# Patient Record
Sex: FEMALE | Race: BLACK OR AFRICAN AMERICAN | NOT HISPANIC OR LATINO | Employment: FULL TIME
[De-identification: names, ages, dates, MRNs, and addresses within clinical notes are randomized per-mention and may not be internally consistent; named-entity substitution may affect disease eponyms.]

---

## 2017-06-17 ENCOUNTER — HEALTH MAINTENANCE LETTER (OUTPATIENT)
Age: 52
End: 2017-06-17

## 2017-10-10 ENCOUNTER — HOSPITAL ENCOUNTER (OUTPATIENT)
Dept: CT IMAGING | Facility: CLINIC | Age: 52
Discharge: HOME | End: 2017-10-10
Attending: RADIOLOGY | Admitting: RADIOLOGY
Payer: COMMERCIAL

## 2017-10-10 DIAGNOSIS — Q25.72 PULMONARY ARTERIOVENOUS MALFORMATION: ICD-10-CM

## 2017-10-10 PROCEDURE — 25000128 H RX IP 250 OP 636: Performed by: STUDENT IN AN ORGANIZED HEALTH CARE EDUCATION/TRAINING PROGRAM

## 2017-10-10 PROCEDURE — 71260 CT THORAX DX C+: CPT

## 2017-10-10 RX ORDER — IOPAMIDOL 755 MG/ML
63 INJECTION, SOLUTION INTRAVASCULAR ONCE
Status: COMPLETED | OUTPATIENT
Start: 2017-10-10 | End: 2017-10-10

## 2017-10-10 RX ADMIN — IOPAMIDOL 63 ML: 755 INJECTION, SOLUTION INTRAVENOUS at 09:50

## 2017-10-11 ENCOUNTER — OFFICE VISIT (OUTPATIENT)
Dept: RADIOLOGY | Facility: CLINIC | Age: 52
End: 2017-10-11
Attending: RADIOLOGY
Payer: COMMERCIAL

## 2017-10-11 VITALS
OXYGEN SATURATION: 97 % | BODY MASS INDEX: 30.22 KG/M2 | SYSTOLIC BLOOD PRESSURE: 141 MMHG | HEIGHT: 64 IN | DIASTOLIC BLOOD PRESSURE: 79 MMHG | TEMPERATURE: 98.4 F | WEIGHT: 177 LBS | RESPIRATION RATE: 18 BRPM | HEART RATE: 51 BPM

## 2017-10-11 DIAGNOSIS — Q25.72 PULMONARY ARTERIOVENOUS MALFORMATION: Primary | ICD-10-CM

## 2017-10-11 PROCEDURE — 99213 OFFICE O/P EST LOW 20 MIN: CPT | Mod: ZF

## 2017-10-11 ASSESSMENT — PAIN SCALES - GENERAL: PAINLEVEL: NO PAIN (0)

## 2017-10-11 NOTE — NURSING NOTE
"Oncology Rooming Note    October 11, 2017 11:09 AM   Leigh Gray is a 52 year old female who presents for:    Chief Complaint   Patient presents with     Oncology Clinic Visit     Return visit related to one year follow up     Initial Vitals: /79  Pulse 51  Temp 98.4  F (36.9  C) (Tympanic)  Resp 18  Ht 1.613 m (5' 3.5\")  Wt 80.3 kg (177 lb)  SpO2 97%  BMI 30.86 kg/m2 Estimated body mass index is 30.86 kg/(m^2) as calculated from the following:    Height as of this encounter: 1.613 m (5' 3.5\").    Weight as of this encounter: 80.3 kg (177 lb). Body surface area is 1.9 meters squared.  No Pain (0) Comment: Data Unavailable   No LMP recorded. Patient is not currently having periods (Reason: IUD).  Allergies reviewed: Yes  Medications reviewed: Yes    Medications: Medication refills not needed today.  Pharmacy name entered into EPIC:    [facility] [place] - [id number] [place], [place] - [address] [place] [address]    Clinical concerns: No new concerns. Provider was notified.    10 minutes for nursing intake (face to face time)     Katarzyna Ellis LPN            "

## 2017-10-11 NOTE — PROGRESS NOTES
"    INTERVENTIONAL RADIOLOGY CONSULTATION    Name: Leigh Gray  Age: 52 year old   Referring Physician: Dr. Fernandez   REASON FOR REFERRAL: followup pulmonary AVM     HPI: Ms. 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 She has had no recent epistaxis, chest pain, shortness of breath or neurological symptoms. No shortness of breath or chest pain. Overall, she feels \"great\".    No epistaxis or GI bleeding. She is established at the [facility] HHT clinic.     She got  since our last visit and is very happy.       PAST MEDICAL HISTORY:   Past Medical History:   Diagnosis Date     AVM (arteriovenous malformation)     Right Pulmonary     GERD (gastroesophageal reflux disease)      Hiatal hernia     nissen done in 2007     HTN (hypertension)        PAST SURGICAL HISTORY:   Past Surgical History:   Procedure Laterality Date     COLONOSCOPY  6/21/2012    Procedure: COLONOSCOPY;;  Surgeon: Radha Hector MD;  Location:  GI     LAPAROSCOPIC NISSEN FUNDOPLICATION  2007     TUBAL LIGATION  1990       FAMILY HISTORY:   Family History   Problem Relation Age of Onset     C.A.D. 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 Provider to review         MEDICATIONS:   Prescription Medications as of 10/11/2017             amoxicillin (AMOXIL) 500 MG tablet Take 4 pills (2000 mg) 30-60 minutes before dental procedures, including teeth cleaning.    SERTRALINE HCL PO Take 50 mg by mouth daily    LISINOPRIL PO Take 20 mg by mouth daily    TRAZodone (DESYREL) 25 MG TABS Take 150 mg by mouth At Bedtime.    atenolol (TENORMIN) 25 MG tablet Take 25 mg by mouth. Takes 1 tab a day.           ALLERGIES:   Sulfa drugs    ROS:  Skin: negative  Eyes: negative  Ears/Nose/Throat: negative  Respiratory: No shortness of breath   Cardiovascular: negative  Gastrointestinal: negative  Neurologic: negative  Psychiatric: negative      Physical Examination:   VITALS:   /79  Pulse 51  Temp 98.4  F (36.9  C) (Tympanic)  Resp 18  Ht 1.613 m (5' 3.5\")  Wt 80.3 kg (177 lb)  SpO2 97%  BMI 30.86 kg/m2  Constitutional: healthy, alert and no distress  Head: Normocephalic.   ENT: oral mucosal and nasal telangiectasias with no bleeding stigmata  Cardiovascular: RRR. No murmur  Respiratory: Lungs clear  Extr: No pedal edema  Psychiatric: affect normal/bright and mentation appears normal.      Labs:    BMP RESULTS:  Lab Results   Component Value Date     10/31/2012    POTASSIUM 3.6 03/04/2015    CHLORIDE 106 10/31/2012    CO2 26 09/05/2012    ANIONGAP 9 09/05/2012     (A) 10/31/2012    BUN 11 10/31/2012    CR 0.64 03/04/2015    GFRESTIMATED >90  Non  GFR Calc   03/04/2015    GFRESTBLACK >90   GFR Calc   03/04/2015    ILIANA 8.9 10/31/2012        CBC RESULTS:  Lab Results   Component Value Date    WBC 7.8 10/31/2012    RBC 5.00 10/31/2012    HGB 14.5 03/04/2015    HCT 43.1 03/04/2015    MCV 88 10/31/2012    MCH 30 10/31/2012    MCHC 34 10/31/2012    RDW 14 10/31/2012     03/04/2015       INR/PTT:  Lab Results   Component Value Date    INR 1.11 03/04/2015       Diagnostic studies: CTA " reviewed by me. Stable plug RML peripheral pulmonary artery. There is filling of the vein, but no significant change and some filling may be related to return from adjacent pulmonary venous drainage    Assessment: 53 yo F with HHT, s/p embolization of RML AVM. She is asymptomatic and there are no new AVMs. There is residual fillin of her venous varix that may be related to venous drainage from adjacent normal lung. She is asymptomatic.     Plan:  RTC with pulmonary CTA in one year. If the draining vein enlarges at all or a distinct supplying artery is found, I will consider pulmonary angiography at that time.    It was a pleasure to see Leigh in clinic today. Thank you for involving the interventional Radiology service in her care.     A total of 20 minutes was spent with this patient, over 50% time was for counseling.      Minerva Israel MD  Interventional Radiology Attending                 [facility]        CC  Patient Care Team:  Darleen Ventura MD as PCP - General (Family Practice)  Katie Chavez MD as MD (Pulmonary)  Minerva Israel MD as MD (Radiology)  SELF, REFERRED

## 2017-10-11 NOTE — LETTER
"10/11/2017       RE: Leigh Gray  [address] [place] [address]     Dear Colleague,    Thank you for referring your patient, Leigh Gray, to the [facility]. Please see a copy of my visit note below.        INTERVENTIONAL RADIOLOGY CONSULTATION    Name: Leigh Gray  Age: 52 year old   Referring Physician: Dr. Fernandez   REASON FOR REFERRAL: followup pulmonary AVM     HPI: Ms. 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 She has had no recent epistaxis, chest pain, shortness of breath or neurological symptoms. No shortness of breath or chest pain. Overall, she feels \"great\".    No epistaxis or GI bleeding. She is established at the [facility] HHT clinic.     She got  since our last visit and is very happy.       PAST MEDICAL HISTORY:   Past Medical History:   Diagnosis Date     AVM (arteriovenous malformation)     Right Pulmonary     GERD (gastroesophageal reflux disease)      Hiatal hernia     nissen done in 2007     HTN (hypertension)        PAST SURGICAL HISTORY:   Past Surgical History:   Procedure Laterality Date     COLONOSCOPY  6/21/2012    Procedure: COLONOSCOPY;;  Surgeon: Radha Hector MD;  Location:  GI     LAPAROSCOPIC NISSEN FUNDOPLICATION  2007     TUBAL LIGATION  1990       FAMILY HISTORY:   Family History   Problem Relation Age of Onset     C.A.D. 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 Provider to review         MEDICATIONS:   Prescription Medications as of 10/11/2017             amoxicillin (AMOXIL) 500 MG tablet Take 4 pills (2000 mg) 30-60 minutes before dental procedures, including teeth cleaning.    SERTRALINE HCL PO Take 50 mg by mouth daily    LISINOPRIL PO Take 20 mg by mouth daily    TRAZodone (DESYREL) 25 MG TABS Take 150 mg by mouth At Bedtime.    atenolol (TENORMIN) 25 MG tablet Take 25 mg by mouth. Takes 1 tab a day.           ALLERGIES:   Sulfa drugs    ROS:  Skin: negative  Eyes: negative  Ears/Nose/Throat: negative  Respiratory: No shortness of breath   Cardiovascular: negative  Gastrointestinal: negative  Neurologic: negative  Psychiatric: negative      Physical Examination:   VITALS:   /79  Pulse 51  Temp 98.4  F (36.9  C) (Tympanic)  Resp 18  Ht 1.613 m (5' 3.5\")  Wt 80.3 kg (177 lb)  SpO2 97%  BMI 30.86 kg/m2  Constitutional: healthy, alert and no distress  Head: Normocephalic.   ENT: oral mucosal and nasal telangiectasias with no bleeding stigmata  Cardiovascular: RRR. No murmur  Respiratory: Lungs clear  Extr: No pedal edema  Psychiatric: affect normal/bright and mentation appears normal.      Labs:    BMP RESULTS:  Lab Results   Component Value Date     10/31/2012    POTASSIUM 3.6 03/04/2015    CHLORIDE 106 10/31/2012    CO2 26 09/05/2012    ANIONGAP 9 09/05/2012     (A) 10/31/2012    BUN 11 10/31/2012    CR 0.64 03/04/2015    GFRESTIMATED >90  Non  GFR Calc   03/04/2015    GFRESTBLACK >90   GFR Calc   03/04/2015    ILIANA 8.9 10/31/2012        CBC RESULTS:  Lab Results   Component Value Date    WBC 7.8 10/31/2012    RBC 5.00 10/31/2012    HGB 14.5 " 03/04/2015    HCT 43.1 03/04/2015    MCV 88 10/31/2012    MCH 30 10/31/2012    MCHC 34 10/31/2012    RDW 14 10/31/2012     03/04/2015       INR/PTT:  Lab Results   Component Value Date    INR 1.11 03/04/2015       Diagnostic studies: CTA reviewed by me. Stable plug RML peripheral pulmonary artery. There is filling of the vein, but no significant change and some filling may be related to return from adjacent pulmonary venous drainage    Assessment: 53 yo F with HHT, s/p embolization of RML AVM. She is asymptomatic and there are no new AVMs. There is residual fillin of her venous varix that may be related to venous drainage from adjacent normal lung. She is asymptomatic.     Plan:  RTC with pulmonary CTA in one year. If the draining vein enlarges at all or a distinct supplying artery is found, I will consider pulmonary angiography at that time.    It was a pleasure to see Leigh in clinic today. Thank you for involving the interventional Radiology service in her care.     A total of 20 minutes was spent with this patient, over 50% time was for counseling.      Minerva Israel MD  Interventional Radiology Attending                 [facility]        CC  Patient Care Team:  Darleen Ventura MD as PCP - General (Family Practice)  Katie Chavez MD as MD (Pulmonary)

## 2017-10-11 NOTE — MR AVS SNAPSHOT
After Visit Summary   10/11/2017    Leigh Gray    MRN: [id number]           Patient Information     Date Of Birth          1965        Visit Information        Provider Department      10/11/2017 10:40 AM Minerva Israel MD [facility]        Today's Diagnoses     Pulmonary arteriovenous malformation    -  1       Follow-ups after your visit        Your next 10 appointments already scheduled     Oct 18, 2018  7:30 AM CDT   [facility]  [facility]   [facility] Lab Draw ([facility])    [address] [place] [id number] [phone]            Oct 18, 2018  8:00 AM CDT   (Arrive by 7:45 AM)   Return Visit with Kacie Real MD   [facility] Cancer [facility] ([facility])    [address] [place] [id number] [phone]              Who to contact     If you have questions or need follow up information about today's clinic visit or your schedule please contact [facility] CANCER [facility] directly at [phone].  Normal or non-critical lab and imaging results will be communicated to you by [employer], letter or phone within 4 business days after the clinic has received the results. If you do not hear from us within 7 days, please contact the clinic through [employer] or phone. If you have a critical or abnormal lab result, we will notify you by phone as soon as possible.  Submit refill requests through [employer] or call your pharmacy and they will forward the refill request to us. Please allow 3 business days for your refill to be completed.          Additional Information About Your Visit        MyChart Information     [employer] gives you secure access to your electronic health record. If you see a primary care provider, you can also send messages to your care team and make appointments. If you have questions, please call your primary care  "clinic.  If you do not have a primary care provider, please call [phone] and they will assist you.        Care EveryWhere ID     This is your Care EveryWhere ID. This could be used by other organizations to access your [facility] medical records  [id number]        Your Vitals Were     Pulse Temperature Respirations Height Pulse Oximetry BMI (Body Mass Index)    51 98.4  F (36.9  C) (Tympanic) 18 1.613 m (5' 3.5\") 97% 30.86 kg/m2       Blood Pressure from Last 3 Encounters:   10/18/17 (!) 157/100   10/11/17 141/79   10/26/16 (!) 156/93    Weight from Last 3 Encounters:   10/18/17 78.5 kg (173 lb)   10/11/17 80.3 kg (177 lb)   10/26/16 83.2 kg (183 lb 8 oz)              Today, you had the following     No orders found for display       Primary Care Provider Office Phone # Fax #    Darleen Ventura [phone]       [address] [address]        Equal Access to Services     [facility]: Hadii aad ku hadasho Soomaali, waaxda luqadaha, qaybta kaalmada adeegyada, luci mcgrath . So [facility] [phone].    ATENCIÓN: Si habla español, tiene a blanco disposición servicios gratuitos de asistencia lingüística. Llame al [phone].    We comply with applicable federal civil rights laws and [place] laws. We do not discriminate on the basis of race, color, national origin, age, disability, sex, sexual orientation, or gender identity.            Thank you!     Thank you for choosing [facility] CANCER [facility]  for your care. Our goal is always to provide you with excellent care. Hearing back from our patients is one way we can continue to improve our services. Please take a few minutes to complete the written survey that you may receive in the mail after your visit with us. Thank you!             Your Updated Medication List - Protect others around you: Learn how to safely use, store and throw away your medicines at " www.disposemymeds.org.          This list is accurate as of: 10/11/17 11:59 PM.  Always use your most recent med list.                   Brand Name Dispense Instructions for use Diagnosis    atenolol 25 MG tablet    TENORMIN     Take 25 mg by mouth. Takes 1 tab a day.        LISINOPRIL PO      Take 20 mg by mouth daily        SERTRALINE HCL PO      Take 50 mg by mouth daily        traZODone 25 mg Tabs half-tab    DESYREL     Take 150 mg by mouth At Bedtime.

## 2017-10-18 ENCOUNTER — ONCOLOGY VISIT (OUTPATIENT)
Dept: ONCOLOGY | Facility: CLINIC | Age: 52
End: 2017-10-18
Attending: INTERNAL MEDICINE
Payer: COMMERCIAL

## 2017-10-18 VITALS
TEMPERATURE: 97 F | WEIGHT: 173 LBS | OXYGEN SATURATION: 95 % | HEIGHT: 64 IN | RESPIRATION RATE: 18 BRPM | DIASTOLIC BLOOD PRESSURE: 100 MMHG | SYSTOLIC BLOOD PRESSURE: 157 MMHG | HEART RATE: 53 BPM | BODY MASS INDEX: 29.53 KG/M2

## 2017-10-18 DIAGNOSIS — Q25.72 PULMONARY ARTERIOVENOUS MALFORMATION: ICD-10-CM

## 2017-10-18 DIAGNOSIS — I78.0 HHT (HEREDITARY HEMORRHAGIC TELANGIECTASIA) (H): Primary | ICD-10-CM

## 2017-10-18 DIAGNOSIS — I78.0 HHT (HEREDITARY HEMORRHAGIC TELANGIECTASIA) (H): ICD-10-CM

## 2017-10-18 LAB
ALBUMIN SERPL-MCNC: 3.3 G/DL (ref 3.4–5)
ALP SERPL-CCNC: 88 U/L (ref 40–150)
ALT SERPL W P-5'-P-CCNC: 20 U/L (ref 0–50)
ANION GAP SERPL CALCULATED.3IONS-SCNC: 7 MMOL/L (ref 3–14)
AST SERPL W P-5'-P-CCNC: 11 U/L (ref 0–45)
BASOPHILS # BLD AUTO: 0 10E9/L (ref 0–0.2)
BASOPHILS NFR BLD AUTO: 0.4 %
BILIRUB SERPL-MCNC: 0.4 MG/DL (ref 0.2–1.3)
BUN SERPL-MCNC: 13 MG/DL (ref 7–30)
CALCIUM SERPL-MCNC: 8.8 MG/DL (ref 8.5–10.1)
CHLORIDE SERPL-SCNC: 105 MMOL/L (ref 94–109)
CO2 SERPL-SCNC: 26 MMOL/L (ref 20–32)
CREAT SERPL-MCNC: 0.84 MG/DL (ref 0.52–1.04)
DIFFERENTIAL METHOD BLD: ABNORMAL
EOSINOPHIL # BLD AUTO: 0 10E9/L (ref 0–0.7)
EOSINOPHIL NFR BLD AUTO: 0.4 %
ERYTHROCYTE [DISTWIDTH] IN BLOOD BY AUTOMATED COUNT: 14 % (ref 10–15)
FERRITIN SERPL-MCNC: 89 NG/ML (ref 8–252)
GFR SERPL CREATININE-BSD FRML MDRD: 72 ML/MIN/1.7M2
GLUCOSE SERPL-MCNC: 130 MG/DL (ref 70–99)
HCT VFR BLD AUTO: 47.2 % (ref 35–47)
HGB BLD-MCNC: 15.2 G/DL (ref 11.7–15.7)
IMM GRANULOCYTES # BLD: 0 10E9/L (ref 0–0.4)
IMM GRANULOCYTES NFR BLD: 0.3 %
LYMPHOCYTES # BLD AUTO: 2.6 10E9/L (ref 0.8–5.3)
LYMPHOCYTES NFR BLD AUTO: 36.5 %
MCH RBC QN AUTO: 29.3 PG (ref 26.5–33)
MCHC RBC AUTO-ENTMCNC: 32.2 G/DL (ref 31.5–36.5)
MCV RBC AUTO: 91 FL (ref 78–100)
MONOCYTES # BLD AUTO: 0.4 10E9/L (ref 0–1.3)
MONOCYTES NFR BLD AUTO: 5.7 %
NEUTROPHILS # BLD AUTO: 4 10E9/L (ref 1.6–8.3)
NEUTROPHILS NFR BLD AUTO: 56.7 %
NRBC # BLD AUTO: 0 10*3/UL
NRBC BLD AUTO-RTO: 0 /100
PLATELET # BLD AUTO: 145 10E9/L (ref 150–450)
POTASSIUM SERPL-SCNC: 3.8 MMOL/L (ref 3.4–5.3)
PROT SERPL-MCNC: 6.5 G/DL (ref 6.8–8.8)
RBC # BLD AUTO: 5.19 10E12/L (ref 3.8–5.2)
SODIUM SERPL-SCNC: 138 MMOL/L (ref 133–144)
WBC # BLD AUTO: 7 10E9/L (ref 4–11)

## 2017-10-18 PROCEDURE — 85025 COMPLETE CBC W/AUTO DIFF WBC: CPT | Performed by: INTERNAL MEDICINE

## 2017-10-18 PROCEDURE — 99213 OFFICE O/P EST LOW 20 MIN: CPT | Mod: ZP | Performed by: INTERNAL MEDICINE

## 2017-10-18 PROCEDURE — 36415 COLL VENOUS BLD VENIPUNCTURE: CPT | Performed by: INTERNAL MEDICINE

## 2017-10-18 PROCEDURE — 80053 COMPREHEN METABOLIC PANEL: CPT | Performed by: INTERNAL MEDICINE

## 2017-10-18 PROCEDURE — 82728 ASSAY OF FERRITIN: CPT | Performed by: INTERNAL MEDICINE

## 2017-10-18 PROCEDURE — 99213 OFFICE O/P EST LOW 20 MIN: CPT | Mod: ZF

## 2017-10-18 RX ORDER — AMOXICILLIN 500 MG/1
TABLET, FILM COATED ORAL
Qty: 4 TABLET | Refills: 3 | Status: SHIPPED | OUTPATIENT
Start: 2017-10-18 | End: 2021-07-20

## 2017-10-18 ASSESSMENT — PAIN SCALES - GENERAL: PAINLEVEL: NO PAIN (0)

## 2017-10-18 NOTE — NURSING NOTE
"Oncology Rooming Note    October 18, 2017 7:29 AM   Leigh Gray is a 52 year old female who presents for:    Chief Complaint   Patient presents with     Oncology Clinic Visit     Return visit related to HTT     Initial Vitals: BP (!) 157/100  Pulse 53  Temp 97  F (36.1  C) (Oral)  Resp 18  Ht 1.613 m (5' 3.5\")  Wt 78.5 kg (173 lb)  SpO2 95%  BMI 30.16 kg/m2 Estimated body mass index is 30.16 kg/(m^2) as calculated from the following:    Height as of this encounter: 1.613 m (5' 3.5\").    Weight as of this encounter: 78.5 kg (173 lb). Body surface area is 1.88 meters squared.  No Pain (0) Comment: Data Unavailable   No LMP recorded. Patient is not currently having periods (Reason: IUD).  Allergies reviewed: Yes  Medications reviewed: Yes    Medications: Medication refills not needed today.  Pharmacy name entered into EPIC:    [facility] - [place] - [id number] [facility] - [place], [place] - [address] [place] [address]    Clinical concerns: No new concerns. Provider was notified.    10 minutes for nursing intake (face to face time)     Katarzyna Ellis LPN            "

## 2017-10-18 NOTE — LETTER
"10/18/2017       RE: Leigh Gray  [address] [place] [address]     Dear Colleague,    Thank you for referring your patient, Leigh Gray, to the [facility] CLINIC. Please see a copy of my visit note below.    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   Adopted and does not know family hx.  Adult offsping do not have s/s of HHT.Sequencing 10/6/15 of ACVRL1, ENG and SMAD4 are negative for any pathogenic mutations. Has pulmonary and liver AVMs, lip telangiectasia. Negative AVM on brain MRA 11/30/15. H/o plug embolization of lung AVM 3/2015.    2) hypertension  3) depression  4) h/o Frederic fund;lication     Interim History:  Ms. Gray is here for annual f/u. She has been feeling well. Has had only one nose bleed. No gum bleeding. No heavy menses because she has a Mirena IUD. T.i.d. was placed because of heavy menstrual bleeding. She has had recent CT angiogram with some small bowel dilatation beyond the area that had plug embolization. She reports that Dr. Israel will just monitor this.    She is concerned that she's gained some weight due to inactivity but just recently joined a gym. She denies any dyspnea on exertion or at rest. No abdominal pain. No leg edema.      PHYSICAL EXAMINATION:  BP (!) 157/100  Pulse 53  Temp 97  F (36.1  C) (Oral)  Resp 18  Ht 1.613 m (5' 3.5\")  Wt 78.5 kg (173 lb)  SpO2 95%  BMI 30.16 kg/m2    General appearance:  Patient is 53 yo woman in no acute distress.     HEENT:  No pallor, icterus, or mucositis.  A few tiny telangiectasias on lower lip.    Lungs:  Clear to auscultation bilaterally.   Heart:  Regular rate and rhythm; no S3 S4 or murmer.     Abdomen:  Positive bowel sounds, soft and nontender, nondistended.  No hepatomegaly. No splenomegaly appreciated.    Extremities:  No joint swelling or tenderness.  No ankle edema.     Skin:  No rash, no petechiae or ecchymoses.    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 She has been doing quite well and has this small abnormality in her lung for which he is being followed by interventional radiology. No other acute issues. Regarding the liver hemangiomas there is nothing specific to be done at this time. However annual comprehensive metabolic panel and monitoring for signs of portal hypertension continue. Regarding screening for brain AVMs-she has had this done and it seems that most patients if they are negative his adults will not go on to develop brain AVMs so there is no need to repeat routine MRI/MRA scans of the head. I have refilled her amoxicillin which she should take when she has dental work done.    Return to clinic in 1 year with labs.  Kacie Real MD  Hematology      Again, thank you for allowing me to participate in the care of your patient.      Sincerely,    Kacie Real MD

## 2017-10-18 NOTE — MR AVS SNAPSHOT
After Visit Summary   10/18/2017    Leigh Gray    MRN: [id number]           Patient Information     Date Of Birth          1965        Visit Information        Provider Department      10/18/2017 7:30 AM Kacie Real MD [facility]        Today's Diagnoses     HHT (hereditary hemorrhagic telangiectasia) (H)    -  1    Pulmonary arteriovenous malformation           Follow-ups after your visit        Follow-up notes from your care team     Return in about 1 year (around 10/18/2018) for f/u Farhan.      Your next 10 appointments already scheduled     Oct 18, 2017  8:15 AM CDT   Lab with [facility]   [facility] ([facility])    [address]  1st Floor  [facility] [address]   [phone]            Oct 18, 2018  7:30 AM CDT   [facility] Draw with  [facility] DRAW   [facility] ([facility])    [address] [place] [address]  [address] [facility] [address]   [phone]            Oct 18, 2018  8:00 AM CDT   (Arrive by 7:45 AM)   Return Visit with Kacie Real MD   [facility] ([facility])    [address] [place] [address]  [address] [facility] [address]   [phone]              Future tests that were ordered for you today     Open Future Orders        Priority Expected Expires Ordered    CBC with platelets differential Routine 10/18/2018 10/18/2018 10/18/2017    Comprehensive metabolic panel Routine 10/18/2018 10/18/2018 10/18/2017    Ferritin Routine 10/18/2018 10/18/2018 10/18/2017    CBC with platelets differential Routine 10/18/2017 11/1/2017 10/18/2017    Comprehensive metabolic panel Routine 10/18/2017 11/1/2017 10/18/2017    Ferritin Routine 10/18/2017 11/1/2017 10/18/2017            Who to contact     If you have questions or need follow up information about today's clinic visit or your schedule please contact [facility]  "directly at [phone].  Normal or non-critical lab and imaging results will be communicated to you by MyChart, letter or phone within 4 business days after the clinic has received the results. If you do not hear from us within 7 days, please contact the clinic through [employer] or phone. If you have a critical or abnormal lab result, we will notify you by phone as soon as possible.  Submit refill requests through [employer] or call your pharmacy and they will forward the refill request to us. Please allow 3 business days for your refill to be completed.          Additional Information About Your Visit        Face++har[employer]-umus Information     [employer] gives you secure access to your electronic health record. If you see a primary care provider, you can also send messages to your care team and make appointments. If you have questions, please call your primary care clinic.  If you do not have a primary care provider, please call [phone] and they will assist you.        Care EveryWhere ID     This is your Care EveryWhere ID. This could be used by other organizations to access your [facility] medical records  [id number]        Your Vitals Were     Pulse Temperature Respirations Height Pulse Oximetry BMI (Body Mass Index)    53 97  F (36.1  C) (Oral) 18 1.613 m (5' 3.5\") 95% 30.16 kg/m2       Blood Pressure from Last 3 Encounters:   10/18/17 (!) 157/100   10/11/17 141/79   10/26/16 (!) 156/93    Weight from Last 3 Encounters:   10/18/17 78.5 kg (173 lb)   10/11/17 80.3 kg (177 lb)   10/26/16 83.2 kg (183 lb 8 oz)                 Where to get your medicines      These medications were sent to [place], [place] - [address] [place] [address]     Phone:  [phone]     amoxicillin 500 MG tablet          Primary Care Provider Office Phone # Fax #    Darleen Ventura [phone]       [address]   [facility] [address]      "   Equal Access to Services     [facility] Center[facility]ISSY : Hadii odalis basilio titus Hallman, wabhaktida luqadaha, qaybta kagraysonluci sen. So [facility] [phone].    ATENCIÓN: Si habla español, tiene a blanco disposición servicios gratuitos de asistencia lingüística. Melitoname al [phone].    We comply with applicable federal civil rights laws and [place] laws. We do not discriminate on the basis of race, color, national origin, age, disability, sex, sexual orientation, or gender identity.            Thank you!     Thank you for choosing [facility]  for your care. Our goal is always to provide you with excellent care. Hearing back from our patients is one way we can continue to improve our services. Please take a few minutes to complete the written survey that you may receive in the mail after your visit with us. Thank you!             Your Updated Medication List - Protect others around you: Learn how to safely use, store and throw away your medicines at www.disposemymeds.org.          This list is accurate as of: 10/18/17  7:53 AM.  Always use your most recent med list.                   Brand Name Dispense Instructions for use Diagnosis    amoxicillin 500 MG tablet    AMOXIL    4 tablet    Take 4 pills (2000 mg) 30-60 minutes before dental procedures, including teeth cleaning.    Pulmonary arteriovenous malformation, HHT (hereditary hemorrhagic telangiectasia) (H)       atenolol 25 MG tablet    TENORMIN     Take 25 mg by mouth. Takes 1 tab a day.        LISINOPRIL PO      Take 20 mg by mouth daily        SERTRALINE HCL PO      Take 50 mg by mouth daily        traZODone 25 mg Tabs half-tab    DESYREL     Take 150 mg by mouth At Bedtime.

## 2017-10-18 NOTE — PROGRESS NOTES
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  Adopted and does not know family hx.  Adult offsping do not have s/s of HHT.Sequencing 10/6/15 of ACVRL1, ENG and SMAD4 are negative for any pathogenic mutations. Has pulmonary and liver AVMs, lip telangiectasia. Negative AVM on brain MRA 11/30/15. H/o plug embolization of lung AVM 3/2015.    2) hypertension  3) depression  4) h/o Frederic fund;lication     Interim History:  Ms. Gray is here for annual f/u. She has been feeling well. Has had only one nose bleed. No gum bleeding. No heavy menses because she has a Mirena IUD. T.i.d. was placed because of heavy menstrual bleeding. She has had recent CT angiogram with some small bowel dilatation beyond the area that had plug embolization. She reports that Dr. Israel will just monitor this.    She is concerned that she's gained some weight due to inactivity but just recently joined a gym. She denies any dyspnea on exertion or at rest. No abdominal pain. No leg edema.      PHYSICAL EXAMINATION:  BP (!) 157/100  Pulse 53  Temp 97  F (36.1  C) (Oral)  Resp 18  Ht 1.613 m (5' 3.5\")  Wt 78.5 kg (173 lb)  SpO2 95%  BMI 30.16 kg/m2    General appearance:  Patient is 51 yo woman in no acute distress.     HEENT:  No pallor, icterus, or mucositis.  A few tiny telangiectasias on lower lip.    Lungs:  Clear to auscultation bilaterally.   Heart:  Regular rate and rhythm; no S3 S4 or murmer.     Abdomen:  Positive bowel sounds, soft and nontender, nondistended.  No hepatomegaly. No splenomegaly appreciated.    Extremities:  No joint swelling or tenderness.  No ankle edema.     Skin:  No rash, no petechiae or ecchymoses.    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 She has been doing quite well and has this small abnormality in her lung for which he is being followed by interventional radiology. No other acute issues. Regarding the liver hemangiomas there is " nothing specific to be done at this time. However annual comprehensive metabolic panel and monitoring for signs of portal hypertension continue. Regarding screening for brain AVMs-she has had this done and it seems that most patients if they are negative his adults will not go on to develop brain AVMs so there is no need to repeat routine MRI/MRA scans of the head. I have refilled her amoxicillin which she should take when she has dental work done.    Return to clinic in 1 year with labs.  Kacie Real MD  Hematology

## 2018-05-07 ENCOUNTER — TRANSFERRED RECORDS (OUTPATIENT)
Dept: HEALTH INFORMATION MANAGEMENT | Facility: CLINIC | Age: 53
End: 2018-05-07

## 2018-05-07 ENCOUNTER — MEDICAL CORRESPONDENCE (OUTPATIENT)
Dept: HEALTH INFORMATION MANAGEMENT | Facility: CLINIC | Age: 53
End: 2018-05-07

## 2018-08-06 ENCOUNTER — TELEPHONE (OUTPATIENT)
Dept: RADIOLOGY | Facility: CLINIC | Age: 53
End: 2018-08-06

## 2018-08-06 DIAGNOSIS — Q25.72 PULMONARY ARTERIOVENOUS MALFORMATION: Primary | ICD-10-CM

## 2018-10-10 ENCOUNTER — RADIANT APPOINTMENT (OUTPATIENT)
Dept: CT IMAGING | Facility: CLINIC | Age: 53
End: 2018-10-10
Attending: RADIOLOGY
Payer: COMMERCIAL

## 2018-10-10 ENCOUNTER — OFFICE VISIT (OUTPATIENT)
Dept: RADIOLOGY | Facility: CLINIC | Age: 53
End: 2018-10-10
Attending: RADIOLOGY
Payer: COMMERCIAL

## 2018-10-10 VITALS
TEMPERATURE: 98.3 F | WEIGHT: 166.1 LBS | HEART RATE: 59 BPM | SYSTOLIC BLOOD PRESSURE: 153 MMHG | HEIGHT: 64 IN | DIASTOLIC BLOOD PRESSURE: 78 MMHG | BODY MASS INDEX: 28.36 KG/M2

## 2018-10-10 DIAGNOSIS — Q25.72 PULMONARY ARTERIOVENOUS MALFORMATION: ICD-10-CM

## 2018-10-10 DIAGNOSIS — Q25.72 PULMONARY ARTERIOVENOUS MALFORMATION: Primary | ICD-10-CM

## 2018-10-10 PROCEDURE — G0463 HOSPITAL OUTPT CLINIC VISIT: HCPCS

## 2018-10-10 RX ORDER — IOPAMIDOL 755 MG/ML
61 INJECTION, SOLUTION INTRAVASCULAR ONCE
Status: COMPLETED | OUTPATIENT
Start: 2018-10-10 | End: 2018-10-10

## 2018-10-10 RX ORDER — PANTOPRAZOLE SODIUM 20 MG/1
20 TABLET, DELAYED RELEASE ORAL DAILY PRN
Refills: 3 | COMMUNITY
Start: 2018-01-12

## 2018-10-10 RX ADMIN — IOPAMIDOL 61 ML: 755 INJECTION, SOLUTION INTRAVASCULAR at 09:34

## 2018-10-10 ASSESSMENT — PAIN SCALES - GENERAL: PAINLEVEL: NO PAIN (0)

## 2018-10-10 NOTE — LETTER
10/10/2018        RE: Leigh Espinal  [address] [place] [address]     Dear Colleague,    Thank you for referring your patient, Leigh Espinal, to the [facility]. Please see a copy of my visit note below.        INTERVENTIONAL RADIOLOGY CONSULTATION    Name: Leigh Espinal  Age: 53 year old   Referring Physician: Dr. Ab Cuellar, Dr. Real  REASON FOR REFERRAL: f/u pulmonary AVM     HPI: Productive (clear mucus) cough x several days, nasal congestion started at same time. No chest pain, dyspnea, fever. Cough medicine helps with cough (Robitussin CF). Past smoker (quit 5-6 years ago).    No dizziness, vision changes, headaches, focal neurologic symptoms. A few bloody noses (2 this month), lasts 2-3 minutes, able to stop with compression. Energy level good. No fatigue with house hold chores or walking. No blood in stool. No vomiting. No chest pain.    PAST MEDICAL HISTORY:   Past Medical History:   Diagnosis Date     AVM (arteriovenous malformation)     Right Pulmonary     GERD (gastroesophageal reflux disease)      Hiatal hernia     nissen done in 2007     HTN (hypertension)        PAST SURGICAL HISTORY:   Past Surgical History:   Procedure Laterality Date     COLONOSCOPY  6/21/2012    Procedure: COLONOSCOPY;;  Surgeon: Rahda Hector MD;  Location:  GI     LAPAROSCOPIC NISSEN FUNDOPLICATION  2007     TUBAL LIGATION  1990       FAMILY HISTORY:   Family History   Problem Relation Age of Onset     C.A.D. Maternal Grandfather      Hypertension Mother      Hypertension Father        SOCIAL HISTORY:   Social History   Substance Use Topics     Smoking status: Former Smoker     Packs/day: 1.00     Years: 20.00     Types: Cigarettes     Start date: 1/1/1980     Quit date: 9/13/2011     Smokeless tobacco: Never Used     Alcohol use 2.0 oz/week     4 drink(s) per week      Comment: occ       PROBLEM LIST:   Patient Active Problem List    Diagnosis Date Noted  "    Pulmonary arteriovenous malformation 11/25/2015     Priority: Medium     HHT (hereditary hemorrhagic telangiectasia) (H) 11/25/2015     Priority: Medium     Possible- no ACVRL1, ENG or SMAD4 mutations found on sequencing 10/6/15       Iron deficiency anemia 06/08/2012     Priority: Medium     Problem list name updated by automated process. Provider to review         MEDICATIONS:   Prescription Medications as of 10/10/2018             amoxicillin (AMOXIL) 500 MG tablet Take 4 pills (2000 mg) 30-60 minutes before dental procedures, including teeth cleaning.    atenolol (TENORMIN) 25 MG tablet Take 25 mg by mouth. Takes 1 tab a day.     LISINOPRIL PO Take 20 mg by mouth daily    pantoprazole (PROTONIX) 20 MG EC tablet TK 1 T PO D    SERTRALINE HCL PO Take 50 mg by mouth daily    TRAZodone (DESYREL) 25 MG TABS Take 150 mg by mouth At Bedtime.      Facility Administered Medications as of 10/10/2018             iopamidol (ISOVUE-370) solution 61 mL Inject 61 mLs into the vein once          ALLERGIES:   Sulfa drugs    ROS:  As above    Physical Examination:   VITALS:   /78 (BP Location: Left arm, Patient Position: Chair, Cuff Size: Adult Large)  Pulse 59  Temp 98.3  F (36.8  C) (Oral)  Ht 1.613 m (5' 3.5\")  Wt 75.3 kg (166 lb 1.6 oz)  BMI 28.96 kg/m2  Constitutional: healthy, alert and no distress  Head: Normocephalic.   Cardiovascular: Acyanotic  Respiratory: Normal effort  Psychiatric: affect normal/bright and mentation appears normal.    Labs:    BMP RESULTS:  Lab Results   Component Value Date     10/18/2017    POTASSIUM 3.8 10/18/2017    CHLORIDE 105 10/18/2017    CO2 26 10/18/2017    ANIONGAP 7 10/18/2017     (H) 10/18/2017    BUN 13 10/18/2017    CR 0.84 10/18/2017    GFRESTIMATED 72 10/18/2017    GFRESTBLACK 87 10/18/2017    ILIANA 8.8 10/18/2017        CBC RESULTS:  Lab Results   Component Value Date    WBC 7.0 10/18/2017    RBC 5.19 10/18/2017    HGB 15.2 10/18/2017    HCT 47.2 (H) " 10/18/2017    MCV 91 10/18/2017    MCH 29.3 10/18/2017    MCHC 32.2 10/18/2017    RDW 14.0 10/18/2017     (L) 10/18/2017       INR/PTT:  Lab Results   Component Value Date    INR 1.11 03/04/2015       Diagnostic studies:   Imaging reviewed by me: Pulm CTA today shows stable decompressed AVM, no new AVM. There is bronchial wall thickening with no pulmonary consolidation    Final read:  IMPRESSION:   1. Stable embolization changes of the right upper lobe AV  malformation.  2. Mild diffuse bronchial wall thickening. Correlate for bronchitis.     ROULA BAZZI MD      Assessment/Plan:  53-year-old female with history of gastroesophageal reflux disease, status post Nissen revision, hypertension, HHT and a right middle lobe pulmonary AVM status post embolization March 2015. AVM is stable on imaging, significantly reduced in size compared to preembolization images. Plan to continue to follow with yearly clinic visit with pulmonary CT angiogram. Regarding her upper respiratory symptoms, should they not resolve or worsen, she will consult her primary care physician.    It was a pleasure to see Mrs. Espinal in clinic today. Thank you for involving the Interventional Radiology service in her care.     I spent a total of 20 minutes with this patient, over 50% time was for counseling and coordination.     Minerva Israel MD  Interventional Radiology Attending                 [facility]    CC  Patient Care Team:  Darleen Ventura MD as PCP - General (Family Practice)

## 2018-10-10 NOTE — MR AVS SNAPSHOT
MRN:[id number]                      After Visit Summary   10/10/2018    Leigh Espinal    MRN: [id number]           Visit Information        Provider Department      10/10/2018 11:00 AM Minerva Israel MD [facility]        MyChart Information     [employer] gives you secure access to your electronic health record. If you see a primary care provider, you can also send messages to your care team and make appointments. If you have questions, please call your primary care clinic.  If you do not have a primary care provider, please call [phone] and they will assist you.        Care EveryWhere ID     This is your Care EveryWhere ID. This could be used by other organizations to access your [place] medical records  [id number]        Equal Access to Services     SIRI CORONEL : Willa Hallman, wabhaktida talon, qaybta kaalmada delfino, luci johansen. So [facility] [phone].    ATENCIÓN: Si habla español, tiene a blanco disposición servicios gratuitos de asistencia lingüística. Llame al [phone].    We comply with applicable federal civil rights laws and [place] laws. We do not discriminate on the basis of race, color, national origin, age, disability, sex, sexual orientation, or gender identity.

## 2018-10-10 NOTE — NURSING NOTE
"Oncology Rooming Note    October 10, 2018 11:13 AM   Leigh Espinal is a 53 year old female who presents for:    Chief Complaint   Patient presents with     Oncology Clinic Visit     [facility] RETURN- IRON DEFICIENCY ANEMIA     Initial Vitals: /78 (BP Location: Left arm, Patient Position: Chair, Cuff Size: Adult Large)  Pulse 59  Temp 98.3  F (36.8  C) (Oral)  Ht 1.613 m (5' 3.5\")  Wt 75.3 kg (166 lb 1.6 oz)  BMI 28.96 kg/m2 Estimated body mass index is 28.96 kg/(m^2) as calculated from the following:    Height as of this encounter: 1.613 m (5' 3.5\").    Weight as of this encounter: 75.3 kg (166 lb 1.6 oz). Body surface area is 1.84 meters squared.  No Pain (0) Comment: Data Unavailable   No LMP recorded. Patient is not currently having periods (Reason: IUD).  Allergies reviewed: Yes  Medications reviewed: Yes    Medications: Medication refills not needed today.  Pharmacy name entered into EPIC:    [facility] ([facility] - [place], [place] - [address] [facility] - [place], [place] - [address] [place] [address]    Clinical concerns: No new concerns. Jhonatan was notified.    10 minutes for nursing intake (face to face time)     Tree Miramontes LPN            "

## 2018-10-10 NOTE — DISCHARGE INSTRUCTIONS

## 2018-10-18 ENCOUNTER — ONCOLOGY VISIT (OUTPATIENT)
Dept: ONCOLOGY | Facility: CLINIC | Age: 53
End: 2018-10-18
Attending: INTERNAL MEDICINE
Payer: COMMERCIAL

## 2018-10-18 ENCOUNTER — APPOINTMENT (OUTPATIENT)
Dept: LAB | Facility: CLINIC | Age: 53
End: 2018-10-18
Attending: INTERNAL MEDICINE
Payer: COMMERCIAL

## 2018-10-18 VITALS
WEIGHT: 163 LBS | DIASTOLIC BLOOD PRESSURE: 122 MMHG | SYSTOLIC BLOOD PRESSURE: 201 MMHG | HEIGHT: 64 IN | BODY MASS INDEX: 27.83 KG/M2 | HEART RATE: 59 BPM | RESPIRATION RATE: 18 BRPM | OXYGEN SATURATION: 97 % | TEMPERATURE: 98.8 F

## 2018-10-18 DIAGNOSIS — I78.0 HHT (HEREDITARY HEMORRHAGIC TELANGIECTASIA) (H): ICD-10-CM

## 2018-10-18 DIAGNOSIS — D50.0 IRON DEFICIENCY ANEMIA DUE TO CHRONIC BLOOD LOSS: Primary | ICD-10-CM

## 2018-10-18 PROCEDURE — 99214 OFFICE O/P EST MOD 30 MIN: CPT | Mod: ZP | Performed by: INTERNAL MEDICINE

## 2018-10-18 PROCEDURE — G0463 HOSPITAL OUTPT CLINIC VISIT: HCPCS | Mod: ZF

## 2018-10-18 ASSESSMENT — PAIN SCALES - GENERAL: PAINLEVEL: NO PAIN (0)

## 2018-10-18 NOTE — NURSING NOTE
"Oncology Rooming Note    October 18, 2018 8:26 AM   Leigh Espinal is a 53 year old female who presents for:    Chief Complaint   Patient presents with     Oncology Clinic Visit     Return visit related to HTT     Initial Vitals: BP (!) 201/122 (BP Location: Right arm, Patient Position: Sitting, Cuff Size: Adult Regular)  Pulse 59  Temp 98.8  F (37.1  C) (Oral)  Resp 18  Ht 1.613 m (5' 3.5\")  Wt 73.9 kg (163 lb)  SpO2 97%  BMI 28.42 kg/m2 Estimated body mass index is 28.42 kg/(m^2) as calculated from the following:    Height as of this encounter: 1.613 m (5' 3.5\").    Weight as of this encounter: 73.9 kg (163 lb). Body surface area is 1.82 meters squared.  No Pain (0) Comment: Data Unavailable   No LMP recorded. Patient is not currently having periods (Reason: IUD).  Allergies reviewed: Yes  Medications reviewed: Yes    Medications: Medication refills not needed today.  Pharmacy name entered into EPIC:    [facility] ([facility]) [facility] - [place], [place] - [address] [facility] - [place], [place] - [address] [place] [address]    Clinical concerns: No new concerns. Provider was notified.    10 minutes for nursing intake (face to face time)     Katarzyna Ellis LPN            "

## 2018-10-18 NOTE — PROGRESS NOTES
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  Adopted and does not know family hx.  Adult offsping do not have s/s of HHT.Sequencing 10/6/15 of ACVRL1, ENG and SMAD4 are negative for any pathogenic mutations. Has pulmonary and liver AVMs, lip telangiectasia. Negative AVM on brain MRA 11/30/15. H/o plug embolization of lung AVM 3/2015.    2) hypertension  3) depression  4) s/p Frederic fundiplication      Interim History:  Ms. Espinal is here for follow-up of HHT.  I last saw her on 10/18/17.  She had a CT angiogram on 10/10/18 which showed no new AVMs.  She had a couple of nosebleeds a few weeks ago, which is the only nosebleeds she has had in the past year.  She has an IUD in place and so no longer has heavy menses.  No melena or hematochezia or hematuria.  He has had a colonoscopy twice in the past.  Overall she is feeling quite well.  She had labs done through her primary care physician and on 5/9/18 the ferritin was 90.    Review of systems she says she has occasional cough and allergies.  She keeps her blood pressure medications at work and does not taken them yet today, which is why her blood pressure so high.  The rest of the greater than 10 point review of systems negative.      PHYSICAL EXAMINATION:  BP (!) 201/122 (BP Location: Right arm, Patient Position: Sitting, Cuff Size: Adult Regular)  Pulse 59  Temp 98.8  F (37.1  C) (Oral)  Resp 18  Ht 1.613 m (5' 3.5\")  Wt 73.9 kg (163 lb)  SpO2 97%  BMI 28.42 kg/m2    General appearance:  Patient is 52 yo womanin no acute distress.     HEENT:  No pallor, icterus, or mucositis.  No thyromegaly.   Lymph nodes:  No cervical, supraclavicular, axillary, or inguinal lymphadenopathy.   Lungs:  Clear to auscultation bilaterally.   Heart:  Regular rate and rhythm; no S3 S4 or murmer.     Abdomen: Well-healed tiny surgical scars from the Niesen fundoplication positive bowel sounds, soft and nontender, nondistended.  No hepatomegaly. No " splenomegaly appreciated.    Extremities:  No joint swelling or tenderness.  No ankle edema.     Skin:  No rash, no petechiae or ecchymoses.    Assessment and recommendation: HHT-she is having minimal symptoms.  She does not have a lot of epistaxis.  The menorrhagia is gone ever since she started using an IUD seven years ago.  In May she was iron replete, and with no excessive bleeding I do not think it is necessary to check today.  She should let me know if she is having more problems with the epistaxis.  I discussed with her that there is no clear guidelines as far as how often to screen for pulmonary AVMs.  She has had CT angios only a year apart.  She probably needs the CT angios screening for pulmonary AVMs checked almost every 5 years.  She should continue to take amoxicillin before dental procedures.  She says she does not need a refill.    RTC 12 months.  Kacie Real MD  Hematology

## 2018-10-18 NOTE — MR AVS SNAPSHOT
After Visit Summary   10/18/2018    Leigh Espinal    MRN: [id number]           Patient Information     Date Of Birth          1965        Visit Information        Provider Department      10/18/2018 8:00 AM Kacie Real MD [facility]        Today's Diagnoses     Iron deficiency anemia due to chronic blood loss    -  1    HHT (hereditary hemorrhagic telangiectasia) (H)           Follow-ups after your visit        Follow-up notes from your care team     Return in about 1 year (around 10/18/2019) for f/u Farhan.      Future tests that were ordered for you today     Open Future Orders        Priority Expected Expires Ordered    CBC with platelets differential Routine 10/18/2019 10/18/2019 10/18/2018    Ferritin Routine 10/18/2019 10/18/2019 10/18/2018            Who to contact     If you have questions or need follow up information about today's clinic visit or your schedule please contact [facility] directly at [phone].  Normal or non-critical lab and imaging results will be communicated to you by [employer], letter or phone within 4 business days after the clinic has received the results. If you do not hear from us within 7 days, please contact the clinic through [facility] or phone. If you have a critical or abnormal lab result, we will notify you by phone as soon as possible.  Submit refill requests through [facility] or call your pharmacy and they will forward the refill request to us. Please allow 3 business days for your refill to be completed.          Additional Information About Your Visit        [employer] Information     [facility] gives you secure access to your electronic health record. If you see a primary care provider, you can also send messages to your care team and make appointments. If you have questions, please call your primary care clinic.  If you do not have a primary care provider, please call [phone] and they will assist you.    "     Care EveryWhere ID     This is your Care EveryWhere ID. This could be used by other organizations to access your [facility] medical records  [id number]        Your Vitals Were     Pulse Temperature Respirations Height Pulse Oximetry BMI (Body Mass Index)    59 98.8  F (37.1  C) (Oral) 18 1.613 m (5' 3.5\") 97% 28.42 kg/m2       Blood Pressure from Last 3 Encounters:   10/18/18 (!) 201/122   10/10/18 153/78   10/18/17 (!) 157/100    Weight from Last 3 Encounters:   10/18/18 73.9 kg (163 lb)   10/10/18 75.3 kg (166 lb 1.6 oz)   10/18/17 78.5 kg (173 lb)               Primary Care Provider Office Phone # Fax #    Darleen Ventura [phone]       [address] [address]        Equal Access to Services     LIANA [facility] AH: Hadii aad ku hadasho Soomaali, waaxda luqadaha, qaybta kaalmada adeegyada, waxay idiin hayana paula mcgrath . So [facility] [phone].    ATENCIÓN: Si habla español, tiene a blanco disposición servicios gratuitos de asistencia lingüística. [facility] [phone].    We comply with applicable federal civil rights laws and [place] laws. We do not discriminate on the basis of race, color, national origin, age, disability, sex, sexual orientation, or gender identity.            Thank you!     Thank you for choosing [facility]  for your care. Our goal is always to provide you with excellent care. Hearing back from our patients is one way we can continue to improve our services. Please take a few minutes to complete the written survey that you may receive in the mail after your visit with us. Thank you!             Your Updated Medication List - Protect others around you: Learn how to safely use, store and throw away your medicines at www.disposemymeds.org.          This list is accurate as of 10/18/18  8:51 AM.  Always use your most recent med list.                   Brand Name Dispense Instructions for use Diagnosis "    amoxicillin 500 MG tablet    AMOXIL    4 tablet    Take 4 pills (2000 mg) 30-60 minutes before dental procedures, including teeth cleaning.    Pulmonary arteriovenous malformation, HHT (hereditary hemorrhagic telangiectasia) (H)       atenolol 25 MG tablet    TENORMIN     Take 25 mg by mouth. Takes 1 tab a day.        LISINOPRIL PO      Take 20 mg by mouth daily        pantoprazole 20 MG EC tablet    PROTONIX     TK 1 T PO D        SERTRALINE HCL PO      Take 50 mg by mouth daily        traZODone 25 mg Tabs half-tab    DESYREL     Take 150 mg by mouth At Bedtime.

## 2018-10-18 NOTE — LETTER
"10/18/2018       RE: Leigh Espinal  [address] [place] [address]     Dear Colleague,    Thank you for referring your patient, Leigh Espinal, to the [facility]IC. Please see a copy of my visit note below.    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  Adopted and does not know family hx.  Adult offsping do not have s/s of HHT.Sequencing 10/6/15 of ACVRL1, ENG and SMAD4 are negative for any pathogenic mutations. Has pulmonary and liver AVMs, lip telangiectasia. Negative AVM on brain MRA 11/30/15. H/o plug embolization of lung AVM 3/2015.    2) hypertension  3) depression  4) s/p Frederic fundiplication      Interim History:  Ms. Espinal is here for follow-up of HHT.  I last saw her on 10/18/17.  She had a CT angiogram on 10/10/18 which showed no new AVMs.  She had a couple of nosebleeds a few weeks ago, which is the only nosebleeds she has had in the past year.  She has an IUD in place and so no longer has heavy menses.  No melena or hematochezia or hematuria.  He has had a colonoscopy twice in the past.  Overall she is feeling quite well.  She had labs done through her primary care physician and on 5/9/18 the ferritin was 90.    Review of systems she says she has occasional cough and allergies.  She keeps her blood pressure medications at work and does not taken them yet today, which is why her blood pressure so high.  The rest of the greater than 10 point review of systems negative.      PHYSICAL EXAMINATION:  BP (!) 201/122 (BP Location: Right arm, Patient Position: Sitting, Cuff Size: Adult Regular)  Pulse 59  Temp 98.8  F (37.1  C) (Oral)  Resp 18  Ht 1.613 m (5' 3.5\")  Wt 73.9 kg (163 lb)  SpO2 97%  BMI 28.42 kg/m2    General appearance:  Patient is 54 yo womanin no acute distress.     HEENT:  No pallor, icterus, or mucositis.  No thyromegaly.   Lymph nodes:  No cervical, supraclavicular, axillary, or inguinal lymphadenopathy. "   Lungs:  Clear to auscultation bilaterally.   Heart:  Regular rate and rhythm; no S3 S4 or murmer.     Abdomen: Well-healed tiny surgical scars from the Niesen fundoplication positive bowel sounds, soft and nontender, nondistended.  No hepatomegaly. No splenomegaly appreciated.    Extremities:  No joint swelling or tenderness.  No ankle edema.     Skin:  No rash, no petechiae or ecchymoses.    Assessment and recommendation: HHT-she is having minimal symptoms.  She does not have a lot of epistaxis.  The menorrhagia is gone ever since she started using an IUD seven years ago.  In May she was iron replete, and with no excessive bleeding I do not think it is necessary to check today.  She should let me know if she is having more problems with the epistaxis.  I discussed with her that there is no clear guidelines as far as how often to screen for pulmonary AVMs.  She has had CT angios only a year apart.  She probably needs the CT angios screening for pulmonary AVMs checked almost every 5 years.  She should continue to take amoxicillin before dental procedures.  She says she does not need a refill.    RTC 12 months.  Kacie Real MD  Hematology        Again, thank you for allowing me to participate in the care of your patient.      Sincerely,    Kacie Real MD

## 2019-01-10 ENCOUNTER — TRANSFERRED RECORDS (OUTPATIENT)
Dept: HEALTH INFORMATION MANAGEMENT | Facility: CLINIC | Age: 54
End: 2019-01-10

## 2019-08-12 DIAGNOSIS — Q25.72 PULMONARY ARTERIOVENOUS MALFORMATION: Primary | ICD-10-CM

## 2019-10-01 ENCOUNTER — HEALTH MAINTENANCE LETTER (OUTPATIENT)
Age: 54
End: 2019-10-01

## 2019-10-23 ENCOUNTER — ONCOLOGY VISIT (OUTPATIENT)
Dept: ONCOLOGY | Facility: CLINIC | Age: 54
End: 2019-10-23
Attending: INTERNAL MEDICINE
Payer: COMMERCIAL

## 2019-10-23 ENCOUNTER — APPOINTMENT (OUTPATIENT)
Dept: LAB | Facility: CLINIC | Age: 54
End: 2019-10-23
Attending: INTERNAL MEDICINE
Payer: COMMERCIAL

## 2019-10-23 ENCOUNTER — ANCILLARY PROCEDURE (OUTPATIENT)
Dept: MAMMOGRAPHY | Facility: CLINIC | Age: 54
End: 2019-10-23
Payer: COMMERCIAL

## 2019-10-23 ENCOUNTER — OFFICE VISIT (OUTPATIENT)
Dept: RADIOLOGY | Facility: CLINIC | Age: 54
End: 2019-10-23
Attending: INTERNAL MEDICINE
Payer: COMMERCIAL

## 2019-10-23 VITALS
TEMPERATURE: 98 F | RESPIRATION RATE: 18 BRPM | WEIGHT: 173.2 LBS | SYSTOLIC BLOOD PRESSURE: 156 MMHG | OXYGEN SATURATION: 97 % | DIASTOLIC BLOOD PRESSURE: 84 MMHG | BODY MASS INDEX: 30.2 KG/M2 | HEART RATE: 60 BPM

## 2019-10-23 VITALS
DIASTOLIC BLOOD PRESSURE: 84 MMHG | HEART RATE: 60 BPM | WEIGHT: 173.28 LBS | TEMPERATURE: 98 F | BODY MASS INDEX: 30.21 KG/M2 | SYSTOLIC BLOOD PRESSURE: 156 MMHG

## 2019-10-23 DIAGNOSIS — Q25.72 PULMONARY ARTERIOVENOUS MALFORMATION: Primary | ICD-10-CM

## 2019-10-23 DIAGNOSIS — I78.0 HHT (HEREDITARY HEMORRHAGIC TELANGIECTASIA) (H): ICD-10-CM

## 2019-10-23 DIAGNOSIS — Z12.31 VISIT FOR SCREENING MAMMOGRAM: ICD-10-CM

## 2019-10-23 DIAGNOSIS — D50.0 IRON DEFICIENCY ANEMIA DUE TO CHRONIC BLOOD LOSS: ICD-10-CM

## 2019-10-23 DIAGNOSIS — Z23 NEED FOR PROPHYLACTIC VACCINATION AND INOCULATION AGAINST INFLUENZA: Primary | ICD-10-CM

## 2019-10-23 LAB
BASOPHILS # BLD AUTO: 0 10E9/L (ref 0–0.2)
BASOPHILS NFR BLD AUTO: 0.5 %
DIFFERENTIAL METHOD BLD: NORMAL
EOSINOPHIL # BLD AUTO: 0.1 10E9/L (ref 0–0.7)
EOSINOPHIL NFR BLD AUTO: 0.8 %
ERYTHROCYTE [DISTWIDTH] IN BLOOD BY AUTOMATED COUNT: 14.1 % (ref 10–15)
FERRITIN SERPL-MCNC: 82 NG/ML (ref 8–252)
HCT VFR BLD AUTO: 44.9 % (ref 35–47)
HGB BLD-MCNC: 14.7 G/DL (ref 11.7–15.7)
IMM GRANULOCYTES # BLD: 0 10E9/L (ref 0–0.4)
IMM GRANULOCYTES NFR BLD: 0.2 %
LYMPHOCYTES # BLD AUTO: 2.5 10E9/L (ref 0.8–5.3)
LYMPHOCYTES NFR BLD AUTO: 38.8 %
MCH RBC QN AUTO: 29.6 PG (ref 26.5–33)
MCHC RBC AUTO-ENTMCNC: 32.7 G/DL (ref 31.5–36.5)
MCV RBC AUTO: 91 FL (ref 78–100)
MONOCYTES # BLD AUTO: 0.5 10E9/L (ref 0–1.3)
MONOCYTES NFR BLD AUTO: 7.2 %
NEUTROPHILS # BLD AUTO: 3.4 10E9/L (ref 1.6–8.3)
NEUTROPHILS NFR BLD AUTO: 52.5 %
NRBC # BLD AUTO: 0 10*3/UL
NRBC BLD AUTO-RTO: 0 /100
PLATELET # BLD AUTO: 192 10E9/L (ref 150–450)
RBC # BLD AUTO: 4.96 10E12/L (ref 3.8–5.2)
WBC # BLD AUTO: 6.4 10E9/L (ref 4–11)

## 2019-10-23 PROCEDURE — 85025 COMPLETE CBC W/AUTO DIFF WBC: CPT | Performed by: INTERNAL MEDICINE

## 2019-10-23 PROCEDURE — 99214 OFFICE O/P EST MOD 30 MIN: CPT | Mod: ZP | Performed by: INTERNAL MEDICINE

## 2019-10-23 PROCEDURE — G0008 ADMIN INFLUENZA VIRUS VAC: HCPCS

## 2019-10-23 PROCEDURE — 82728 ASSAY OF FERRITIN: CPT | Performed by: INTERNAL MEDICINE

## 2019-10-23 PROCEDURE — 25000128 H RX IP 250 OP 636: Mod: ZF | Performed by: INTERNAL MEDICINE

## 2019-10-23 PROCEDURE — G0463 HOSPITAL OUTPT CLINIC VISIT: HCPCS | Mod: ZF

## 2019-10-23 PROCEDURE — 90682 RIV4 VACC RECOMBINANT DNA IM: CPT | Mod: ZF | Performed by: INTERNAL MEDICINE

## 2019-10-23 PROCEDURE — G0463 HOSPITAL OUTPT CLINIC VISIT: HCPCS | Mod: ZF,25

## 2019-10-23 RX ADMIN — INFLUENZA A VIRUS A/BRISBANE/02/2018 (H1N1) RECOMBINANT HEMAGGLUTININ ANTIGEN, INFLUENZA A VIRUS A/KANSAS/14/2017 (H3N2) RECOMBINANT HEMAGGLUTININ ANTIGEN, INFLUENZA B VIRUS B/PHUKET/3073/2013 RECOMBINANT HEMAGGLUTININ ANTIGEN, AND INFLUENZA B VIRUS B/MARYLAND/15/2016 RECOMBINANT HEMAGGLUTININ ANTIGEN 0.5 ML: 45; 45; 45; 45 INJECTION INTRAMUSCULAR at 08:36

## 2019-10-23 ASSESSMENT — PAIN SCALES - GENERAL
PAINLEVEL: NO PAIN (0)
PAINLEVEL: NO PAIN (0)

## 2019-10-23 NOTE — NURSING NOTE
Chief Complaint   Patient presents with     Blood Draw     Right FA 20 ga angio place X 1 attempt for CT Scan today, labs drawn and sent, flushed with saline, vitals completed, checked into next appointment.   Izzy Deluna RN

## 2019-10-23 NOTE — LETTER
10/23/2019       RE: Leigh Espinal  [address] [place] [address]     Dear Colleague,    Thank you for referring your patient, Leigh Espinal, to the [facility]. Please see a copy of my visit note below.        INTERVENTIONAL RADIOLOGY CONSULTATION    Name: Leigh Espinal  Age: 54 year old   Referring Physician: Dr. Ventura   REASON FOR REFERRAL: followup pulmonary AVM     HPI: No dyspnea or cough. No dizziness, vision changes, headaches, focal neurologic symptoms. Rare epistaxis, lasts a few minutes, able to stop with compression. Energy level good. No fatigue with house hold chores or walking. No blood in stool. No vomiting. No chest pain.    PAST MEDICAL HISTORY:   Past Medical History:   Diagnosis Date     AVM (arteriovenous malformation)     Right Pulmonary     GERD (gastroesophageal reflux disease)      Hiatal hernia     nissen done in      HTN (hypertension)        PAST SURGICAL HISTORY:   Past Surgical History:   Procedure Laterality Date     COLONOSCOPY  2012    Procedure: COLONOSCOPY;;  Surgeon: Radha Hector MD;  Location: [facility]     LAPAROSCOPIC NISSEN FUNDOPLICATION       TUBAL LIGATION         FAMILY HISTORY:   Family History   Problem Relation Age of Onset     C.A.D. Maternal Grandfather      Hypertension Mother      Hypertension Father        SOCIAL HISTORY:   Social History     Tobacco Use     Smoking status: Former Smoker     Packs/day: 1.00     Years: 20.00     Pack years: 20.00     Types: Cigarettes     Start date: 1980     Last attempt to quit: 2011     Years since quittin.1     Smokeless tobacco: Never Used   Substance Use Topics     Alcohol use: Yes     Alcohol/week: 3.3 standard drinks     Types: 4 drink(s) per week     Comment: occ       PROBLEM LIST:   Patient Active Problem List    Diagnosis Date Noted     Pulmonary arteriovenous malformation 2015     Priority: Medium     HHT (hereditary  hemorrhagic telangiectasia) (H) 11/25/2015     Priority: Medium     Possible- no ACVRL1, ENG or SMAD4 mutations found on sequencing 10/6/15       Iron deficiency anemia 06/08/2012     Priority: Medium     Problem list name updated by automated process. Provider to review         MEDICATIONS:   Prescription Medications as of 10/23/2019       Rx Number Disp Refills Start End Last Dispensed Date Next Fill Date Owning Pharmacy    amoxicillin (AMOXIL) 500 MG tablet  4 tablet 3 10/18/2017    [place], [place] - [address] [place] [address] [address]    Sig: Take 4 pills (2000 mg) 30-60 minutes before dental procedures, including teeth cleaning.    Class: E-Prescribe    atenolol (TENORMIN) 25 MG tablet            Sig: Take 25 mg by mouth. Takes 1 tab a day.     Class: Historical    Route: Oral    LISINOPRIL PO            Sig: Take 20 mg by mouth daily    Class: Historical    Route: Oral    pantoprazole (PROTONIX) 20 MG EC tablet   3 1/12/2018        Sig: TK 1 T PO D    Class: Historical    SERTRALINE HCL PO            Sig: Take 50 mg by mouth daily    Class: Historical    Route: Oral    TRAZodone (DESYREL) 25 MG TABS            Sig: Take 150 mg by mouth At Bedtime.    Class: Historical    Route: Oral      Clinic-Administered Medications as of 10/23/2019       Dose Frequency Start End    influenza recomb quadrivalent PF (FLUBLOK) injection 0.5 mL (Completed) 0.5 mL ONCE 10/23/2019 10/23/2019    Admin Instructions: Administer when afebrile (less than 100.4 F) x 24 hours, or Prior to Discharge. If not administering when scheduled , change the due time by following the instructions in the reference link below. If patient refuses vaccine, chart as Vaccine Refused.    Route: Intramuscular    sodium chloride (PF) 0.9% PF flush 20 mL 20 mL EVERY 8 HOURS 10/23/2019     Class: E-Prescribe    Route: Intracatheter          ALLERGIES:   Sulfa drugs    ROS:  As above      Physical Examination:   VITALS:   BP (!)  156/84   Pulse 60   Temp 98  F (36.7  C) (Oral)   Wt 78.6 kg (173 lb 4.5 oz)   BMI 30.21 kg/m     Constitutional: healthy, alert and no distress  Head: Normocephalic.   ENT: Few mucocutaneous telangiectasias bilateral nares, tongue  CV: RRR  Respiratory: Lungs clear  Skin: No jaundice  Psychiatric: affect normal/bright and mentation appears normal.    Labs:    BMP RESULTS:  Lab Results   Component Value Date     10/18/2017    POTASSIUM 3.8 10/18/2017    CHLORIDE 105 10/18/2017    CO2 26 10/18/2017    ANIONGAP 7 10/18/2017     (H) 10/18/2017    BUN 13 10/18/2017    CR 0.84 10/18/2017    GFRESTIMATED 72 10/18/2017    GFRESTBLACK 87 10/18/2017    ILIANA 8.8 10/18/2017        CBC RESULTS:  Lab Results   Component Value Date    WBC 6.4 10/23/2019    RBC 4.96 10/23/2019    HGB 14.7 10/23/2019    HCT 44.9 10/23/2019    MCV 91 10/23/2019    MCH 29.6 10/23/2019    MCHC 32.7 10/23/2019    RDW 14.1 10/23/2019     10/23/2019       INR/PTT:  Lab Results   Component Value Date    INR 1.11 03/04/2015       Diagnostic studies:   CTA chest today stable post-embo changes. No new or enlarging pAVM    Final Read 10/2018:  IMPRESSION:   1. Stable embolization changes of the right upper lobe AV  malformation.  2. Mild diffuse bronchial wall thickening. Correlate for bronchitis.     ROULA BAZZI MD    Assessment:   54-year-old female with history of gastroesophageal reflux disease, status post Nissen revision, hypertension, HHT and a right middle lobe pulmonary AVM status post embolization March 2015. AVM has remained stable on imaging, significantly reduced in size compared to preembolization images.     Plan: Followup CTA five years from most recent scan of 2018     It was a pleasure to see Mrs. Espinal in clinic today. Thank you for involving the Interventional Radiology service in her care.     I spent a total of 20 minutes with this patient, over 50% time was for counseling and coordination.     Minerva MALDONADO  MD Jhonatan  Interventional Radiology Attending                 [facility]    CC  Patient Care Team:  Darleen Ventura MD as PCP - General (Family Practice)  Minerva Israel MD as MD (Radiology)

## 2019-10-23 NOTE — NURSING NOTE
"Oncology Rooming Note    October 23, 2019 8:48 AM   Leigh Espinal is a 54 year old female who presents for:    Chief Complaint   Patient presents with     Oncology Clinic Visit     Return visit; hereditary hemorrhagic telangiectasia; vitals taken     Initial Vitals: BP (!) 156/84   Pulse 60   Temp 98  F (36.7  C) (Oral)   Wt 78.6 kg (173 lb 4.5 oz)   BMI 30.21 kg/m   Estimated body mass index is 30.21 kg/m  as calculated from the following:    Height as of 10/18/18: 1.613 m (5' 3.5\").    Weight as of this encounter: 78.6 kg (173 lb 4.5 oz). Body surface area is 1.88 meters squared.  No Pain (0) Comment: Data Unavailable   No LMP recorded. (Menstrual status: IUD).  Allergies reviewed: Yes  Medications reviewed: Yes    Medications: Medication refills not needed today.  Pharmacy name entered into EPIC:    [facility] - [place], [place] - [address] [facility] [place], [place] - [address] [place] [address] [address]    Clinical concerns: No new concerns today. Dr. Israel was notified.      BRIGIDO REN CMA            "

## 2019-10-23 NOTE — PROGRESS NOTES
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  Adopted and does not know family hx.  Adult offsping do not have s/s of HHT.Sequencing 10/6/15 of ACVRL1, ENG and SMAD4 are negative for any pathogenic mutations. Has pulmonary and liver AVMs, lip telangiectasia. Negative AVM on brain MRA 11/30/15. H/o plug embolization of lung AVM 3/2015.  Most recent CT angiogram 10/10/2018 with no new AVMs.  Brain MRI 11/30/2015, no AVMs, completely normal.  2) hypertension  3) depression  4) s/p Frederic fundiplication      Interim History:  Ms. Hernadez is here for follow-up of HHT.  I last saw her on 10/18/2018.  Since that time she has been feeling well.  She is only had a couple of nosebleeds within the past year.  She does not have any menstrual periods due to the Mirena IUD.  Her energy is good.  She has an appointment today to have a CT angiogram and follow-up with Dr. Israel in .  She is also scheduled for a mammogram today, this is about 5 years since she had one.      PHYSICAL EXAMINATION:  BP (!) 156/84 (BP Location: Right arm, Patient Position: Sitting, Cuff Size: Adult Regular)   Pulse 60   Temp 98  F (36.7  C) (Oral)   Resp 18   Wt 78.6 kg (173 lb 3.2 oz)   SpO2 97%   BMI 30.20 kg/m      General appearance:  Patient is 55 yo woman in no acute distress.     HEENT:  No pallor, icterus, or mucositis.  Few tiny sludge ectasias on lips.  Lymph nodes:  No cervical, supraclavicular, axillary, or inguinal lymphadenopathy.   Lungs:  Clear to auscultation bilaterally.   Heart:  Regular rate and rhythm; no S3 S4 or murmer.     Abdomen:  Positive bowel sounds, soft and nontender, nondistended.  No hepatomegaly. No splenomegaly appreciated.    Extremities:  No joint swelling or tenderness.  No ankle edema.     Skin:  No rash, no petechiae or ecchymoses.    Labs:  Results for AZRA HERNADEZ (MRN [id number]) as of 10/23/2019 16:32   Ref. Range 10/23/2019 07:51   Ferritin Latest Ref Range: 8  - 252 ng/mL 82   WBC Latest Ref Range: 4.0 - 11.0 10e9/L 6.4   Hemoglobin Latest Ref Range: 11.7 - 15.7 g/dL 14.7   Hematocrit Latest Ref Range: 35.0 - 47.0 % 44.9   Platelet Count Latest Ref Range: 150 - 450 10e9/L 192   RBC Count Latest Ref Range: 3.8 - 5.2 10e12/L 4.96   MCV Latest Ref Range: 78 - 100 fl 91   MCH Latest Ref Range: 26.5 - 33.0 pg 29.6   MCHC Latest Ref Range: 31.5 - 36.5 g/dL 32.7   RDW Latest Ref Range: 10.0 - 15.0 % 14.1   Diff Method Unknown Automated Method   % Neutrophils Latest Units: % 52.5   % Lymphocytes Latest Units: % 38.8   % Monocytes Latest Units: % 7.2   % Eosinophils Latest Units: % 0.8   % Basophils Latest Units: % 0.5   % Immature Granulocytes Latest Units: % 0.2   Nucleated RBCs Latest Ref Range: 0 /100 0   Absolute Neutrophil Latest Ref Range: 1.6 - 8.3 10e9/L 3.4   Absolute Lymphocytes Latest Ref Range: 0.8 - 5.3 10e9/L 2.5   Absolute Monocytes Latest Ref Range: 0.0 - 1.3 10e9/L 0.5   Absolute Eosinophils Latest Ref Range: 0.0 - 0.7 10e9/L 0.1   Absolute Basophils Latest Ref Range: 0.0 - 0.2 10e9/L 0.0   Abs Immature Granulocytes Latest Ref Range: 0 - 0.4 10e9/L 0.0   Absolute Nucleated RBC Unknown 0.0         Assessment and recommendation:   HHT-she is having minimal symptoms.  She does not have a lot of epistaxis.  The menorrhagia is gone ever since she started using an IUD seven years ago.  Her hemoglobin is normal and the ferritin is good.  She does not need iron supplementation.    -Timing of CT angio-I discussed with Dr. Israel who agrees that she does not need another CT angio at this time.  Due for CT angio to screen for pulmonary AVMs October 2023    -She should continue to take amoxicillin before dental procedures.  She says she does not need a refill.     -Flu shot today    RTC 12 months.  Kacie Real MD  Hematology

## 2019-10-23 NOTE — LETTER
10/23/2019       RE: Leigh Espinal  [address] [place] [address]     Dear Colleague,    Thank you for referring your patient, Leigh Espinal, to the [facility]. Please see a copy of my visit note below.    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  Adopted and does not know family hx.  Adult offsping do not have s/s of HHT.Sequencing 10/6/15 of ACVRL1, ENG and SMAD4 are negative for any pathogenic mutations. Has pulmonary and liver AVMs, lip telangiectasia. Negative AVM on brain MRA 11/30/15. H/o plug embolization of lung AVM 3/2015.  Most recent CT angiogram 10/10/2018 with no new AVMs.  Brain MRI 11/30/2015, no AVMs, completely normal.  2) hypertension  3) depression  4) s/p Frederic fundiplication      Interim History:  Ms. Espinal is here for follow-up of HHT.  I last saw her on 10/18/2018.  Since that time she has been feeling well.  She is only had a couple of nosebleeds within the past year.  She does not have any menstrual periods due to the Mirena IUD.  Her energy is good.  She has an appointment today to have a CT angiogram and follow-up with Dr. Israel in .  She is also scheduled for a mammogram today, this is about 5 years since she had one.      PHYSICAL EXAMINATION:  BP (!) 156/84 (BP Location: Right arm, Patient Position: Sitting, Cuff Size: Adult Regular)   Pulse 60   Temp 98  F (36.7  C) (Oral)   Resp 18   Wt 78.6 kg (173 lb 3.2 oz)   SpO2 97%   BMI 30.20 kg/m       General appearance:  Patient is 55 yo woman in no acute distress.     HEENT:  No pallor, icterus, or mucositis.  Few tiny sludge ectasias on lips.  Lymph nodes:  No cervical, supraclavicular, axillary, or inguinal lymphadenopathy.   Lungs:  Clear to auscultation bilaterally.   Heart:  Regular rate and rhythm; no S3 S4 or murmer.     Abdomen:  Positive bowel sounds, soft and nontender, nondistended.  No hepatomegaly. No splenomegaly appreciated.     Extremities:  No joint swelling or tenderness.  No ankle edema.     Skin:  No rash, no petechiae or ecchymoses.    Labs:  Results for AZRA HERNADEZ (MRN [id number]) as of 10/23/2019 16:32   Ref. Range 10/23/2019 07:51   Ferritin Latest Ref Range: 8 - 252 ng/mL 82   WBC Latest Ref Range: 4.0 - 11.0 10e9/L 6.4   Hemoglobin Latest Ref Range: 11.7 - 15.7 g/dL 14.7   Hematocrit Latest Ref Range: 35.0 - 47.0 % 44.9   Platelet Count Latest Ref Range: 150 - 450 10e9/L 192   RBC Count Latest Ref Range: 3.8 - 5.2 10e12/L 4.96   MCV Latest Ref Range: 78 - 100 fl 91   MCH Latest Ref Range: 26.5 - 33.0 pg 29.6   MCHC Latest Ref Range: 31.5 - 36.5 g/dL 32.7   RDW Latest Ref Range: 10.0 - 15.0 % 14.1   Diff Method Unknown Automated Method   % Neutrophils Latest Units: % 52.5   % Lymphocytes Latest Units: % 38.8   % Monocytes Latest Units: % 7.2   % Eosinophils Latest Units: % 0.8   % Basophils Latest Units: % 0.5   % Immature Granulocytes Latest Units: % 0.2   Nucleated RBCs Latest Ref Range: 0 /100 0   Absolute Neutrophil Latest Ref Range: 1.6 - 8.3 10e9/L 3.4   Absolute Lymphocytes Latest Ref Range: 0.8 - 5.3 10e9/L 2.5   Absolute Monocytes Latest Ref Range: 0.0 - 1.3 10e9/L 0.5   Absolute Eosinophils Latest Ref Range: 0.0 - 0.7 10e9/L 0.1   Absolute Basophils Latest Ref Range: 0.0 - 0.2 10e9/L 0.0   Abs Immature Granulocytes Latest Ref Range: 0 - 0.4 10e9/L 0.0   Absolute Nucleated RBC Unknown 0.0         Assessment and recommendation:   HHT-she is having minimal symptoms.  She does not have a lot of epistaxis.  The menorrhagia is gone ever since she started using an IUD seven years ago.  Her hemoglobin is normal and the ferritin is good.  She does not need iron supplementation.    -Timing of CT angio-I discussed with Dr. Israel who agrees that she does not need another CT angio at this time.  Due for CT angio to screen for pulmonary AVMs October 2023    -She should continue to take amoxicillin before dental  procedures.  She says she does not need a refill.     -Flu shot today    RTC 12 months.    Kacie Real MD  Hematology

## 2019-10-23 NOTE — NURSING NOTE
"Oncology Rooming Note    October 23, 2019 8:09 AM   Leigh Espinal is a 54 year old female who presents for:    Chief Complaint   Patient presents with     Blood Draw     Right FA 20 ga angio place X 1 attempt for CT Scan today, labs drawn and sent, flushed with saline, vitals completed, checked into next appointment.     Oncology Clinic Visit     Return - HTT  1yr f/u     Initial Vitals: BP (!) 156/84 (BP Location: Right arm, Patient Position: Sitting, Cuff Size: Adult Regular)   Pulse 60   Temp 98  F (36.7  C) (Oral)   Resp 18   Wt 78.6 kg (173 lb 3.2 oz)   SpO2 97%   BMI 30.20 kg/m   Estimated body mass index is 30.2 kg/m  as calculated from the following:    Height as of 10/18/18: 1.613 m (5' 3.5\").    Weight as of this encounter: 78.6 kg (173 lb 3.2 oz). Body surface area is 1.88 meters squared.  No Pain (0) Comment: Data Unavailable   No LMP recorded. (Menstrual status: IUD).  Allergies reviewed: Yes  Medications reviewed: Yes    Medications: Medication refills not needed today.  Pharmacy name entered into EPIC:    [facility] ([facility] [place] [facility] [id number] [facility] - [place], [place] - [address] [place] [address] [id number]    Clinical concerns: Lab Results and flu shot      Augustin Lopez              "

## 2019-10-24 ENCOUNTER — MEDICAL CORRESPONDENCE (OUTPATIENT)
Dept: HEALTH INFORMATION MANAGEMENT | Facility: CLINIC | Age: 54
End: 2019-10-24

## 2019-11-11 ENCOUNTER — ANCILLARY PROCEDURE (OUTPATIENT)
Dept: MAMMOGRAPHY | Facility: CLINIC | Age: 54
End: 2019-11-11
Attending: INTERNAL MEDICINE
Payer: COMMERCIAL

## 2019-11-11 DIAGNOSIS — R92.8 ABNORMAL MAMMOGRAM OF BOTH BREASTS: ICD-10-CM

## 2019-11-21 ENCOUNTER — ANCILLARY PROCEDURE (OUTPATIENT)
Dept: MAMMOGRAPHY | Facility: CLINIC | Age: 54
End: 2019-11-21
Attending: INTERNAL MEDICINE
Payer: COMMERCIAL

## 2019-11-21 DIAGNOSIS — R92.8 ABNORMAL MAMMOGRAM OF BOTH BREASTS: ICD-10-CM

## 2019-11-21 DIAGNOSIS — N63.0 BREAST MASS: Primary | ICD-10-CM

## 2019-11-21 RX ORDER — IOPAMIDOL 612 MG/ML
100 INJECTION, SOLUTION INTRAVASCULAR ONCE
Status: COMPLETED | OUTPATIENT
Start: 2019-11-21 | End: 2019-11-21

## 2019-11-21 RX ORDER — LIDOCAINE HYDROCHLORIDE 10 MG/ML
10 INJECTION, SOLUTION EPIDURAL; INFILTRATION; INTRACAUDAL; PERINEURAL ONCE
Status: COMPLETED | OUTPATIENT
Start: 2019-11-21 | End: 2019-11-21

## 2019-11-21 RX ADMIN — LIDOCAINE HYDROCHLORIDE 10 ML: 10 INJECTION, SOLUTION EPIDURAL; INFILTRATION; INTRACAUDAL; PERINEURAL at 09:14

## 2019-11-21 RX ADMIN — IOPAMIDOL 100 ML: 612 INJECTION, SOLUTION INTRAVASCULAR at 08:47

## 2019-11-22 LAB — COPATH REPORT: NORMAL

## 2019-11-25 ENCOUNTER — TELEPHONE (OUTPATIENT)
Dept: MAMMOGRAPHY | Facility: CLINIC | Age: 54
End: 2019-11-25

## 2019-11-25 LAB — COPATH REPORT: NORMAL

## 2019-11-25 NOTE — TELEPHONE ENCOUNTER
ONCOLOGY INTAKE: Records Information      APPT INFORMATION:  Reason for visit/diagnosis:  Bilateral Invasive Lobular Carcinoma, martina grade 1-2, ER/AZ+, HER2 pending, both areas measuring about 2cm in size  Has patient been notified of appointment date and time?: Yes    RECORDS INFORMATION:  Were the records received with the referral (via [employer])? Internal Referral    Has patient been seen for any external appt for this diagnosis? No    If yes, where? NA    Has patient had any imaging or procedures outside of [facility] for this condition? No      If Yes, where? NA    ADDITIONAL INFORMATION:  None

## 2019-11-25 NOTE — TELEPHONE ENCOUNTER
Spoke to Leigh about her new diagnosis of bilateral Invasive Lobular Carcinoma found during her breast biopsy last week.  Discussed the Radiologist's recommendation of surgical consultation.  She is currently scheduled to meet with Dr. Paz Smith on Thursday, December 5th at 7:15am to discuss surgical treatment of the areas.  Leigh verbalized understanding and all questions and concerns were answered at this time.

## 2019-12-02 ENCOUNTER — MEDICAL CORRESPONDENCE (OUTPATIENT)
Dept: HEALTH INFORMATION MANAGEMENT | Facility: CLINIC | Age: 54
End: 2019-12-02

## 2019-12-02 ENCOUNTER — TRANSFERRED RECORDS (OUTPATIENT)
Dept: HEALTH INFORMATION MANAGEMENT | Facility: CLINIC | Age: 54
End: 2019-12-02

## 2019-12-05 ENCOUNTER — TELEPHONE (OUTPATIENT)
Dept: ONCOLOGY | Facility: CLINIC | Age: 54
End: 2019-12-05

## 2019-12-05 ENCOUNTER — PRE VISIT (OUTPATIENT)
Dept: ONCOLOGY | Facility: CLINIC | Age: 54
End: 2019-12-05

## 2019-12-05 ENCOUNTER — OFFICE VISIT (OUTPATIENT)
Dept: ONCOLOGY | Facility: CLINIC | Age: 54
End: 2019-12-05
Attending: SURGERY
Payer: COMMERCIAL

## 2019-12-05 ENCOUNTER — MYC MEDICAL ADVICE (OUTPATIENT)
Dept: PLASTIC SURGERY | Facility: CLINIC | Age: 54
End: 2019-12-05

## 2019-12-05 ENCOUNTER — HOSPITAL ENCOUNTER (OUTPATIENT)
Facility: AMBULATORY SURGERY CENTER | Age: 54
End: 2019-12-05
Attending: SURGERY
Payer: COMMERCIAL

## 2019-12-05 VITALS
HEIGHT: 64 IN | BODY MASS INDEX: 29.89 KG/M2 | TEMPERATURE: 97.7 F | OXYGEN SATURATION: 100 % | SYSTOLIC BLOOD PRESSURE: 194 MMHG | WEIGHT: 175.1 LBS | RESPIRATION RATE: 16 BRPM | HEART RATE: 62 BPM | DIASTOLIC BLOOD PRESSURE: 83 MMHG

## 2019-12-05 DIAGNOSIS — C50.412 MALIGNANT NEOPLASM OF UPPER-OUTER QUADRANT OF LEFT BREAST IN FEMALE, ESTROGEN RECEPTOR POSITIVE (H): ICD-10-CM

## 2019-12-05 DIAGNOSIS — C50.211 MALIGNANT NEOPLASM OF UPPER-INNER QUADRANT OF RIGHT BREAST IN FEMALE, ESTROGEN RECEPTOR POSITIVE (H): ICD-10-CM

## 2019-12-05 DIAGNOSIS — Z17.0 MALIGNANT NEOPLASM OF UPPER-INNER QUADRANT OF RIGHT BREAST IN FEMALE, ESTROGEN RECEPTOR POSITIVE (H): ICD-10-CM

## 2019-12-05 DIAGNOSIS — Z17.0 MALIGNANT NEOPLASM OF UPPER-OUTER QUADRANT OF LEFT BREAST IN FEMALE, ESTROGEN RECEPTOR POSITIVE (H): ICD-10-CM

## 2019-12-05 PROCEDURE — 40000803 ZZHCL STATISTIC DNA ISOL HIGH PURITY: Performed by: SURGERY

## 2019-12-05 PROCEDURE — G0463 HOSPITAL OUTPT CLINIC VISIT: HCPCS | Mod: ZF

## 2019-12-05 PROCEDURE — 36415 COLL VENOUS BLD VENIPUNCTURE: CPT | Performed by: SURGERY

## 2019-12-05 RX ORDER — ACETAMINOPHEN 325 MG/1
975 TABLET ORAL ONCE
Status: CANCELLED | OUTPATIENT
Start: 2019-12-05 | End: 2019-12-05

## 2019-12-05 RX ORDER — CEFAZOLIN SODIUM 2 G/50ML
2 SOLUTION INTRAVENOUS
Status: CANCELLED | OUTPATIENT
Start: 2019-12-05

## 2019-12-05 RX ORDER — CEFAZOLIN SODIUM 1 G/50ML
1 INJECTION, SOLUTION INTRAVENOUS SEE ADMIN INSTRUCTIONS
Status: CANCELLED | OUTPATIENT
Start: 2019-12-05

## 2019-12-05 ASSESSMENT — PAIN SCALES - GENERAL: PAINLEVEL: NO PAIN (0)

## 2019-12-05 ASSESSMENT — MIFFLIN-ST. JEOR: SCORE: 1381.38

## 2019-12-05 NOTE — PROGRESS NOTES
NEW SURGICAL CONSULTATION  Dec 5, 2019    Leigh Espinal is a 54 year old woman who presents with a bilateral  breast complaint.  She was referred by Dr Molina.    HPI:    She noted no masses in either breast, axilla, or neck. She denies any nipple discharge or nipple inversion.    Imaging showed a 2 x 1.4 cm mass at 1:00 5 cm FN in the RIGHT breast, and a 9 mm mass at 2:00 6 cm FN in the LEFT breast.  Contrast imaging confirmed the RIGHT breast mass, and identified a 1.4 cm enhancing mass on the LEFT, with an additional satellite mass (total diameter 2.1 cm).    A biopsy was performed of the RIGHT breast mass and a clip was placed.  It showed invasive ductal carcinoma, grade 1, ER+ MD+ HER2/meche negative.  A biopsy was performed of the LEFT breast mass and a clip was placed. It showed invasive lobular carcinoma, grade 2, ER+ MD+ HER2/meche negative.      BREAST-SPECIFIC HISTORY:  Prior breast biopsies: No  Prior breast surgeries: No  Prior radiation history: No  Hormone replacement therapy: No  Bra size: 40 DD  Dominant hand: Right    GYN HISTORY:    Age at 1st pregnancy: 25  Age at menarche: 11  Breastfeeding history: Yes  Menopausal? Has Mirena in place (for menorrhagia and iron-deficiency)    FAMILY HISTORY:  Breast ca: No  Ovarian ca: No  Pancreatic ca: No  Gastric ca: No  Melanoma: No  Colon ca: No  Other cancer: No    Past Medical History:   Diagnosis Date     AVM (arteriovenous malformation)     Right Pulmonary     GERD (gastroesophageal reflux disease)      HHT (hereditary hemorrhagic telangiectasia) (H) 2015    Possible- no ACVRL1, ENG or SMAD4 mutations found on sequencing 10/6/15      Hiatal hernia     nissen done in      HTN (hypertension)      Iron deficiency anemia 2012     Problem list name updated by automated process. Provider to review   No MI, DM, CVA, asthma    Past Surgical History:   Procedure Laterality Date     COLONOSCOPY  2012    Procedure: COLONOSCOPY;;   "Surgeon: Radha Hector MD;  Location:  GI     LAPAROSCOPIC NISSEN FUNDOPLICATION  2007     TUBAL LIGATION  1990   No GA issues    Current Outpatient Medications   Medication Sig Dispense Refill     atenolol (TENORMIN) 25 MG tablet Take 25 mg by mouth. Takes 1 tab a day.        LISINOPRIL PO Take 20 mg by mouth daily       pantoprazole (PROTONIX) 20 MG EC tablet TK 1 T PO D  3     SERTRALINE HCL PO Take 50 mg by mouth daily       TRAZodone (DESYREL) 25 MG TABS Take 150 mg by mouth At Bedtime.       amoxicillin (AMOXIL) 500 MG tablet Take 4 pills (2000 mg) 30-60 minutes before dental procedures, including teeth cleaning. (Patient not taking: Reported on 10/18/2018) 4 tablet 3         Allergies   Allergen Reactions     Sulfa Drugs         SOCIAL HISTORY:  Smokes: No - quit 2011  EtOH: Yes - 2 drinks per day, most days of the week  Illicit drugs: No    She work as an  at the [employer] at [employer]. No heavy lifting.    ROS:  Back pain: No  Headache: Yes - vision change related? (resolves spontaneously, not nocturnal or persistent)  Abdominal pain: No  Unexpected weight loss: No  Easy bruising/bleeding: Yes    BP (!) 194/83 (BP Location: Right arm, Patient Position: Chair, Cuff Size: Adult Large)   Pulse 62   Temp 97.7  F (36.5  C)   Resp 16   Ht 1.613 m (5' 3.5\")   Wt 79.4 kg (175 lb 1.6 oz)   SpO2 100%   BMI 30.53 kg/m     Physical Exam  Constitutional:       Appearance: She is well-developed.   Chest:      Breasts: Breasts are symmetrical.         Right: Mass present. No inverted nipple, nipple discharge, skin change or tenderness.         Left: No inverted nipple, mass, nipple discharge, skin change or tenderness.          Comments: Patient was examined in both supine and upright positions.   Lymphadenopathy:      Cervical: No cervical adenopathy.      Right cervical: No superficial, deep or posterior cervical adenopathy.     Left cervical: No superficial, deep or posterior cervical " adenopathy.      Upper Body:      Right upper body: No supraclavicular, axillary or pectoral adenopathy.      Left upper body: No supraclavicular, axillary or pectoral adenopathy.      Comments: No lymphedema in bilateral upper extremities.   Skin:     General: Skin is warm and dry.          INVESTIGATIONS:    Contrast-Enhanced Mammogram (11/21/2019) showed:  BREAST DENSITY: Heterogeneously dense  Findings: Enhancing mass in the right breast at the site of known suspicious mass measures 2.0 cm.  1.4 cm enhancing mass in the left breast at the site of the mammographic and ultrasound finding with a small satellite lesion anterior to the mass measuring a total diameter of 2.1 cm.   IMPRESSION: BI-RADS CATEGORY: 4 - Suspicious Abnormality-Biopsy Should Be Considered.    Diagnostic Mammogram & Ultrasound (11/11/2019) showed:  Breast Density: Scattered fibroglandular densities  Technique: Standard mammographic views were performed with tomosynthesis and 2D reconstruction.  Comparisons: 10/23/2019  Findings:     Mammogram:  In the right superior breast at middle depth, there is a round mass with spiculated margins. In the left lateral breast at middle depth, there is architectural distortion.  Ultrasound:  Targeted, real-time ultrasound evaluation was performed by the technologist and radiologist. In the right breast at 1:00, 5 cm from the nipple, there is an irregular mass with angular margins measuring 2 x 1.4 cm. In the left breast at 2:00, 6 cm from the nipple, there is an irregular mass measuring 0.9 x 0.8 x 0.8 cm. There are no enlarged lymph nodes in the right axilla or left axilla.  IMPRESSION: BI-RADS CATEGORY: 5 - Highly Suggestive of Malignancy.    Screening Mammogram (10/23/2019) showed:  BREAST DENSITY: Heterogeneously dense.  COMMENTS: Possible architectural distortion and developing focal asymmetry in the right middle depth medial at 12 to 1:00. Possible developing asymmetry in the left middle depth, at  about 3:00.  IMPRESSION: BI-RADS CATEGORY: 0 - Incomplete - Need Additional Imaging Evaluation and/or Prior Mammograms for Comparison.    Biopsy (11/21/2019) showed:  FINAL DIAGNOSIS:   A. Breast, LEFT, 2:00, 6 cm from nipple, ultrasound guided core biopsy:   -INVASIVE LOBULAR CARCINOMA, NURA GRADE 2   -Lobular carcinoma in situ (LCIS), classical type   -Invasive carcinoma is estrogen receptor positive and progesterone receptor positive by immunohistochemistry   -HER2 FISH result will be reported separately by cytogenetics   -See comment   ER positive (95%)  KS positive (75%)  HER2/meche negative    B. Breast, RIGHT, 1:00, 5 cm from nipple, ultrasound guided core biopsy:   -INVASIVE MAMMARY CARCINOMA OF NO SPECIAL TYPE (INVASIVE DUCTAL CARCINOMA), NURA GRADE 1   -Ductal carcinoma in situ (DCIS), nuclear grade 2, cribriform type   -Invasive carcinoma is estrogen receptor positive and progesterone receptor positive by immunohistochemistry   -HER2 FISH result will be reported separately by cytogenetics   ER positive (98%)  KS positive (99%)  HER2/meche negative    ASSESSMENT:  Leigh Espinal is a 54 year old woman with BILATERAL breast cancer.    Her stage is:  Cancer Staging  Malignant neoplasm of upper-inner quadrant of right breast in female, estrogen receptor positive (H)  Staging form: Breast, AJCC 8th Edition  - Clinical: Stage IA (cT1c, cN0, cM0, G1, ER+, KS+, HER2-) - Signed by Paz Smith MD on 12/5/2019    Malignant neoplasm of upper-outer quadrant of left breast in female, estrogen receptor positive (H)  Staging form: Breast, AJCC 8th Edition  - Clinical: Stage IB (cT2, cN0, cM0, G2, ER+, KS+, HER2-) - Signed by Paz Smith MD on 12/5/2019    I reviewed the imaging, diagnosis, staging, and management (including surgery, chemotherapy, radiation therapy, and endocrine therapy) of breast cancer with Leigh Espinal and her . A copy of the biopsy pathology report was  "also provided.    Her cancer are ER positive. We reviewed that hormonal forms of contraception, including the Mirena IUD, are contraindicated in breast cancer. I recommended that she have this removed.    The mainstay of treatment for resectable breast cancer is surgical resection, in the form of either breast conservation (segmental mastectomy plus radiation) or mastectomy.  We reviewed that the two strategies are equivalent in terms of overall survival.  The advantages and disadvantages of each were discussed.   Leigh Espinal IS a candidate for bilateral breast conservation therapy.  We discussed that this involves two necessary components: the lumpectomy (or \"segmental mastectomy\"), and several weeks of whole breast radiation therapy.  We discussed that the overall survival after breast conservation therapy is identical to mastectomy and that local recurrence rates are significantly higher if segmental mastectomy was performed without subsequent radiation.  We also discussed the significance of clear margins and that a subsequent procedure may be necessary to achieve this.    Because the lesion on the left is not palpable and the one on the right vaguely so, a wire-localized approach would be taken for breast conservation.  She would present on the day of surgery for an image-guided wire placement, followed by a surgical excision in the operating room.  The risks of a wire-localized segmental mastectomy were discussed with the patient and her , including the risks of bleeding, wound infection, wound dehiscence, and post-operative contour change to the breast.      Alternatively, we also discussed the various types of mastectomy, including total, skin-sparing, and nipple-sparing mastectomy.  We reviewed that the nipple-sparing technique is cosmetic; sensation and contractility will likely be lost.  Leigh Espinal is not a candidate for nipple-sparing mastectomy owing to relative size of her " breasts.  The risks of a mastectomy were discussed with the patient and her , including the risks of bleeding, wound infection, wound dehiscence, skin flap/nipple necrosis, poor cosmetic outcomes with skin folds, decreased shoulder range of motion, chest wall numbness, and seroma formation.    The option of having immediate versus delayed reconstruction was also discussed.   We reviewed that the advantages of immediate reconstruction includes superior cosmetics, as the skin is preserved.  However, the major disadvantage is increased postoperative risks, including skin flap ischemia and expander infection, which can potentially delay adjuvant oncologic treatments which may be needed post-surgically.  Leigh Espinal was not interested in this; a Plastic Surgery consultation was offered and will not be arranged. Depending on the margin and alex status post-mastectomy, radiation may be necessary.      In addition to the surgical management of the breast, a sentinel lymph node biopsy is recommended for alex staging of the axilla bilaterally.  This is performed with the combination of the radioactive colloid and lymphazurin. The risks of a sentinel lymph node biopsy were discussed with the patient and her , including the risks of lymphedema (5-10%), bleeding, wound infection, wound dehiscence, seroma formation, and paresthesias. There is also a small risk of anaphylaxis with lymphazurin injection as part of the procedure. There is an approximately 10% false negative rate associated with sentinel lymph node biopsy as published in the literature.  The findings of the sentinel lymph node biopsy may result in the need for further surgery (i.e. Axillary lymph node dissection) or radiation. There is a 5-10% chance of patients whose sentinel lymph nodes do not map despite dual tracer (radiocolloid and lymphazurin). Should this be the case, we discussed that I would proceed with an axillary lymph node  dissection at the index procedure.  The higher risks of an axillary lymph node dissection were also reviewed, including lymphedema (20-30%), bleeding, wound infection, wound dehiscence, seroma formation, nerve injury, limited arm range of motion and paresthesias. We discussed that a drain would be placed intra-operatively should an axillary lymph node dissection be performed.     We discussed that surgical pathology results will be reviewed in person in clinic, at the postoperative visit. Because pathology reports are often complicated, results are not reviewed by phone.  Reviewing in person would also allow for careful discussion of next steps and for answering questions.    Finally, we reviewed that as part of team-based approach to breast cancer, medical oncology and radiation oncology referrals will also be made after surgery to discuss adjuvant systemic/endocrine therapy and radiation therapy.  The decision regarding adjuvant chemotherapy will be made after surgery.     In addition, despite a lack of family history of breast cancer, she does have synchronous bilateral cancers.  I have recommended genetic counseling +/- testing.  The natural history of BRCA mutations and breast cancer were discussed with the patient. Should a deleterious mutation be identified, she would no longer be a good candidate for breast conservation.  We also reviewed the risk reduction benefits of a prophylactic mastectomy in this situation. Because genetic testing results will influence surgical management of this breast cancer, I have recommended genetic counseling +/- testing on an urgent basis.     All of the above was discussed with Leigh Espinal and all questions were answered.  She elected to proceed with genetic testing, followed by counseling.  She has elected for bilateral breast conservation; we will plan for surgery approximately 4 weeks from now to allow time for the test results to come back.    Total time spent  with the patient was 60 minutes, of which 75% was counseling.     PLAN:  1. Discontinue Mirena  2. Genetic testing + counseling  3. BILATERAL wire-localized segmental mastectomy  4. BILATERAL axillary sentinel lymph node biopsy, possible axillary lymph node dissection  5. Plan for surgery about 4 weeks from now, to allow time for genetic testing to come back.    Paz Smith MD MSc [facility] FACS    Division of Surgical Oncology  [facility]     ---  GENETIC TESTING FOR BREAST ACTIONABLE    Based on her personal history, Leigh meets the current National Comprehensive Cancer Network (NCCN) criteria for genetic testing of BRCA1/BRCA2 and/or requires genetic testing based on the recommendation of the American Society of Breast Surgeons Consensus Guideline on Genetic Testing for Hereditary Breast Cancer.     The Breast Actionable test analyzes 11 genes associated with hereditary breast cancer: JEANNE, BRCA1, BRCA2, CDH1, CHEK2, NBN, NF1, PALB2, PTEN, STK11, TP53. BRCA1 and BRCA2 only account for about half of hereditary breast cancer; therefore, this multi-gene test is the most efficient and cost-effective way to analyze these genes. As hereditary breast cancer is suspected, there is a reasonable probability of detecting a mutation in my patient. All of the genes on this test have published clinical practice guidelines.    Medical Management:   For Leigh, we reviewed that the information from genetic testing may determine:    surgery to treat Leigh's active cancer diagnosis    targeted chemotherapies for Leigh's active cancer    additional cancer screening and/or medical intervention for which Leigh may qualify    Additionally, family history was reviewed for any significant cancer history in Leigh's first and second degree relatives. Family history is significant for the following: see above    I, a qualified medical provider, counseled the patient on possible test results and its  implications in relation to Leigh's treatment/management. Leigh expressed understanding and consented to proceed with genetic testing. Referral to genetic counseling will be made upon return of results to discuss additional genetic testing options if indicated based on patient s personal and/or family history.

## 2019-12-05 NOTE — NURSING NOTE
"Oncology Rooming Note    December 5, 2019 7:48 AM   Leigh Espinal is a 54 year old female who presents for:    Chief Complaint   Patient presents with     Oncology Clinic Visit     [facility] NEW- BREAST CA     Initial Vitals: BP (!) 194/83 (BP Location: Right arm, Patient Position: Chair, Cuff Size: Adult Large)   Pulse 62   Temp 97.7  F (36.5  C)   Resp 16   Ht 1.613 m (5' 3.5\")   Wt 79.4 kg (175 lb 1.6 oz)   SpO2 100%   BMI 30.53 kg/m   Estimated body mass index is 30.53 kg/m  as calculated from the following:    Height as of this encounter: 1.613 m (5' 3.5\").    Weight as of this encounter: 79.4 kg (175 lb 1.6 oz). Body surface area is 1.89 meters squared.  No Pain (0) Comment: Data Unavailable   No LMP recorded. (Menstrual status: IUD).  Allergies reviewed: Yes  Medications reviewed: Yes    Medications: Medication refills not needed today.  Pharmacy name entered into EPIC:    [facility] - [place], [place] - [facility] [facility]- [place], [place] - [address] [place] [address] [id number]    Clinical concerns: blood pressure 194/83 patient states she hasn't taken her blood pressure medication this morning.  Sarah was notified.      Tree Miramontes LPN            "

## 2019-12-05 NOTE — Clinical Note
2019       RE: Leigh Espinal  [address] [place] [address]     Dear Colleague,    Thank you for referring your patient, Leigh Espinal, to the [facility] at [facility]. Please see a copy of my visit note below.    NEW SURGICAL CONSULTATION  Dec 5, 2019    Leigh Espinal is a 54 year old woman who presents with a bilateral  breast complaint.  She was referred by Dr Molina.    HPI:    She noted no masses in either breast, axilla, or neck. She denies any nipple discharge or nipple inversion.    Imaging showed a 2 x 1.4 cm mass at 1:00 5 cm FN in the RIGHT breast, and a 9 mm mass at 2:00 6 cm FN in the LEFT breast.  Contrast imaging confirmed the RIGHT breast mass, and identified a 1.4 cm enhancing mass on the LEFT, with an additional satellite mass (total diameter 2.1 cm).    A biopsy was performed of the RIGHT breast mass and a clip was placed.  It showed invasive ductal carcinoma, grade 1, ER+ MI+ HER2/meche negative.  A biopsy was performed of the LEFT breast mass and a clip was placed. It showed invasive lobular carcinoma, grade 2, ER+ MI+ HER2/meche negative.      BREAST-SPECIFIC HISTORY:  Prior breast biopsies: No  Prior breast surgeries: No  Prior radiation history: No  Hormone replacement therapy: No  Bra size: 40 DD  Dominant hand: Right    GYN HISTORY:    Age at 1st pregnancy: 25  Age at menarche: 11  Breastfeeding history: Yes  Menopausal? Has Mirena in place (for menorrhagia and iron-deficiency)    FAMILY HISTORY:  Breast ca: No  Ovarian ca: No  Pancreatic ca: No  Gastric ca: No  Melanoma: No  Colon ca: No  Other cancer: No    Past Medical History:   Diagnosis Date     AVM (arteriovenous malformation)     Right Pulmonary     GERD (gastroesophageal reflux disease)      HHT (hereditary hemorrhagic telangiectasia) (H) 2015    Possible- no ACVRL1, ENG or SMAD4 mutations found on sequencing 10/6/15      Naomi  "hernia     nissen done in 2007     HTN (hypertension)      Iron deficiency anemia 6/8/2012     Problem list name updated by automated process. Provider to review   No MI, DM, CVA, asthma    Past Surgical History:   Procedure Laterality Date     COLONOSCOPY  6/21/2012    Procedure: COLONOSCOPY;;  Surgeon: Radha Hector MD;  Location:  GI     LAPAROSCOPIC NISSEN FUNDOPLICATION  2007     TUBAL LIGATION  1990   No GA issues    Current Outpatient Medications   Medication Sig Dispense Refill     atenolol (TENORMIN) 25 MG tablet Take 25 mg by mouth. Takes 1 tab a day.        LISINOPRIL PO Take 20 mg by mouth daily       pantoprazole (PROTONIX) 20 MG EC tablet TK 1 T PO D  3     SERTRALINE HCL PO Take 50 mg by mouth daily       TRAZodone (DESYREL) 25 MG TABS Take 150 mg by mouth At Bedtime.       amoxicillin (AMOXIL) 500 MG tablet Take 4 pills (2000 mg) 30-60 minutes before dental procedures, including teeth cleaning. (Patient not taking: Reported on 10/18/2018) 4 tablet 3         Allergies   Allergen Reactions     Sulfa Drugs         SOCIAL HISTORY:  Smokes: No - quit 2011  EtOH: Yes - 2 drinks per day, most days of the week  Illicit drugs: No    She work as an  at the [employer] at [employer]. No heavy lifting.    ROS:  Back pain: No  Headache: Yes - vision change related? (resolves spontaneously, not nocturnal or persistent)  Abdominal pain: No  Unexpected weight loss: No  Easy bruising/bleeding: Yes    BP (!) 194/83 (BP Location: Right arm, Patient Position: Chair, Cuff Size: Adult Large)   Pulse 62   Temp 97.7  F (36.5  C)   Resp 16   Ht 1.613 m (5' 3.5\")   Wt 79.4 kg (175 lb 1.6 oz)   SpO2 100%   BMI 30.53 kg/m      Physical Exam  Constitutional:       Appearance: She is well-developed.   Chest:      Breasts: Breasts are symmetrical.         Right: Mass present. No inverted nipple, nipple discharge, skin change or tenderness.         Left: No inverted nipple, mass, nipple discharge, skin " change or tenderness.          Comments: Patient was examined in both supine and upright positions.   Lymphadenopathy:      Cervical: No cervical adenopathy.      Right cervical: No superficial, deep or posterior cervical adenopathy.     Left cervical: No superficial, deep or posterior cervical adenopathy.      Upper Body:      Right upper body: No supraclavicular, axillary or pectoral adenopathy.      Left upper body: No supraclavicular, axillary or pectoral adenopathy.      Comments: No lymphedema in bilateral upper extremities.   Skin:     General: Skin is warm and dry.          INVESTIGATIONS:    Contrast-Enhanced Mammogram (11/21/2019) showed:  BREAST DENSITY: Heterogeneously dense  Findings: Enhancing mass in the right breast at the site of known suspicious mass measures 2.0 cm.  1.4 cm enhancing mass in the left breast at the site of the mammographic and ultrasound finding with a small satellite lesion anterior to the mass measuring a total diameter of 2.1 cm.   IMPRESSION: BI-RADS CATEGORY: 4 - Suspicious Abnormality-Biopsy Should Be Considered.    Diagnostic Mammogram & Ultrasound (11/11/2019) showed:  Breast Density: Scattered fibroglandular densities  Technique: Standard mammographic views were performed with tomosynthesis and 2D reconstruction.  Comparisons: 10/23/2019  Findings:     Mammogram:  In the right superior breast at middle depth, there is a round mass with spiculated margins. In the left lateral breast at middle depth, there is architectural distortion.  Ultrasound:  Targeted, real-time ultrasound evaluation was performed by the technologist and radiologist. In the right breast at 1:00, 5 cm from the nipple, there is an irregular mass with angular margins measuring 2 x 1.4 cm. In the left breast at 2:00, 6 cm from the nipple, there is an irregular mass measuring 0.9 x 0.8 x 0.8 cm. There are no enlarged lymph nodes in the right axilla or left axilla.  IMPRESSION: BI-RADS CATEGORY: 5 - Highly  Suggestive of Malignancy.    Screening Mammogram (10/23/2019) showed:  BREAST DENSITY: Heterogeneously dense.  COMMENTS: Possible architectural distortion and developing focal asymmetry in the right middle depth medial at 12 to 1:00. Possible developing asymmetry in the left middle depth, at about 3:00.  IMPRESSION: BI-RADS CATEGORY: 0 - Incomplete - Need Additional Imaging Evaluation and/or Prior Mammograms for Comparison.    Biopsy (11/21/2019) showed:  FINAL DIAGNOSIS:   A. Breast, LEFT, 2:00, 6 cm from nipple, ultrasound guided core biopsy:   -INVASIVE LOBULAR CARCINOMA, NURA GRADE 2   -Lobular carcinoma in situ (LCIS), classical type   -Invasive carcinoma is estrogen receptor positive and progesterone receptor positive by immunohistochemistry   -HER2 FISH result will be reported separately by cytogenetics   -See comment   ER positive (95%)  WA positive (75%)  HER2/meche negative    B. Breast, RIGHT, 1:00, 5 cm from nipple, ultrasound guided core biopsy:   -INVASIVE MAMMARY CARCINOMA OF NO SPECIAL TYPE (INVASIVE DUCTAL CARCINOMA), NURA GRADE 1   -Ductal carcinoma in situ (DCIS), nuclear grade 2, cribriform type   -Invasive carcinoma is estrogen receptor positive and progesterone receptor positive by immunohistochemistry   -HER2 FISH result will be reported separately by cytogenetics   ER positive (98%)  WA positive (99%)  HER2/meche negative    ASSESSMENT:  Leigh Espinal is a 54 year old woman with BILATERAL breast cancer.    Her stage is:  Cancer Staging  Malignant neoplasm of upper-inner quadrant of right breast in female, estrogen receptor positive (H)  Staging form: Breast, AJCC 8th Edition  - Clinical: Stage IA (cT1c, cN0, cM0, G1, ER+, WA+, HER2-) - Signed by Paz Smith MD on 12/5/2019    Malignant neoplasm of upper-outer quadrant of left breast in female, estrogen receptor positive (H)  Staging form: Breast, AJCC 8th Edition  - Clinical: Stage IB (cT2, cN0, cM0, G2, ER+, WA+,  "HER2-) - Signed by Paz Smith MD on 12/5/2019    I reviewed the imaging, diagnosis, staging, and management (including surgery, chemotherapy, radiation therapy, and endocrine therapy) of breast cancer with Leigh Espinal and her . A copy of the biopsy pathology report was also provided.    Her cancer are ER positive. We reviewed that hormonal forms of contraception, including the Mirena IUD, are contraindicated in breast cancer. I recommended that she have this removed.    The mainstay of treatment for resectable breast cancer is surgical resection, in the form of either breast conservation (segmental mastectomy plus radiation) or mastectomy.  We reviewed that the two strategies are equivalent in terms of overall survival.  The advantages and disadvantages of each were discussed.   Leigh Espinal IS a candidate for bilateral breast conservation therapy.  We discussed that this involves two necessary components: the lumpectomy (or \"segmental mastectomy\"), and several weeks of whole breast radiation therapy.  We discussed that the overall survival after breast conservation therapy is identical to mastectomy and that local recurrence rates are significantly higher if segmental mastectomy was performed without subsequent radiation.  We also discussed the significance of clear margins and that a subsequent procedure may be necessary to achieve this.    Because the lesion on the left is not palpable and the one on the right vaguely so, a wire-localized approach would be taken for breast conservation.  She would present on the day of surgery for an image-guided wire placement, followed by a surgical excision in the operating room.  The risks of a wire-localized segmental mastectomy were discussed with the patient and her , including the risks of bleeding, wound infection, wound dehiscence, and post-operative contour change to the breast.      Alternatively, we also discussed the various " types of mastectomy, including total, skin-sparing, and nipple-sparing mastectomy.  We reviewed that the nipple-sparing technique is cosmetic; sensation and contractility will likely be lost.  Leigh Espinal is not a candidate for nipple-sparing mastectomy owing to relative size of her breasts.  The risks of a mastectomy were discussed with the patient and her , including the risks of bleeding, wound infection, wound dehiscence, skin flap/nipple necrosis, poor cosmetic outcomes with skin folds, decreased shoulder range of motion, chest wall numbness, and seroma formation.    The option of having immediate versus delayed reconstruction was also discussed.   We reviewed that the advantages of immediate reconstruction includes superior cosmetics, as the skin is preserved.  However, the major disadvantage is increased postoperative risks, including skin flap ischemia and expander infection, which can potentially delay adjuvant oncologic treatments which may be needed post-surgically.  Leigh Espinal was not interested in this; a Plastic Surgery consultation was offered and will not be arranged. Depending on the margin and alex status post-mastectomy, radiation may be necessary.      In addition to the surgical management of the breast, a sentinel lymph node biopsy is recommended for alex staging of the axilla bilaterally.  This is performed with the combination of the radioactive colloid and lymphazurin. The risks of a sentinel lymph node biopsy were discussed with the patient and her , including the risks of lymphedema (5-10%), bleeding, wound infection, wound dehiscence, seroma formation, and paresthesias. There is also a small risk of anaphylaxis with lymphazurin injection as part of the procedure. There is an approximately 10% false negative rate associated with sentinel lymph node biopsy as published in the literature.  The findings of the sentinel lymph node biopsy may result in the  need for further surgery (i.e. Axillary lymph node dissection) or radiation. There is a 5-10% chance of patients whose sentinel lymph nodes do not map despite dual tracer (radiocolloid and lymphazurin). Should this be the case, we discussed that I would proceed with an axillary lymph node dissection at the index procedure.  The higher risks of an axillary lymph node dissection were also reviewed, including lymphedema (20-30%), bleeding, wound infection, wound dehiscence, seroma formation, nerve injury, limited arm range of motion and paresthesias. We discussed that a drain would be placed intra-operatively should an axillary lymph node dissection be performed.     We discussed that surgical pathology results will be reviewed in person in clinic, at the postoperative visit. Because pathology reports are often complicated, results are not reviewed by phone.  Reviewing in person would also allow for careful discussion of next steps and for answering questions.    Finally, we reviewed that as part of team-based approach to breast cancer, medical oncology and radiation oncology referrals will also be made after surgery to discuss adjuvant systemic/endocrine therapy and radiation therapy.  The decision regarding adjuvant chemotherapy will be made after surgery.     In addition, despite a lack of family history of breast cancer, she does have synchronous bilateral cancers.  I have recommended genetic counseling +/- testing.  The natural history of BRCA mutations and breast cancer were discussed with the patient. Should a deleterious mutation be identified, she would no longer be a good candidate for breast conservation.  We also reviewed the risk reduction benefits of a prophylactic mastectomy in this situation. Because genetic testing results will influence surgical management of this breast cancer, I have recommended genetic counseling +/- testing on an urgent basis.     All of the above was discussed with Leigh CARLSON  Kylie and all questions were answered.  She elected to proceed with genetic testing, followed by counseling.  She has elected for bilateral breast conservation; we will plan for surgery approximately 4 weeks from now to allow time for the test results to come back.    Total time spent with the patient was 60 minutes, of which 75% was counseling.     PLAN:  1. Discontinue Mirena  2. Genetic testing + counseling  3. BILATERAL wire-localized segmental mastectomy  4. BILATERAL axillary sentinel lymph node biopsy, possible axillary lymph node dissection  5. Plan for surgery about 4 weeks from now, to allow time for genetic testing to come back.    Paz Smith MD MSc [facility] FACS    Division of Surgical Oncology  [facility]     ---  GENETIC TESTING FOR BREAST ACTIONABLE    Based on her personal history, Leigh meets the current National Comprehensive Cancer Network (NCCN) criteria for genetic testing of BRCA1/BRCA2 and/or requires genetic testing based on the recommendation of the American Society of Breast Surgeons Consensus Guideline on Genetic Testing for Hereditary Breast Cancer.     The Breast Actionable test analyzes 11 genes associated with hereditary breast cancer: JEANNE, BRCA1, BRCA2, CDH1, CHEK2, NBN, NF1, PALB2, PTEN, STK11, TP53. BRCA1 and BRCA2 only account for about half of hereditary breast cancer; therefore, this multi-gene test is the most efficient and cost-effective way to analyze these genes. As hereditary breast cancer is suspected, there is a reasonable probability of detecting a mutation in my patient. All of the genes on this test have published clinical practice guidelines.    Medical Management:   For Leigh, we reviewed that the information from genetic testing may determine:    surgery to treat Leigh's active cancer diagnosis    targeted chemotherapies for Leigh's active cancer    additional cancer screening and/or medical intervention for which Leigh  may qualify    Additionally, family history was reviewed for any significant cancer history in Leigh's first and second degree relatives. Family history is significant for the following: see above    I, a qualified medical provider, counseled the patient on possible test results and its implications in relation to Leigh's treatment/management. Leigh expressed understanding and consented to proceed with genetic testing. Referral to genetic counseling will be made upon return of results to discuss additional genetic testing options if indicated based on patient s personal and/or family history.      Again, thank you for allowing me to participate in the care of your patient.      Sincerely,    Paz Smith MD

## 2019-12-05 NOTE — TELEPHONE ENCOUNTER
ORDER RECEIVED VIA: CASE MESSAGE    Left message for patient to schedule surgery with Dr Paz Smith     [facility]    Patient will have pre-surgery visit with PCP -     Nuclear Medicine: yes, confirmed delivery to [facility] at 11am    Implants/Special Equipment: no    Wire Loc: yes email sent for 1130 wire loc and nuc med injection    -Patient advised H&P is needed or surgery cancellation may occur    Post-Op care appointment scheduled?  YES on  1/23    Patient is aware a / is needed day of surgery.     Surgery packet was delivered to patient via [employer], patient has my direct contact information for any further questions.        Zoey Gee  Surgical Peg-Op Coordinator  [phone]

## 2019-12-05 NOTE — LETTER
RE: Leigh Espinal  [address] [place] [address]     Dec 5, 2019    Darleen Ventura MD   [address] [address]    RE: Leigh Espinal  (: 1965)    Dear Dr. Darleen Ventura    Your patient was seen for evaluation in my office.  Please find a copy of my notes for your record and review.  If you have any further questions, please feel free to contact my office.   Thank you for your kind referral.    Sincerely,   Paz Smith MD MSc [facility]    ---   NEW SURGICAL CONSULTATION  Dec 5, 2019    Leigh Espinal is a 54 year old woman who presents with a bilateral  breast complaint.  She was referred by Dr Molina.    HPI:    She noted no masses in either breast, axilla, or neck. She denies any nipple discharge or nipple inversion.    Imaging showed a 2 x 1.4 cm mass at 1:00 5 cm FN in the RIGHT breast, and a 9 mm mass at 2:00 6 cm FN in the LEFT breast.  Contrast imaging confirmed the RIGHT breast mass, and identified a 1.4 cm enhancing mass on the LEFT, with an additional satellite mass (total diameter 2.1 cm).    A biopsy was performed of the RIGHT breast mass and a clip was placed.  It showed invasive ductal carcinoma, grade 1, ER+ FL+ HER2/meche negative.  A biopsy was performed of the LEFT breast mass and a clip was placed. It showed invasive lobular carcinoma, grade 2, ER+ FL+ HER2/meche negative.      BREAST-SPECIFIC HISTORY:  Prior breast biopsies: No  Prior breast surgeries: No  Prior radiation history: No  Hormone replacement therapy: No  Bra size: 40 DD  Dominant hand: Right    GYN HISTORY:    Age at 1st pregnancy: 25  Age at menarche: 11  Breastfeeding history: Yes  Menopausal? 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 Provider to review   No MI, DM, CVA, asthma    Past Surgical History:   Procedure Laterality Date     COLONOSCOPY  6/21/2012    Procedure: COLONOSCOPY;;  Surgeon: Radha Hector MD;  Location:  GI     LAPAROSCOPIC NISSEN FUNDOPLICATION  2007     TUBAL LIGATION  1990   No GA issues    Current Outpatient Medications   Medication Sig Dispense Refill     atenolol (TENORMIN) 25 MG tablet Take 25 mg by mouth. Takes 1 tab a day.        LISINOPRIL PO Take 20 mg by mouth daily       pantoprazole (PROTONIX) 20 MG EC tablet TK 1 T PO D  3     SERTRALINE HCL PO Take 50 mg by mouth daily       TRAZodone (DESYREL) 25 MG TABS Take 150 mg by mouth At Bedtime.       amoxicillin (AMOXIL) 500 MG tablet Take 4 pills (2000 mg) 30-60 minutes before dental procedures, including teeth cleaning. (Patient not taking: Reported on 10/18/2018) 4 tablet 3         Allergies   Allergen Reactions     Sulfa Drugs         SOCIAL HISTORY:  Smokes: No - quit 2011  EtOH: Yes - 2 drinks per day, most days of the week  Illicit drugs: No    She work as an  at the [employer] at [facility] [facility] [employer]. No heavy lifting.    ROS:  Back pain: No  Headache: Yes - vision change related? (resolves spontaneously, not nocturnal or persistent)  Abdominal pain: No  Unexpected weight loss: No  Easy bruising/bleeding: Yes    BP (!) 194/83 (BP Location: Right arm, Patient Position: Chair, Cuff Size: Adult Large)   Pulse 62   Temp 97.7  F (36.5  C)   Resp 16   Ht 1.613 m (5' 3.5\")   Wt 79.4 kg (175 lb 1.6 oz)   SpO2 100%   BMI 30.53 kg/m      Physical Exam  Constitutional:       " Appearance: She is well-developed.   Chest:      Breasts: Breasts are symmetrical.         Right: Mass present. No inverted nipple, nipple discharge, skin change or tenderness.         Left: No inverted nipple, mass, nipple discharge, skin change or tenderness.          Comments: Patient was examined in both supine and upright positions.   Lymphadenopathy:      Cervical: No cervical adenopathy.      Right cervical: No superficial, deep or posterior cervical adenopathy.     Left cervical: No superficial, deep or posterior cervical adenopathy.      Upper Body:      Right upper body: No supraclavicular, axillary or pectoral adenopathy.      Left upper body: No supraclavicular, axillary or pectoral adenopathy.      Comments: No lymphedema in bilateral upper extremities.   Skin:     General: Skin is warm and dry.          INVESTIGATIONS:    Contrast-Enhanced Mammogram (11/21/2019) showed:  BREAST DENSITY: Heterogeneously dense  Findings: Enhancing mass in the right breast at the site of known suspicious mass measures 2.0 cm.  1.4 cm enhancing mass in the left breast at the site of the mammographic and ultrasound finding with a small satellite lesion anterior to the mass measuring a total diameter of 2.1 cm.   IMPRESSION: BI-RADS CATEGORY: 4 - Suspicious Abnormality-Biopsy Should Be Considered.    Diagnostic Mammogram & Ultrasound (11/11/2019) showed:  Breast Density: Scattered fibroglandular densities  Technique: Standard mammographic views were performed with tomosynthesis and 2D reconstruction.  Comparisons: 10/23/2019  Findings:     Mammogram:  In the right superior breast at middle depth, there is a round mass with spiculated margins. In the left lateral breast at middle depth, there is architectural distortion.  Ultrasound:  Targeted, real-time ultrasound evaluation was performed by the technologist and radiologist. In the right breast at 1:00, 5 cm from the nipple, there is an irregular mass with angular margins  measuring 2 x 1.4 cm. In the left breast at 2:00, 6 cm from the nipple, there is an irregular mass measuring 0.9 x 0.8 x 0.8 cm. There are no enlarged lymph nodes in the right axilla or left axilla.  IMPRESSION: BI-RADS CATEGORY: 5 - Highly Suggestive of Malignancy.    Screening Mammogram (10/23/2019) showed:  BREAST DENSITY: Heterogeneously dense.  COMMENTS: Possible architectural distortion and developing focal asymmetry in the right middle depth medial at 12 to 1:00. Possible developing asymmetry in the left middle depth, at about 3:00.  IMPRESSION: BI-RADS CATEGORY: 0 - Incomplete - Need Additional Imaging Evaluation and/or Prior Mammograms for Comparison.    Biopsy (11/21/2019) showed:  FINAL DIAGNOSIS:   A. Breast, LEFT, 2:00, 6 cm from nipple, ultrasound guided core biopsy:   -INVASIVE LOBULAR CARCINOMA, NURA GRADE 2   -Lobular carcinoma in situ (LCIS), classical type   -Invasive carcinoma is estrogen receptor positive and progesterone receptor positive by immunohistochemistry   -HER2 FISH result will be reported separately by cytogenetics   -See comment   ER positive (95%)  WY positive (75%)  HER2/meche negative    B. Breast, RIGHT, 1:00, 5 cm from nipple, ultrasound guided core biopsy:   -INVASIVE MAMMARY CARCINOMA OF NO SPECIAL TYPE (INVASIVE DUCTAL CARCINOMA), NURA GRADE 1   -Ductal carcinoma in situ (DCIS), nuclear grade 2, cribriform type   -Invasive carcinoma is estrogen receptor positive and progesterone receptor positive by immunohistochemistry   -HER2 FISH result will be reported separately by cytogenetics   ER positive (98%)  WY positive (99%)  HER2/meche negative    ASSESSMENT:  Leigh Espinal is a 54 year old woman with BILATERAL breast cancer.    Her stage is:  Cancer Staging  Malignant neoplasm of upper-inner quadrant of right breast in female, estrogen receptor positive (H)  Staging form: Breast, AJCC 8th Edition  - Clinical: Stage IA (cT1c, cN0, cM0, G1, ER+, WY+, HER2-) -  "Signed by Paz Smith MD on 12/5/2019    Malignant neoplasm of upper-outer quadrant of left breast in female, estrogen receptor positive (H)  Staging form: Breast, AJCC 8th Edition  - Clinical: Stage IB (cT2, cN0, cM0, G2, ER+, SC+, HER2-) - Signed by Paz Smith MD on 12/5/2019    I reviewed the imaging, diagnosis, staging, and management (including surgery, chemotherapy, radiation therapy, and endocrine therapy) of breast cancer with Leigh Espinal and her . A copy of the biopsy pathology report was also provided.    Her cancer are ER positive. We reviewed that hormonal forms of contraception, including the Mirena IUD, are contraindicated in breast cancer. I recommended that she have this removed.    The mainstay of treatment for resectable breast cancer is surgical resection, in the form of either breast conservation (segmental mastectomy plus radiation) or mastectomy.  We reviewed that the two strategies are equivalent in terms of overall survival.  The advantages and disadvantages of each were discussed.   Leigh Espinal IS a candidate for bilateral breast conservation therapy.  We discussed that this involves two necessary components: the lumpectomy (or \"segmental mastectomy\"), and several weeks of whole breast radiation therapy.  We discussed that the overall survival after breast conservation therapy is identical to mastectomy and that local recurrence rates are significantly higher if segmental mastectomy was performed without subsequent radiation.  We also discussed the significance of clear margins and that a subsequent procedure may be necessary to achieve this.    Because the lesion on the left is not palpable and the one on the right vaguely so, a wire-localized approach would be taken for breast conservation.  She would present on the day of surgery for an image-guided wire placement, followed by a surgical excision in the operating room.  The risks of a " wire-localized segmental mastectomy were discussed with the patient and her , including the risks of bleeding, wound infection, wound dehiscence, and post-operative contour change to the breast.      Alternatively, we also discussed the various types of mastectomy, including total, skin-sparing, and nipple-sparing mastectomy.  We reviewed that the nipple-sparing technique is cosmetic; sensation and contractility will likely be lost.  Leigh Espinal is not a candidate for nipple-sparing mastectomy owing to relative size of her breasts.  The risks of a mastectomy were discussed with the patient and her , including the risks of bleeding, wound infection, wound dehiscence, skin flap/nipple necrosis, poor cosmetic outcomes with skin folds, decreased shoulder range of motion, chest wall numbness, and seroma formation.    The option of having immediate versus delayed reconstruction was also discussed.   We reviewed that the advantages of immediate reconstruction includes superior cosmetics, as the skin is preserved.  However, the major disadvantage is increased postoperative risks, including skin flap ischemia and expander infection, which can potentially delay adjuvant oncologic treatments which may be needed post-surgically.  Leigh Espinal was not interested in this; a Plastic Surgery consultation was offered and will not be arranged. Depending on the margin and alex status post-mastectomy, radiation may be necessary.      In addition to the surgical management of the breast, a sentinel lymph node biopsy is recommended for alex staging of the axilla bilaterally.  This is performed with the combination of the radioactive colloid and lymphazurin. The risks of a sentinel lymph node biopsy were discussed with the patient and her , including the risks of lymphedema (5-10%), bleeding, wound infection, wound dehiscence, seroma formation, and paresthesias. There is also a small risk of  anaphylaxis with lymphazurin injection as part of the procedure. There is an approximately 10% false negative rate associated with sentinel lymph node biopsy as published in the literature.  The findings of the sentinel lymph node biopsy may result in the need for further surgery (i.e. Axillary lymph node dissection) or radiation. There is a 5-10% chance of patients whose sentinel lymph nodes do not map despite dual tracer (radiocolloid and lymphazurin). Should this be the case, we discussed that I would proceed with an axillary lymph node dissection at the index procedure.  The higher risks of an axillary lymph node dissection were also reviewed, including lymphedema (20-30%), bleeding, wound infection, wound dehiscence, seroma formation, nerve injury, limited arm range of motion and paresthesias. We discussed that a drain would be placed intra-operatively should an axillary lymph node dissection be performed.     We discussed that surgical pathology results will be reviewed in person in clinic, at the postoperative visit. Because pathology reports are often complicated, results are not reviewed by phone.  Reviewing in person would also allow for careful discussion of next steps and for answering questions.    Finally, we reviewed that as part of team-based approach to breast cancer, medical oncology and radiation oncology referrals will also be made after surgery to discuss adjuvant systemic/endocrine therapy and radiation therapy.  The decision regarding adjuvant chemotherapy will be made after surgery.     In addition, despite a lack of family history of breast cancer, she does have synchronous bilateral cancers.  I have recommended genetic counseling +/- testing.  The natural history of BRCA mutations and breast cancer were discussed with the patient. Should a deleterious mutation be identified, she would no longer be a good candidate for breast conservation.  We also reviewed the risk reduction benefits of a  prophylactic mastectomy in this situation. Because genetic testing results will influence surgical management of this breast cancer, I have recommended genetic counseling +/- testing on an urgent basis.     All of the above was discussed with Leigh Espinal and all questions were answered.  She elected to proceed with genetic testing, followed by counseling.  She has elected for bilateral breast conservation; we will plan for surgery approximately 4 weeks from now to allow time for the test results to come back.    Total time spent with the patient was 60 minutes, of which 75% was counseling.     PLAN:  1. Discontinue Mirena  2. Genetic testing + counseling  3. BILATERAL wire-localized segmental mastectomy  4. BILATERAL axillary sentinel lymph node biopsy, possible axillary lymph node dissection  5. Plan for surgery about 4 weeks from now, to allow time for genetic testing to come back.    Paz Smith MD MSc [facility]    Division of Surgical Oncology  [facility]     ---  GENETIC TESTING FOR BREAST ACTIONABLE    Based on her personal history, Leigh meets the current National Comprehensive Cancer Network (NCCN) criteria for genetic testing of BRCA1/BRCA2 and/or requires genetic testing based on the recommendation of the American Society of Breast Surgeons Consensus Guideline on Genetic Testing for Hereditary Breast Cancer.     The Breast Actionable test analyzes 11 genes associated with hereditary breast cancer: JEANNE, BRCA1, BRCA2, CDH1, CHEK2, NBN, NF1, PALB2, PTEN, STK11, TP53. BRCA1 and BRCA2 only account for about half of hereditary breast cancer; therefore, this multi-gene test is the most efficient and cost-effective way to analyze these genes. As hereditary breast cancer is suspected, there is a reasonable probability of detecting a mutation in my patient. All of the genes on this test have published clinical practice guidelines.    Medical Management:   For Leigh, we  reviewed that the information from genetic testing may determine:    surgery to treat Leigh's active cancer diagnosis    targeted chemotherapies for Leigh's active cancer    additional cancer screening and/or medical intervention for which Leigh may qualify    Additionally, family history was reviewed for any significant cancer history in Leigh's first and second degree relatives. Family history is significant for the following: see above    I, a qualified medical provider, counseled the patient on possible test results and its implications in relation to Leigh's treatment/management. Leigh expressed understanding and consented to proceed with genetic testing. Referral to genetic counseling will be made upon return of results to discuss additional genetic testing options if indicated based on patient s personal and/or family history.

## 2019-12-06 LAB — COPATH REPORT: NORMAL

## 2019-12-19 DIAGNOSIS — C50.211 MALIGNANT NEOPLASM OF UPPER-INNER QUADRANT OF RIGHT BREAST IN FEMALE, ESTROGEN RECEPTOR POSITIVE (H): ICD-10-CM

## 2019-12-19 DIAGNOSIS — Z17.0 MALIGNANT NEOPLASM OF UPPER-INNER QUADRANT OF RIGHT BREAST IN FEMALE, ESTROGEN RECEPTOR POSITIVE (H): ICD-10-CM

## 2019-12-19 DIAGNOSIS — C50.412 MALIGNANT NEOPLASM OF UPPER-OUTER QUADRANT OF LEFT BREAST IN FEMALE, ESTROGEN RECEPTOR POSITIVE (H): Primary | ICD-10-CM

## 2019-12-19 DIAGNOSIS — Z17.0 MALIGNANT NEOPLASM OF UPPER-OUTER QUADRANT OF LEFT BREAST IN FEMALE, ESTROGEN RECEPTOR POSITIVE (H): Primary | ICD-10-CM

## 2019-12-19 PROCEDURE — 81405 MOPATH PROCEDURE LEVEL 6: CPT | Performed by: SURGERY

## 2019-12-19 PROCEDURE — 81323 PTEN GENE DUP/DELET VARIANT: CPT | Performed by: SURGERY

## 2019-12-19 PROCEDURE — 81479 UNLISTED MOLECULAR PATHOLOGY: CPT | Performed by: SURGERY

## 2019-12-19 PROCEDURE — 81408 MOPATH PROCEDURE LEVEL 9: CPT | Performed by: SURGERY

## 2019-12-19 PROCEDURE — 81406 MOPATH PROCEDURE LEVEL 7: CPT | Performed by: SURGERY

## 2019-12-19 PROCEDURE — 81321 PTEN GENE FULL SEQUENCE: CPT | Performed by: SURGERY

## 2019-12-19 PROCEDURE — 81162 BRCA1&2 GEN FULL SEQ DUP/DEL: CPT | Performed by: SURGERY

## 2019-12-23 LAB — COPATH REPORT: NORMAL

## 2020-01-02 ENCOUNTER — TELEPHONE (OUTPATIENT)
Dept: ONCOLOGY | Facility: CLINIC | Age: 55
End: 2020-01-02

## 2020-01-02 DIAGNOSIS — C50.211 MALIGNANT NEOPLASM OF UPPER-INNER QUADRANT OF RIGHT BREAST IN FEMALE, ESTROGEN RECEPTOR POSITIVE (H): ICD-10-CM

## 2020-01-02 DIAGNOSIS — C50.412 MALIGNANT NEOPLASM OF UPPER-OUTER QUADRANT OF LEFT BREAST IN FEMALE, ESTROGEN RECEPTOR POSITIVE (H): Primary | ICD-10-CM

## 2020-01-02 DIAGNOSIS — Z17.0 MALIGNANT NEOPLASM OF UPPER-INNER QUADRANT OF RIGHT BREAST IN FEMALE, ESTROGEN RECEPTOR POSITIVE (H): ICD-10-CM

## 2020-01-02 DIAGNOSIS — Z17.0 MALIGNANT NEOPLASM OF UPPER-OUTER QUADRANT OF LEFT BREAST IN FEMALE, ESTROGEN RECEPTOR POSITIVE (H): Primary | ICD-10-CM

## 2020-01-02 NOTE — TELEPHONE ENCOUNTER
Discussed with Leigh Espinal on the phone regarding deleterious CHEK2 mutation.    Discussed it is associated with 2-3 fold increase in breast cancer.  Likely around 30-45% lifetime risk. There is insufficient data to absolutely suggest mastectomy or that breast conservation is contraindicated, but we did discuss the pros and cons of each in the context of this mutation, as well as the perioperative expectations. If breast conservation, she would need high risk breast screening moving forward.  If mastectomy, no further breast imaging for screening.  She would like reconstruction if she decides on mastectomy.  Will arrange plastic surgery consultation. Will delay her lumpectomy surgery by another week for now to allow time for plastic surgery consultation.  She will speak to her family and let us know her decision regarding breast conservation or mastectomy in the near future.    All questions answered. She was appreciative of the call.    Paz Smith MD MSc [facility] FACS    Division of Surgical Oncology  [facility]

## 2020-01-06 DIAGNOSIS — C50.412 MALIGNANT NEOPLASM OF UPPER-OUTER QUADRANT OF LEFT BREAST IN FEMALE, ESTROGEN RECEPTOR POSITIVE (H): Primary | ICD-10-CM

## 2020-01-06 DIAGNOSIS — Z17.0 MALIGNANT NEOPLASM OF UPPER-INNER QUADRANT OF RIGHT BREAST IN FEMALE, ESTROGEN RECEPTOR POSITIVE (H): ICD-10-CM

## 2020-01-06 DIAGNOSIS — Z17.0 MALIGNANT NEOPLASM OF UPPER-OUTER QUADRANT OF LEFT BREAST IN FEMALE, ESTROGEN RECEPTOR POSITIVE (H): Primary | ICD-10-CM

## 2020-01-06 DIAGNOSIS — C50.211 MALIGNANT NEOPLASM OF UPPER-INNER QUADRANT OF RIGHT BREAST IN FEMALE, ESTROGEN RECEPTOR POSITIVE (H): ICD-10-CM

## 2020-01-06 RX ORDER — CEFAZOLIN SODIUM 2 G/50ML
2 SOLUTION INTRAVENOUS
Status: CANCELLED | OUTPATIENT
Start: 2020-01-06

## 2020-01-06 RX ORDER — CEFAZOLIN SODIUM 1 G/50ML
1 INJECTION, SOLUTION INTRAVENOUS SEE ADMIN INSTRUCTIONS
Status: CANCELLED | OUTPATIENT
Start: 2020-01-06

## 2020-01-06 RX ORDER — ACETAMINOPHEN 325 MG/1
975 TABLET ORAL ONCE
Status: CANCELLED | OUTPATIENT
Start: 2020-01-06 | End: 2020-01-06

## 2020-01-10 ENCOUNTER — NURSE TRIAGE (OUTPATIENT)
Dept: NURSING | Facility: CLINIC | Age: 55
End: 2020-01-10

## 2020-01-10 ENCOUNTER — PATIENT OUTREACH (OUTPATIENT)
Dept: PLASTIC SURGERY | Facility: CLINIC | Age: 55
End: 2020-01-10

## 2020-01-10 NOTE — PATIENT INSTRUCTIONS
Left message for pt regarding rescheduling consult. Offered earlier appt on 1/14/20 and provided direct contact information to discuss scheduling. Gwendolyn GALLEGO RNCC

## 2020-01-10 NOTE — TELEPHONE ENCOUNTER
The fax number for her pre op paperwork goes to  [phone].     Omayra Leger RN/ [facility]    Forward to Dr Ventura      Reason for Disposition    General information question, no triage required and triager able to answer question    Protocols used: INFORMATION ONLY CALL-A-AH

## 2020-01-16 ENCOUNTER — TELEPHONE (OUTPATIENT)
Dept: ONCOLOGY | Facility: CLINIC | Age: 55
End: 2020-01-16

## 2020-01-16 ENCOUNTER — PREP FOR PROCEDURE (OUTPATIENT)
Dept: SURGERY | Facility: CLINIC | Age: 55
End: 2020-01-16

## 2020-01-16 ENCOUNTER — TRANSFERRED RECORDS (OUTPATIENT)
Dept: SURGERY | Facility: AMBULATORY SURGERY CENTER | Age: 55
End: 2020-01-16

## 2020-01-16 DIAGNOSIS — Z17.0 MALIGNANT NEOPLASM OF UPPER-OUTER QUADRANT OF LEFT BREAST IN FEMALE, ESTROGEN RECEPTOR POSITIVE (H): Primary | ICD-10-CM

## 2020-01-16 DIAGNOSIS — C50.412 MALIGNANT NEOPLASM OF UPPER-OUTER QUADRANT OF LEFT BREAST IN FEMALE, ESTROGEN RECEPTOR POSITIVE (H): Primary | ICD-10-CM

## 2020-01-16 PROBLEM — C50.211 MALIGNANT NEOPLASM OF UPPER-INNER QUADRANT OF RIGHT BREAST IN FEMALE, ESTROGEN RECEPTOR POSITIVE (H): Status: ACTIVE | Noted: 2019-12-05

## 2020-01-16 RX ORDER — CEFAZOLIN SODIUM 2 G/50ML
2 SOLUTION INTRAVENOUS
Status: CANCELLED | OUTPATIENT
Start: 2020-01-16

## 2020-01-16 RX ORDER — CEFAZOLIN SODIUM 1 G/50ML
1 INJECTION, SOLUTION INTRAVENOUS SEE ADMIN INSTRUCTIONS
Status: CANCELLED | OUTPATIENT
Start: 2020-01-16

## 2020-01-16 NOTE — TELEPHONE ENCOUNTER
I scheduled surgery for this patient with Dr. Smith & Sen on 1/29 at [facility]. Scheduled per MD orders.    I called the patient and left a voicemail to confirm the scheduled dates.     The RN has completed the education regarding the surgery. I also mailed a surgery packet.    They are aware that they will receive a call  ~2 days prior to the scheduled procedure and will be given an exact arrival/start time.      PAC/H&P: PCP to complete    Post-Op:     Sen on 2/11    Sarah on 2/13

## 2020-01-21 ENCOUNTER — OFFICE VISIT (OUTPATIENT)
Dept: PLASTIC SURGERY | Facility: CLINIC | Age: 55
End: 2020-01-21
Attending: PLASTIC SURGERY
Payer: COMMERCIAL

## 2020-01-21 VITALS
HEART RATE: 72 BPM | WEIGHT: 176.2 LBS | RESPIRATION RATE: 16 BRPM | DIASTOLIC BLOOD PRESSURE: 109 MMHG | TEMPERATURE: 98.4 F | BODY MASS INDEX: 30.08 KG/M2 | SYSTOLIC BLOOD PRESSURE: 191 MMHG | HEIGHT: 64 IN | OXYGEN SATURATION: 98 %

## 2020-01-21 DIAGNOSIS — Z17.0 MALIGNANT NEOPLASM OF UPPER-INNER QUADRANT OF RIGHT BREAST IN FEMALE, ESTROGEN RECEPTOR POSITIVE (H): ICD-10-CM

## 2020-01-21 DIAGNOSIS — C50.211 MALIGNANT NEOPLASM OF UPPER-INNER QUADRANT OF RIGHT BREAST IN FEMALE, ESTROGEN RECEPTOR POSITIVE (H): ICD-10-CM

## 2020-01-21 DIAGNOSIS — C50.412 MALIGNANT NEOPLASM OF UPPER-OUTER QUADRANT OF LEFT BREAST IN FEMALE, ESTROGEN RECEPTOR POSITIVE (H): Primary | ICD-10-CM

## 2020-01-21 DIAGNOSIS — Z17.0 MALIGNANT NEOPLASM OF UPPER-OUTER QUADRANT OF LEFT BREAST IN FEMALE, ESTROGEN RECEPTOR POSITIVE (H): Primary | ICD-10-CM

## 2020-01-21 PROCEDURE — G0463 HOSPITAL OUTPT CLINIC VISIT: HCPCS | Mod: ZF

## 2020-01-21 ASSESSMENT — PAIN SCALES - GENERAL: PAINLEVEL: NO PAIN (0)

## 2020-01-21 ASSESSMENT — MIFFLIN-ST. JEOR: SCORE: 1386.3

## 2020-01-21 NOTE — NURSING NOTE
"Oncology Rooming Note    January 21, 2020 9:11 AM   Leigh Espinal is a 54 year old female who presents for:    Chief Complaint   Patient presents with     New Patient     NEW PT; RECONSTRUCTION CONSULT; BREAST CANCER; VITALS TAKEN      Initial Vitals: BP (!) 191/109   Pulse 72   Temp 98.4  F (36.9  C) (Oral)   Resp 16   Ht 1.613 m (5' 3.5\")   Wt 79.9 kg (176 lb 3.2 oz)   SpO2 98%   Breastfeeding No   BMI 30.72 kg/m   Estimated body mass index is 30.72 kg/m  as calculated from the following:    Height as of this encounter: 1.613 m (5' 3.5\").    Weight as of this encounter: 79.9 kg (176 lb 3.2 oz). Body surface area is 1.89 meters squared.  No Pain (0) Comment: Data Unavailable   No LMP recorded. (Menstrual status: IUD).  Allergies reviewed: Yes  Medications reviewed: Yes    Medications: Medication refills not needed today.  Pharmacy name entered into EPIC:    [facility] - [place], [place] - [phone] [facility]- [place], [place] - [address] [place] [address] [id number]    Clinical concerns: Patient's blood pressure readings given to provider. Patient states that she didn't take her blood pressure medication yesterday or today    Didn't do any screenings on patient as her isn't alone for appointment today.      Dr Chatterjee was notified.      Debbie Ureña              "

## 2020-01-21 NOTE — PROGRESS NOTES
CONSULT NOTE      REFERRING PHYSICIAN:  Paz Smith MD      PRESENTING COMPLAINT:  Consultation for breast reconstruction.      HISTORY OF PRESENTING COMPLAINT:  Ms. Espinal is 54 years old.  She has been diagnosed with bilateral breast cancer along with genetic workup is positive.  She has been advised bilateral nipple non-sparing mastectomies and is interested in breast reconstruction.  Here to discuss that.  Chance of radiation therapy is low but negative and will be based on postoperative pathology findings.  No plans for chemotherapy.  She wears a DD bra.  She would like to be around a C cup.      PAST MEDICAL HISTORY:  Hypertension, pulmonary AV malformation, hereditary hemorrhagic telangiectasias.      PAST SURGICAL HISTORY:  Nissen fundoplication x2.      MEDICATIONS:  Atenolol, lisinopril, sertraline, trazodone.      ALLERGIES:  Sulfa.      SOCIAL HISTORY:  Used to smoke a pack a day for 20 years, quit in 2011.  Drinks socially.  Lives in [place], is an .      REVIEW OF SYSTEMS:  Denies chest pain, shortness of breath, MI, CVA, DVT and PE.      PHYSICAL EXAMINATION:  Vital signs are stable.  She is afebrile, in no obvious distress.  She is 5 feet 3-1/2 inches, 176 pounds, BMI 31 kg/m2.  On examination of her breasts, she has grade 2 ptosis.  Left breast is slightly longer and larger than the right.  She has a moderate lower abdominal panniculus that has minimal overhang.  No obvious hernia.  Enough tissue to give her about a C cup bilaterally.      ASSESSMENT AND PLAN:  Based on above findings, a diagnosis of bilateral breast cancer undergoing bilateral nipple non-sparing mastectomy, interested in immediate reconstruction was made.  I had a mackenzie, detailed discussion with the patient and her  about breast reconstruction.  Overview of breast reconstruction was given to her.  She was made clear that this is an elective procedure.  The time line, the staged nature of reconstruction, the  overall options of implant-based versus autologous reconstructions were explained to her in detail.  She is inclined towards an autologous reconstruction, which I agree with and I think is reasonable.  Based on the exam, I think the first stage of nipple non-sparing mastectomy is feasible followed by an expander-based reconstruction.  I explained to her the risks, benefits and alternatives including pain, infection, bleeding, scarring, asymmetry, seromas, hematomas, wound breakdown, wound dehiscence, expander exposure, infection, removal, skin necrosis, numbness of the breast/chest, DVT, PE, MI, CVA, pneumonia, renal failure and death were all explained.  I look forward to helping her out in the near future.  All questions were answered.  All exam and discussion done in the presence of a female chaperone.  Consent obtained.  Photographs obtained.      Total time spent with patient 30 minutes, more than half was counseling.      cc:   Paz Smith MD   [facility]   [address] [facility] [place] [facility] [address]   [place]  [address]

## 2020-01-21 NOTE — LETTER
1/21/2020       RE: Leigh Espinal  [address] [place] [address]     Dear Colleague,    Thank you for referring your patient, Leigh Espinal, to the [facility] at [facility]. Please see a copy of my visit note below.    CONSULT NOTE      REFERRING PHYSICIAN:  Paz Smith MD      PRESENTING COMPLAINT:  Consultation for breast reconstruction.      HISTORY OF PRESENTING COMPLAINT:  Ms. Espinal is 54 years old.  She has been diagnosed with bilateral breast cancer along with genetic workup is positive.  She has been advised bilateral nipple non-sparing mastectomies and is interested in breast reconstruction.  Here to discuss that.  Chance of radiation therapy is low but negative and will be based on postoperative pathology findings.  No plans for chemotherapy.  She wears a DD bra.  She would like to be around a C cup.      PAST MEDICAL HISTORY:  Hypertension, pulmonary AV malformation, hereditary hemorrhagic telangiectasias.      PAST SURGICAL HISTORY:  Nissen fundoplication x2.      MEDICATIONS:  Atenolol, lisinopril, sertraline, trazodone.      ALLERGIES:  Sulfa.      SOCIAL HISTORY:  Used to smoke a pack a day for 20 years, quit in 2011.  Drinks socially.  Lives in [place], is an .      REVIEW OF SYSTEMS:  Denies chest pain, shortness of breath, MI, CVA, DVT and PE.      PHYSICAL EXAMINATION:  Vital signs are stable.  She is afebrile, in no obvious distress.  She is 5 feet 3-1/2 inches, 176 pounds, BMI 31 kg/m2.  On examination of her breasts, she has grade 2 ptosis.  Left breast is slightly longer and larger than the right.  She has a moderate lower abdominal panniculus that has minimal overhang.  No obvious hernia.  Enough tissue to give her about a C cup bilaterally.      ASSESSMENT AND PLAN:  Based on above findings, a diagnosis of bilateral breast cancer undergoing bilateral nipple non-sparing mastectomy, interested in immediate  reconstruction was made.  I had a mackenzie, detailed discussion with the patient and her  about breast reconstruction.  Overview of breast reconstruction was given to her.  She was made clear that this is an elective procedure.  The time line, the staged nature of reconstruction, the overall options of implant-based versus autologous reconstructions were explained to her in detail.  She is inclined towards an autologous reconstruction, which I agree with and I think is reasonable.  Based on the exam, I think the first stage of nipple non-sparing mastectomy is feasible followed by an expander-based reconstruction.  I explained to her the risks, benefits and alternatives including pain, infection, bleeding, scarring, asymmetry, seromas, hematomas, wound breakdown, wound dehiscence, expander exposure, infection, removal, skin necrosis, numbness of the breast/chest, DVT, PE, MI, CVA, pneumonia, renal failure and death were all explained.  I look forward to helping her out in the near future.  All questions were answered.  All exam and discussion done in the presence of a female chaperone.  Consent obtained.  Photographs obtained.      Total time spent with patient 30 minutes, more than half was counseling.      Again, thank you for allowing me to participate in the care of your patient.      Sincerely,    CEE Chatterjee MD    cc:   Paz Smith MD   [facility]   [address] [facility], [address]  [address]

## 2020-01-23 ENCOUNTER — ANCILLARY PROCEDURE (OUTPATIENT)
Dept: ULTRASOUND IMAGING | Facility: CLINIC | Age: 55
End: 2020-01-23
Attending: NURSE PRACTITIONER
Payer: COMMERCIAL

## 2020-01-23 ENCOUNTER — TRANSFERRED RECORDS (OUTPATIENT)
Dept: HEALTH INFORMATION MANAGEMENT | Facility: CLINIC | Age: 55
End: 2020-01-23

## 2020-01-23 DIAGNOSIS — N92.1 MENORRHAGIA WITH IRREGULAR CYCLE: ICD-10-CM

## 2020-01-27 ENCOUNTER — ANESTHESIA EVENT (OUTPATIENT)
Dept: SURGERY | Facility: CLINIC | Age: 55
End: 2020-01-27
Payer: COMMERCIAL

## 2020-01-29 ENCOUNTER — HOSPITAL ENCOUNTER (OUTPATIENT)
Facility: CLINIC | Age: 55
Discharge: HOME | End: 2020-01-29
Attending: SURGERY | Admitting: SURGERY
Payer: COMMERCIAL

## 2020-01-29 ENCOUNTER — RESULTS ONLY (OUTPATIENT)
Dept: LAB | Facility: CLINIC | Age: 55
End: 2020-01-29

## 2020-01-29 ENCOUNTER — HOSPITAL ENCOUNTER (OUTPATIENT)
Dept: NUCLEAR MEDICINE | Facility: CLINIC | Age: 55
Setting detail: NUCLEAR MEDICINE
End: 2020-01-29
Attending: SURGERY
Payer: COMMERCIAL

## 2020-01-29 ENCOUNTER — TRANSFERRED RECORDS (OUTPATIENT)
Dept: HEALTH INFORMATION MANAGEMENT | Facility: CLINIC | Age: 55
End: 2020-01-29

## 2020-01-29 ENCOUNTER — ANESTHESIA (OUTPATIENT)
Dept: SURGERY | Facility: CLINIC | Age: 55
End: 2020-01-29
Payer: COMMERCIAL

## 2020-01-29 VITALS
TEMPERATURE: 97.8 F | BODY MASS INDEX: 30.11 KG/M2 | OXYGEN SATURATION: 97 % | HEIGHT: 64 IN | RESPIRATION RATE: 16 BRPM | WEIGHT: 176.37 LBS | DIASTOLIC BLOOD PRESSURE: 74 MMHG | HEART RATE: 52 BPM | SYSTOLIC BLOOD PRESSURE: 143 MMHG

## 2020-01-29 DIAGNOSIS — C50.412 MALIGNANT NEOPLASM OF UPPER-OUTER QUADRANT OF LEFT BREAST IN FEMALE, ESTROGEN RECEPTOR POSITIVE (H): ICD-10-CM

## 2020-01-29 DIAGNOSIS — Z17.0 MALIGNANT NEOPLASM OF UPPER-INNER QUADRANT OF RIGHT BREAST IN FEMALE, ESTROGEN RECEPTOR POSITIVE (H): ICD-10-CM

## 2020-01-29 DIAGNOSIS — Z17.0 MALIGNANT NEOPLASM OF UPPER-OUTER QUADRANT OF LEFT BREAST IN FEMALE, ESTROGEN RECEPTOR POSITIVE (H): ICD-10-CM

## 2020-01-29 DIAGNOSIS — C50.211 MALIGNANT NEOPLASM OF UPPER-INNER QUADRANT OF RIGHT BREAST IN FEMALE, ESTROGEN RECEPTOR POSITIVE (H): ICD-10-CM

## 2020-01-29 DIAGNOSIS — Z98.890 S/P BREAST RECONSTRUCTION, BILATERAL: Primary | ICD-10-CM

## 2020-01-29 LAB
GLUCOSE BLDC GLUCOMTR-MCNC: 108 MG/DL (ref 70–99)
HCG UR QL: NEGATIVE

## 2020-01-29 PROCEDURE — 25000128 H RX IP 250 OP 636

## 2020-01-29 PROCEDURE — 25000128 H RX IP 250 OP 636: Performed by: ANESTHESIOLOGY

## 2020-01-29 PROCEDURE — 81025 URINE PREGNANCY TEST: CPT | Performed by: ANESTHESIOLOGY

## 2020-01-29 PROCEDURE — 25000132 ZZH RX MED GY IP 250 OP 250 PS 637: Performed by: SURGERY

## 2020-01-29 PROCEDURE — 25000125 ZZHC RX 250: Performed by: NURSE ANESTHETIST, CERTIFIED REGISTERED

## 2020-01-29 PROCEDURE — 36000057 ZZH SURGERY LEVEL 3 1ST 30 MIN - UMMC: Performed by: SURGERY

## 2020-01-29 PROCEDURE — A9520 TC99 TILMANOCEPT DIAG 0.5MCI: HCPCS | Performed by: SURGERY

## 2020-01-29 PROCEDURE — 38792 RA TRACER ID OF SENTINL NODE: CPT

## 2020-01-29 PROCEDURE — 88342 IMHCHEM/IMCYTCHM 1ST ANTB: CPT | Performed by: SURGERY

## 2020-01-29 PROCEDURE — 88360 TUMOR IMMUNOHISTOCHEM/MANUAL: CPT | Performed by: SURGERY

## 2020-01-29 PROCEDURE — 25000125 ZZHC RX 250

## 2020-01-29 PROCEDURE — L8600 IMPLANT BREAST SILICONE/EQ: HCPCS | Performed by: SURGERY

## 2020-01-29 PROCEDURE — 71000015 ZZH RECOVERY PHASE 1 LEVEL 2 EA ADDTL HR: Performed by: SURGERY

## 2020-01-29 PROCEDURE — 36000059 ZZH SURGERY LEVEL 3 EA 15 ADDTL MIN UMMC: Performed by: SURGERY

## 2020-01-29 PROCEDURE — 37000008 ZZH ANESTHESIA TECHNICAL FEE, 1ST 30 MIN: Performed by: SURGERY

## 2020-01-29 PROCEDURE — 25000128 H RX IP 250 OP 636: Performed by: NURSE ANESTHETIST, CERTIFIED REGISTERED

## 2020-01-29 PROCEDURE — 25000132 ZZH RX MED GY IP 250 OP 250 PS 637: Performed by: ANESTHESIOLOGY

## 2020-01-29 PROCEDURE — 25000128 H RX IP 250 OP 636: Performed by: PLASTIC SURGERY

## 2020-01-29 PROCEDURE — 25800030 ZZH RX IP 258 OP 636: Performed by: ANESTHESIOLOGY

## 2020-01-29 PROCEDURE — 25000125 ZZHC RX 250: Performed by: SURGERY

## 2020-01-29 PROCEDURE — 88377 M/PHMTRC ALYS ISHQUANT/SEMIQ: CPT | Performed by: PATHOLOGY

## 2020-01-29 PROCEDURE — 37000009 ZZH ANESTHESIA TECHNICAL FEE, EACH ADDTL 15 MIN: Performed by: SURGERY

## 2020-01-29 PROCEDURE — 71000014 ZZH RECOVERY PHASE 1 LEVEL 2 FIRST HR: Performed by: SURGERY

## 2020-01-29 PROCEDURE — 88341 IMHCHEM/IMCYTCHM EA ADD ANTB: CPT | Performed by: SURGERY

## 2020-01-29 PROCEDURE — 25800030 ZZH RX IP 258 OP 636

## 2020-01-29 PROCEDURE — 25000565 ZZH ISOFLURANE, EA 15 MIN: Performed by: SURGERY

## 2020-01-29 PROCEDURE — 71000027 ZZH RECOVERY PHASE 2 EACH 15 MINS: Performed by: SURGERY

## 2020-01-29 PROCEDURE — 27210794 ZZH OR GENERAL SUPPLY STERILE: Performed by: SURGERY

## 2020-01-29 PROCEDURE — 34300033 ZZH RX 343: Performed by: SURGERY

## 2020-01-29 PROCEDURE — 25000128 H RX IP 250 OP 636: Performed by: SURGERY

## 2020-01-29 PROCEDURE — 82962 GLUCOSE BLOOD TEST: CPT

## 2020-01-29 PROCEDURE — 25000125 ZZHC RX 250: Performed by: PLASTIC SURGERY

## 2020-01-29 PROCEDURE — 40000171 ZZH STATISTIC PRE-PROCEDURE ASSESSMENT III: Performed by: SURGERY

## 2020-01-29 PROCEDURE — 88307 TISSUE EXAM BY PATHOLOGIST: CPT | Performed by: SURGERY

## 2020-01-29 PROCEDURE — 00000159 ZZHCL STATISTIC H-SEND OUTS PREP: Performed by: SURGERY

## 2020-01-29 PROCEDURE — 00000158 ZZHCL STATISTIC H-FISH PROCESS B/S: Performed by: SURGERY

## 2020-01-29 RX ORDER — LIDOCAINE 40 MG/G
CREAM TOPICAL
Status: DISCONTINUED | OUTPATIENT
Start: 2020-01-29 | End: 2020-01-29 | Stop reason: HOSPADM

## 2020-01-29 RX ORDER — FENTANYL CITRATE 50 UG/ML
25-50 INJECTION, SOLUTION INTRAMUSCULAR; INTRAVENOUS
Status: DISCONTINUED | OUTPATIENT
Start: 2020-01-29 | End: 2020-01-29 | Stop reason: HOSPADM

## 2020-01-29 RX ORDER — ACETAMINOPHEN 325 MG/1
650 TABLET ORAL EVERY 4 HOURS PRN
Qty: 50 TABLET | Refills: 0 | Status: SHIPPED | OUTPATIENT
Start: 2020-01-29 | End: 2020-01-29

## 2020-01-29 RX ORDER — ONDANSETRON 2 MG/ML
4 INJECTION INTRAMUSCULAR; INTRAVENOUS EVERY 30 MIN PRN
Status: DISCONTINUED | OUTPATIENT
Start: 2020-01-29 | End: 2020-01-29 | Stop reason: HOSPADM

## 2020-01-29 RX ORDER — PROPOFOL 10 MG/ML
INJECTION, EMULSION INTRAVENOUS PRN
Status: DISCONTINUED | OUTPATIENT
Start: 2020-01-29 | End: 2020-01-29

## 2020-01-29 RX ORDER — FLUMAZENIL 0.1 MG/ML
0.2 INJECTION, SOLUTION INTRAVENOUS
Status: DISCONTINUED | OUTPATIENT
Start: 2020-01-29 | End: 2020-01-29 | Stop reason: HOSPADM

## 2020-01-29 RX ORDER — HYDROMORPHONE HYDROCHLORIDE 1 MG/ML
.3-.5 INJECTION, SOLUTION INTRAMUSCULAR; INTRAVENOUS; SUBCUTANEOUS EVERY 5 MIN PRN
Status: DISCONTINUED | OUTPATIENT
Start: 2020-01-29 | End: 2020-01-29 | Stop reason: HOSPADM

## 2020-01-29 RX ORDER — OXYCODONE HYDROCHLORIDE 5 MG/1
5 TABLET ORAL EVERY 4 HOURS PRN
Status: DISCONTINUED | OUTPATIENT
Start: 2020-01-29 | End: 2020-01-29 | Stop reason: HOSPADM

## 2020-01-29 RX ORDER — LIDOCAINE HYDROCHLORIDE 20 MG/ML
INJECTION, SOLUTION INFILTRATION; PERINEURAL PRN
Status: DISCONTINUED | OUTPATIENT
Start: 2020-01-29 | End: 2020-01-29

## 2020-01-29 RX ORDER — CLINDAMYCIN HCL 300 MG
300 CAPSULE ORAL EVERY 8 HOURS SCHEDULED
Status: DISCONTINUED | OUTPATIENT
Start: 2020-01-29 | End: 2020-01-29 | Stop reason: HOSPADM

## 2020-01-29 RX ORDER — GLYCOPYRROLATE 0.2 MG/ML
INJECTION, SOLUTION INTRAMUSCULAR; INTRAVENOUS PRN
Status: DISCONTINUED | OUTPATIENT
Start: 2020-01-29 | End: 2020-01-29

## 2020-01-29 RX ORDER — INDOCYANINE GREEN AND WATER 25 MG
KIT INJECTION PRN
Status: DISCONTINUED | OUTPATIENT
Start: 2020-01-29 | End: 2020-01-29

## 2020-01-29 RX ORDER — FENTANYL CITRATE 50 UG/ML
INJECTION, SOLUTION INTRAMUSCULAR; INTRAVENOUS PRN
Status: DISCONTINUED | OUTPATIENT
Start: 2020-01-29 | End: 2020-01-29

## 2020-01-29 RX ORDER — ONDANSETRON 4 MG/1
4 TABLET, FILM COATED ORAL EVERY 8 HOURS PRN
Qty: 10 TABLET | Refills: 0 | Status: SHIPPED | OUTPATIENT
Start: 2020-01-29 | End: 2020-03-09

## 2020-01-29 RX ORDER — CEFAZOLIN SODIUM 2 G/100ML
2 INJECTION, SOLUTION INTRAVENOUS
Status: COMPLETED | OUTPATIENT
Start: 2020-01-29 | End: 2020-01-29

## 2020-01-29 RX ORDER — ISOSULFAN BLUE 50 MG/5ML
INJECTION, SOLUTION SUBCUTANEOUS PRN
Status: DISCONTINUED | OUTPATIENT
Start: 2020-01-29 | End: 2020-01-29 | Stop reason: HOSPADM

## 2020-01-29 RX ORDER — CEFAZOLIN SODIUM 1 G/3ML
1 INJECTION, POWDER, FOR SOLUTION INTRAMUSCULAR; INTRAVENOUS SEE ADMIN INSTRUCTIONS
Status: DISCONTINUED | OUTPATIENT
Start: 2020-01-29 | End: 2020-01-29

## 2020-01-29 RX ORDER — OXYCODONE HYDROCHLORIDE 5 MG/1
5-10 TABLET ORAL EVERY 4 HOURS PRN
Qty: 30 TABLET | Refills: 0 | Status: SHIPPED | OUTPATIENT
Start: 2020-01-29 | End: 2020-01-29

## 2020-01-29 RX ORDER — ONDANSETRON 2 MG/ML
INJECTION INTRAMUSCULAR; INTRAVENOUS PRN
Status: DISCONTINUED | OUTPATIENT
Start: 2020-01-29 | End: 2020-01-29

## 2020-01-29 RX ORDER — PROPOFOL 10 MG/ML
INJECTION, EMULSION INTRAVENOUS CONTINUOUS PRN
Status: DISCONTINUED | OUTPATIENT
Start: 2020-01-29 | End: 2020-01-29

## 2020-01-29 RX ORDER — ONDANSETRON 4 MG/1
4 TABLET, ORALLY DISINTEGRATING ORAL EVERY 30 MIN PRN
Status: DISCONTINUED | OUTPATIENT
Start: 2020-01-29 | End: 2020-01-29 | Stop reason: HOSPADM

## 2020-01-29 RX ORDER — CLINDAMYCIN HCL 300 MG
300 CAPSULE ORAL 3 TIMES DAILY
Qty: 42 CAPSULE | Refills: 0 | Status: SHIPPED | OUTPATIENT
Start: 2020-01-29 | End: 2020-02-11

## 2020-01-29 RX ORDER — CEFAZOLIN SODIUM 2 G/100ML
2 INJECTION, SOLUTION INTRAVENOUS
Status: DISCONTINUED | OUTPATIENT
Start: 2020-01-29 | End: 2020-01-29

## 2020-01-29 RX ORDER — NALOXONE HYDROCHLORIDE 0.4 MG/ML
.1-.4 INJECTION, SOLUTION INTRAMUSCULAR; INTRAVENOUS; SUBCUTANEOUS
Status: DISCONTINUED | OUTPATIENT
Start: 2020-01-29 | End: 2020-01-29 | Stop reason: HOSPADM

## 2020-01-29 RX ORDER — SODIUM CHLORIDE, SODIUM LACTATE, POTASSIUM CHLORIDE, CALCIUM CHLORIDE 600; 310; 30; 20 MG/100ML; MG/100ML; MG/100ML; MG/100ML
INJECTION, SOLUTION INTRAVENOUS CONTINUOUS
Status: DISCONTINUED | OUTPATIENT
Start: 2020-01-29 | End: 2020-01-29 | Stop reason: HOSPADM

## 2020-01-29 RX ORDER — CEFAZOLIN SODIUM 1 G/3ML
1 INJECTION, POWDER, FOR SOLUTION INTRAMUSCULAR; INTRAVENOUS SEE ADMIN INSTRUCTIONS
Status: DISCONTINUED | OUTPATIENT
Start: 2020-01-29 | End: 2020-01-29 | Stop reason: HOSPADM

## 2020-01-29 RX ORDER — HYDROCODONE BITARTRATE AND ACETAMINOPHEN 5; 325 MG/1; MG/1
1 TABLET ORAL EVERY 6 HOURS PRN
Qty: 30 TABLET | Refills: 0 | Status: SHIPPED | OUTPATIENT
Start: 2020-01-29 | End: 2020-01-29

## 2020-01-29 RX ORDER — ACETAMINOPHEN 325 MG/1
975 TABLET ORAL ONCE
Status: COMPLETED | OUTPATIENT
Start: 2020-01-29 | End: 2020-01-29

## 2020-01-29 RX ORDER — EPHEDRINE SULFATE 50 MG/ML
INJECTION, SOLUTION INTRAMUSCULAR; INTRAVENOUS; SUBCUTANEOUS PRN
Status: DISCONTINUED | OUTPATIENT
Start: 2020-01-29 | End: 2020-01-29

## 2020-01-29 RX ORDER — BUPIVACAINE HYDROCHLORIDE 2.5 MG/ML
INJECTION, SOLUTION EPIDURAL; INFILTRATION; INTRACAUDAL PRN
Status: DISCONTINUED | OUTPATIENT
Start: 2020-01-29 | End: 2020-01-29

## 2020-01-29 RX ORDER — HYDROCODONE BITARTRATE AND ACETAMINOPHEN 5; 325 MG/1; MG/1
1 TABLET ORAL EVERY 6 HOURS PRN
Qty: 30 TABLET | Refills: 0 | Status: SHIPPED | OUTPATIENT
Start: 2020-01-29 | End: 2020-02-12

## 2020-01-29 RX ORDER — DEXAMETHASONE SODIUM PHOSPHATE 4 MG/ML
INJECTION, SOLUTION INTRA-ARTICULAR; INTRALESIONAL; INTRAMUSCULAR; INTRAVENOUS; SOFT TISSUE PRN
Status: DISCONTINUED | OUTPATIENT
Start: 2020-01-29 | End: 2020-01-29

## 2020-01-29 RX ADMIN — CEFAZOLIN SODIUM 2 G: 2 INJECTION, SOLUTION INTRAVENOUS at 09:38

## 2020-01-29 RX ADMIN — SODIUM CHLORIDE, POTASSIUM CHLORIDE, SODIUM LACTATE AND CALCIUM CHLORIDE: 600; 310; 30; 20 INJECTION, SOLUTION INTRAVENOUS at 09:30

## 2020-01-29 RX ADMIN — ROCURONIUM BROMIDE 20 MG: 10 INJECTION INTRAVENOUS at 12:11

## 2020-01-29 RX ADMIN — MIDAZOLAM 1 MG: 1 INJECTION INTRAMUSCULAR; INTRAVENOUS at 09:13

## 2020-01-29 RX ADMIN — HYDROMORPHONE HYDROCHLORIDE 0.3 MG: 1 INJECTION, SOLUTION INTRAMUSCULAR; INTRAVENOUS; SUBCUTANEOUS at 15:24

## 2020-01-29 RX ADMIN — ROCURONIUM BROMIDE 20 MG: 10 INJECTION INTRAVENOUS at 11:45

## 2020-01-29 RX ADMIN — CEFAZOLIN 1 G: 1 INJECTION, POWDER, FOR SOLUTION INTRAMUSCULAR; INTRAVENOUS at 11:40

## 2020-01-29 RX ADMIN — TILMANOCEPT 0.5 MILLICURIE: KIT at 08:05

## 2020-01-29 RX ADMIN — FENTANYL CITRATE 50 MCG: 50 INJECTION, SOLUTION INTRAMUSCULAR; INTRAVENOUS at 08:38

## 2020-01-29 RX ADMIN — PROPOFOL 20 MCG/KG/MIN: 10 INJECTION, EMULSION INTRAVENOUS at 09:40

## 2020-01-29 RX ADMIN — DEXAMETHASONE SODIUM PHOSPHATE 2 MG: 4 INJECTION, SOLUTION INTRA-ARTICULAR; INTRALESIONAL; INTRAMUSCULAR; INTRAVENOUS; SOFT TISSUE at 08:52

## 2020-01-29 RX ADMIN — ROCURONIUM BROMIDE 20 MG: 10 INJECTION INTRAVENOUS at 10:06

## 2020-01-29 RX ADMIN — ROCURONIUM BROMIDE 20 MG: 10 INJECTION INTRAVENOUS at 11:00

## 2020-01-29 RX ADMIN — ACETAMINOPHEN 975 MG: 325 TABLET, FILM COATED ORAL at 07:36

## 2020-01-29 RX ADMIN — Medication 10 MG: at 09:34

## 2020-01-29 RX ADMIN — GLYCOPYRROLATE 0.2 MG: 0.2 INJECTION, SOLUTION INTRAMUSCULAR; INTRAVENOUS at 09:23

## 2020-01-29 RX ADMIN — HYDROMORPHONE HYDROCHLORIDE 0.4 MG: 1 INJECTION, SOLUTION INTRAMUSCULAR; INTRAVENOUS; SUBCUTANEOUS at 15:50

## 2020-01-29 RX ADMIN — DEXMEDETOMIDINE HYDROCHLORIDE 40 MCG: 100 INJECTION, SOLUTION INTRAVENOUS at 08:52

## 2020-01-29 RX ADMIN — DEXAMETHASONE SODIUM PHOSPHATE 6 MG: 4 INJECTION, SOLUTION INTRA-ARTICULAR; INTRALESIONAL; INTRAMUSCULAR; INTRAVENOUS; SOFT TISSUE at 09:49

## 2020-01-29 RX ADMIN — ONDANSETRON 4 MG: 2 INJECTION INTRAMUSCULAR; INTRAVENOUS at 16:54

## 2020-01-29 RX ADMIN — SODIUM CHLORIDE, POTASSIUM CHLORIDE, SODIUM LACTATE AND CALCIUM CHLORIDE: 600; 310; 30; 20 INJECTION, SOLUTION INTRAVENOUS at 09:13

## 2020-01-29 RX ADMIN — ONDANSETRON 4 MG: 2 INJECTION INTRAMUSCULAR; INTRAVENOUS at 14:04

## 2020-01-29 RX ADMIN — FENTANYL CITRATE 50 MCG: 50 INJECTION, SOLUTION INTRAMUSCULAR; INTRAVENOUS at 12:07

## 2020-01-29 RX ADMIN — FENTANYL CITRATE 50 MCG: 50 INJECTION, SOLUTION INTRAMUSCULAR; INTRAVENOUS at 09:23

## 2020-01-29 RX ADMIN — FENTANYL CITRATE 50 MCG: 50 INJECTION, SOLUTION INTRAMUSCULAR; INTRAVENOUS at 11:00

## 2020-01-29 RX ADMIN — PROCHLORPERAZINE EDISYLATE 5 MG: 5 INJECTION INTRAMUSCULAR; INTRAVENOUS at 17:45

## 2020-01-29 RX ADMIN — OXYCODONE HYDROCHLORIDE 5 MG: 5 TABLET ORAL at 17:04

## 2020-01-29 RX ADMIN — PROPOFOL 150 MG: 10 INJECTION, EMULSION INTRAVENOUS at 09:23

## 2020-01-29 RX ADMIN — LIDOCAINE HYDROCHLORIDE 100 MG: 20 INJECTION, SOLUTION INFILTRATION; PERINEURAL at 09:23

## 2020-01-29 RX ADMIN — FENTANYL CITRATE 50 MCG: 50 INJECTION, SOLUTION INTRAMUSCULAR; INTRAVENOUS at 12:50

## 2020-01-29 RX ADMIN — HYDROMORPHONE HYDROCHLORIDE 0.5 MG: 1 INJECTION, SOLUTION INTRAMUSCULAR; INTRAVENOUS; SUBCUTANEOUS at 14:32

## 2020-01-29 RX ADMIN — MIDAZOLAM 1 MG: 1 INJECTION INTRAMUSCULAR; INTRAVENOUS at 08:38

## 2020-01-29 RX ADMIN — ROCURONIUM BROMIDE 50 MG: 10 INJECTION INTRAVENOUS at 09:23

## 2020-01-29 RX ADMIN — CEFAZOLIN 1 G: 1 INJECTION, POWDER, FOR SOLUTION INTRAMUSCULAR; INTRAVENOUS at 13:39

## 2020-01-29 RX ADMIN — FENTANYL CITRATE 50 MCG: 50 INJECTION, SOLUTION INTRAMUSCULAR; INTRAVENOUS at 10:26

## 2020-01-29 RX ADMIN — SUGAMMADEX 200 MG: 100 INJECTION, SOLUTION INTRAVENOUS at 14:30

## 2020-01-29 RX ADMIN — ROCURONIUM BROMIDE 10 MG: 10 INJECTION INTRAVENOUS at 13:52

## 2020-01-29 RX ADMIN — BUPIVACAINE HYDROCHLORIDE 40 ML: 2.5 INJECTION, SOLUTION EPIDURAL; INFILTRATION; INTRACAUDAL; PERINEURAL at 08:52

## 2020-01-29 RX ADMIN — ROCURONIUM BROMIDE 20 MG: 10 INJECTION INTRAVENOUS at 13:01

## 2020-01-29 RX ADMIN — Medication 5 MG: at 09:49

## 2020-01-29 RX ADMIN — INDOCYANINE GREEN 12.5 MG: KIT INTRAVENOUS at 13:40

## 2020-01-29 ASSESSMENT — MIFFLIN-ST. JEOR: SCORE: 1395

## 2020-01-29 NOTE — ANESTHESIA PROCEDURE NOTES
Peripheral Nerve Block Procedure Note    Staff:     Anesthesiologist:  Tolu aHyden DO    Resident/CRNA:  Meliton Uriostegui MD    Block performed by resident/CRNA in the presence of a teaching physician    Location: Pre-op  Procedure Start/Stop TImes:      1/29/2020 8:35 AM     1/29/2020 8:53 AM    patient identified, IV checked, site marked, risks and benefits discussed, informed consent, monitors and equipment checked, pre-op evaluation, at physician/surgeon's request and post-op pain management      Correct Patient: Yes      Correct Position: Yes      Correct Site: Yes      Correct Procedure: Yes      Correct Laterality:  Yes    Site Marked:  Yes  Procedure details:     Procedure:  Pectoralis    ASA:  2    Laterality:  Bilateral    Position:  Supine    Sterile Prep: chloraprep, mask and sterile gloves      Local skin infiltration:  None    Needle:  Touhy needle    Needle gauge:  17    Needle length (inches):  3.1    Ultrasound: Yes      Ultrasound used to identify targeted nerve, plexus, or vascular structure and placed a needle adjacent to it      Permanent Image entered into patiient's record      Abnormal pain on injection: No      Blood Aspirated: No      Paresthesias:  No    Bleeding at site: No      Bolus via:  Needle    Infusion Method:  Single Shot    Blood aspirated via catheter: No      Complications:  None

## 2020-01-29 NOTE — ANESTHESIA CARE TRANSFER NOTE
Patient: Leigh Espinal    Procedure(s):  BILATERAL Skin-Sparing Mastectomy  BILATERAL Axillary [place] Lymph Node Biopsy  Bilateral breast reconstruction with expanders and SPY    Diagnosis: Malignant neoplasm of upper-outer quadrant of left breast in female, estrogen receptor positive (H) [C50.412, Z17.0]  Malignant neoplasm of upper-inner quadrant of right breast in female, estrogen receptor positive (H) [C50.211, Z17.0]  Diagnosis Additional Information: No value filed.    Anesthesia Type:   General     Note:  Airway :Face Mask  Patient transferred to:PACU  Comments: Pt alert, breathing spontaneously on 6L O2 via FM. VSS. Report shared with RN.Handoff Report: Identifed the Patient, Identified the Reponsible Provider, Reviewed the pertinent medical history, Discussed the surgical course, Reviewed Intra-OP anesthesia mangement and issues during anesthesia, Set expectations for post-procedure period and Allowed opportunity for questions and acknowledgement of understanding      Vitals: (Last set prior to Anesthesia Care Transfer)    CRNA VITALS  1/29/2020 1418 - 1/29/2020 1454      1/29/2020             NIBP:  153/84    NIBP Mean:  114    Ht Rate:  66    SpO2:  100 %                Electronically Signed By: MATTHEW Osorio CRNA  January 29, 2020  2:54 PM

## 2020-01-29 NOTE — OP NOTE
DATE OF SURGERY: January 29, 2020     SURGEON: Paz Smith MD  ASSISTANT:   1. Alli Arellano MD PGY-5    PREOPERATIVE DIAGNOSIS:   1. Left breast cancer, upper outer quadrant  2. Right breast cancer, upper inner quadrant  3. Deleterious CHEK2 mutation    POSTOPERATIVE DIAGNOSIS: same    PROCEDURE:   1. Left skin-sparing mastectomy  2. Left axillary sentinel lymph node mapping and biopsy   3. Right skin-sparing mastectomy  4. Right axillary sentinel lymph node mapping and biopsy   5. Immediate reconstruction by Dr Chatterjee    ANESTHESIA: General + Block    CLINICAL NOTE:  Leigh Espinal is a 54 year old woman with bilateral breast cancer and a deleterious CHEK2 mutation.  The risks and benefits of bilateral skin-sparing mastectomy and bilateral axillary alex staging were discussed with the patient and she elected to proceed with informed consent.    OPERATIVE FINDINGS:  1. No palpable mass in either breast.  2. One sentinel lymph node removed from left axilla.  3. One sentinel lymph node removed from right axilla.    OPERATIVE PROCEDURE:  The patient first presented to the Nuclear Medicine department for subareolar injection of technetium-labelled colloid (tilmanocept).  The patient was brought to the operating room and placed in the supine position with appropriate padding for all of the pressure points. A general anesthetic was administered.   A surgical safety checklist was performed to confirm the patient's identity, the site and laterality of the procedure. Perioperative antibiotics (Ancef) was provided.  VTE prophylaxis was provided with serial compression devices. A chacko catheter was placed.  The bilateral breasts and axillae were then prepped and draped in the usual sterile fashion using Chloraprep.     We began on the left. 2.5 mL of lymphazurin was injected into the left  subareolar space and the left  breast was massaged for 5 minutes.  A circumareolar incision with a radial extension towards the  upper outer quadrant was made.  Superficial skin flaps were raised circumferentially, heading superiorly towards the clavicle, medially towards the lateral border of the sternum, inferiorly towards the insertion of the rectus sheath, and laterally to the lateral border of the pectoralis major muscle.  The breast was then dissected off of the pectoralis major muscle, taking its fascia en bloc with the specimen.   The specimen was then marked with a suture for the axillary tail and sent to pathology.  The wound was irrigated and hemostasis was achieved.   The Neoprobe was used to identify the sentinel lymph nodes in the left axilla.  [place] lymph node #1 was blue and had an ex vivo count of 6341.  The background count was 80. No other blue or palpable lymph nodes were found.  Thus no additional sentinel lymph nodes were sought.  All of the lymph nodes were sent to pathology individually.     Next, 2.5 mL of lymphazurin was injected into the right  subareolar space and the right  breast was massaged for 5 minutes.  A circumareolar incision with a radial extension towards the upper outer quadrant was made.  Superficial skin flaps were raised circumferentially, heading superiorly towards the clavicle, medially towards the lateral border of the sternum, inferiorly towards the insertion of the rectus sheath, and laterally to the lateral border of the pectoralis major muscle.  The breast was then dissected off of the pectoralis major muscle, taking its fascia en bloc with the specimen.   The specimen was then marked with a suture for the axillary tail and sent to pathology.  The wound was irrigated and hemostasis was achieved.   The Neoprobe was used to identify the sentinel lymph nodes.  [place] lymph node #1 was not blue and had an ex vivo count of 5725.  The background count was 103. No other blue or palpable lymph nodes were found.  Thus no additional sentinel lymph nodes were sought.  All of the lymph nodes were  sent to pathology individually.     The wound was irrigated and hemostasis was achieved. The patient tolerated the procedure well thus far. The sponge, needle, instrument counts were correct thus far.  At this point, Dr Chatterjee and his team took over for the reconstruction portion of the case, which will be dictated separately.       I was present and scrubbed for the entire above procedure.    EBL: 10 mL    SPECIMEN(S):  A. Left breast and nipple; suture = axillary tail  B. Left axillary sentinel lymph node # 1  C. Right breast and nipple; suture = axillary tail  D. Right axillary sentinel lymph node # 1    Paz Smith MD MSc [facility] FACS    Division of Surgical Oncology  [facility]

## 2020-01-29 NOTE — OR NURSING
Surgery paged (Dr. Arellano) : re: Leigh Espinal ([address]) patient does not want OXY, would prefer to have VICODIN. Additionally, patient may need Zofran to go home with she is experiencing fair amount of nausea. Av K[facility] [phone]  Spoke to Dr. Arellano, he asked that the general surgery resident be paged since he is no longer in house. Dr. French paged.

## 2020-01-29 NOTE — ANESTHESIA POSTPROCEDURE EVALUATION
Anesthesia POST Procedure Evaluation    Patient: Leigh Espinal   MRN:     [id number] Gender:   female   Age:    54 year old :      1965        Preoperative Diagnosis: Malignant neoplasm of upper-outer quadrant of left breast in female, estrogen receptor positive (H) [C50.412, Z17.0]  Malignant neoplasm of upper-inner quadrant of right breast in female, estrogen receptor positive (H) [C50.211, Z17.0]   Procedure(s):  BILATERAL Skin-Sparing Mastectomy  BILATERAL Axillary [place] Lymph Node Biopsy  Bilateral breast reconstruction with expanders and SPY   Postop Comments: No value filed.       Anesthesia Type:  Not documented  General    Reportable Event: NO     PAIN: Uncomplicated   Sign Out status: Comfortable, Well controlled pain     PONV: No PONV   Sign Out status:  No Nausea or Vomiting     Neuro/Psych: Uneventful perioperative course   Sign Out Status: Preoperative baseline; Age appropriate mentation     Airway/Resp.: Uneventful perioperative course   Sign Out Status: Non labored breathing, age appropriate RR; Resp. Status within EXPECTED Parameters     CV: Uneventful perioperative course   Sign Out status: Appropriate BP and perfusion indices; Appropriate HR/Rhythm     Disposition:   Sign Out in:  PACU  Disposition:  Floor  Recovery Course: Uneventful  Follow-Up: Not required           Last Anesthesia Record Vitals:  CRNA VITALS  2020 1418 - 2020 1501      2020             NIBP:  153/84    NIBP Mean:  114    Ht Rate:  66    SpO2:  100 %          Last PACU Vitals:  Vitals Value Taken Time   /84 2020  2:52 PM   Temp     Pulse 61 2020  2:52 PM   Resp     SpO2 99 % 2020  3:00 PM   Temp src     NIBP 153/84 2020  2:54 PM   Pulse     SpO2 100 % 2020  2:54 PM   Resp     Temp     Ht Rate 66 2020  2:54 PM   Temp 2     Vitals shown include unvalidated device data.      Electronically Signed By: Jasmin Dumont MD, 2020, 3:01 PM

## 2020-01-29 NOTE — ANESTHESIA PREPROCEDURE EVALUATION
Anesthesia Pre-Procedure Evaluation    Patient: Leigh Espinal   MRN:     [id number] Gender:   female   Age:    54 year old :      1965        Preoperative Diagnosis: Malignant neoplasm of upper-outer quadrant of left breast in female, estrogen receptor positive (H) [C50.412, Z17.0]  Malignant neoplasm of upper-inner quadrant of right breast in female, estrogen receptor positive (H) [C50.211, Z17.0]   Procedure(s):  BILATERAL Skin-Sparing Mastectomy  BILATERAL Axillary [place] Lymph Node Biopsy  BILATERAL Axillary Lymph Node Dissection possible  Bilateral breast reconstruction with expanders     Past Medical History:   Diagnosis Date     AVM (arteriovenous malformation)     Right Pulmonary     GERD (gastroesophageal reflux disease)      HHT (hereditary hemorrhagic telangiectasia) (H) 2015    Possible- no ACVRL1, ENG or SMAD4 mutations found on sequencing 10/6/15      Hiatal hernia     nissen done in      HTN (hypertension)      Iron deficiency anemia 2012     Problem list name updated by automated process. Provider to review      Past Surgical History:   Procedure Laterality Date     COLONOSCOPY  2012    Procedure: COLONOSCOPY;;  Surgeon: Radha Hector MD;  Location:  GI     LAPAROSCOPIC NISSEN FUNDOPLICATION       TUBAL LIGATION                 JZG FV AN PHYSICAL EXAM    LABS:  CBC:   Lab Results   Component Value Date    WBC 6.4 10/23/2019    WBC 7.0 10/18/2017    HGB 14.7 10/23/2019    HGB 15.2 10/18/2017    HCT 44.9 10/23/2019    HCT 47.2 (H) 10/18/2017     10/23/2019     (L) 10/18/2017     BMP:   Lab Results   Component Value Date     10/18/2017     10/31/2012    POTASSIUM 3.8 10/18/2017    POTASSIUM 3.6 2015    CHLORIDE 105 10/18/2017    CHLORIDE 106 10/31/2012    CO2 26 10/18/2017    CO2 26 2012    BUN 13 10/18/2017    BUN 11 10/31/2012    CR 0.84 10/18/2017    CR 0.64 2015     (H) 10/18/2017    GLC  "109 (A) 10/31/2012     COAGS:   Lab Results   Component Value Date    INR 1.11 03/04/2015     POC:   Lab Results   Component Value Date    HCGS Negative 03/04/2015     OTHER:   Lab Results   Component Value Date    ILIANA 8.8 10/18/2017    ALBUMIN 3.3 (L) 10/18/2017    PROTTOTAL 6.5 (L) 10/18/2017    ALT 20 10/18/2017    AST 11 10/18/2017    ALKPHOS 88 10/18/2017    BILITOTAL 0.4 10/18/2017    TSH 1.43 04/18/2012    CRP <5.0 04/18/2012        Preop Vitals    BP Readings from Last 3 Encounters:   01/29/20 (!) 160/81   01/21/20 (!) 191/109   12/05/19 (!) 194/83    Pulse Readings from Last 3 Encounters:   01/29/20 50   01/21/20 72   12/05/19 62      Resp Readings from Last 3 Encounters:   01/29/20 16   01/21/20 16   12/05/19 16    SpO2 Readings from Last 3 Encounters:   01/29/20 99%   01/21/20 98%   12/05/19 100%      Temp Readings from Last 1 Encounters:   01/29/20 36.6  C (97.9  F) (Oral)    Ht Readings from Last 1 Encounters:   01/29/20 1.626 m (5' 4\")      Wt Readings from Last 1 Encounters:   01/29/20 80 kg (176 lb 5.9 oz)    Estimated body mass index is 30.27 kg/m  as calculated from the following:    Height as of this encounter: 1.626 m (5' 4\").    Weight as of this encounter: 80 kg (176 lb 5.9 oz).     LDA:  Peripheral IV 10/23/19 Right Lower forearm (Active)   Number of days: 98        Assessment:   ASA SCORE: 2    H&P: History and physical reviewed and following examination; no interval change.    NPO Status: NPO Appropriate     Plan:   Anes. Type:  General   Pre-Medication: None   Induction:  IV (Standard)   Airway: ETT; Oral   Access/Monitoring: PIV   Maintenance: Balanced     Postop Plan:   Postop Pain: Opioids  Postop Sedation/Airway: Not planned  Disposition: Inpatient/Admit     PONV Management:   Adult Risk Factors: Female, Postop Opioids   Prevention: Ondansetron     CONSENT: Direct conversation   Plan and risks discussed with: Patient   Blood Products: Consented (ALL Blood Products)           Anesthesia " Attending    HPI: Leigh Espinal is a 54 year old female who  has a past medical history of AVM (arteriovenous malformation), GERD (gastroesophageal reflux disease), HHT (hereditary hemorrhagic telangiectasia) (H) (2015), Hiatal hernia, HTN (hypertension), and Iron deficiency anemia (2012).  has a past surgical history that includes Laparoscopic nissen fundoplication (); tubal ligation (); and Colonoscopy (2012). with a Malignant neoplasm of upper-outer quadrant of left breast in female, estrogen receptor positive (H) [C50.412, Z17.0];Malignant neoplasm of upper-inner quadrant of right breast in female, estrogen receptor positive (H) [C50.211, Z17.0] scheduled for Procedure(s):  BILATERAL Skin-Sparing Mastectomy  BILATERAL Axillary [place] Lymph Node Biopsy  BILATERAL Axillary Lymph Node Dissection possible  Bilateral breast reconstruction with expanders.      PMHx/PSHx/ROS:  Past Medical History:   Diagnosis Date     AVM (arteriovenous malformation)     Right Pulmonary     GERD (gastroesophageal reflux disease)      HHT (hereditary hemorrhagic telangiectasia) (H) 2015    Possible- no ACVRL1, ENG or SMAD4 mutations found on sequencing 10/6/15      Hiatal hernia     nissen done in      HTN (hypertension)      Iron deficiency anemia 2012     Problem list name updated by automated process. Provider to review       Past Surgical History:   Procedure Laterality Date     COLONOSCOPY  2012    Procedure: COLONOSCOPY;;  Surgeon: Radha Hector MD;  Location: [facility]     LAPAROSCOPIC NISSEN FUNDOPLICATION       TUBAL LIGATION         Soc Hx:   Social History     Tobacco Use     Smoking status: Former Smoker     Packs/day: 1.00     Years: 20.00     Pack years: 20.00     Types: Cigarettes     Start date: 1980     Last attempt to quit: 2011     Years since quittin.3     Smokeless tobacco: Never Used   Substance Use Topics     Alcohol use: Yes      Alcohol/week: 3.3 standard drinks     Types: 4 drink(s) per week     Comment: occ         Allergies:   Allergies   Allergen Reactions     Sulfa Drugs        Meds:   Medications Prior to Admission   Medication Sig Dispense Refill Last Dose     amoxicillin (AMOXIL) 500 MG tablet Take 4 pills (2000 mg) 30-60 minutes before dental procedures, including teeth cleaning. (Patient not taking: Reported on 10/18/2018) 4 tablet 3 Not Taking     atenolol (TENORMIN) 25 MG tablet Take 25 mg by mouth. Takes 1 tab a day.    Taking     LISINOPRIL PO Take 20 mg by mouth daily   Taking     pantoprazole (PROTONIX) 20 MG EC tablet daily as needed   3 Taking     SERTRALINE HCL PO Take 25 mg by mouth daily    Taking     TRAZodone (DESYREL) 25 MG TABS Take 50 mg by mouth At Bedtime    Taking       No current outpatient medications on file.         Labs:  BMP:  Recent Labs   Lab Test 10/18/17  0814      POTASSIUM 3.8   CHLORIDE 105   CO2 26   BUN 13   CR 0.84   *   ILIANA 8.8     CBC:   Recent Labs   Lab Test 10/23/19  0751   WBC 6.4   RBC 4.96   HGB 14.7   HCT 44.9   MCV 91   MCH 29.6   MCHC 32.7   RDW 14.1        Coags:  Recent Labs   Lab Test 03/04/15  1030   INR 1.11       Vital Signs:  T 97.9  P 50  /81  R 16  SpO2 99%    NPO status adequate.      54 year old female with PMHx as above, to OR for Procedure(s):  BILATERAL Skin-Sparing Mastectomy  BILATERAL Axillary [place] Lymph Node Biopsy  BILATERAL Axillary Lymph Node Dissection possible  Bilateral breast reconstruction with expanders    Plan for GA, standard monitors, large bore IVs, possible blood transfusion, PONV prophylaxis.  Antibiotics per primary service.    Plan discussed with the anesthesia and surgery teams.  Anesthetic risks discussed with the patient, all questions answered.  Consent in chart.            Jasmin Dumont MD

## 2020-01-29 NOTE — PROGRESS NOTES
"SPIRITUAL HEALTH SERVICES  [facility] ([place]) 3C   PRE-SURGERY VISIT    Had pre-surgery visit with pt./family. Provided spiritual support, prayer. The pt said, \"I want my  to pray with us.\" He held her hand and we prayed together.    Shabbir Reinoso M.Div.     Pager [phone]    "

## 2020-01-29 NOTE — DISCHARGE INSTRUCTIONS
TISSUE EXPANSION FOR BREAST RECONSTRUCTION  POST-OPERATIVE INSTRUCTIONS    Instructions     Shower with Bactoshield the night before and morning of each tissue expansion.     You need someone to drive you to your appointment for the first 3 weeks post-operatively or as long as you requiring narcotic pain medicine.    Activities     You cannot perform house hold chores or heavy lifting greater than 5 to 10 pounds for 6 weeks post-operatively. (Gallon of milk is 8 pounds)      No hot tubs, swimming in lakes until fully healed.    Be Aware:     You cannot have an MRI if you have tissue expanders in place.     The tissue expanders may be detected via the security scanners at the airport. You will need a note from your Doctor if you plan to travel indicating you have tissue expanders in place with metal.    Incision Care     You can shower 48 hours after the last drain tube has removed.     Avoid sun exposure to scars for at least 12 months after your last surgery.     If sun exposure is unavoidable then always use a sun block with 50 SPF or higher.     Shower regularly to keep the incisions clean and inspect for signs of Infection.     You can use moisturizer on the incisions and surrounding skin starting 3 Weeks.    What to Expect     You may experience minor discomfort for the following 12 to 24 hours after each tissue expansion. This discomfort usually subsides 2 to 3 days after each tissue expansion.     The tissue expander may shift after the 1st or 2 nd expansion.     You may experience more discomfort on one side than the other if you have bilateral tissue expanders placed.     Your posture may change causing you to have some upper and/or mid back pain as you re expanded.     Your shoulder range of motion may become stiff requiring continuing to perform the shoulder exercises during the tissue expansion process to prevent shoulder stiffness and a frozen shoulder.     You may find it difficult to sleep because you  feel tight across the chest and shoulders.     You may find it difficult to fit into certain types of tight fitting clothing. You may need to wear oversized shirts until the final exchange of the tissue expander (s).     You may feel chest persist tightness or heaviness secondary to being expanded. This usually subsides with time.     The tissue expanders will remain in place for a minimum of 8 weeks after completing the last tissue expansion. This will allow for the soft tissues (i.e. skin and muscle) to heal and recover from being stretched before the second surgery takes place for the exchange of the tissue expander for a permanent breast implant.     You ll see your surgeon when we think you have achieve your desired breast size to determine if you need additional expansions and for surgical planning.    How can I treat discomfort?     Ibuprofen (or other NSAIDs) 600 mg by mouth every 6 to 8 hours with food starting the morning of each tissue expansion and continue to take around the clock for 3 to 5 days as needed for discomfort. You can start this 2 weeks after surgery.     If you need additional pain control at night, you may take the prescribed Vicodin or Norco you were prescribed for pain immediately after surgery. DO NOT TAKE PRODUCTS CONTAINING ACETAMINOPHEN (TYLENOL).      You can take the ibuprofen with the Vicodin or Norco if additional pain relief is required.    Will I achieve my desired breast size?     This is determined on an individual basis.     There is no scientific way to determine the exact breast size. It will be determined by a number of different factors i.e.     How well you tolerate each tissue expansion.     How well your skin reacts to the expansions.     The size of the other breast (if a unilateral tissue expander).     Previous surgeries or amount of scarring     We recommend trying on a desired bra size as you approach your desired breast size to see how well the bra size  fits.    Appearance     While the tissue is being expanded, a bulge will be created. Depending upon the location of the tissue expander, the bulge may be considered desirable or unsightly.     Following the exchange of the tissue expander, a more normal and desirable look should be restored.     After the exchange for permanent implant, the breasts will feel softer with less fullness in your axilla.    When to Call:     If you have increasing swelling or bruising.     If swelling and redness persist after a few days.     If you have increased redness along the incision.     If you have severe or increased pain not relieved by medication.     If you have any side effects to mediations; such as, rash, nauseas, headache, vomiting, etc.     If you have an oral temperature of 100.5 degrees or higher.     If you have any yellow or greenish drainage from the incisions or notice a foul smell.     If you have bleeding from the incisions that is difficult to control with light pressure.     If you have loss of feeling or motion.    For Medical Questions, Please Call:     [phone], Monday - Friday, 8 a.m. - 4:30 p.m.     After hours and on weekends, call Hospital Paging at [phone] and ask for the Plastic Surgeon on call.    Please note my office will call you 1-2 business days after your procedure to check up on how you're doing and to schedule your post-operative appointment.       Take it easy when you get home.  Remember, same day surgery DOES NOT MEAN SAME DAY RECOVERY!  Healing is a gradual process.  You will need some time to recover - you may be more tired than you realize at first.  Rest and relax for at least the first 24 hours at home.  You'll feel better and heal faster if you take good care of yourself.

## 2020-01-29 NOTE — OP NOTE
PREOPERATIVE DIAGNOSIS: Status post bilateral breast cancer requiring bilateral nipple-non-sparing mastectomy and immediate reconstruction.     POSTOPERATIVE DIAGNOSIS: Status post bilateral breast cancer requiring bilateral nipple-non-sparing mastectomy and immediate reconstruction.     PROCEDURES:   1. Bilateral immediate breast reconstruction using pre-pectoral expander (CPX4 anatomic [place] breast expander with a base diameter 15.0 cm and a total fill volume of 750 mL filled to with air without tension for about a 1300 gram mastectomy specimen. Medical Indication for Use in the Pre-pectoral plane: Prevent animation defect, decrease matilda-operative pain, decrease anatomical destruction/distortion of the pectoral muscle).   2. Intraoperative chemical angiography with injection of ICG dye and interpretation of the angiogram using the SPY Elite System (indication of use was the fact the patient had undergone a nipple-non-sparing mastectomy and we needed to evaluate the blood flow to the skin flaps to decide appropriate reconstruction as well as debridement).    IMPLANTS:     Implant Name Type Inv. Item Serial No.  Lot No. LRB No. Used   MENTOR CPX4 WITH SUTURE TABS     [id number] Right 1     Implant Name Type Inv. Item Serial No.  Lot No. LRB No. Used   MENTOR CPX4 WITH SUTURE TABS      left 1         SURGEON: Leeann Chatterjee MD     PA: Ana Calvillo. (PA needed as there was no resident available for the case. Ana helped with closing skin and retraction).     ANESTHESIA: General anesthesia with endotracheal intubation.     COMPLICATIONS: Nil.     DRAINS: One 15-M[place]oth[place] channel YOLY drain on each side.    SPECIMENS: Nil.     BLOOD LOSS: 5 mL      DESCRIPTION OF PROCEDURE: After informed consent was taken from the patient, the proper site and procedure was ascertained with her and she was appropriately marked and taken to the operating room. She was placed in a supine position with her knees  comfortably flexed, pillows underneath them and pneumoboots placed and running prior to induction of anesthesia. Preoperative antibiotics were given in the OR and appropriately redosed during the case. General anesthesia was administered without any complications. A Abebe catheter was inserted. Her arms were abducted to about 50 degrees and appropriately padded. Surgical Oncology took over and began the procedure. The bilateral nipple-non sparing mastectomy was completed and I was called to the operating room. She was reprepped and draped and I began the procedure by first analyzing the skin flaps. Clinically everything looked good. I went ahead and carried out the preoperative chemical angiogram using the SPY Elite System and injection of ICG dye. A total of 5 mL was injected at this time and at 30 seconds we evaluated the blood flow to the surrounding skin flaps. Overall both skin flaps had some dark matilda-incisional areas (more on the right than the left) but overall the flow was good. These were marked out for later excision. Given the clinical findings, we decided to proceed with the immediate pre-pectoral reconstruction. Identical steps were performed bilaterally.The dead-space in the lateral areas were closed off and the LMF and IMF were recreated with 0-vicryl sutures. Key 0-Vicryl sutures were placed in the MMF and IMF to sew the tabs of the expanders to. No Alloderm/ADM was used.  We measured the base width at about 15.0 cm. The surgical pocket was then irrigated with triple antibiotic solution and betadine. We changed our gloves. The expander was then removed from the package and were placed in the pockets. The medial and inferior tabs were sutured down to the present sutures. Air was placed to fill the skin without tension. A 15-Ugandan channeled YOLY drain was then placed on each side and brought out through a separate stab incision and sewn into position. The edges of the wounds were refreshened per the  SPY and there was bleeding seen at the edges of the wounds and then the skin was closed using 2-0 Monocryl suture in a deep dermal layer. We went ahead and then placed a 3-0 Strattafix suture in a running intracuticular manner followed by Surgical Glue and Tape. Appropriate dressings were placed over the drain sites. The patient was wrapped not tightly. The patient tolerated the procedure well. All counts were correct at the end of the case. The patient was extubated and sent to recovery room in stable condition.

## 2020-01-30 ENCOUNTER — TELEPHONE (OUTPATIENT)
Dept: ONCOLOGY | Facility: CLINIC | Age: 55
End: 2020-01-30

## 2020-01-30 NOTE — TELEPHONE ENCOUNTER
POST-OP CALL  Jan 30, 2020    Leigh Espinal is a 54 year old female s/p   1. Left skin-sparing mastectomy  2. Left axillary sentinel lymph node mapping and biopsy   3. Right skin-sparing mastectomy  4. Right axillary sentinel lymph node mapping and biopsy   5. Immediate reconstruction by Dr Chatterjee    She reports doing just fine. Her pain is well controlled. She does have pain at the drain site at the skin.     Fevers/chills: Patient denies fever/chills.  Eating/drinking: Patient is able to eat and drink without any complaints.   Bowel habits: No bowel movement yet. Discussed plenty of fluids and miralax if needed.   Urine output: Voiding without difficulty.   Drains (YOLY): site covered with gauze, denies drainage from around site. Drainage: red, stable in color. Recording output.   Incisions: Patient denies any signs and symptoms of infection. No erythema, swelling or drainage  Pain: Patient reports pain is controlled with oxycodone.   Follow up appointment scheduled on 2/13 with Dr. Smith.  Patient will call with any questions or concerns.    Suellen Thomas PA-C

## 2020-01-30 NOTE — OR NURSING
Spoke to Dr. French, script for Zofran has been sent to pharmacy. States that she does not see script receipt for OXYCODONE. When reviewed it appears Dr. Arellano changed to GABRIELE, script has been printed and needs to have signature of prescribing physician. Dr. Arellano paged and made aware.

## 2020-01-30 NOTE — OR NURSING
Dr. Arellano paged: re: Leigh Espinal - thank you for changing the script to Norco but it will need to be signed, are you coming back to hosp or can you e-scribe?

## 2020-01-30 NOTE — OR NURSING
Patient assisted to ambulation to bathroom without difficulty. Patient able to void and appears steady on feet.

## 2020-01-30 NOTE — OR NURSING
Spoke to Dr. French regarding prescriptions, states she will cancel out Dr. Arellano script and place new order. MD aware pharmacy closes at 8pm. Awaiting new script.

## 2020-02-03 ENCOUNTER — TELEPHONE (OUTPATIENT)
Dept: ONCOLOGY | Facility: CLINIC | Age: 55
End: 2020-02-03

## 2020-02-03 NOTE — TELEPHONE ENCOUNTER
S/p mastectomy 1/29/2020. Pt reports that bandage is almost falling off and pressing on her skin especially at the seams of the wrap by her drain tubes. Has tried to put gauze and a washcloth in between but is not helping. Skin is red and very painful. She is also requesting a Norco refill. Is taking 3-4/day and is almost out. [facility] in [place].     Paged to Suellen Thomas PA-C

## 2020-02-03 NOTE — TELEPHONE ENCOUNTER
Leigh Espinal  Female, 54 year old, 1965  MRN:   [id number]  SB#:   No Service Bundle Assignment  Phone:   [phone] (M)  Weight:   80 kg (176 lb 5.9 oz)  HM Due?:   Due  Allergies  Sulfa Drugs  Pref Language:   English  PCP:   Darleen Ventura MD  Coverage:   MEDICA/MEDICA ESSENTIAL  Next Appt  With Oncology (Paz Smith MD)  02/13/2020 at 9:15 AM  concerns from pt   Received: Today   Message Contents   Gemma Rebolledo RN Choudry, M Umar Hasan, MD; Paz Smith MD; Gwendolyn Holbrook RN   Cc: Gemma Rebolledo RN             Call from who was having several concerns; she had called nurse triage and they sent message out to Suellen Thomas. They realized late in the day Suellen is on vacation this week. Pt reports she is miserable in the ace wrap, she said her skin is raw and the wrap hurts. I told her to take it off. She only has 2 pain meds left and is requesting more. She says she is nauseated from her antibiotic. I told her she would probably not hear back regarding her concerns today. She said she understood.

## 2020-02-03 NOTE — TELEPHONE ENCOUNTER
Pt called again, stating she wants to be seen today just to get another wrap since the velcro is irritating her skin. If she comes in to the clinic she can wait for refill to clinic pharmacy. Advised pt triage has paged Suellen Thomas as unsure if clinic would have supply available. Will also route to RNCC for assistance.

## 2020-02-04 ENCOUNTER — PATIENT OUTREACH (OUTPATIENT)
Dept: PLASTIC SURGERY | Facility: CLINIC | Age: 55
End: 2020-02-04

## 2020-02-04 DIAGNOSIS — Z17.0 MALIGNANT NEOPLASM OF UPPER-OUTER QUADRANT OF LEFT BREAST IN FEMALE, ESTROGEN RECEPTOR POSITIVE (H): Primary | ICD-10-CM

## 2020-02-04 DIAGNOSIS — C50.412 MALIGNANT NEOPLASM OF UPPER-OUTER QUADRANT OF LEFT BREAST IN FEMALE, ESTROGEN RECEPTOR POSITIVE (H): Primary | ICD-10-CM

## 2020-02-04 RX ORDER — TRAMADOL HYDROCHLORIDE 50 MG/1
50 TABLET ORAL EVERY 6 HOURS PRN
Qty: 20 TABLET | Refills: 0 | Status: SHIPPED | OUTPATIENT
Start: 2020-02-04 | End: 2020-03-06

## 2020-02-04 NOTE — TELEPHONE ENCOUNTER
ONCOLOGY INTAKE: Records Information      APPT INFORMATION: 2/25/20 - [facility]  Referring provider:  ROBYN Smith  Referring provider s clinic:  [facility]  Reason for visit/diagnosis:  breast cancer  Has patient been notified of appointment date and time?: Yes    RECORDS INFORMATION:  Were the records received with the referral (via [employer])? No    Has patient been seen for any external appt for this diagnosis? Yes    If yes, where? [facility]    Has patient had any imaging or procedures outside of [facility] for this condition? Yes      If Yes, where? [facility]    ADDITIONAL INFORMATION:  Scheduled via IB from Gemma ESPINO Pt confirmed

## 2020-02-04 NOTE — PATIENT INSTRUCTIONS
Spoke with pt regarding post op concerns. Recommended pt wrap the ACE bandage more loosely and place gauze pads between the the wrap and her skin. Pt states she tried this and it was not helpful. Pt reports skin is red and raw from the wrap. Recommended pt try a thin cotton shirt with ACE wrap over. Pt states she will try this measure. Pt has been experiencing nausea from antibiotic, recommended pt discontinue antibiotic. Pt has been taking Tylenol every 4 hours and Oxycodone. Pt reports Tylenol is not adequately managing pain. Has one tab of Oxycodone left. Recommended pt alternate between Tylenol and Ibuprofen. Prescription for tramadol will be sent for breakthrough pain. Pt to contact clinic if symptoms with skin do not improve. Gwendolyn GALLEGO RNCC

## 2020-02-10 LAB — COPATH REPORT: NORMAL

## 2020-02-11 ENCOUNTER — OFFICE VISIT (OUTPATIENT)
Dept: PLASTIC SURGERY | Facility: CLINIC | Age: 55
End: 2020-02-11
Attending: PLASTIC SURGERY
Payer: COMMERCIAL

## 2020-02-11 VITALS
HEART RATE: 56 BPM | HEIGHT: 64 IN | WEIGHT: 167 LBS | DIASTOLIC BLOOD PRESSURE: 90 MMHG | BODY MASS INDEX: 28.51 KG/M2 | RESPIRATION RATE: 16 BRPM | OXYGEN SATURATION: 97 % | SYSTOLIC BLOOD PRESSURE: 156 MMHG | TEMPERATURE: 98.6 F

## 2020-02-11 DIAGNOSIS — Z98.890 S/P BREAST RECONSTRUCTION, BILATERAL: Primary | ICD-10-CM

## 2020-02-11 PROCEDURE — G0463 HOSPITAL OUTPT CLINIC VISIT: HCPCS | Mod: ZF

## 2020-02-11 RX ORDER — ATENOLOL 50 MG/1
50 TABLET ORAL EVERY EVENING
COMMUNITY
Start: 2020-01-29

## 2020-02-11 RX ORDER — CEFAZOLIN SODIUM 2 G/50ML
2 SOLUTION INTRAVENOUS
Status: CANCELLED | OUTPATIENT
Start: 2020-02-11

## 2020-02-11 RX ORDER — CEFAZOLIN SODIUM 1 G/50ML
1 INJECTION, SOLUTION INTRAVENOUS SEE ADMIN INSTRUCTIONS
Status: CANCELLED | OUTPATIENT
Start: 2020-02-11

## 2020-02-11 ASSESSMENT — PAIN SCALES - GENERAL: PAINLEVEL: NO PAIN (0)

## 2020-02-11 ASSESSMENT — MIFFLIN-ST. JEOR: SCORE: 1352.51

## 2020-02-11 NOTE — PROGRESS NOTES
POSTOPERATIVE VISIT NOTE      PRESENTING COMPLAINT:  Postoperative visit status post bilateral breast reconstruction after undergoing bilateral nipple non-sparing mastectomy for bilateral breast cancer, done on 01/29/2020.      HISTORY OF PRESENTING COMPLAINT:  Ms. Espinal is 54 years old.  She is just about 2 weeks out from surgery.  Overall, doing okay.  Definitive and adjuvant therapies have not been decided but chances of radiation or chemotherapy are low.      PHYSICAL EXAMINATION:     VITAL SIGNS:  Stable.  She is afebrile, in no obvious distress.     CHEST:  Her left breast has some medial superior mastectomy skin flap skin darkening.  Everything is intact.  No evidence of infection.  Right breast overall is intact.      ASSESSMENT AND PLAN:  Based on above findings, a diagnosis of bilateral breast reconstruction for breast cancer was made.  My plan is to take her back to the operating room and excise the very tentative skin to try and prevent wound breakdown and subsequent infection.  I will see her back in a week's time to monitor the progress.  I will plan this on 02/19/2020.  She understood the plan.  She has decided to proceed with bilateral MIKAL flaps in the future.  We will plan that also by the next week once we are sure she does not need adjuvant therapies.  Went over the planned procedure with the patient in detail.  All risks, benefits and alternatives of the procedure including pain, infection, bleeding, scarring, asymmetry, seromas, hematomas, wound breakdown, wound dehiscence, loss of the expander, requirement of further surgeries, DVT, PE, MI, CVA and death were explained.  She understood them all and wants to proceed.

## 2020-02-11 NOTE — LETTER
2/11/2020       RE: Leigh Espinal  [address] [place] [address]     Dear Colleague,    Thank you for referring your patient, Leigh Espinal, to the [facility] at [facility]. Please see a copy of my visit note below.    POSTOPERATIVE VISIT NOTE      PRESENTING COMPLAINT:  Postoperative visit status post bilateral breast reconstruction after undergoing bilateral nipple non-sparing mastectomy for bilateral breast cancer, done on 01/29/2020.      HISTORY OF PRESENTING COMPLAINT:  Ms. Espinal is 54 years old.  She is just about 2 weeks out from surgery.  Overall, doing okay.  Definitive and adjuvant therapies have not been decided but chances of radiation or chemotherapy are low.      PHYSICAL EXAMINATION:     VITAL SIGNS:  Stable.  She is afebrile, in no obvious distress.     CHEST:  Her left breast has some medial superior mastectomy skin flap skin darkening.  Everything is intact.  No evidence of infection.  Right breast overall is intact.      ASSESSMENT AND PLAN:  Based on above findings, a diagnosis of bilateral breast reconstruction for breast cancer was made.  My plan is to take her back to the operating room and excise the very tentative skin to try and prevent wound breakdown and subsequent infection.  I will see her back in a week's time to monitor the progress.  I will plan this on 02/19/2020.  She understood the plan.  She has decided to proceed with bilateral MIKAL flaps in the future.  We will plan that also by the next week once we are sure she does not need adjuvant therapies.  Went over the planned procedure with the patient in detail.  All risks, benefits and alternatives of the procedure including pain, infection, bleeding, scarring, asymmetry, seromas, hematomas, wound breakdown, wound dehiscence, loss of the expander, requirement of further surgeries, DVT, PE, MI, CVA and death were explained.  She understood them all and  wants to proceed.     CEE Chatterjee MD

## 2020-02-12 ENCOUNTER — ANESTHESIA EVENT (OUTPATIENT)
Dept: SURGERY | Facility: AMBULATORY SURGERY CENTER | Age: 55
End: 2020-02-12

## 2020-02-12 ENCOUNTER — MEDICAL CORRESPONDENCE (OUTPATIENT)
Dept: HEALTH INFORMATION MANAGEMENT | Facility: CLINIC | Age: 55
End: 2020-02-12

## 2020-02-12 ENCOUNTER — PATIENT OUTREACH (OUTPATIENT)
Dept: PLASTIC SURGERY | Facility: CLINIC | Age: 55
End: 2020-02-12

## 2020-02-12 LAB — COPATH REPORT: NORMAL

## 2020-02-12 NOTE — PATIENT INSTRUCTIONS
Pt contacted clinic regarding upcoming surgery 2/14/20. Pt states she has not had the time confirmed yet and is anxious to know time as her  needs to ask for time off of work today. Provided current time and arrival time, but explained that pt would be contacted to confirm this. Pt states understanding and denies any additional questions or concerns. Gwendolyn GALLEGO RNCC

## 2020-02-13 ENCOUNTER — OFFICE VISIT (OUTPATIENT)
Dept: ONCOLOGY | Facility: CLINIC | Age: 55
End: 2020-02-13
Attending: SURGERY
Payer: COMMERCIAL

## 2020-02-13 ENCOUNTER — TELEPHONE (OUTPATIENT)
Dept: SURGERY | Facility: CLINIC | Age: 55
End: 2020-02-13

## 2020-02-13 VITALS
SYSTOLIC BLOOD PRESSURE: 127 MMHG | BODY MASS INDEX: 28.79 KG/M2 | OXYGEN SATURATION: 98 % | WEIGHT: 168.6 LBS | RESPIRATION RATE: 16 BRPM | TEMPERATURE: 99 F | HEIGHT: 64 IN | DIASTOLIC BLOOD PRESSURE: 71 MMHG | HEART RATE: 53 BPM

## 2020-02-13 DIAGNOSIS — C50.412 MALIGNANT NEOPLASM OF UPPER-OUTER QUADRANT OF LEFT BREAST IN FEMALE, ESTROGEN RECEPTOR POSITIVE (H): Primary | ICD-10-CM

## 2020-02-13 DIAGNOSIS — Z17.0 MALIGNANT NEOPLASM OF UPPER-OUTER QUADRANT OF LEFT BREAST IN FEMALE, ESTROGEN RECEPTOR POSITIVE (H): Primary | ICD-10-CM

## 2020-02-13 DIAGNOSIS — Z17.0 MALIGNANT NEOPLASM OF UPPER-INNER QUADRANT OF RIGHT BREAST IN FEMALE, ESTROGEN RECEPTOR POSITIVE (H): ICD-10-CM

## 2020-02-13 DIAGNOSIS — C50.211 MALIGNANT NEOPLASM OF UPPER-INNER QUADRANT OF RIGHT BREAST IN FEMALE, ESTROGEN RECEPTOR POSITIVE (H): ICD-10-CM

## 2020-02-13 PROCEDURE — G0463 HOSPITAL OUTPT CLINIC VISIT: HCPCS | Mod: ZF

## 2020-02-13 ASSESSMENT — LIFESTYLE VARIABLES: TOBACCO_USE: 1

## 2020-02-13 ASSESSMENT — MIFFLIN-ST. JEOR: SCORE: 1359.76

## 2020-02-13 ASSESSMENT — PAIN SCALES - GENERAL: PAINLEVEL: NO PAIN (0)

## 2020-02-13 NOTE — TELEPHONE ENCOUNTER
Received a call from pt's pcp office at [facility] stating that they had called pt to just update the Pre-op pt had fairly recently and everything checked out fine until they asked about blood thinners and pt states that she had used 600mg of Ibuprofen x 1 in the past 24 hours. Pt is scheduled for surgery tomorrow at the [facility].  RN notified pcp's office that RN would reach out to  to advise and call pt back. Phone message sent to  to advise..Skye Bell RN

## 2020-02-13 NOTE — NURSING NOTE
"Oncology Rooming Note    February 13, 2020 9:21 AM   Leigh Espinal is a 54 year old female who presents for:    Chief Complaint   Patient presents with     Oncology Clinic Visit     RETURN VISIT; BREAST RECONSTRUCTION; BREAST CANCER; VITALS TAKEN     Initial Vitals: /71   Pulse 53   Temp 99  F (37.2  C) (Oral)   Resp 16   Ht 1.626 m (5' 4\")   Wt 76.5 kg (168 lb 9.6 oz)   SpO2 98%   BMI 28.94 kg/m   Estimated body mass index is 28.94 kg/m  as calculated from the following:    Height as of this encounter: 1.626 m (5' 4\").    Weight as of this encounter: 76.5 kg (168 lb 9.6 oz). Body surface area is 1.86 meters squared.  No Pain (0) Comment: Data Unavailable   No LMP recorded.  Allergies reviewed: Yes  Medications reviewed: Yes    Medications: Medication refills not needed today.  Pharmacy name entered into EPIC:    [facility] [place] - [id number] [facility] - [place], [place] - [address]  [facility] - [place], [place] - [address]    Clinical concerns: Patient is having surgery tomorrow for the infection in her left breast.  Dr Smith was notified.    Patient is aware that provider is running behind schedule. We will keep patient informed of status of provider.    Debbie Ureña              "

## 2020-02-13 NOTE — PROGRESS NOTES
"FOLLOW-UP  Feb 13, 2020    Leigh Espinal is a 54 year old female who returns for her 1st post-operative follow-up visit.    Cancer Staging  Malignant neoplasm of upper-inner quadrant of right breast in female, estrogen receptor positive (H)  Staging form: Breast, AJCC 8th Edition  - Clinical: Stage IA (cT1c, cN0, cM0, G1, ER+, FL+, HER2-) - Signed by Paz Smith MD on 12/5/2019    Malignant neoplasm of upper-outer quadrant of left breast in female, estrogen receptor positive (H)  Staging form: Breast, AJCC 8th Edition  - Clinical: Stage IB (cT2, cN0, cM0, G2, ER+, FL+, HER2-) - Signed by Paz Smith MD on 12/5/2019      Treatment to date:  1. Genetic testing (12/5/2019) - deleterious CHEK2 mutation identified.   2. Bilateral skin-sparing mastectomy and bilateral axillary sentinel lymph node biopsy (1/29/2020)    HPI:    She underwent bilateral skin-sparing mastectomy and bilateral axillary sentinel lymph node biopsy on 1/29/2020.  She is currently 2 week(s) post-op.  Final surgical pathology showed a [id number] invasive lobular carcinoma on the LEFT with uninvolved margins and 1/1 lymph node with a micrometastasis, and a pT2(2)N0 invasive ductal carcinoma on the RIGHT with uninvolved margins and 0/1 lymph nodes.    Since the procedure, she has been doing well. She denies any redness or swelling, or drainage from the incision, or fever/chills.  She has good range of motion and denies any upper extremity swelling.  She was seen by Dr Chatterjee on 2/11, and a decision was made at that visit to return to the operating room to excise the ischemic skin.    /71   Pulse 53   Temp 99  F (37.2  C) (Oral)   Resp 16   Ht 1.626 m (5' 4\")   Wt 76.5 kg (168 lb 9.6 oz)   SpO2 98%   BMI 28.94 kg/m     Physical Exam  Constitutional:       Appearance: She is well-developed.   Pulmonary:      Effort: No respiratory distress.   Chest:      Comments: Bilateral surgical absence of breasts. Incisions " intact, clean. Drains with serous output. Left upper flap with ischemic changes and eschar.  Skin:     General: Skin is warm and dry.       INVESTIGATIONS:    Surgical Pathology (1/29/2020):  FINAL DIAGNOSIS:   A: Breast, LEFT, skin-sparing mastectomy:   -INVASIVE LOBULAR CARCINOMA, NURA GRADE 2, size 16 mm, at 3:00   -Ductal carcinoma in situ (DCIS), nuclear grade 2, micropapillary and cribriform types, size 5 mm, away from invasive carcinoma, in the upper inner quadrant   -Lobular carcinoma in situ (LCIS), classical type   -Lymphovascular invasion present   -Margins are uninvolved by invasive carcinoma and DCIS   -Invasive carcinoma is > 10 mm from all margins (12 mm from the nearest (anterior inferior) margin)   -DCIS is > 10 mm from all margins   -Benign nipple and skin   -Atypical ductal hyperplasia   -Atypical lobular hyperplasia   -Other findings: columnar cell change, fibrocystic change (including   microcysts), intraductal papilloma (1 mm)   and usual ductal hyperplasia   -Calcifications associated with LCIS and benign ducts and acini   -Prior core biopsy site changes   -Invasive carcinoma is estrogen receptor positive (95% moderate intensity) and progesterone receptor positive (75%, moderate intensity) by immunohistochemistry and is HER2 non-amplified by FISH (HER2:CEN17 ratio 1.0, see report [id number]) (performed on prior core biopsy, see report [id number])   -Estrogen receptor and progesterone receptor immunohistochemistry results for this specimen will be reported in an addendum   -HER2 FISH results for this specimen will be reported separately by cytogenetics   -See microscopic description   -See tumor synoptic below   B. Lymph node, LEFT axillary, sentinel #1, excision:   -MICROMETASTATIC CARCINOMA in one of one lymph node (1/1)   -Metastasis is 1.5 mm in greatest dimension   -No extranodal extension identified   -HER2 immunohistochemistry results will be reported in an addendum   C: Breast,  RIGHT, skin-sparing mastectomy:   -INVASIVE MAMMARY CARCINOMA (MIXED DUCTAL, PAPILLARY AND CRIBRIFORM types), NURA GRADE 2, size 21 mm, in the upper inner quadrant at 1:00   -Smaller focus of INVASIVE MAMMARY CARCINOMA (MIXED DUCTAL AND CRIBRIFORM types), size 3.6 mm, superior to dominant focus of invasive carcinoma   -Ductal carcinoma in situ (DCIS), nuclear grade 2, cribriform type, with lobular involvement and focal necrosis   -DCIS is admixed with and adjacent to the dominant focus of invasive carcinoma in the upper inner quadrant, and is also lateral to invasive carcinoma, in the upper outer quadrant   -Focus suspicious for lymphovascular invasion   -Margins are uninvolved by invasive carcinoma and DCIS   -Invasive carcinoma is 0.2 mm from the nearest (anterior superior) margin and is > 10 mm from the remaining margins   -DCIS is 6 mm from the nearest (anterior superior) margin and is > 10 mm from the remaining margins   -Benign nipple and skin   -Atypical ductal hyperplasia   -Flat epithelial atypia   -Calcifications associated with DCIS and benign ducts and acini   -Other findings: Columnar cell change, intraductal papillomas (largest: 4 mm), fibrocystic change (including microcysts with apocrine metaplasia), fibroadenomatoid change and usual ductal hyperplasia   -Prior core biopsy site changes   -Invasive carcinoma is estrogen receptor positive (98% strong intensity) and progesterone receptor positive (99%, strong intensity) by immunohistochemistry and is HER2 non-amp by FISH (HER2:CEN17 ratio 1.0, see report [id number]) (performed on prior core biopsy, see report [id number])   -Estrogen receptor and progesterone receptor immunohistochemistry results for this specimen will be reported in an addendum   -HER2 FISH results for this specimen will be reported separately by cytogenetics   -See microscopic description   -See tumor synoptic below   D. Lymph node, RIGHT axillary, sentinel #1, excision:   -One  benign lymph node (0/1)     ASSESSMENT:    Leigh Espinal is a 54 year old female with bilateral breast cancer and a deleterious CHEK2 mutation, s/p surgery.    She is doing well. Though the skin is intact bilaterally, there is an eschar on the left and plans are for her to return to the OR for excision tomorrow with Dr Chatterjee.  She will continue to follow up with him regarding reconstruction.  I encouraged her to continue range of motion exercises of the shoulders bilaterally.    We reviewed the pathology today and a copy of the report was provided. No further surgery is indicated. We reviewed that the involved lymph node on the LEFT does increase her risk of locoregional recurrence and I have recommended a referral to radiation oncology to discuss adjuvant radiation therapy for this reason.  We also reviewed the rationale of using Oncotype DX to make decisions regarding systemic therapy. This test is pending. Because of the bilateral disease, I did review testing with my medical oncology colleague Dr Shepherd prior to today to determine testing in this situation. An Oncotype test on each tumor was requested. A referral to medical oncology has been made for further discussion regarding systemic therapy.    All of the above was discussed with the patient and all questions were answered.     PLAN:  1. Radiation oncology referral for node-positive disease on the LEFT  2. Medical oncology referral  3. Oncoytpe DX pending  4. Return to OR with Dr Chatterjee tomorrow for ischemic skin flaps  5. Follow up with me in 3 months    Paz Smith MD MSc [facility] FACS    Division of Surgical Oncology  [facility]

## 2020-02-13 NOTE — Clinical Note
2/13/2020       RE: Leigh Espinal  [address] [place] [address]     Dear Colleague,    Thank you for referring your patient, Leigh Espinal, to the [facility] at [facility]. Please see a copy of my visit note below.    FOLLOW-UP  Feb 13, 2020    Leigh Espinal is a 54 year old female who returns for her 1st post-operative follow-up visit.    Cancer Staging  Malignant neoplasm of upper-inner quadrant of right breast in female, estrogen receptor positive (H)  Staging form: Breast, AJCC 8th Edition  - Clinical: Stage IA (cT1c, cN0, cM0, G1, ER+, NC+, HER2-) - Signed by Paz Smith MD on 12/5/2019    Malignant neoplasm of upper-outer quadrant of left breast in female, estrogen receptor positive (H)  Staging form: Breast, AJCC 8th Edition  - Clinical: Stage IB (cT2, cN0, cM0, G2, ER+, NC+, HER2-) - Signed by Paz Smith MD on 12/5/2019      Treatment to date:  1. Genetic testing (12/5/2019) - deleterious CHEK2 mutation identified.   2. Bilateral skin-sparing mastectomy and bilateral axillary sentinel lymph node biopsy (1/29/2020)    HPI:    She underwent bilateral skin-sparing mastectomy and bilateral axillary sentinel lymph node biopsy on 1/29/2020.  She is currently 2 week(s) post-op.  Final surgical pathology showed a [id number] invasive lobular carcinoma on the LEFT with uninvolved margins and 1/1 lymph node with a micrometastasis, and a pT2(2)N0 invasive ductal carcinoma on the RIGHT with uninvolved margins and 0/1 lymph nodes.    Since the procedure, she has been doing well. She denies any redness or swelling, or drainage from the incision, or fever/chills.  She has good range of motion and denies any upper extremity swelling.  She was seen by Dr Chatterjee on 2/11, and a decision was made at that visit to return to the operating room to excise the ischemic skin.    /71   Pulse 53   Temp 99  F (37.2  C) (Oral)   " Resp 16   Ht 1.626 m (5' 4\")   Wt 76.5 kg (168 lb 9.6 oz)   SpO2 98%   BMI 28.94 kg/m      Physical Exam  Constitutional:       Appearance: She is well-developed.   Pulmonary:      Effort: No respiratory distress.   Chest:      Comments: Bilateral surgical absence of breasts. Incisions intact, clean. Drains with serous output. Left upper flap with ischemic changes and eschar.  Skin:     General: Skin is warm and dry.       INVESTIGATIONS:    Surgical Pathology (1/29/2020):  FINAL DIAGNOSIS:   A: Breast, LEFT, skin-sparing mastectomy:   -INVASIVE LOBULAR CARCINOMA, NURA GRADE 2, size 16 mm, at 3:00   -Ductal carcinoma in situ (DCIS), nuclear grade 2, micropapillary and cribriform types, size 5 mm, away from invasive carcinoma, in the upper inner quadrant   -Lobular carcinoma in situ (LCIS), classical type   -Lymphovascular invasion present   -Margins are uninvolved by invasive carcinoma and DCIS   -Invasive carcinoma is > 10 mm from all margins (12 mm from the nearest (anterior inferior) margin)   -DCIS is > 10 mm from all margins   -Benign nipple and skin   -Atypical ductal hyperplasia   -Atypical lobular hyperplasia   -Other findings: columnar cell change, fibrocystic change (including   microcysts), intraductal papilloma (1 mm)   and usual ductal hyperplasia   -Calcifications associated with LCIS and benign ducts and acini   -Prior core biopsy site changes   -Invasive carcinoma is estrogen receptor positive (95% moderate intensity) and progesterone receptor positive (75%, moderate intensity) by immunohistochemistry and is HER2 non-amplified by FISH (HER2:CEN17 ratio 1.0, see report [id number]) (performed on prior core biopsy, see report [id number])   -Estrogen receptor and progesterone receptor immunohistochemistry results for this specimen will be reported in an addendum   -HER2 FISH results for this specimen will be reported separately by cytogenetics   -See microscopic description   -See tumor " synoptic below   B. Lymph node, LEFT axillary, sentinel #1, excision:   -MICROMETASTATIC CARCINOMA in one of one lymph node (1/1)   -Metastasis is 1.5 mm in greatest dimension   -No extranodal extension identified   -HER2 immunohistochemistry results will be reported in an addendum   C: Breast, RIGHT, skin-sparing mastectomy:   -INVASIVE MAMMARY CARCINOMA (MIXED DUCTAL, PAPILLARY AND CRIBRIFORM types), NURA GRADE 2, size 21 mm, in the upper inner quadrant at 1:00   -Smaller focus of INVASIVE MAMMARY CARCINOMA (MIXED DUCTAL AND CRIBRIFORM types), size 3.6 mm, superior to dominant focus of invasive carcinoma   -Ductal carcinoma in situ (DCIS), nuclear grade 2, cribriform type, with lobular involvement and focal necrosis   -DCIS is admixed with and adjacent to the dominant focus of invasive carcinoma in the upper inner quadrant, and is also lateral to invasive carcinoma, in the upper outer quadrant   -Focus suspicious for lymphovascular invasion   -Margins are uninvolved by invasive carcinoma and DCIS   -Invasive carcinoma is 0.2 mm from the nearest (anterior superior) margin and is > 10 mm from the remaining margins   -DCIS is 6 mm from the nearest (anterior superior) margin and is > 10 mm from the remaining margins   -Benign nipple and skin   -Atypical ductal hyperplasia   -Flat epithelial atypia   -Calcifications associated with DCIS and benign ducts and acini   -Other findings: Columnar cell change, intraductal papillomas (largest: 4 mm), fibrocystic change (including microcysts with apocrine metaplasia), fibroadenomatoid change and usual ductal hyperplasia   -Prior core biopsy site changes   -Invasive carcinoma is estrogen receptor positive (98% strong intensity) and progesterone receptor positive (99%, strong intensity) by immunohistochemistry and is HER2 non-amp by FISH (HER2:CEN17 ratio 1.0, see report [id number]) (performed on prior core biopsy, see report [id number])   -Estrogen receptor and progesterone  receptor immunohistochemistry results for this specimen will be reported in an addendum   -HER2 FISH results for this specimen will be reported separately by cytogenetics   -See microscopic description   -See tumor synoptic below   D. Lymph node, RIGHT axillary, sentinel #1, excision:   -One benign lymph node (0/1)     ASSESSMENT:    Leigh Espinal is a 54 year old female with bilateral breast cancer and a deleterious CHEK2 mutation, s/p surgery.    She is doing well. Though the skin is intact bilaterally, there is an eschar on the left and plans are for her to return to the OR for excision tomorrow with Dr Chatterjee.  She will continue to follow up with him regarding reconstruction.  I encouraged her to continue range of motion exercises of the shoulders bilaterally.    We reviewed the pathology today and a copy of the report was provided. No further surgery is indicated. We reviewed that the involved lymph node on the LEFT does increase her risk of locoregional recurrence and I have recommended a referral to radiation oncology to discuss adjuvant radiation therapy for this reason.  We also reviewed the rationale of using Oncotype DX to make decisions regarding systemic therapy. This test is pending. Because of the bilateral disease, I did review testing with my medical oncology colleague Dr Shepherd prior to today to determine testing in this situation. An Oncotype test on each tumor was requested. A referral to medical oncology has been made for further discussion regarding systemic therapy.    All of the above was discussed with the patient and all questions were answered.     PLAN:  1. Radiation oncology referral for node-positive disease on the LEFT  2. Medical oncology referral  3. Oncoytpe DX pending  4. Return to OR with Dr Chatterjee tomorrow for ischemic skin flaps  5. Follow up with me in 3 months    Paz Smith MD MSc [facility] FACS    Division of Surgical Oncology  [facility]      Again, thank you for allowing me to participate in the care of your patient.      Sincerely,    Paz Smith MD

## 2020-02-13 NOTE — ANESTHESIA PREPROCEDURE EVALUATION
"Anesthesia Pre-Procedure Evaluation    Patient: Leigh Espinal   MRN:     [id number] Gender:   female   Age:    54 year old :      1965        Preoperative Diagnosis: S/P breast reconstruction, bilateral [Z98.890]   Procedure(s):  Possible bilateral breast washout and expander exchange     LABS:  CBC:   Lab Results   Component Value Date    WBC 6.4 10/23/2019    WBC 7.0 10/18/2017    HGB 14.7 10/23/2019    HGB 15.2 10/18/2017    HCT 44.9 10/23/2019    HCT 47.2 (H) 10/18/2017     10/23/2019     (L) 10/18/2017     BMP:   Lab Results   Component Value Date     10/18/2017     10/31/2012    POTASSIUM 3.8 10/18/2017    POTASSIUM 3.6 2015    CHLORIDE 105 10/18/2017    CHLORIDE 106 10/31/2012    CO2 26 10/18/2017    CO2 26 2012    BUN 13 10/18/2017    BUN 11 10/31/2012    CR 0.84 10/18/2017    CR 0.64 2015     (H) 10/18/2017     (A) 10/31/2012     COAGS:   Lab Results   Component Value Date    INR 1.11 2015     POC:   Lab Results   Component Value Date     (H) 2020    HCG Negative 2020    HCGS Negative 2015     OTHER:   Lab Results   Component Value Date    ILIANA 8.8 10/18/2017    ALBUMIN 3.3 (L) 10/18/2017    PROTTOTAL 6.5 (L) 10/18/2017    ALT 20 10/18/2017    AST 11 10/18/2017    ALKPHOS 88 10/18/2017    BILITOTAL 0.4 10/18/2017    TSH 1.43 2012    CRP <5.0 2012        Preop Vitals    BP Readings from Last 3 Encounters:   20 127/71   20 (!) 156/90   20 (!) 143/74    Pulse Readings from Last 3 Encounters:   20 53   20 56   20 52      Resp Readings from Last 3 Encounters:   20 16   20 16   20 16    SpO2 Readings from Last 3 Encounters:   20 98%   20 97%   20 97%      Temp Readings from Last 1 Encounters:   20 99  F (37.2  C) (Oral)    Ht Readings from Last 1 Encounters:   20 1.626 m (5' 4\")      Wt Readings from Last 1 " "Encounters:   02/13/20 76.5 kg (168 lb 9.6 oz)    Estimated body mass index is 28.94 kg/m  as calculated from the following:    Height as of 2/13/20: 1.626 m (5' 4\").    Weight as of 2/13/20: 76.5 kg (168 lb 9.6 oz).     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 Provider to review      Past Surgical History:   Procedure Laterality Date     BIOPSY NODE SENTINEL Bilateral 1/29/2020    Procedure: BILATERAL Axillary [place] Lymph Node Biopsy;  Surgeon: Paz Smith MD;  Location:  OR     COLONOSCOPY  6/21/2012    Procedure: COLONOSCOPY;;  Surgeon: Radha Hector MD;  Location:  GI     LAPAROSCOPIC NISSEN FUNDOPLICATION  2007     MASTECTOMY SIMPLE Bilateral 1/29/2020    Procedure: BILATERAL Skin-Sparing Mastectomy;  Surgeon: Paz Smith MD;  Location:  OR     RECONSTRUCT BREAST Bilateral 1/29/2020    Procedure: Bilateral breast reconstruction with expanders and SPY;  Surgeon: CEE Chatterjee MD;  Location:  OR     TUBAL LIGATION  1990      Allergies   Allergen Reactions     Sulfa Drugs         Anesthesia Evaluation     . Pt has had prior anesthetic. Type: General    No history of anesthetic complications          ROS/MED HX    ENT/Pulmonary:     (+)tobacco use, Past use , . Other pulmonary disease Pulmonary AV malformation.    Neurologic:       Cardiovascular:     (+) hypertension----. : . . . :. .     "   METS/Exercise Tolerance:     Hematologic:  - neg hematologic  ROS       Musculoskeletal:  - neg musculoskeletal ROS       GI/Hepatic: Comment: S/P Nissen fundoplication    (+) GERD Asymptomatic on medication, hiatal hernia,       Renal/Genitourinary:  - ROS Renal section negative       Endo:  - neg endo ROS       Psychiatric:  - neg psychiatric ROS       Infectious Disease:  - neg infectious disease ROS       Malignancy:   (+) Malignancy History of Breast  Breast CA Active status post Surgery.         Other:    - neg other ROS                     PHYSICAL EXAM:   Mental Status/Neuro: A/A/O   Airway: Facies: Feasible  Mallampati: I  Mouth/Opening: Full  TM distance: > 6 cm  Neck ROM: Full   Respiratory: Auscultation: CTAB     Resp. Rate: Normal     Resp. Effort: Normal      CV: Rhythm: Regular  Rate: Age appropriate  Heart: Normal Sounds  Edema: None   Comments:      Dental: Normal Dentition                Assessment:   ASA SCORE: 3    H&P: History and physical reviewed and following examination; no interval change.   Smoking Status:  Non-Smoker/Unknown   NPO Status: NPO Appropriate     Plan:   Anes. Type:  General   Pre-Medication: None   Induction:  IV (Standard)   Airway: LMA   Access/Monitoring: PIV   Maintenance: Balanced     Postop Plan:   Postop Pain: Opioids  Postop Sedation/Airway: Not planned     PONV Management:   Adult Risk Factors: Female, Non-Smoker, Postop Opioids   Prevention: Ondansetron     CONSENT: Direct conversation   Plan and risks discussed with: Patient   Blood Products: Consent Deferred (Minimal Blood Loss)                   Joshua Rendon MD

## 2020-02-13 NOTE — PATIENT INSTRUCTIONS
- Referral to Radiation oncology. This has been ordered.     - Follow up with Dr. Smith in 3 months.

## 2020-02-13 NOTE — LETTER
2020      RE: Leigh Espinal  [address] [place] [address]     2020    Darleen Ventura MD   [address] [address]    RE: Leigh Espinal  (: 1965)    Dear Dr. Darleen Ventura    Your patient was seen for evaluation in my office.  Please find a copy of my notes for your record and review.  If you have any further questions, please feel free to contact my office.   Thank you for your kind referral.    Sincerely,   Paz Smith MD MSc [facility] FACS    ---   FOLLOW-UP  2020    Leigh Espinal is a 54 year old female who returns for her 1st post-operative follow-up visit.    Cancer Staging  Malignant neoplasm of upper-inner quadrant of right breast in female, estrogen receptor positive (H)  Staging form: Breast, AJCC 8th Edition  - Clinical: Stage IA (cT1c, cN0, cM0, G1, ER+, MN+, HER2-) - Signed by Paz Smith MD on 2019    Malignant neoplasm of upper-outer quadrant of left breast in female, estrogen receptor positive (H)  Staging form: Breast, AJCC 8th Edition  - Clinical: Stage IB (cT2, cN0, cM0, G2, ER+, MN+, HER2-) - Signed by Paz Smith MD on 2019      Treatment to date:  1. Genetic testing (2019) - deleterious CHEK2 mutation identified.   2. Bilateral skin-sparing mastectomy and bilateral axillary sentinel lymph node biopsy (2020)    HPI:    She underwent bilateral skin-sparing mastectomy and bilateral axillary sentinel lymph node biopsy on 2020.  She is currently 2 week(s) post-op.  Final surgical pathology showed a [id number] invasive lobular carcinoma on the LEFT with uninvolved margins and 1/1 lymph node with a micrometastasis, and a pT2(2)N0 invasive ductal carcinoma on the RIGHT with uninvolved margins and 0/1 lymph nodes.    Since the procedure, she has been doing well. She denies any redness or swelling, or drainage from the incision, or fever/chills.  She has  "good range of motion and denies any upper extremity swelling.  She was seen by Dr Chatterjee on 2/11, and a decision was made at that visit to return to the operating room to excise the ischemic skin.    /71   Pulse 53   Temp 99  F (37.2  C) (Oral)   Resp 16   Ht 1.626 m (5' 4\")   Wt 76.5 kg (168 lb 9.6 oz)   SpO2 98%   BMI 28.94 kg/m      Physical Exam  Constitutional:       Appearance: She is well-developed.   Pulmonary:      Effort: No respiratory distress.   Chest:      Comments: Bilateral surgical absence of breasts. Incisions intact, clean. Drains with serous output. Left upper flap with ischemic changes and eschar.  Skin:     General: Skin is warm and dry.       INVESTIGATIONS:    Surgical Pathology (1/29/2020):  FINAL DIAGNOSIS:   A: Breast, LEFT, skin-sparing mastectomy:   -INVASIVE LOBULAR CARCINOMA, NURA GRADE 2, size 16 mm, at 3:00   -Ductal carcinoma in situ (DCIS), nuclear grade 2, micropapillary and cribriform types, size 5 mm, away from invasive carcinoma, in the upper inner quadrant   -Lobular carcinoma in situ (LCIS), classical type   -Lymphovascular invasion present   -Margins are uninvolved by invasive carcinoma and DCIS   -Invasive carcinoma is > 10 mm from all margins (12 mm from the nearest (anterior inferior) margin)   -DCIS is > 10 mm from all margins   -Benign nipple and skin   -Atypical ductal hyperplasia   -Atypical lobular hyperplasia   -Other findings: columnar cell change, fibrocystic change (including   microcysts), intraductal papilloma (1 mm)   and usual ductal hyperplasia   -Calcifications associated with LCIS and benign ducts and acini   -Prior core biopsy site changes   -Invasive carcinoma is estrogen receptor positive (95% moderate intensity) and progesterone receptor positive (75%, moderate intensity) by immunohistochemistry and is HER2 non-amplified by FISH (HER2:CEN17 ratio 1.0, see report [id number]) (performed on prior core biopsy, see report [id number]) "   -Estrogen receptor and progesterone receptor immunohistochemistry results for this specimen will be reported in an addendum   -HER2 FISH results for this specimen will be reported separately by cytogenetics   -See microscopic description   -See tumor synoptic below   B. Lymph node, LEFT axillary, sentinel #1, excision:   -MICROMETASTATIC CARCINOMA in one of one lymph node (1/1)   -Metastasis is 1.5 mm in greatest dimension   -No extranodal extension identified   -HER2 immunohistochemistry results will be reported in an addendum   C: Breast, RIGHT, skin-sparing mastectomy:   -INVASIVE MAMMARY CARCINOMA (MIXED DUCTAL, PAPILLARY AND CRIBRIFORM types), NURA GRADE 2, size 21 mm, in the upper inner quadrant at 1:00   -Smaller focus of INVASIVE MAMMARY CARCINOMA (MIXED DUCTAL AND CRIBRIFORM types), size 3.6 mm, superior to dominant focus of invasive carcinoma   -Ductal carcinoma in situ (DCIS), nuclear grade 2, cribriform type, with lobular involvement and focal necrosis   -DCIS is admixed with and adjacent to the dominant focus of invasive carcinoma in the upper inner quadrant, and is also lateral to invasive carcinoma, in the upper outer quadrant   -Focus suspicious for lymphovascular invasion   -Margins are uninvolved by invasive carcinoma and DCIS   -Invasive carcinoma is 0.2 mm from the nearest (anterior superior) margin and is > 10 mm from the remaining margins   -DCIS is 6 mm from the nearest (anterior superior) margin and is > 10 mm from the remaining margins   -Benign nipple and skin   -Atypical ductal hyperplasia   -Flat epithelial atypia   -Calcifications associated with DCIS and benign ducts and acini   -Other findings: Columnar cell change, intraductal papillomas (largest: 4 mm), fibrocystic change (including microcysts with apocrine metaplasia), fibroadenomatoid change and usual ductal hyperplasia   -Prior core biopsy site changes   -Invasive carcinoma is estrogen receptor positive (98% strong  intensity) and progesterone receptor positive (99%, strong intensity) by immunohistochemistry and is HER2 non-amp by FISH (HER2:CEN17 ratio 1.0, see report [id number]) (performed on prior core biopsy, see report [id number])   -Estrogen receptor and progesterone receptor immunohistochemistry results for this specimen will be reported in an addendum   -HER2 FISH results for this specimen will be reported separately by cytogenetics   -See microscopic description   -See tumor synoptic below   D. Lymph node, RIGHT axillary, sentinel #1, excision:   -One benign lymph node (0/1)     ASSESSMENT:    Leigh Espinal is a 54 year old female with bilateral breast cancer and a deleterious CHEK2 mutation, s/p surgery.    She is doing well. Though the skin is intact bilaterally, there is an eschar on the left and plans are for her to return to the OR for excision tomorrow with Dr Chatterjee.  She will continue to follow up with him regarding reconstruction.  I encouraged her to continue range of motion exercises of the shoulders bilaterally.    We reviewed the pathology today and a copy of the report was provided. No further surgery is indicated. We reviewed that the involved lymph node on the LEFT does increase her risk of locoregional recurrence and I have recommended a referral to radiation oncology to discuss adjuvant radiation therapy for this reason.  We also reviewed the rationale of using Oncotype DX to make decisions regarding systemic therapy. This test is pending. Because of the bilateral disease, I did review testing with my medical oncology colleague Dr Shepherd prior to today to determine testing in this situation. An Oncotype test on each tumor was requested. A referral to medical oncology has been made for further discussion regarding systemic therapy.    All of the above was discussed with the patient and all questions were answered.     PLAN:  1. Radiation oncology referral for node-positive disease on the  LEFT  2. Medical oncology referral  3. Oncoytpe DX pending  4. Return to OR with Dr Chatterjee tomorrow for ischemic skin flaps  5. Follow up with me in 3 months    Paz Smith MD MSc [facility] FACS    Division of Surgical Oncology  [facility]

## 2020-02-14 ENCOUNTER — HOSPITAL ENCOUNTER (OUTPATIENT)
Facility: AMBULATORY SURGERY CENTER | Age: 55
Discharge: HOME | End: 2020-02-14
Attending: PLASTIC SURGERY | Admitting: PLASTIC SURGERY
Payer: COMMERCIAL

## 2020-02-14 ENCOUNTER — SURGERY (OUTPATIENT)
Age: 55
End: 2020-02-14
Payer: COMMERCIAL

## 2020-02-14 ENCOUNTER — ANESTHESIA (OUTPATIENT)
Dept: SURGERY | Facility: AMBULATORY SURGERY CENTER | Age: 55
End: 2020-02-14
Payer: COMMERCIAL

## 2020-02-14 VITALS
RESPIRATION RATE: 18 BRPM | DIASTOLIC BLOOD PRESSURE: 77 MMHG | TEMPERATURE: 97.6 F | OXYGEN SATURATION: 96 % | SYSTOLIC BLOOD PRESSURE: 156 MMHG

## 2020-02-14 DIAGNOSIS — Z98.890 S/P BREAST RECONSTRUCTION, BILATERAL: ICD-10-CM

## 2020-02-14 PROCEDURE — G8907 PT DOC NO EVENTS ON DISCHARG: HCPCS

## 2020-02-14 PROCEDURE — 11971 RMVL TIS XPNDR WO INSJ IMPLT: CPT | Mod: 50 | Performed by: PLASTIC SURGERY

## 2020-02-14 PROCEDURE — 88305 TISSUE EXAM BY PATHOLOGIST: CPT | Performed by: PLASTIC SURGERY

## 2020-02-14 PROCEDURE — G8916 PT W IV AB GIVEN ON TIME: HCPCS

## 2020-02-14 PROCEDURE — 11971 RMVL TIS XPNDR WO INSJ IMPLT: CPT | Mod: LT

## 2020-02-14 RX ORDER — ONDANSETRON 4 MG/1
4 TABLET, ORALLY DISINTEGRATING ORAL EVERY 30 MIN PRN
Status: DISCONTINUED | OUTPATIENT
Start: 2020-02-14 | End: 2020-02-15 | Stop reason: HOSPADM

## 2020-02-14 RX ORDER — GLYCOPYRROLATE 0.2 MG/ML
INJECTION, SOLUTION INTRAMUSCULAR; INTRAVENOUS PRN
Status: DISCONTINUED | OUTPATIENT
Start: 2020-02-14 | End: 2020-02-14

## 2020-02-14 RX ORDER — ACETAMINOPHEN 325 MG/1
975 TABLET ORAL ONCE
Status: COMPLETED | OUTPATIENT
Start: 2020-02-14 | End: 2020-02-14

## 2020-02-14 RX ORDER — MEPERIDINE HYDROCHLORIDE 25 MG/ML
12.5 INJECTION INTRAMUSCULAR; INTRAVENOUS; SUBCUTANEOUS
Status: DISCONTINUED | OUTPATIENT
Start: 2020-02-14 | End: 2020-02-15 | Stop reason: HOSPADM

## 2020-02-14 RX ORDER — ONDANSETRON 2 MG/ML
4 INJECTION INTRAMUSCULAR; INTRAVENOUS EVERY 30 MIN PRN
Status: DISCONTINUED | OUTPATIENT
Start: 2020-02-14 | End: 2020-02-15 | Stop reason: HOSPADM

## 2020-02-14 RX ORDER — SODIUM CHLORIDE, SODIUM LACTATE, POTASSIUM CHLORIDE, CALCIUM CHLORIDE 600; 310; 30; 20 MG/100ML; MG/100ML; MG/100ML; MG/100ML
INJECTION, SOLUTION INTRAVENOUS CONTINUOUS
Status: DISCONTINUED | OUTPATIENT
Start: 2020-02-14 | End: 2020-02-15 | Stop reason: HOSPADM

## 2020-02-14 RX ORDER — GABAPENTIN 300 MG/1
300 CAPSULE ORAL ONCE
Status: COMPLETED | OUTPATIENT
Start: 2020-02-14 | End: 2020-02-14

## 2020-02-14 RX ORDER — KETOROLAC TROMETHAMINE 30 MG/ML
INJECTION, SOLUTION INTRAMUSCULAR; INTRAVENOUS PRN
Status: DISCONTINUED | OUTPATIENT
Start: 2020-02-14 | End: 2020-02-14

## 2020-02-14 RX ORDER — BUPIVACAINE HYDROCHLORIDE 2.5 MG/ML
INJECTION, SOLUTION INFILTRATION; PERINEURAL PRN
Status: DISCONTINUED | OUTPATIENT
Start: 2020-02-14 | End: 2020-02-14 | Stop reason: HOSPADM

## 2020-02-14 RX ORDER — CEFAZOLIN SODIUM 2 G/100ML
2 INJECTION, SOLUTION INTRAVENOUS
Status: COMPLETED | OUTPATIENT
Start: 2020-02-14 | End: 2020-02-14

## 2020-02-14 RX ORDER — CEFAZOLIN SODIUM 1 G/3ML
1 INJECTION, POWDER, FOR SOLUTION INTRAMUSCULAR; INTRAVENOUS SEE ADMIN INSTRUCTIONS
Status: DISCONTINUED | OUTPATIENT
Start: 2020-02-14 | End: 2020-02-15 | Stop reason: HOSPADM

## 2020-02-14 RX ORDER — FENTANYL CITRATE 50 UG/ML
25-50 INJECTION, SOLUTION INTRAMUSCULAR; INTRAVENOUS EVERY 5 MIN PRN
Status: DISCONTINUED | OUTPATIENT
Start: 2020-02-14 | End: 2020-02-15 | Stop reason: HOSPADM

## 2020-02-14 RX ORDER — LIDOCAINE HYDROCHLORIDE 20 MG/ML
INJECTION, SOLUTION INFILTRATION; PERINEURAL PRN
Status: DISCONTINUED | OUTPATIENT
Start: 2020-02-14 | End: 2020-02-14

## 2020-02-14 RX ORDER — FENTANYL CITRATE 50 UG/ML
INJECTION, SOLUTION INTRAMUSCULAR; INTRAVENOUS PRN
Status: DISCONTINUED | OUTPATIENT
Start: 2020-02-14 | End: 2020-02-14

## 2020-02-14 RX ORDER — PROPOFOL 10 MG/ML
INJECTION, EMULSION INTRAVENOUS CONTINUOUS PRN
Status: DISCONTINUED | OUTPATIENT
Start: 2020-02-14 | End: 2020-02-14

## 2020-02-14 RX ORDER — PROPOFOL 10 MG/ML
INJECTION, EMULSION INTRAVENOUS PRN
Status: DISCONTINUED | OUTPATIENT
Start: 2020-02-14 | End: 2020-02-14

## 2020-02-14 RX ORDER — ONDANSETRON 2 MG/ML
INJECTION INTRAMUSCULAR; INTRAVENOUS PRN
Status: DISCONTINUED | OUTPATIENT
Start: 2020-02-14 | End: 2020-02-14

## 2020-02-14 RX ORDER — OXYCODONE HYDROCHLORIDE 5 MG/1
5 TABLET ORAL EVERY 4 HOURS PRN
Status: DISCONTINUED | OUTPATIENT
Start: 2020-02-14 | End: 2020-02-15 | Stop reason: HOSPADM

## 2020-02-14 RX ORDER — NALOXONE HYDROCHLORIDE 0.4 MG/ML
.1-.4 INJECTION, SOLUTION INTRAMUSCULAR; INTRAVENOUS; SUBCUTANEOUS
Status: DISCONTINUED | OUTPATIENT
Start: 2020-02-14 | End: 2020-02-15 | Stop reason: HOSPADM

## 2020-02-14 RX ORDER — EPHEDRINE SULFATE 50 MG/ML
INJECTION, SOLUTION INTRAMUSCULAR; INTRAVENOUS; SUBCUTANEOUS PRN
Status: DISCONTINUED | OUTPATIENT
Start: 2020-02-14 | End: 2020-02-14

## 2020-02-14 RX ORDER — LIDOCAINE 40 MG/G
CREAM TOPICAL
Status: DISCONTINUED | OUTPATIENT
Start: 2020-02-14 | End: 2020-02-15 | Stop reason: HOSPADM

## 2020-02-14 RX ADMIN — FENTANYL CITRATE 50 MCG: 50 INJECTION, SOLUTION INTRAMUSCULAR; INTRAVENOUS at 07:51

## 2020-02-14 RX ADMIN — FENTANYL CITRATE 50 MCG: 50 INJECTION, SOLUTION INTRAMUSCULAR; INTRAVENOUS at 08:20

## 2020-02-14 RX ADMIN — OXYCODONE HYDROCHLORIDE 5 MG: 5 TABLET ORAL at 09:24

## 2020-02-14 RX ADMIN — FENTANYL CITRATE 25 MCG: 50 INJECTION, SOLUTION INTRAMUSCULAR; INTRAVENOUS at 09:40

## 2020-02-14 RX ADMIN — SODIUM CHLORIDE, SODIUM LACTATE, POTASSIUM CHLORIDE, CALCIUM CHLORIDE: 600; 310; 30; 20 INJECTION, SOLUTION INTRAVENOUS at 07:30

## 2020-02-14 RX ADMIN — FENTANYL CITRATE 25 MCG: 50 INJECTION, SOLUTION INTRAMUSCULAR; INTRAVENOUS at 09:35

## 2020-02-14 RX ADMIN — BUPIVACAINE HYDROCHLORIDE 50 ML: 2.5 INJECTION, SOLUTION INFILTRATION; PERINEURAL at 08:58

## 2020-02-14 RX ADMIN — LIDOCAINE HYDROCHLORIDE 40 MG: 20 INJECTION, SOLUTION INFILTRATION; PERINEURAL at 07:58

## 2020-02-14 RX ADMIN — CEFAZOLIN SODIUM 2 G: 2 INJECTION, SOLUTION INTRAVENOUS at 07:49

## 2020-02-14 RX ADMIN — GABAPENTIN 300 MG: 300 CAPSULE ORAL at 07:21

## 2020-02-14 RX ADMIN — KETOROLAC TROMETHAMINE 30 MG: 30 INJECTION, SOLUTION INTRAMUSCULAR; INTRAVENOUS at 08:50

## 2020-02-14 RX ADMIN — GLYCOPYRROLATE 0.2 MG: 0.2 INJECTION, SOLUTION INTRAMUSCULAR; INTRAVENOUS at 07:52

## 2020-02-14 RX ADMIN — PROPOFOL 200 MG: 10 INJECTION, EMULSION INTRAVENOUS at 07:58

## 2020-02-14 RX ADMIN — ACETAMINOPHEN 975 MG: 325 TABLET ORAL at 07:21

## 2020-02-14 RX ADMIN — ONDANSETRON 4 MG: 2 INJECTION INTRAMUSCULAR; INTRAVENOUS at 08:05

## 2020-02-14 RX ADMIN — EPHEDRINE SULFATE 5 MG: 50 INJECTION, SOLUTION INTRAMUSCULAR; INTRAVENOUS; SUBCUTANEOUS at 08:07

## 2020-02-14 RX ADMIN — PROPOFOL 75 MCG/KG/MIN: 10 INJECTION, EMULSION INTRAVENOUS at 08:01

## 2020-02-14 NOTE — OP NOTE
Procedure Date: 02/14/2020      PREOPERATIVE DIAGNOSIS:  Status post bilateral breast reconstruction for left-sided breast cancer with mastectomy skin flap ischemic issues.      POSTOPERATIVE DIAGNOSIS:  Status post bilateral breast reconstruction for left-sided breast cancer with mastectomy skin flap ischemic issues.      PROCEDURE:  Excision of ischemic skin bilateral breasts with removal of expanders in preparation for radiation therapy.      SURGEON:  Leeann Chatterjee MD.      RESIDENT:  Wisam Adam MD.       ANESTHESIA:  General anesthesia with LMA.      COMPLICATIONS:  Nil.      DRAINS:  A 15-Mozambican Fredi drains bilaterally.       SPECIMENS:  Nil.      BLOOD LOSS:  10 mL.      DESCRIPTION OF PROCEDURE:  After informed consent was taken from the patient, the proper site and procedure were ascertained with her, she was appropriately marked and taken to the operating room.  She was placed in a supine position with the knees comfortably flexed, pillows underneath them, and Pneumoboots placed and running prior to induction of anesthesia.  Preoperative antibiotics were given in the OR.  All pressure points were appropriately padded.  General anesthesia was administered without any complications.  She was prepped and draped in standard surgical fashion.        I began by first marking out the ischemic skin on both sides, making the cut, cutting out that skin, removing the expanders, washing out each of the surgical sites with triple antibiotic irrigation, ensuring hemostasis, placing a 15-Mozambican Fredi drain on each side, securing it and then closing the incision with 2-0 Monocryl sutures in the deep layer followed by 3-0 Stratafix suture in a running manner followed by surgical tape and glue and a wrap.  The patient tolerated the procedure well.  All counts were correct at the end of the case.  The patient was extubated and sent to recovery room in stable condition.         CEE CHATTERJEE MD             D:  2020   T: 2020   MT:       Name:     AZRA HERNADEZ   MRN:      [id number]        Account:        [id number]   :      1965           Procedure Date: 2020      Document: [id number]

## 2020-02-14 NOTE — ANESTHESIA POSTPROCEDURE EVALUATION
Anesthesia POST Procedure Evaluation    Patient: Leigh Espinal   MRN:     [id number] Gender:   female   Age:    54 year old :      1965        Preoperative Diagnosis: S/P breast reconstruction, bilateral [Z98.890]   Procedure(s):  bilateral breast expander removal and washout   Postop Comments: No value filed.       Anesthesia Type:  Not documented  General    Reportable Event: NO     PAIN: Uncomplicated   Sign Out status: Comfortable, Well controlled pain     PONV: No PONV   Sign Out status:  No Nausea or Vomiting     Neuro/Psych: Uneventful perioperative course   Sign Out Status: Preoperative baseline; Age appropriate mentation     Airway/Resp.: Uneventful perioperative course   Sign Out Status: Non labored breathing, age appropriate RR; Resp. Status within EXPECTED Parameters     CV: Uneventful perioperative course   Sign Out status: Appropriate BP and perfusion indices; Appropriate HR/Rhythm     Disposition:   Sign Out in:  PACU  Disposition:  Phase II; Home  Recovery Course: Uneventful  Follow-Up: Not required           Last Anesthesia Record Vitals:  CRNA VITALS  2020 0836 - 2020 0936      2020             Pulse:  69    SpO2:  100 %          Last PACU Vitals:  Vitals Value Taken Time   /86 2020  9:38 AM   Temp 97.4  F (36.3  C) 2020  9:07 AM   Pulse     Resp 20 2020  9:22 AM   SpO2 96 % 2020  9:22 AM   Temp src     NIBP     Pulse     SpO2     Resp     Temp     Ht Rate     Temp 2           Electronically Signed By: Joshua Rendon MD, 2020, 10:28 AM

## 2020-02-14 NOTE — OR NURSING
Pt c.o pain- administered prn pain medication as ordered per [facility]. Spoke with dr. licona to clarify- tylenol and ibuprofen should be taken at home for pain. No new rx.

## 2020-02-14 NOTE — BRIEF OP NOTE
[facility]    Brief Operative Note    Pre-operative diagnosis: S/P breast reconstruction, bilateral [Z98.890]  Post-operative diagnosis Same as pre-operative diagnosis    Procedure: Procedure(s):  bilateral breast expander removal and washout  Surgeon: Surgeon(s) and Role:     * CEE Chatterjee MD - Primary  Anesthesia: General   Estimated blood loss: Less than 10 ml  Drains: Kahlil-Pitt  Specimens:   ID Type Source Tests Collected by Time Destination   A : RIGHT BREAST SKIN Tissue Breast, Right SURGICAL PATHOLOGY EXAM CEE Chatterjee MD 2/14/2020  8:19 AM    B : LEFT BREAST SKIN Tissue Breast, Left SURGICAL PATHOLOGY EXAM CEE Chatterjee MD 2/14/2020  8:23 AM      Findings:   Mastectomy skin necrosis. Implant removal.  Complications: None.  Implants:   Implant Name Type Inv. Item Serial No.  Lot No. LRB No. Used   MENTOR CPX 4 TALL HEIGHT BREAST TISSUE EXPANDER   [id number]  [id number] Left 1   MENTOR CPX4 WITH SUTURE TABS   [id number]  [id number] Right 1

## 2020-02-14 NOTE — DISCHARGE INSTRUCTIONS
BREAST SURGERY POST-OPERATIVE INSTRUCTIONS    Instructions       Have someone drive you home after surgery and help you at home for 1-2      days.      Get plenty of rest.      Follow balanced diet.      Decreased activity may promote constipation, so you may want to add      more raw fruit to your diet, and be sure to increase fluid intake.      Take pain medication as prescribed. Do not take aspirin or any products      containing aspirin.      Do not drink alcohol when taking pain medications.      Even when not taking pain medications, no alcohol for 3 weeks as it      causes fluid retention.      If you are taking vitamins with iron, resume these as tolerated.      Do not smoke, as smoking delays healing and increases the risk of      complications.    Activities      Do not drive until you are no longer taking any pain medications      (narcotics).      Start walking as soon as possible, this helps to reduce swelling and       lowers the chance of blood clots.      Resume normal activities gradually.      Return to work in 1-2 days, depending upon extent of surgery      No strenuous work or stress on wound for 2-3 weeks.    Incision Care      If steri strips have been used, keep steri-strips on; replace if they come off.      Avoid exposing scars to sun for at least 12 months.      Always use a strong sunblock, if sun exposure is unavoidable (SPF 50 or      greater).      Inspect daily for signs of infection.      No tub soaking, bathing, or swimming while sutures or drains are in place.      Keep area clean and dry for first 24 hours.     What to Expect      Some swelling and bruising.      May have slight bleeding from incision.  Apply 4 x 4 gauze with slight pressure to       control bleeding.      Skin grafts and flaps may take several weeks or months to heal; a support garment or      bandage may be necessary for up to a year.    Appearance      Final results of surgery may take a year or  more.    Follow-Up Care      If external sutures have been used, they will be removed in 5-14 days.    Please note my office will call you 1-2 business days after your procedure to check up on how you're doing and to schedule your post-operative appointment.        When to Call      If you have increased swelling or bruising.      If swelling and redness persist after a few days.      If you have increased redness along the incision.      If you have severe or increased pain not relieved by medication.      If you have any side effects to medications; such as, rash, nausea,      headache, vomiting.      If you have an oral temperature over 100.4 degrees.      If you have any yellowish or greenish drainage from the incisions or      notice a foul odor.      If you have bleeding from the incisions that is difficult to control with      light pressure.      If you have loss of feeling or motion.    For Medical Questions, Please Call:      [phone], Monday - Friday, 8 a.m. - 4:30 p.m.      After hours and on weekends, call Hospital Paging at [phone] and      ask for the Plastic Surgeon on call.    [facility]  Same-Day Surgery   Adult Discharge Orders & Instructions   For 24 hours after surgery  1. Get plenty of rest.  A responsible adult must stay with you for at least 24 hours after you leave the hospital.   2. Do not drive or use heavy equipment.  If you have weakness or tingling, don't drive or use heavy equipment until this feeling goes away.  3. Do not drink alcohol.  4. Avoid strenuous or risky activities.  Ask for help when climbing stairs.   5. You may feel lightheaded.  IF so, sit for a few minutes before standing.  Have someone help you get up.   6. If you have nausea (feel sick to your stomach): Drink only clear liquids such as apple juice, ginger ale, broth or 7-Up.  Rest may also help.  Be sure to drink enough fluids.  Move to a regular diet as you feel able.  7. You may  have a slight fever. Call the doctor if your fever is over 100 F (37.7 C) (taken under the tongue) or lasts longer than 24 hours.  8. You may have a dry mouth, a sore throat, muscle aches or trouble sleeping.  These should go away after 24 hours.  9. Do not make important or legal decisions.   Call your doctor for any of the followin.  Signs of infection (fever, growing tenderness at the surgery site, a large amount of drainage or bleeding, severe pain, foul-smelling drainage, redness, swelling).    2. It has been over 8 to 10 hours since surgery and you are still not able to urinate (pass water).    3.  Headache for over 24 hours.

## 2020-02-20 LAB — LAB SCANNED RESULT: NORMAL

## 2020-02-21 LAB — COPATH REPORT: NORMAL

## 2020-02-25 ENCOUNTER — OFFICE VISIT (OUTPATIENT)
Dept: PLASTIC SURGERY | Facility: CLINIC | Age: 55
End: 2020-02-25
Attending: PLASTIC SURGERY
Payer: COMMERCIAL

## 2020-02-25 ENCOUNTER — PRE VISIT (OUTPATIENT)
Dept: ONCOLOGY | Facility: CLINIC | Age: 55
End: 2020-02-25

## 2020-02-25 ENCOUNTER — ONCOLOGY VISIT (OUTPATIENT)
Dept: ONCOLOGY | Facility: CLINIC | Age: 55
End: 2020-02-25
Attending: INTERNAL MEDICINE
Payer: COMMERCIAL

## 2020-02-25 VITALS
SYSTOLIC BLOOD PRESSURE: 167 MMHG | HEART RATE: 44 BPM | HEIGHT: 64 IN | RESPIRATION RATE: 16 BRPM | OXYGEN SATURATION: 99 % | DIASTOLIC BLOOD PRESSURE: 80 MMHG | BODY MASS INDEX: 29.6 KG/M2 | TEMPERATURE: 98.1 F | WEIGHT: 173.4 LBS

## 2020-02-25 VITALS
DIASTOLIC BLOOD PRESSURE: 80 MMHG | TEMPERATURE: 98.1 F | SYSTOLIC BLOOD PRESSURE: 167 MMHG | RESPIRATION RATE: 16 BRPM | BODY MASS INDEX: 29.62 KG/M2 | HEIGHT: 64 IN | OXYGEN SATURATION: 99 % | WEIGHT: 173.5 LBS | HEART RATE: 44 BPM

## 2020-02-25 DIAGNOSIS — C50.211 MALIGNANT NEOPLASM OF UPPER-INNER QUADRANT OF RIGHT BREAST IN FEMALE, ESTROGEN RECEPTOR POSITIVE (H): Primary | ICD-10-CM

## 2020-02-25 DIAGNOSIS — C50.412 MALIGNANT NEOPLASM OF UPPER-OUTER QUADRANT OF LEFT BREAST IN FEMALE, ESTROGEN RECEPTOR POSITIVE (H): ICD-10-CM

## 2020-02-25 DIAGNOSIS — Z17.0 MALIGNANT NEOPLASM OF UPPER-OUTER QUADRANT OF LEFT BREAST IN FEMALE, ESTROGEN RECEPTOR POSITIVE (H): ICD-10-CM

## 2020-02-25 DIAGNOSIS — Z17.0 MALIGNANT NEOPLASM OF UPPER-INNER QUADRANT OF RIGHT BREAST IN FEMALE, ESTROGEN RECEPTOR POSITIVE (H): Primary | ICD-10-CM

## 2020-02-25 DIAGNOSIS — Z98.890 S/P BREAST RECONSTRUCTION, BILATERAL: Primary | ICD-10-CM

## 2020-02-25 PROCEDURE — G0463 HOSPITAL OUTPT CLINIC VISIT: HCPCS | Mod: ZF

## 2020-02-25 PROCEDURE — 99215 OFFICE O/P EST HI 40 MIN: CPT | Mod: GC | Performed by: INTERNAL MEDICINE

## 2020-02-25 ASSESSMENT — PAIN SCALES - GENERAL
PAINLEVEL: NO PAIN (0)
PAINLEVEL: NO PAIN (0)

## 2020-02-25 ASSESSMENT — MIFFLIN-ST. JEOR
SCORE: 1381.54
SCORE: 1382

## 2020-02-25 NOTE — NURSING NOTE
"Oncology Rooming Note    February 25, 2020 10:40 AM   Leigh Espinal is a 54 year old female who presents for:    Chief Complaint   Patient presents with     New Patient     NEW PT; BREAST CANCER; VITALS TAKEN      Initial Vitals: BP (!) 167/80   Pulse (!) 44   Temp 98.1  F (36.7  C) (Oral)   Resp 16   Ht 1.626 m (5' 4\")   Wt 78.7 kg (173 lb 8 oz)   SpO2 99%   BMI 29.78 kg/m   Estimated body mass index is 29.78 kg/m  as calculated from the following:    Height as of this encounter: 1.626 m (5' 4\").    Weight as of this encounter: 78.7 kg (173 lb 8 oz). Body surface area is 1.89 meters squared.  No Pain (0) Comment: Data Unavailable   No LMP recorded.  Allergies reviewed: Yes  Medications reviewed: Yes    Medications: Medication refills not needed today.  Pharmacy name entered into EPIC:    [facility] - [address] [facility] [place], [place] - [address]  [facility] - [place], [place] - [address] [facility] [address]    Clinical concerns: No new concerns today  Dr Shepherd  was notified.      Debbie Ureña              "

## 2020-02-25 NOTE — NURSING NOTE
"Oncology Rooming Note    February 25, 2020 8:09 AM   Leigh Espinal is a 54 year old female who presents for:    Chief Complaint   Patient presents with     Oncology Clinic Visit     RETURN VISIT; POST OP; BREAST CANCER; VITALS TAKEN      Initial Vitals: BP (!) 167/80   Pulse (!) 44   Temp 98.1  F (36.7  C) (Oral)   Resp 16   Ht 1.626 m (5' 4\")   Wt 78.7 kg (173 lb 6.4 oz)   SpO2 99%   BMI 29.76 kg/m   Estimated body mass index is 29.76 kg/m  as calculated from the following:    Height as of this encounter: 1.626 m (5' 4\").    Weight as of this encounter: 78.7 kg (173 lb 6.4 oz). Body surface area is 1.89 meters squared.  No Pain (0) Comment: Data Unavailable   No LMP recorded.  Allergies reviewed: Yes  Medications reviewed: Yes    Medications: Medication refills not needed today.  Pharmacy name entered into EPIC:    [facility] [place] - [address] [facility]- [facility] - [place], [place] - [address]  [facility] - [place], [place] - [address] [facility]    Clinical concerns: No new concerns today      Partner Violence Screening Completed: No- Screening was done on 02/13/2020  Oncology Distress Screening Completed: No- Screening was done on 02/13/2020    Debbie Ureña              "

## 2020-02-25 NOTE — LETTER
2/25/2020       RE: Leigh Espinal  [address] [place] [address]     Dear Colleague,    Thank you for referring your patient, Leigh Espinal, to the [facility] at [facility]. Please see a copy of my visit note below.    PRESENTING COMPLAINT:  Postoperative visit, status post removal of bilateral expanders because of ischemic mastectomy flaps done 02/14/2020.        HISTORY OF PRESENTING COMPLAINT:  Ms. Espinal is 54 years old, 10 days out from surgery, overall doing very well.  YOLY drainage is minimal.      PHYSICAL EXAMINATION:  Vital signs are stable.  She is afebrile, in no obvious distress.  Everything is healing in well.      ASSESSMENT AND PLAN:  Based upon the above findings, a diagnosis of bilateral breast expander removal for ischemic skin flaps was made.  I have advised aggressive moisturization.  YOLY drains were removed.  She can start her radiation therapy in the next 2-4 weeks.  I will see her back after the radiation is complete to plan the autologous reconstructions in a delayed fashion.       CEE Chatterjee MD

## 2020-02-25 NOTE — PROGRESS NOTES
Oncology Consultation:  Date on this visit: 2/25/2020    Leigh Espinal is referred by Dr.Jane Saroj Smith for an oncology consultation. She requires evaluation for diagnosis of bilateral breast cancers.    Primary Physician: Darleen Ventura     History Of Present Illness:  Ms. Espinal is a 54 year old female with bilateral breast cancers. Other past medical history is notable for HHT (follows with Dr. Real), pulmonary and liver AVMs, GERD and HTN. Bilateral screening mammograms on 10/23/19 showed architectural distortion and developing focal asymmetry in the right breast, and possible developing asymmetry in the left breast. Diagnostic mammograms showed a 2 x 1.4 cm mass at 1:00 right breast and a 9 mm mass at 2:00 left breast. An additional 1.4 cm satellite mass in the left breast (total diameter of both left breast masses, 2.1 cm) was noted on contrast mammogram on 11/21/19.  Biopsy of the right breast at 1:00 revealed invasive ductal carcinoma, grade 1, ER+ (98%), RI+ (99%), HER2 negative. Biopsy of the left breast 2:00 revealed invasive lobular carcinoma, grade 2, ER+ (95%), RI+ (75%), and HER2 negative.  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      She underwent bilateral nipple sparing mastectomies, tissue expander placement, and bilateral axillary sentinel lymph node biopsies on 1/29/20 under the care of Dr. Smith. Final pathology revealed grade 2 invasive mammary carcinoma in the right breast measuring 2.1 cm, a smaller focus of invasive mammary carcinoma (mixed ductal and cribriform) measuring 3.6 mm, admixed DCIS, and negative margins. The left breast showed grade 2 invasive lobular carcinoma, measuring 1.6 cm, DCIS and LCIS, and negative margins. The right axillary sentinel node was benign; the left axillary sentinel node showed micrometastatic carcinoma measuring 1.5 mm without extranodal extension.    OncotypeDx was ordered for  right and left breast cancers.  Oncotype dx recurrence score of the left breast cancer was 10. Oncotype dx of the right breast cancer is pending.  Due to ischemic skin flap issues, expander removal and washout was performed on 2/14/20.    She is here today for her first visit with medical oncology. She has had a long day so far with a surgery clinic appointment this morning and expresses frustration with the wait for this appointment. She expresses that she doesn't know why she is here and that she doesn't understand why Dr. Real can't treat her breast cancer.  She is otherwise feeling well. She has recovered from surgery without major issues. She understands she will need to meet with radiation oncology for discussion about adjuvant radiation on the left side. She is hoping to go back to work soon and is confident that she can work radiation into her work schedule without problems. She notes that she's had recurrence of her menses after the Mirena IUD was removed, with menstrual bleeding for almost a month and a half. She has a long history of heavy periods and has an appointment with gynecology in the coming weeks to discuss options such as ablation.     Review of Systems:  The remainder of a full 12 point ROS was performed with the patient and was found to be negative or non-contributory with the exception of that noted in the HPI above.    Endocrine History:  Patient underwent menarche at 12 yo.  She has had 2 pregnancies and has 2 children.  First full term pregnancy at 24 yo.  She  both of her children.  She had a mirena IUD in place until shortly after breast cancer diagnosis.  She had menstrual bleeding for about 6 weeks after the IUD was removed.    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 Provider to review     Past Surgical History:   Procedure Laterality Date     BIOPSY NODE SENTINEL Bilateral 1/29/2020    Procedure: BILATERAL Axillary [place] Lymph Node Biopsy;  Surgeon: Paz Smith MD;  Location: [facility] OR     COLONOSCOPY  6/21/2012    Procedure: COLONOSCOPY;;  Surgeon: Radha Hector MD;  Location: [facility] GI     INSERT TISSUE EXPANDER BREAST BILATERAL Bilateral 2/14/2020    Procedure: bilateral breast expander removal and washout;  Surgeon: CEE Chatterjee MD;  Location: [facility] OR     LAPAROSCOPIC NISSEN FUNDOPLICATION  2007     MASTECTOMY SIMPLE Bilateral 1/29/2020    Procedure: BILATERAL Skin-Sparing Mastectomy;  Surgeon: Paz Smith MD;  Location: [facility] OR     RECONSTRUCT BREAST Bilateral 1/29/2020    Procedure: Bilateral breast reconstruction with expanders and SPY;  Surgeon: CEE Chatterjee MD;  Location: [facility] OR     TUBAL LIGATION  1990     Allergies:  Allergies as of 02/25/2020 - Reviewed 02/14/2020   Allergen Reaction Noted     Sulfa drugs  10/18/2011     Current Medications:  Current Outpatient Medications   Medication Sig Dispense Refill     amoxicillin (AMOXIL) 500 MG tablet Take 4 pills (2000 mg) 30-60 minutes before dental procedures, including teeth cleaning. (Patient not taking: Reported on 2/13/2020) 4 tablet 3     atenolol (TENORMIN) 50 MG tablet Take 1 tablet by mouth daily       LISINOPRIL PO Take 20 mg by mouth daily       ondansetron (ZOFRAN) 4 MG tablet Take 1 tablet (4 mg) by mouth every 8 hours as needed for nausea 10 tablet 0     pantoprazole (PROTONIX) 20 MG EC tablet daily as needed   3     SERTRALINE HCL PO Take 25 mg by mouth daily        TRAZodone (DESYREL) 25 MG TABS Take 50 mg by mouth At Bedtime         Family  "History:  Denies a family history of malignancy.  Patient with known pathogenic CHEK2 mutation.    Social History:  .  Has 2 children.  Patient works as an  for the [employer] at the [employer].  She quit smoking cigarettes in 2011.  She has 2 alcoholic beverages per day, most days of the week.    Physical Exam:  BP (!) 167/80   Pulse (!) 44   Temp 98.1  F (36.7  C) (Oral)   Resp 16   Ht 1.626 m (5' 4\")   Wt 78.7 kg (173 lb 8 oz)   SpO2 99%   BMI 29.78 kg/m    General:  Well appearing, well-nourished adult female in NAD.  HEENT:  Normocephalic.  Sclera anicteric.  MMM.  No lesions of the oropharynx.  Lymph:  No palpable cervical, supraclavicular, or axillary LAD.  Chest:  CTA bilaterally.  No wheezes or crackles.  CV:  RRR.  Nl S1 and S2.  No m/r/g.  Breast:  Not examined today per patient preference.  Abd:  Soft/NT/ND.  BSs normoactive.  Ext:  No pitting edema of the bilateral lower extremities.    Neuro:  Cranial nerves grossly intact.  Gait stable.  Psych:  Patient expressed anger and frustration throughout our visit.    Laboratory/Imaging Studies  10/23/19 Bilateral screening mammogram:  - Architectural distortion and developing focal asymmetry in the right breast at 12 - 1:00.    - Possible developing asymmetry left breast at 3:00.    11/11/2019 Bilateral diagnostic mammograms and ultrasound:  - In the right breast at 1:00, 5 cm from the nipple is a 2 cm mass  - In the left breast at 2:00, 6 cm from the nipple is a 0.9 cm mass  - No enlarged lymph nodes in either axilla.    11/21/2019 Left breast biopsy 2:00, 6 cm from the nipple:  - Grade 2 invasive lobular carcinoma  - LCIS  - ER moderate in 95%  - MO moderate in 75%  - HER2 negative by FISH    Right breast biopsy 1:00, 5 cm from the nipple:  - Grade 1 invasive mammary carcinoma  - DCIS  - ER strong in 98%  - MO strong in 99%  - HER2 negative by FISH    12/19/19 Breast Actionable Panel:  Positive for pathogenic " mutation in CHEK2, [id number]    1/29/2020 Bilateral mastectomies and SLN procedures:  1.  Left breast mastectomy:  - Grade 2 invasive lobular carcinoma measuring 1.6 cm  - intermediate grade DCIS  - LCIS  - surgical margins are negative  - lymphovascular invasion is present  - ADH  - ALH  - invasive carcinoma is HER2 negative  - Oncotype dx recurrence score = 10    2.  Left axillary sentinel lymph node:  - Micrometastatic carcinoma in one lymph node, measures 1.5 mm  - No extranodal extension    3.  Right breast mastectomy:  - Grade 2 invasive mammary carcinoma measuring 2.1 cm  - smaller focus of invasive mammary carcinoma superior to the dominant focus measures 3.6 mm  - intermediate grade DCIS  - focus suspicious for lymphovascular invasion  - surgical margins are negative  - ADH  - invasive carcinoma is HER2 negative.    4.  Right axillary sentinel lymph node:  - one benign lymph node    2/14/2020 Skin right and left mastectomy sites:  - negative for malignancy.    ASSESSMENT/PLAN:  Leigh Espinal is a 54 year old female with CHEK2 mutation and bilateral breast cancers - stage Ia, right breast, T2(2)N0M0, grade 2, ER+, ME+, HER2 negative invasive mammary carcinoma, and stage Ia, left breast, [id number], grade 2, ER+, ME+, HER2 negative invasive lobular carcinoma measuring 1.6 cm.  She is s/p bilateral mastectomies and bilateral sentinel lymph node biopsies on 1/29/20 with 1 of 1 left axillary sentinel lymph node positive for micrometastatic carcinoma measuring 1.5 mm. OncotypeDX of the left breast cancer was low-risk with a RS of 10.  Oncotype dx RS of the right breast cancer is pending.    1. Bilateral breast cancers:  We discussed that she has bilateral stage IA breast cancers.  These are early stage breast cancers.  The goal of treatment is cure, or in other words, to reduce the risk of recurrence as much as possible.      We reviewed treatment options.  We reviewed the roles of surgery and radiation  in preventing local recurrence.  We discussed the concept of micrometastatic disease and the roles of chemotherapy and endocrine therapy in preventing both local and distant recurrence.  We reviewed how Oncotype DX molecular profiling is utilized to determine chemotherapy benefit in early stage estrogen receptor positive breast cancer.  Oncotype DX was ordered for both breast tumors, but only the left side has returned at this time. Oncotype dx testing of the left breast resulted with a recurrence score of 10.  We discussed that this is a low risk Oncotype and suggests the left breast cancer will not benefit from chemotherapy.  We will await results of Oncotype dx testing of the right breast, in order to determine if the right breast cancer will benefit from chemotherapy.  We discussed there a number of features to the right breast cancer which make chemotherapy benefit less likely including high estrogen and progesterone receptor positivity, intermediate grade, and negative alex status. Patient expressed frustration at this part of our visit staging the surgeon told her she didn't need chemo and she didn't understand why we are talking about it.  We reviewed the need to have Oncotype of both tumors in order to determine chemotherapy benefit of each cancer.    We recommend meeting with the radiation oncologist to discuss whether or not adjuvant radiation is recommended in the setting of left sentinel lymph node micrometastasis. Radiation oncology referral has been placed.    She was frustrated that we could not definitively say that chemotherapy will not be needed (in the absence of the right side Oncotype Score), and as such we only briefly discussed the role of adjuvant endocrine therapy.  We did discuss that the use of adjuvant endocrine therapy in pre-menopausal women with ER positive breast cancer reduces the relative risk of breast cancer recurrence by approximately half. She will see her gynecologist in the  next couple of weeks to discuss options for menorrhagia such as endometrial ablation. We would not start endocrine therapy until after radiation, but noting that she is likely matilda-menopausal, we would recommend initial treatment with tamoxifen.  Given tamoxifen can cause endometrial thickening, we feel it is important that her menorrhagia is managed prior to starting tamoxifen.  Plan would be to continue tamoxifen until she has gone 1 year without a menstrual bleed, at which time we would  check serum estradiol and FSH levels and switch to an aromatase inhibitor if within postmenopausal range.  Plan would be to complete a total 10 years of adjuvant endocrine therapy.  We reviewed the potential side effects of endocrine therapy including myalgias, arthralgias, hot flashes, vaginal dryness, mood disturbances, weight gain, cataracts, hypertriglyceridemia, and thinning of the bones.     2.  Bone health.  She is at risk for bone loss with endocrine therapy. She has not had a DEXA bone density scan. Will need to obtain a baseline DEXA bone density scan when the decision is made to start adjuvant endocrine therapy. In the meantime, recommended calcium 1200 mg and vitamin D 1000 IU daily. Also recommended that she walk 20-30 minutes daily.     3.  CHEK2 mutation.  She is s/p bilateral mastectomies therefore no further surveillance breast imaging is required.  As CHEK2 mutation may slightly increase risk for colon cancer, recommend she adhere to colon cancer screening guidelines. She should have formal genetic counseling regarding this finding and was agreeable to this referral.    4.  Follow-up:  No scheduled follow up at this time.  Follow up to be scheduled once right breast Oncotype and decision regarding radiation is obtained.    Patient was seen and discussed with Dr. Nicolle Shepherd.    Rachelle Chacon MD/PhD  Heme/Onc Fellow    Patient was seen and discussed with Dr. Chacon.  I have edited the above note to  reflect our joint assessment and plan.  Of note, the patient was very frustrated and angry prior to our appointment (per rooming staff) as well as during our appointment.  She and her  angrily walked out of the appointment before discussion regarding treatments was fully completed as well as before an examination of the chest wall could be performed.  I spent a total of 45 minutes in face to face visit with the patient.  An addition 45 minutes was spent reviewing records in preparation for this visit.    Nicolle Shepherd MD

## 2020-02-25 NOTE — LETTER
2/25/2020       RE: Leigh Espinal  [address] [place] [address]     Dear Colleague,    Thank you for referring your patient, Leigh Espinal, to the [facility]. Please see a copy of my visit note below.    Oncology Consultation:  Date on this visit: 2/25/2020    Leigh Espinal is referred by Dr.Jane Saroj Smith for an oncology consultation. She requires evaluation for diagnosis of bilateral breast cancers.    Primary Physician: Darleen Ventura     History Of Present Illness:  Ms. Espinal is a 54 year old female with bilateral breast cancers. Other past medical history is notable for HHT (follows with Dr. Real), pulmonary and liver AVMs, GERD and HTN. Bilateral screening mammograms on 10/23/19 showed architectural distortion and developing focal asymmetry in the right breast, and possible developing asymmetry in the left breast. Diagnostic mammograms showed a 2 x 1.4 cm mass at 1:00 right breast and a 9 mm mass at 2:00 left breast. An additional 1.4 cm satellite mass in the left breast (total diameter of both left breast masses, 2.1 cm) was noted on contrast mammogram on 11/21/19.  Biopsy of the right breast at 1:00 revealed invasive ductal carcinoma, grade 1, ER+ (98%), AZ+ (99%), HER2 negative. Biopsy of the left breast 2:00 revealed invasive lobular carcinoma, grade 2, ER+ (95%), AZ+ (75%), and HER2 negative.  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      She underwent bilateral nipple sparing mastectomies, tissue expander placement, and bilateral axillary sentinel lymph node biopsies on 1/29/20 under the care of Dr. Smith. Final pathology revealed grade 2 invasive mammary carcinoma in the right breast measuring 2.1 cm, a smaller focus of invasive mammary carcinoma (mixed ductal and cribriform) measuring 3.6 mm, admixed DCIS, and negative margins. The left breast showed grade 2 invasive lobular  carcinoma, measuring 1.6 cm, DCIS and LCIS, and negative margins. The right axillary sentinel node was benign; the left axillary sentinel node showed micrometastatic carcinoma measuring 1.5 mm without extranodal extension.    OncotypeDx was ordered for right and left breast cancers.  Oncotype dx recurrence score of the left breast cancer was 10. Oncotype dx of the right breast cancer is pending.  Due to ischemic skin flap issues, expander removal and washout was performed on 2/14/20.    She is here today for her first visit with medical oncology. She has had a long day so far with a surgery clinic appointment this morning and expresses frustration with the wait for this appointment. She expresses that she doesn't know why she is here and that she doesn't understand why Dr. Real can't treat her breast cancer.  She is otherwise feeling well. She has recovered from surgery without major issues. She understands she will need to meet with radiation oncology for discussion about adjuvant radiation on the left side. She is hoping to go back to work soon and is confident that she can work radiation into her work schedule without problems. She notes that she's had recurrence of her menses after the Mirena IUD was removed, with menstrual bleeding for almost a month and a half. She has a long history of heavy periods and has an appointment with gynecology in the coming weeks to discuss options such as ablation.     Review of Systems:  The remainder of a full 12 point ROS was performed with the patient and was found to be negative or non-contributory with the exception of that noted in the HPI above.    Endocrine History:  Patient underwent menarche at 10 yo.  She has had 2 pregnancies and has 2 children.  First full term pregnancy at 24 yo.  She  both of her children.  She had a mirena IUD in place until shortly after breast cancer diagnosis.  She had menstrual bleeding for about 6 weeks after the IUD was  removed.    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 Provider to review     Past Surgical History:   Procedure Laterality Date     BIOPSY NODE SENTINEL Bilateral 1/29/2020    Procedure: BILATERAL Axillary [place] Lymph Node Biopsy;  Surgeon: Paz Smith MD;  Location: [facility]     COLONOSCOPY  6/21/2012    Procedure: COLONOSCOPY;;  Surgeon: Radha Hector MD;  Location: [facility] GI     INSERT TISSUE EXPANDER BREAST BILATERAL Bilateral 2/14/2020    Procedure: bilateral breast expander removal and washout;  Surgeon: CEE Chatterjee MD;  Location: [facility] OR     LAPAROSCOPIC NISSEN FUNDOPLICATION  2007     MASTECTOMY SIMPLE Bilateral 1/29/2020    Procedure: BILATERAL Skin-Sparing Mastectomy;  Surgeon: Paz Smith MD;  Location: [facility] OR     RECONSTRUCT BREAST Bilateral 1/29/2020    Procedure: Bilateral breast reconstruction with expanders and SPY;  Surgeon: CEE Chatterjee MD;  Location: [facility] OR     TUBAL LIGATION  1990     Allergies:  Allergies as of 02/25/2020 - Reviewed 02/14/2020   Allergen Reaction Noted     Sulfa drugs  10/18/2011     Current Medications:  Current Outpatient Medications   Medication Sig Dispense Refill     amoxicillin (AMOXIL) 500 MG tablet Take 4 pills (2000 mg) 30-60 minutes before dental procedures, including teeth cleaning. (Patient not taking: Reported on 2/13/2020) 4 tablet 3     atenolol (TENORMIN) 50 MG tablet Take 1 tablet by mouth daily       LISINOPRIL PO Take 20 mg by mouth daily       ondansetron (ZOFRAN) 4 MG tablet Take 1 tablet (4 mg)  "by mouth every 8 hours as needed for nausea 10 tablet 0     pantoprazole (PROTONIX) 20 MG EC tablet daily as needed   3     SERTRALINE HCL PO Take 25 mg by mouth daily        TRAZodone (DESYREL) 25 MG TABS Take 50 mg by mouth At Bedtime         Family History:  Denies a family history of malignancy.  Patient with known pathogenic CHEK2 mutation.    Social History:  .  Has 2 children.  Patient works as an  for the [employer] at the [employer].  She quit smoking cigarettes in 2011.  She has 2 alcoholic beverages per day, most days of the week.    Physical Exam:  BP (!) 167/80   Pulse (!) 44   Temp 98.1  F (36.7  C) (Oral)   Resp 16   Ht 1.626 m (5' 4\")   Wt 78.7 kg (173 lb 8 oz)   SpO2 99%   BMI 29.78 kg/m     General:  Well appearing, well-nourished adult female in NAD.  HEENT:  Normocephalic.  Sclera anicteric.  MMM.  No lesions of the oropharynx.  Lymph:  No palpable cervical, supraclavicular, or axillary LAD.  Chest:  CTA bilaterally.  No wheezes or crackles.  CV:  RRR.  Nl S1 and S2.  No m/r/g.  Breast:  Not examined today per patient preference.  Abd:  Soft/NT/ND.  BSs normoactive.  Ext:  No pitting edema of the bilateral lower extremities.    Neuro:  Cranial nerves grossly intact.  Gait stable.  Psych:  Patient expressed anger and frustration throughout our visit.    Laboratory/Imaging Studies  10/23/19 Bilateral screening mammogram:  - Architectural distortion and developing focal asymmetry in the right breast at 12 - 1:00.    - Possible developing asymmetry left breast at 3:00.    11/11/2019 Bilateral diagnostic mammograms and ultrasound:  - In the right breast at 1:00, 5 cm from the nipple is a 2 cm mass  - In the left breast at 2:00, 6 cm from the nipple is a 0.9 cm mass  - No enlarged lymph nodes in either axilla.    11/21/2019 Left breast biopsy 2:00, 6 cm from the nipple:  - Grade 2 invasive lobular carcinoma  - LCIS  - ER moderate in 95%  - VA moderate in " 75%  - HER2 negative by FISH    Right breast biopsy 1:00, 5 cm from the nipple:  - Grade 1 invasive mammary carcinoma  - DCIS  - ER strong in 98%  - MA strong in 99%  - HER2 negative by FISH    12/19/19 Breast Actionable Panel:  Positive for pathogenic mutation in CHEK2, [id number]    1/29/2020 Bilateral mastectomies and SLN procedures:  1.  Left breast mastectomy:  - Grade 2 invasive lobular carcinoma measuring 1.6 cm  - intermediate grade DCIS  - LCIS  - surgical margins are negative  - lymphovascular invasion is present  - ADH  - ALH  - invasive carcinoma is HER2 negative  - Oncotype dx recurrence score = 10    2.  Left axillary sentinel lymph node:  - Micrometastatic carcinoma in one lymph node, measures 1.5 mm  - No extranodal extension    3.  Right breast mastectomy:  - Grade 2 invasive mammary carcinoma measuring 2.1 cm  - smaller focus of invasive mammary carcinoma superior to the dominant focus measures 3.6 mm  - intermediate grade DCIS  - focus suspicious for lymphovascular invasion  - surgical margins are negative  - ADH  - invasive carcinoma is HER2 negative.    4.  Right axillary sentinel lymph node:  - one benign lymph node    2/14/2020 Skin right and left mastectomy sites:  - negative for malignancy.    ASSESSMENT/PLAN:  Leigh Espinal is a 54 year old female with CHEK2 mutation and bilateral breast cancers - stage Ia, right breast, T2(2)N0M0, grade 2, ER+, MA+, HER2 negative invasive mammary carcinoma, and stage Ia, left breast, [id number], grade 2, ER+, MA+, HER2 negative invasive lobular carcinoma measuring 1.6 cm.  She is s/p bilateral mastectomies and bilateral sentinel lymph node biopsies on 1/29/20 with 1 of 1 left axillary sentinel lymph node positive for micrometastatic carcinoma measuring 1.5 mm. OncotypeDX of the left breast cancer was low-risk with a RS of 10.  Oncotype dx RS of the right breast cancer is pending.    1. Bilateral breast cancers:  We discussed that she has bilateral  stage IA breast cancers.  These are early stage breast cancers.  The goal of treatment is cure, or in other words, to reduce the risk of recurrence as much as possible.      We reviewed treatment options.  We reviewed the roles of surgery and radiation in preventing local recurrence.  We discussed the concept of micrometastatic disease and the roles of chemotherapy and endocrine therapy in preventing both local and distant recurrence.  We reviewed how Oncotype DX molecular profiling is utilized to determine chemotherapy benefit in early stage estrogen receptor positive breast cancer.  Oncotype DX was ordered for both breast tumors, but only the left side has returned at this time. Oncotype dx testing of the left breast resulted with a recurrence score of 10.  We discussed that this is a low risk Oncotype and suggests the left breast cancer will not benefit from chemotherapy.  We will await results of Oncotype dx testing of the right breast, in order to determine if the right breast cancer will benefit from chemotherapy.  We discussed there a number of features to the right breast cancer which make chemotherapy benefit less likely including high estrogen and progesterone receptor positivity, intermediate grade, and negative alex status. Patient expressed frustration at this part of our visit staging the surgeon told her she didn't need chemo and she didn't understand why we are talking about it.  We reviewed the need to have Oncotype of both tumors in order to determine chemotherapy benefit of each cancer.    We recommend meeting with the radiation oncologist to discuss whether or not adjuvant radiation is recommended in the setting of left sentinel lymph node micrometastasis. Radiation oncology referral has been placed.    She was frustrated that we could not definitively say that chemotherapy will not be needed (in the absence of the right side Oncotype Score), and as such we only briefly discussed the role of  adjuvant  endocrine therapy.  We did discuss that the use of adjuvant endocrine therapy in pre-menopausal women with ER positive breast cancer reduces the relative risk of breast cancer recurrence by approximately half. She will see her gynecologist in the next couple of weeks to discuss options for menorrhagia such as endometrial ablation. We would not start endocrine therapy until after radiation, but noting that she is likely matilda-menopausal, we would recommend initial treatment with tamoxifen.  Given tamoxifen can cause endometrial thickening, we feel it is important that her menorrhagia is managed prior to starting tamoxifen.  Plan would be to continue tamoxifen until she has gone 1 year without a menstrual bleed, at which time we would  check serum estradiol and FSH levels and switch to an aromatase inhibitor if within postmenopausal range.  Plan would be to complete a total 10 years of adjuvant endocrine therapy.  We reviewed the potential side effects of endocrine therapy including myalgias, arthralgias, hot flashes, vaginal dryness, mood disturbances, weight gain, cataracts, hypertriglyceridemia, and thinning of the bones.     2.  Bone health.  She is at risk for bone loss with endocrine therapy. She has not had a DEXA bone density scan. Will need to obtain a baseline DEXA bone density scan when the decision is made to start adjuvant endocrine therapy. In the meantime, recommended calcium 1200 mg and vitamin D 1000 IU daily. Also recommended that she walk 20-30 minutes daily.     3.  CHEK2 mutation.  She is s/p bilateral mastectomies therefore no further surveillance breast imaging is required.  As CHEK2 mutation may slightly increase risk for colon cancer, recommend she adhere to colon cancer screening guidelines. She should have formal genetic counseling regarding this finding and was agreeable to this referral.    4.  Follow-up:  No scheduled follow up at this time.  Follow up to be scheduled once right  breast Oncotype and decision regarding radiation is obtained.    Patient was seen and discussed with Dr. Nicolle Shepherd.    Rachelle Chacon MD/PhD  Heme/Onc Fellow    Patient was seen and discussed with Dr. Chacon.  I have edited the above note to reflect our joint assessment and plan.  Of note, the patient was very frustrated and angry prior to our appointment (per rooming staff) as well as during our appointment.  She and her  angrily walked out of the appointment before discussion regarding treatments was fully completed as well as before an examination of the chest wall could be performed.  I spent a total of 45 minutes in face to face visit with the patient.  An addition 45 minutes was spent reviewing records in preparation for this visit.    Nicolle Shepherd MD

## 2020-02-26 ENCOUNTER — PATIENT OUTREACH (OUTPATIENT)
Dept: ONCOLOGY | Facility: CLINIC | Age: 55
End: 2020-02-26

## 2020-02-26 NOTE — PROGRESS NOTES
"  Patient was given reason for telephone call. Patient stated she did not receive a phone call about her appointment yesterday.  Patient felt that her appointment with Dr. Shepherd should have been rescheduled since we were waiting on a second tumor Oncotype result.  Patient stated she started her appointments here yesterday at 8:00am.  She did not come to her appointment to hear about   \"possibilities.\"  She is only interested in hearing a resulted plan that is individual to her care.  She wants to stay at [facility] but requests to see another oncologist.  She was given our oncologist names and she chose to see Dr. Swann. Pt was told that an in basket message will be sent to Dr. Swann and Ping CORDON.  Pt stated she was not in a hurry for an appt.  She will only come in if her Oncotype result on the second tumor is back.  It is estimated to be reported out on 3/10/20. We reviewed her appt with Dr. Brunson and again she will only keep if her Oncotype result is back.     Patient was pleasant during our conversation and appreciated the telephone call.  She did not need any further care at the end of our telephone conversation.   Will send in basket message to Dr. Swann.   Dr. Shepherd, updated with change of care.    Gale Pelletier RNCC BSN CBCN                    Per clinic supervisor call was placed back to patient:[employer] company called me saying that she was confused about her visit yesterday with Cora and didn't understand her plan of care. Would you be able to reach out to her today?     Thanks!  Kathi"

## 2020-02-27 NOTE — TELEPHONE ENCOUNTER
ONCOLOGY INTAKE: Records Information      APPT INFORMATION:  Referring provider: Nicolle Shepherd MD  Referring provider s clinic:  [facility]  Reason for visit/diagnosis: Malignant neoplasm of upper-inner quadrant of right breast in female, estrogen receptor positive; Malignant neoplasm of upper-outer quadrant of left breast in female, estrogen receptor positive  Has patient been notified of appointment date and time?: No    RECORDS INFORMATION:  Were the records received with the referral (via [employer])? No    Has patient been seen for any external appt for this diagnosis? No    If yes, where? N/a    Has patient had any imaging or procedures outside of [facility] for this condition? No      If Yes, where? N/a    ADDITIONAL INFORMATION:  None

## 2020-03-02 LAB — LAB SCANNED RESULT: NORMAL

## 2020-03-05 NOTE — TELEPHONE ENCOUNTER
ONCOLOGY INTAKE: Records Information      APPT INFORMATION:  Referring provider:  Dr. Shepherd  Referring provider s clinic: Oncology [facility]  Reason for visit/diagnosis:  Malignant neoplasm of upper-inner quadrant of right breast, estrogen receptor positive; Malignant neoplasm of upper-outer quadrant of left breast, estrogen receptor positive.    Has patient been notified of appointment date and time?: Yes. 4/16/20 [facility].    RECORDS INFORMATION:  Were the records received with the referral (via [employer])? No.    Has patient been seen for any external appt for this diagnosis? No.    If yes, where? N/A     Has patient had any imaging or procedures outside of [facility] for this condition? No.      If Yes, where? N/A    ADDITIONAL INFORMATION:  Patient scheduled and confirmed for genetic counseling appointment at [facility], 4/16/20.

## 2020-03-06 ENCOUNTER — PATIENT OUTREACH (OUTPATIENT)
Dept: ONCOLOGY | Facility: CLINIC | Age: 55
End: 2020-03-06

## 2020-03-06 ENCOUNTER — OFFICE VISIT (OUTPATIENT)
Dept: OBGYN | Facility: CLINIC | Age: 55
End: 2020-03-06
Attending: OBSTETRICS & GYNECOLOGY
Payer: COMMERCIAL

## 2020-03-06 ENCOUNTER — ONCOLOGY VISIT (OUTPATIENT)
Dept: ONCOLOGY | Facility: CLINIC | Age: 55
End: 2020-03-06
Attending: INTERNAL MEDICINE
Payer: COMMERCIAL

## 2020-03-06 VITALS
DIASTOLIC BLOOD PRESSURE: 90 MMHG | SYSTOLIC BLOOD PRESSURE: 171 MMHG | HEART RATE: 55 BPM | HEIGHT: 64 IN | RESPIRATION RATE: 16 BRPM | BODY MASS INDEX: 29.3 KG/M2 | WEIGHT: 171.6 LBS | TEMPERATURE: 98.1 F | OXYGEN SATURATION: 98 %

## 2020-03-06 VITALS
HEIGHT: 64 IN | DIASTOLIC BLOOD PRESSURE: 66 MMHG | HEART RATE: 35 BPM | WEIGHT: 171 LBS | SYSTOLIC BLOOD PRESSURE: 148 MMHG | BODY MASS INDEX: 29.19 KG/M2

## 2020-03-06 DIAGNOSIS — N92.4 EXCESSIVE BLEEDING IN PREMENOPAUSAL PERIOD: Primary | ICD-10-CM

## 2020-03-06 DIAGNOSIS — Z17.0 MALIGNANT NEOPLASM OF UPPER-OUTER QUADRANT OF LEFT BREAST IN FEMALE, ESTROGEN RECEPTOR POSITIVE (H): ICD-10-CM

## 2020-03-06 DIAGNOSIS — Z17.0 MALIGNANT NEOPLASM OF UPPER-INNER QUADRANT OF RIGHT BREAST IN FEMALE, ESTROGEN RECEPTOR POSITIVE (H): Primary | ICD-10-CM

## 2020-03-06 DIAGNOSIS — C50.412 MALIGNANT NEOPLASM OF UPPER-OUTER QUADRANT OF LEFT BREAST IN FEMALE, ESTROGEN RECEPTOR POSITIVE (H): ICD-10-CM

## 2020-03-06 DIAGNOSIS — C50.211 MALIGNANT NEOPLASM OF UPPER-INNER QUADRANT OF RIGHT BREAST IN FEMALE, ESTROGEN RECEPTOR POSITIVE (H): Primary | ICD-10-CM

## 2020-03-06 PROCEDURE — 99215 OFFICE O/P EST HI 40 MIN: CPT | Mod: ZP | Performed by: INTERNAL MEDICINE

## 2020-03-06 PROCEDURE — G0463 HOSPITAL OUTPT CLINIC VISIT: HCPCS | Mod: ZF

## 2020-03-06 ASSESSMENT — ANXIETY QUESTIONNAIRES
3. WORRYING TOO MUCH ABOUT DIFFERENT THINGS: NOT AT ALL
IF YOU CHECKED OFF ANY PROBLEMS ON THIS QUESTIONNAIRE, HOW DIFFICULT HAVE THESE PROBLEMS MADE IT FOR YOU TO DO YOUR WORK, TAKE CARE OF THINGS AT HOME, OR GET ALONG WITH OTHER PEOPLE: NOT DIFFICULT AT ALL
1. FEELING NERVOUS, ANXIOUS, OR ON EDGE: NOT AT ALL
GAD7 TOTAL SCORE: 0
5. BEING SO RESTLESS THAT IT IS HARD TO SIT STILL: NOT AT ALL
2. NOT BEING ABLE TO STOP OR CONTROL WORRYING: NOT AT ALL
6. BECOMING EASILY ANNOYED OR IRRITABLE: NOT AT ALL
7. FEELING AFRAID AS IF SOMETHING AWFUL MIGHT HAPPEN: NOT AT ALL

## 2020-03-06 ASSESSMENT — PATIENT HEALTH QUESTIONNAIRE - PHQ9
5. POOR APPETITE OR OVEREATING: NOT AT ALL
SUM OF ALL RESPONSES TO PHQ QUESTIONS 1-9: 0

## 2020-03-06 ASSESSMENT — MIFFLIN-ST. JEOR
SCORE: 1370.65
SCORE: 1373.37

## 2020-03-06 ASSESSMENT — PAIN SCALES - GENERAL: PAINLEVEL: NO PAIN (0)

## 2020-03-06 NOTE — PROGRESS NOTES
Met with Leigh to introduce self and provide contact information .     Plan:     Seeing Dr Brunson to determine radiation need  Seeing GYN  Luis Fernandoa  RTC 6 weeks to start tamoxifen     Answered all questions to her stated satisfaction.   Encouraged her to call with additional questions or concerns.

## 2020-03-06 NOTE — LETTER
3/6/2020       RE: Leigh Espinal  [address] [place] [address]     Dear Colleague,    Thank you for referring your patient, Leigh Espinal, to the [facility] at [facility]. Please see a copy of my visit note below.    CC: control of menorrhagia  HPI: Ms Espinal is a 54 year old  referred from [facility].  She reports a history of heavy menses and anemia requiring Fe infusions before her Mirena IUD.  She was seen at [facility] on 2020.  Her Mirena IUD was removed  in light of her breast cancer diagnosis.  Approximately 2 days after removal she began bleeding quite heavily soaking through a super tampon and an overnight pad in less than 3 hours.  It did stop but then resumed.  She was diagnosed with breast cancer on 2019 which necessitated the removal of her IUD.  She is status post tubal ligation.  She had been using the Mirena IUD for 8 years.  In  she had a pelvic ultrasound which was normal and revealed a 1.4 cm uterine fibroid in her upper uterine segment.  At that visit cervical polyp was noted and removed.  Endometrial biopsy also performed.  Uterus sounded to 8 cm.  Pathology revealed fragmented benign endometrium focally displaying features of chronic endometritis.  Hgb from 2020 was 14.4 ultrasound on 2020 revealed a normal size uterus with 2 intramural fibroids in the anterior wall measuring 1.4 cm max.  Right ovary revealed a small hyperechoic focus measuring 4 mm thought to be calcification versus hemosiderin.  Left ovary normal.  Endometrial stripe was 3 mm.  Her menses since January are difficult to obtain from the patient: she isn't quite sure but reports menses that lasted 1 1/2 months after her IUD removal, then stopped for a few days, then started bleeding heavy again.  Her LMP was 6 days ago and stopped yesterday.    Initially there was  some confusion as to the reason for her consultation today.  The consult was from David.  However apparently her oncologist called this morning to talk to me but hung up before the connection could be made.    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 Provider to review     Menorrhagia        Past Surgical History:   Procedure Laterality Date     BIOPSY NODE SENTINEL Bilateral 1/29/2020    Procedure: BILATERAL Axillary [place] Lymph Node Biopsy;  Surgeon: Paz Smith MD;  Location: [facility]     COLONOSCOPY  6/21/2012    Procedure: COLONOSCOPY;;  Surgeon: Radha Hector MD;  Location: [facility] GI     INSERT TISSUE EXPANDER BREAST BILATERAL Bilateral 2/14/2020    Procedure: bilateral breast expander removal and washout;  Surgeon: CEE Chatterjee MD;  Location: [facility] OR     LAPAROSCOPIC NISSEN FUNDOPLICATION  2007     MASTECTOMY SIMPLE Bilateral 1/29/2020    Procedure: BILATERAL Skin-Sparing Mastectomy;  Surgeon: Paz Smith MD;  Location: [facility] OR     RECONSTRUCT BREAST Bilateral 1/29/2020    Procedure: Bilateral breast reconstruction with expanders and SPY;  Surgeon: CEE Chatterjee MD;  Location: [facility] OR     TUBAL LIGATION  1990       Family History   Problem Relation Age of Onset     C.A.D. Maternal Grandfather      Hypertension Mother      Hypertension Father        Allergies: Sulfa drugs    Current Outpatient Medications   Medication Sig Dispense Refill     atenolol (TENORMIN) 50 MG tablet Take 1  "tablet by mouth daily       LISINOPRIL PO Take 20 mg by mouth daily       ondansetron (ZOFRAN) 4 MG tablet Take 1 tablet (4 mg) by mouth every 8 hours as needed for nausea 10 tablet 0     pantoprazole (PROTONIX) 20 MG EC tablet daily as needed   3     SERTRALINE HCL PO Take 25 mg by mouth daily        TRAZodone (DESYREL) 25 MG TABS Take 50 mg by mouth At Bedtime        amoxicillin (AMOXIL) 500 MG tablet Take 4 pills (2000 mg) 30-60 minutes before dental procedures, including teeth cleaning. (Patient not taking: Reported on 2/13/2020) 4 tablet 3       ROS:  Nose bleeds, problems with teeth/gums, visual difficulty        EXAM:  Blood pressure (!) 148/66, pulse (!) 35, height 1.626 m (5' 4\"), weight 77.6 kg (171 lb), last menstrual period 03/01/2020, not currently breastfeeding.   BMI= Body mass index is 29.35 kg/m .  General - pleasant female in no acute distress.    ASSESSMENT/PLAN:  Menorrhagia s/p Mirena IUD removal with recent Breast cancer diagnosis:  Pt was very happy with Mirena and would prefer this option for control of menses if possible.  She will be starting her tamoxifen soon.  Per Up to Date:   The World Health Organization guidelines for medical eligibility for contraceptive use have recommended avoiding hormonal contraception in women with a current or past history of breast cancer (particularly in those with hormone receptor-positive disease)  Although this is our typical practice, we do make exceptions to this approach. Specifically, although we first suggest use of a nonhormonal contraceptive method (condom, diaphragm, copper intrauterine device [IUD]), some patients may reasonably opt for a low-dose levonorgestrel-releasing contraception device (LNG-IUD), either for contraception or endometrial protection on tamoxifen. Patients with a low risk of recurrence, double mastectomy, or remote history of cancer may be appropriate candidates for this option, depending on their values and preferences. If a " patient is interested in an LNG-IUD, we discuss that the systemic absorption of hormone is likely to be low, although the safety and efficacy of such an approach has not been well validated.  I will discuss with Dr. Swann and see if this is a possibility for her.  Otherwise a second good option would be Global Endometrial Ablation.  The nature of this procedure was discussed in detail.  She was informed that this would be done as an outpatient and would be discharged the same day.  She was told that the hope would be that her bleeding would be quite diminished and there is a chance that she will reach the point of no menses at all.  She was informed of the risks of the procedure including bleeding, infection, perforating her uterus, and post ablative tubal sterilization complication where blood could accumulate in the endometrial cavity which could be trapped from vaginal outflow.    At this point I would like to discuss with her oncologist and see if Mirena IUD is at all an option.  Otherwise we will proceed with scheduling the ablation.      I spent a total of 30 minutes face to face with Leigh  during today's office visit. Over 50% of this time was spent counseling the patient and/or coordinating care regarding how to correct her heavy bleeding.    Maci Clark MD

## 2020-03-06 NOTE — PROGRESS NOTES
Oncology Follow-up Visit:  March 6, 2020    Diagnosis: locally advanced breast cancer    History Of Present Illness:  Ms. Espinal is a 54 year old female is here for follow-up of locally advanced breast cancer.    Ms. Espinal is a 54 year old female with bilateral breast cancers. Other past medical history is notable for HHT (follows with Dr. Real), pulmonary and liver AVMs, GERD and HTN. Bilateral screening mammograms on 10/23/19 showed architectural distortion and developing focal asymmetry in the right breast, and possible developing asymmetry in the left breast. Diagnostic mammograms showed a 2 x 1.4 cm mass at 1:00 right breast and a 9 mm mass at 2:00 left breast. An additional 1.4 cm satellite mass in the left breast (total diameter of both left breast masses, 2.1 cm) was noted on contrast mammogram on 11/21/19.  Biopsy of the right breast at 1:00 revealed invasive ductal carcinoma, grade 1, ER+ (98%), VT+ (99%), HER2 negative. Biopsy of the left breast 2:00 revealed invasive lobular carcinoma, grade 2, ER+ (95%), VT+ (75%), and HER2 negative.  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      She underwent bilateral nipple sparing mastectomies, tissue expander placement, and bilateral axillary sentinel lymph node biopsies on 1/29/20 under the care of Dr. Smith. Final pathology revealed grade 2 invasive mammary carcinoma in the right breast measuring 2.1 cm, a smaller focus of invasive mammary carcinoma (mixed ductal and cribriform) measuring 3.6 mm, admixed DCIS, and negative margins. The left breast showed grade 2 invasive lobular carcinoma, measuring 1.6 cm, DCIS and LCIS, and negative margins. The right axillary sentinel node was benign; the left axillary sentinel node showed micrometastatic carcinoma measuring 1.5 mm without extranodal extension.     OncotypeDx was ordered for right and left breast cancers.  Oncotype dx recurrence score of the  "left breast cancer was 10. Oncotype on the right breast cancer was 18.      Her surgery was complicated by skin necrosis requiring additional surgery.    She met Dr Shepherd in oncology; she is coming to see me for a second opinion.    She is feeling well.  She feels like she is recovering from her surgeries.  She notices some irritation under her left axilla.  No f/c.  No chest pain or shortness of breath.  No n/v/d/c.      Review Of Systems:   ROS: 10 point ROS neg other than the symptoms noted above in the HPI.      Past medical, social, surgical, and family histories reviewed.  Patient underwent menarche at 10 yo.  She has had 2 pregnancies and has 2 children.  First full term pregnancy at 24 yo.  She  both of her children.  She had a mirena IUD in place until shortly after breast cancer diagnosis.  She had menstrual bleeding for about 6 weeks after the IUD was removed.    Allergies:  Allergies as of 03/06/2020 - Reviewed 03/06/2020   Allergen Reaction Noted     Sulfa drugs  10/18/2011       Current Medications:  Current Outpatient Medications   Medication Sig Dispense Refill     atenolol (TENORMIN) 50 MG tablet Take 1 tablet by mouth daily       LISINOPRIL PO Take 20 mg by mouth daily       ondansetron (ZOFRAN) 4 MG tablet Take 1 tablet (4 mg) by mouth every 8 hours as needed for nausea 10 tablet 0     pantoprazole (PROTONIX) 20 MG EC tablet daily as needed   3     SERTRALINE HCL PO Take 25 mg by mouth daily        TRAZodone (DESYREL) 25 MG TABS Take 50 mg by mouth At Bedtime        amoxicillin (AMOXIL) 500 MG tablet Take 4 pills (2000 mg) 30-60 minutes before dental procedures, including teeth cleaning. (Patient not taking: Reported on 2/13/2020) 4 tablet 3        Physical Exam:  Ht 1.626 m (5' 4\")   Wt 77.8 kg (171 lb 9.6 oz)   BMI 29.46 kg/m      GENERAL APPEARANCE: healthy, alert and no distress     HENT: Mouth without ulcers or lesions     NECK: no adenopathy, no asymmetry or masses     " LYMPHATICS: No cervical, supraclavicular, axillary or inguinal lymphadenopathy     RESP: lungs clear to auscultation - no rales, rhonchi or wheezes     CARDIOVASCULAR: regular rates and rhythm      CHEST: s/p bilateral mastectomies, incisions are c/d/i     ABDOMEN:  soft, nontender, no HSM or masses and bowel sounds normal     MUSCULOSKELETAL: extremities normal- no gross deformities noted, no evidence of inflammation in joints, FROM in all extremities. No edema b/l LE.     SKIN: no suspicious lesions or rashes     PSYCHIATRIC: mentation appears normal and affect normal    Laboratory/Imaging Studies  No visits with results within 2 Week(s) from this visit.   Latest known visit with results is:   Results Only on 01/29/2020   Component Date Value Ref Range Status     Lab Scanned Result 01/29/2020 ONCOTYPE DX BREAST-Scanned   Final      10/23/19 Bilateral screening mammogram:  - Architectural distortion and developing focal asymmetry in the right breast at 12 - 1:00.    - Possible developing asymmetry left breast at 3:00.     11/11/2019 Bilateral diagnostic mammograms and ultrasound:  - In the right breast at 1:00, 5 cm from the nipple is a 2 cm mass  - In the left breast at 2:00, 6 cm from the nipple is a 0.9 cm mass  - No enlarged lymph nodes in either axilla.     11/21/2019 Left breast biopsy 2:00, 6 cm from the nipple:  - Grade 2 invasive lobular carcinoma  - LCIS  - ER moderate in 95%  - IL moderate in 75%  - HER2 negative by FISH     Right breast biopsy 1:00, 5 cm from the nipple:  - Grade 1 invasive mammary carcinoma  - DCIS  - ER strong in 98%  - IL strong in 99%  - HER2 negative by FISH     12/19/19 Breast Actionable Panel:  Positive for pathogenic mutation in CHEK2, [id number]     1/29/2020 Bilateral mastectomies and SLN procedures:  1.  Left breast mastectomy:  - Grade 2 invasive lobular carcinoma measuring 1.6 cm  - intermediate grade DCIS  - LCIS  - surgical margins are negative  - lymphovascular invasion  is present  - ADH  - ALH  - invasive carcinoma is HER2 negative  - Oncotype dx recurrence score = 10     2.  Left axillary sentinel lymph node:  - Micrometastatic carcinoma in one lymph node, measures 1.5 mm  - No extranodal extension     3.  Right breast mastectomy:  - Grade 2 invasive mammary carcinoma measuring 2.1 cm  - smaller focus of invasive mammary carcinoma superior to the dominant focus measures 3.6 mm  - intermediate grade DCIS  - focus suspicious for lymphovascular invasion  - surgical margins are negative  - ADH  - invasive carcinoma is HER2 negative.     4.  Right axillary sentinel lymph node:  - one benign lymph node     2/14/2020 Skin right and left mastectomy sites:  - negative for malignancy.      ASSESSMENT/PLAN:  53 y/o female with bilateral breast cancer as outlined below:  1. Left breast cancer [id number] ER + MA + her2 negative, oncotype 10 s/p mastectomy and SNLB  2. Right breast cancer T2(2)N0 ER + MA + her2 negative, oncotype 18 s/p mastectomy and SNLB  3. CHEK 2 mutation  4. HHT    1.  I reviewed with Leigh today that she has had curative-intent therapy with a mastectomy and sentinel lymph node biopsy.  I reviewed with her with a low Oncotype, I would recommend no chemotherapy.      We reviewed that she needs no additional surgical resection from a cancer perspective.      I would like her to see Radiation Oncology to discuss the micrometastases involving her left lymph node.  She is scheduled to see Dr. Brunson on Monday.   2.  We reviewed with the low Oncotype, she does not need chemotherapy.   3.  I reviewed with her that with an estrogen- and progesterone-positive tumor, I would recommend the use of adjuvant endocrine therapy.  She is currently perimenopausal and is having active menstrual periods.  We discussed that typically in these situations, I recommend the use of tamoxifen with a transition over to an aromatase inhibitor once she is menopausal.  The other option would be to put  her into menopause with Zoladex or an oophorectomy followed by the use of an aromatase inhibitor.  We reviewed that the use of adjuvant endocrine therapy will reduce her risk of recurrence by approximately 30%-40%.  She is meeting with OB/GYN later on this morning to discuss other options for her menstrual periods and her menorrhagia.  I will touch base with her GYN, and following this discussion, we will make a plan about the initiation of tamoxifen versus an aromatase inhibitor with endocrine suppression.      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  She is scheduled to see Genetics next month.  She has had a colonoscopy in 2012, which was normal.   4.  She sees Dr. Kacie Real for HHT.   5.  I reviewed with Leigh today that I also recommend lifestyle management to help prevent breast cancer recurrence and her overall health.  This includes regular exercise 150 minutes per week as well as a diet high in fruits and vegetables.  We will continue to discuss this with future visits.      We will have the patient return in approximately 6 weeks with an JACINTO to discuss her endocrine therapy.  I will see her back in 3 months.

## 2020-03-06 NOTE — LETTER
3/6/2020       RE: Leigh Espinal  [address] [place] [address]     Dear Colleague,    Thank you for referring your patient, Leigh Espinal, to the [facility]. Please see a copy of my visit note below.    Oncology Follow-up Visit:  March 6, 2020    Diagnosis: locally advanced breast cancer    History Of Present Illness:  Ms. Espinal is a 54 year old female is here for follow-up of locally advanced breast cancer.    Ms. Espinal is a 54 year old female with bilateral breast cancers. Other past medical history is notable for HHT (follows with Dr. Real), pulmonary and liver AVMs, GERD and HTN. Bilateral screening mammograms on 10/23/19 showed architectural distortion and developing focal asymmetry in the right breast, and possible developing asymmetry in the left breast. Diagnostic mammograms showed a 2 x 1.4 cm mass at 1:00 right breast and a 9 mm mass at 2:00 left breast. An additional 1.4 cm satellite mass in the left breast (total diameter of both left breast masses, 2.1 cm) was noted on contrast mammogram on 11/21/19.  Biopsy of the right breast at 1:00 revealed invasive ductal carcinoma, grade 1, ER+ (98%), [place]+ (99%), HER2 negative. Biopsy of the left breast 2:00 revealed invasive lobular carcinoma, grade 2, ER+ (95%), [place]+ (75%), and HER2 negative.  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      She underwent bilateral nipple sparing mastectomies, tissue expander placement, and bilateral axillary sentinel lymph node biopsies on 1/29/20 under the care of Dr. Smith. Final pathology revealed grade 2 invasive mammary carcinoma in the right breast measuring 2.1 cm, a smaller focus of invasive mammary carcinoma (mixed ductal and cribriform) measuring 3.6 mm, admixed DCIS, and negative margins. The left breast showed grade 2 invasive lobular carcinoma, measuring 1.6 cm, DCIS and LCIS, and negative margins. The  right axillary sentinel node was benign; the left axillary sentinel node showed micrometastatic carcinoma measuring 1.5 mm without extranodal extension.     OncotypeDx was ordered for right and left breast cancers.  Oncotype dx recurrence score of the left breast cancer was 10. Oncotype on the right breast cancer was 18.      Her surgery was complicated by skin necrosis requiring additional surgery.    She met Dr Shepherd in oncology; she is coming to see me for a second opinion.    She is feeling well.  She feels like she is recovering from her surgeries.  She notices some irritation under her left axilla.  No f/c.  No chest pain or shortness of breath.  No n/v/d/c.      Review Of Systems:   ROS: 10 point ROS neg other than the symptoms noted above in the HPI.      Past medical, social, surgical, and family histories reviewed.  Patient underwent menarche at 10 yo.  She has had 2 pregnancies and has 2 children.  First full term pregnancy at 26 yo.  She  both of her children.  She had a mirena IUD in place until shortly after breast cancer diagnosis.  She had menstrual bleeding for about 6 weeks after the IUD was removed.    Allergies:  Allergies as of 03/06/2020 - Reviewed 03/06/2020   Allergen Reaction Noted     Sulfa drugs  10/18/2011       Current Medications:  Current Outpatient Medications   Medication Sig Dispense Refill     atenolol (TENORMIN) 50 MG tablet Take 1 tablet by mouth daily       LISINOPRIL PO Take 20 mg by mouth daily       ondansetron (ZOFRAN) 4 MG tablet Take 1 tablet (4 mg) by mouth every 8 hours as needed for nausea 10 tablet 0     pantoprazole (PROTONIX) 20 MG EC tablet daily as needed   3     SERTRALINE HCL PO Take 25 mg by mouth daily        TRAZodone (DESYREL) 25 MG TABS Take 50 mg by mouth At Bedtime        amoxicillin (AMOXIL) 500 MG tablet Take 4 pills (2000 mg) 30-60 minutes before dental procedures, including teeth cleaning. (Patient not taking: Reported on 2/13/2020) 4 tablet  "3        Physical Exam:  Ht 1.626 m (5' 4\")   Wt 77.8 kg (171 lb 9.6 oz)   BMI 29.46 kg/m      GENERAL APPEARANCE: healthy, alert and no distress     HENT: Mouth without ulcers or lesions     NECK: no adenopathy, no asymmetry or masses     LYMPHATICS: No cervical, supraclavicular, axillary or inguinal lymphadenopathy     RESP: lungs clear to auscultation - no rales, rhonchi or wheezes     CARDIOVASCULAR: regular rates and rhythm      CHEST: s/p bilateral mastectomies, incisions are c/d/i     ABDOMEN:  soft, nontender, no HSM or masses and bowel sounds normal     MUSCULOSKELETAL: extremities normal- no gross deformities noted, no evidence of inflammation in joints, FROM in all extremities. No edema b/l LE.     SKIN: no suspicious lesions or rashes     PSYCHIATRIC: mentation appears normal and affect normal    Laboratory/Imaging Studies  No visits with results within 2 Week(s) from this visit.   Latest known visit with results is:   Results Only on 01/29/2020   Component Date Value Ref Range Status     Lab Scanned Result 01/29/2020 ONCOTYPE DX BREAST-Scanned   Final      10/23/19 Bilateral screening mammogram:  - Architectural distortion and developing focal asymmetry in the right breast at 12 - 1:00.    - Possible developing asymmetry left breast at 3:00.     11/11/2019 Bilateral diagnostic mammograms and ultrasound:  - In the right breast at 1:00, 5 cm from the nipple is a 2 cm mass  - In the left breast at 2:00, 6 cm from the nipple is a 0.9 cm mass  - No enlarged lymph nodes in either axilla.     11/21/2019 Left breast biopsy 2:00, 6 cm from the nipple:  - Grade 2 invasive lobular carcinoma  - LCIS  - ER moderate in 95%  - FL moderate in 75%  - HER2 negative by FISH     Right breast biopsy 1:00, 5 cm from the nipple:  - Grade 1 invasive mammary carcinoma  - DCIS  - ER strong in 98%  - FL strong in 99%  - HER2 negative by FISH     12/19/19 Breast Actionable Panel:  Positive for pathogenic mutation in CHEK2, " [id number]     1/29/2020 Bilateral mastectomies and SLN procedures:  1.  Left breast mastectomy:  - Grade 2 invasive lobular carcinoma measuring 1.6 cm  - intermediate grade DCIS  - LCIS  - surgical margins are negative  - lymphovascular invasion is present  - ADH  - ALH  - invasive carcinoma is HER2 negative  - Oncotype dx recurrence score = 10     2.  Left axillary sentinel lymph node:  - Micrometastatic carcinoma in one lymph node, measures 1.5 mm  - No extranodal extension     3.  Right breast mastectomy:  - Grade 2 invasive mammary carcinoma measuring 2.1 cm  - smaller focus of invasive mammary carcinoma superior to the dominant focus measures 3.6 mm  - intermediate grade DCIS  - focus suspicious for lymphovascular invasion  - surgical margins are negative  - ADH  - invasive carcinoma is HER2 negative.     4.  Right axillary sentinel lymph node:  - one benign lymph node     2/14/2020 Skin right and left mastectomy sites:  - negative for malignancy.      ASSESSMENT/PLAN:  53 y/o female with bilateral breast cancer as outlined below:  1. Left breast cancer [id number] ER + KY + her2 negative, oncotype 10 s/p mastectomy and SNLB  2. Right breast cancer T2(2)N0 ER + [place] + her2 negative, oncotype 18 s/p mastectomy and SNLB  3. CHEK 2 mutation  4. HHT    1.  I reviewed with Leigh today that she has had curative-intent therapy with a mastectomy and sentinel lymph node biopsy.  I reviewed with her with a low Oncotype, I would recommend no chemotherapy.      We reviewed that she needs no additional surgical resection from a cancer perspective.      I would like her to see Radiation Oncology to discuss the micrometastases involving her left lymph node.  She is scheduled to see Dr. Brunson on Monday.   2.  We reviewed with the low Oncotype, she does not need chemotherapy.   3.  I reviewed with her that with an estrogen- and progesterone-positive tumor, I would recommend the use of adjuvant endocrine therapy.  She is currently  perimenopausal and is having active menstrual periods.  We discussed that typically in these situations, I recommend the use of tamoxifen with a transition over to an aromatase inhibitor once she is menopausal.  The other option would be to put her into menopause with Zoladex or an oophorectomy followed by the use of an aromatase inhibitor.  We reviewed that the use of adjuvant endocrine therapy will reduce her risk of recurrence by approximately 30%-40%.  She is meeting with OB/GYN later on this morning to discuss other options for her menstrual periods and her menorrhagia.  I will touch base with her GYN, and following this discussion, we will make a plan about the initiation of tamoxifen versus an aromatase inhibitor with endocrine suppression.      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  She is scheduled to see Genetics next month.  She has had a colonoscopy in 2012, which was normal.   4.  She sees Dr. Kacie Real for HHT.   5.  I reviewed with Leigh today that I also recommend lifestyle management to help prevent breast cancer recurrence and her overall health.  This includes regular exercise 150 minutes per week as well as a diet high in fruits and vegetables.  We will continue to discuss this with future visits.      We will have the patient return in approximately 6 weeks with an JACINTO to discuss her endocrine therapy.  I will see her back in 3 months.         Again, thank you for allowing me to participate in the care of your patient.      Sincerely,    Katarzyna Swann MD

## 2020-03-06 NOTE — NURSING NOTE
Chief Complaint   Patient presents with     Abnormal Uterine Bleeding     Menorrhagia with irregular cycles       See SALONI Vogel 3/6/2020

## 2020-03-06 NOTE — NURSING NOTE
"Oncology Rooming Note    March 6, 2020 7:53 AM   Leigh Espinal is a 54 year old female who presents for:    Chief Complaint   Patient presents with     Oncology Clinic Visit     RETURN VISIT; BREAST CANCER; VITALS TAKEN      Initial Vitals: BP (!) 171/90   Pulse 55   Temp 98.1  F (36.7  C) (Oral)   Resp 16   Ht 1.626 m (5' 4\")   Wt 77.8 kg (171 lb 9.6 oz)   SpO2 98%   Breastfeeding No   BMI 29.46 kg/m   Estimated body mass index is 29.46 kg/m  as calculated from the following:    Height as of this encounter: 1.626 m (5' 4\").    Weight as of this encounter: 77.8 kg (171 lb 9.6 oz). Body surface area is 1.87 meters squared.  No Pain (0) Comment: Data Unavailable   No LMP recorded.  Allergies reviewed: Yes  Medications reviewed: Yes    Medications: Medication refills not needed today.  Pharmacy name entered into EPIC:    [facility] [place] - [id number] [facility] - [place], [place] - [address]  [facility] - [place], [place] - [address]    Clinical concerns: No new concerns today  Dr Swann was notified.      Debbie Ureña              "

## 2020-03-06 NOTE — PROGRESS NOTES
CC: control of menorrhagia  HPI: Ms Espinal is a 54 year old  referred from [facility].  She reports a history of heavy menses and anemia requiring Fe infusions before her Mirena IUD.  She was seen at [facility] on 2020.  Her Mirena IUD was removed  in light of her breast cancer diagnosis.  Approximately 2 days after removal she began bleeding quite heavily soaking through a super tampon and an overnight pad in less than 3 hours.  It did stop but then resumed.  She was diagnosed with breast cancer on 2019 which necessitated the removal of her IUD.  She is status post tubal ligation.  She had been using the Mirena IUD for 8 years.  In  she had a pelvic ultrasound which was normal and revealed a 1.4 cm uterine fibroid in her upper uterine segment.  At that visit cervical polyp was noted and removed.  Endometrial biopsy also performed.  Uterus sounded to 8 cm.  Pathology revealed fragmented benign endometrium focally displaying features of chronic endometritis.  Hgb from 2020 was 14.4 ultrasound on 2020 revealed a normal size uterus with 2 intramural fibroids in the anterior wall measuring 1.4 cm max.  Right ovary revealed a small hyperechoic focus measuring 4 mm thought to be calcification versus hemosiderin.  Left ovary normal.  Endometrial stripe was 3 mm.  Her menses since January are difficult to obtain from the patient: she isn't quite sure but reports menses that lasted 1 1/2 months after her IUD removal, then stopped for a few days, then started bleeding heavy again.  Her LMP was 6 days ago and stopped yesterday.    Initially there was some confusion as to the reason for her consultation today.  The consult was from [facility].  However apparently her oncologist called this morning to talk to me but hung up before the connection could be made.    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 Provider to review     Menorrhagia        Past Surgical History:   Procedure Laterality Date     BIOPSY NODE SENTINEL Bilateral 1/29/2020    Procedure: BILATERAL Axillary [place] Lymph Node Biopsy;  Surgeon: Paz Smith MD;  Location: [facility]     COLONOSCOPY  6/21/2012    Procedure: COLONOSCOPY;;  Surgeon: Radha Hector MD;  Location: [facility] GI     INSERT TISSUE EXPANDER BREAST BILATERAL Bilateral 2/14/2020    Procedure: bilateral breast expander removal and washout;  Surgeon: CEE Chatterjee MD;  Location: [facility] OR     LAPAROSCOPIC NISSEN FUNDOPLICATION  2007     MASTECTOMY SIMPLE Bilateral 1/29/2020    Procedure: BILATERAL Skin-Sparing Mastectomy;  Surgeon: Paz Smith MD;  Location: [facility] OR     RECONSTRUCT BREAST Bilateral 1/29/2020    Procedure: Bilateral breast reconstruction with expanders and SPY;  Surgeon: CEE Chatterjee MD;  Location: [facility] OR     TUBAL LIGATION  1990       Family History   Problem Relation Age of Onset     C.A.D. Maternal Grandfather      Hypertension Mother      Hypertension Father        Allergies: Sulfa drugs    Current Outpatient Medications   Medication Sig Dispense Refill     atenolol (TENORMIN) 50 MG tablet Take 1 tablet by mouth daily       LISINOPRIL PO Take 20 mg by mouth daily       ondansetron (ZOFRAN) 4 MG tablet Take 1 tablet (4 mg) by mouth every 8 hours as needed for nausea 10 tablet 0     pantoprazole (PROTONIX) 20 MG EC tablet daily as needed   3     SERTRALINE HCL PO Take 25 mg by mouth daily         "TRAZodone (DESYREL) 25 MG TABS Take 50 mg by mouth At Bedtime        amoxicillin (AMOXIL) 500 MG tablet Take 4 pills (2000 mg) 30-60 minutes before dental procedures, including teeth cleaning. (Patient not taking: Reported on 2/13/2020) 4 tablet 3       ROS:  Nose bleeds, problems with teeth/gums, visual difficulty        EXAM:  Blood pressure (!) 148/66, pulse (!) 35, height 1.626 m (5' 4\"), weight 77.6 kg (171 lb), last menstrual period 03/01/2020, not currently breastfeeding.   BMI= Body mass index is 29.35 kg/m .  General - pleasant female in no acute distress.    ASSESSMENT/PLAN:  Menorrhagia s/p Mirena IUD removal with recent Breast cancer diagnosis:  Pt was very happy with Mirena and would prefer this option for control of menses if possible.  She will be starting her tamoxifen soon.  Per Up to Date:   The World Health Organization guidelines for medical eligibility for contraceptive use have recommended avoiding hormonal contraception in women with a current or past history of breast cancer (particularly in those with hormone receptor-positive disease)  Although this is our typical practice, we do make exceptions to this approach. Specifically, although we first suggest use of a nonhormonal contraceptive method (condom, diaphragm, copper intrauterine device [IUD]), some patients may reasonably opt for a low-dose levonorgestrel-releasing contraception device (LNG-IUD), either for contraception or endometrial protection on tamoxifen. Patients with a low risk of recurrence, double mastectomy, or remote history of cancer may be appropriate candidates for this option, depending on their values and preferences. If a patient is interested in an LNG-IUD, we discuss that the systemic absorption of hormone is likely to be low, although the safety and efficacy of such an approach has not been well validated.  I will discuss with Dr. Swann and see if this is a possibility for her.  Otherwise a second good option would " be Global Endometrial Ablation.  The nature of this procedure was discussed in detail.  She was informed that this would be done as an outpatient and would be discharged the same day.  She was told that the hope would be that her bleeding would be quite diminished and there is a chance that she will reach the point of no menses at all.  She was informed of the risks of the procedure including bleeding, infection, perforating her uterus, and post ablative tubal sterilization complication where blood could accumulate in the endometrial cavity which could be trapped from vaginal outflow.    At this point I would like to discuss with her oncologist and see if Mirena IUD is at all an option.  Otherwise we will proceed with scheduling the ablation.      I spent a total of 30 minutes face to face with Leigh  during today's office visit. Over 50% of this time was spent counseling the patient and/or coordinating care regarding how to correct her heavy bleeding.

## 2020-03-07 ASSESSMENT — ANXIETY QUESTIONNAIRES: GAD7 TOTAL SCORE: 0

## 2020-03-08 NOTE — PROGRESS NOTES
Department of Radiation Oncology  [address] [address]  [phone]       Consultation Note    Name: Leigh Espinal MRN: [id number]   : 1965   Date of Service: Mar 9, 2020 Referring: Dr. Shepherd     Reason for consultation: Consideration of adjuvant postmastectomy radiation therapy for management of bilateral breast cancer  1. Left Breast: pT1c(m) N1(mi) invasive lobular carcinoma ER/[place] +, HER-2 negative  2. Right Breast: pT2 N0(sn) invasive mixed ductal, papillary and cribriform carcinoma, ER/[place] +, HER-2 negative    History of Present Illness   Ms. Espinal is a 54 year old perimenopausal female with past medical history of HHT with pulmonary and liver AVMs, GERD and hypertension who was recently diagnosed with synchronous bilateral breast cancers status post bilateral skin sparing mastectomies. She was referred to us for consideration of adjuvant radiotherapy.    She initially underwent routine screening mammogram on 10/23/2019 which demonstrated possible architectural distortion and developing focal asymmetry in the right breast at 12 to 1:00, middle depth, and possible developing asymmetry in the left breast at 3:00, middle depth. Diagnostic mammogram on 2019 conformed these findings. On the ultrasound, the right breast mass was at 1:00, 5 cm from the nipple, measuring 2 x 1.4 cm; and the left breast mass was at 2:00, 6 cm from the nipple, measuring 0.9x 0.8 x0.8 cm.  There was no evidence of enlarged lymph nodes in both axillae (BI-RADS category 5).    A contrast mammogram was obtained on 2019 and redomonstrated the two suspicious lesions ( 2.0 cm in right and 1.4 cm in left) as well as an additional satellite mass anterior to the main mass with total area of 2.1 cm in the left breast.     Bilateral ultrasound guided biopsy was performed on the same day. Right breast biopsy ([id number]) revealed invasive ductal  carcinoma, Jesús grade 1, with associated ductal carcinoma in situ (DCIS), nuclear grade 2, cribriform type.  Invasive carcinoma was ER+ (98%), [place]+ (99%), HER2 non- amplified. Biopsy of the left breast revealed invasive lobular carcinoma, Jesús grade 2, with associated lobular carcinoma in situ, classical type, ER+ (95%), [place]+ (75%), and HER2 non- amplified.       She was seen by Dr. Smith on 12/5/2019 where her exam was notable for 2 cm mass in the upper inner quadrant of the right breast.  No evidence of left breast lesions or axillary lymphadenopathy. Different surgical options were discussed and patient initially elected to proceed with BCS. 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 She was interested in immediate reconstruction and met with Dr. Chatterjee.      Ms. Espinal underwent bilateral skin sparing mastectomies, and bilateral axillary sentinel lymph node biopsies and tissue expander placement on 1/29/2020 under the care of Tc Smith and Sen. Surgical pathology ([id number]) revealed   Right Breast: invasive carcinoma of the mixed mixed ductal, papillary and cribriform types.  Jones grade 2.  Tumor size was 2.1x 1.6 x 1.2 cm in the upper inner quadrant at 1:00.  There was another smaller focus measuring 3.6 mm, superior to the dominant focus.  Associated DCIS was present in both upper inner and upper outer quadrant, measuring 1.5 cm, nuclear grade 2, involving 9 out of 27 blocks, cribriform type with lobular involvement and focal necrosis. DCIS was admixed with and adjacent to the dominant focus of invasive carcinoma in the upper inner quadrant. There was indeterminate lymphovascular invasion. Surgical margins were uninvolved by invasive carcinoma and DCIS with the closest margin (anterior superior) at 0.2 mm for invasive carcinoma and 6 mm for DCIS. One  sentinel node showed no evidence of metastatic carcinoma. Pathologic stage T2(m)N0(sn).    Left Breast:  invasive lobular carcinoma at 3:00 position.  Tumor size was 1.6 x 1.2 x 1.2 cm, Lynn grade 2.  Assassociated ductal carcinoma in situ measured 5 mm, nuclear grade 2, micropapillary and cribriform type, involving 1 of 20 blocks.  Lobular carcinoma in situ, classical type was also found. There was evidence of  lymphovascular invasion. Surgical margins were uninvolved by invasive carcinoma or DCIS with the closest margin (anterior inferior) at 1.2 cm for invasive carcinoma and 1 cm for DCIS. Micrometastatic carcinoma was evident in the sentinel lymph node biopsy, measuring 1.5 mm, without extranodal extension. Pathologic stage [id number](sn).     Post-op, Ms. Espinal developed skin flap ischemic changes that ultimately led to removal of her expander by Dr. Chatterjee on 2/14/2020. Skin excision ( [id number]) was negative for malignancy.      She met James Shepherd and Shree in Medical Oncology. Based on Oncotype Dx score of the left breast cancer being 10 and Oncotype Dx score on the right breast cancer being 18, the consensus was no role of chemotherapy.  She will proceed with endocrine therapy     She is referred to us for discussion of radiotherapy.  On interview today, she stated that she recovered well from her surgeries. Her Mirena IUD was removed on 12/6/2019. She reported having menses after that. She also endorsed on and off episodes of heavy bleeding. She will resume her full-time work from next week. She otherwise denied fever, chills, headaches, nausea, vomiting, axillary swelling, urinary or bowel symptoms, weight or appetite changes. Her ECOG is 0.    Past Medical History:  Hypertension  pulmonary and liver AV malformation  hereditary hemorrhagic telangiectasias.   Anxiety and depression  Iron deficiency anemia    Past Surgical History:  Nissen fundoplication x2   S/p  Tubal ligation  S/p  colonoscopy    OBGYN History:    Age at 1st pregnancy: 25  Age at menarche: 11  Breastfeeding history: Yes  She had a mirena IUD in place until shortly after breast cancer diagnosis.  She is perimenopausal    Chemotherapy History:  None    Radiation History:  None    Pregnant: No  Implanted Cardiac Devices: No    Medications:  Atenolol  Lisinopril  Sincerely  Trazodone    Allergies:  Sulfa    Social History:  Tobacco: former smoker, quit in   Alcohol: Yes - 2 drinks per day  Employment:  at the [employer] at [employer]    Family History:  NA (patient is adopted)     Review of Systems   A 10-point review of systems was performed. Pertinent findings are noted in the HPI.    Physical Exam   ECOG Status: 0    Vitals:  BP (!) 176/59   Pulse 54   Wt 79.6 kg (175 lb 6.4 oz)   LMP 2020 (Approximate)   BMI 30.11 kg/m      Gen: Alert, in NAD  BREAST: Bilateral dry clean mastectomy scars. Small erythematous area noted over the most lateral end of the left mastectomy scar with no tenderness on palpation. The area was marked with a marker pen.   Neck: No palpable lymphadenopathy  Axilla: No axillary lymphadenopathy  Arms no swelling. Full ROM    Imaging/Path/Labs   Imaging: Reviewed    Path: Reviewed     Assessment    Ms. 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 She has recovered well from surgery. Her ECOG is 0.     Plan   We discussed at length with the patient and her  the indications of postmastectomy radiotherapy (PMRT) which typically are recommended for large tumors and/or node positive disease.      On the right side, she has a 2.1 cm tumor, grade 2, ER+/SD+/Her2-.  One sentinel lymph node was negative.  She has a  focus suspicious for LVSI, but this was deemed indeterminate.  She also has close margin of 0.2 mm for invasive disease, which I suspect is close to the skin flap.  She otherwise does not have any high risk features.  Thus we do not recommend radiation therapy on the right side.    On the left side, despite tumor being somewhat smaller, she did have evidence of LVSI.  Most importantly, she had 1/1 positive SLN.  The size of the metastasis was small at 1.5 mm.  However, a dissection was not done, thus it is not clear if she has additional alex disease. These factors, coupled with her premenopausal status, do put her at an increased risk of locoregional recurrence, and we would therefore recommend a course of postmastectomy radiotherapy.  I do not think she should go back for dissection, as this would give her a higher risk of upper extremity lymphedema.  Given the low alex disease burden, radiation therapy should be able to sterilize subclinical disease.  Our treatment field will include left chest wall and low axilla (level I and II).      We discussed the rationale, logistics and side effects associated with the radiation. Ms. Espinal will proceed with reconstruction after radiation therapy, thus we will administer radiation using conventional radiation therapy as the the effect of hypofractionation on cosmesis of reconstruction is unknown at this time.  We plan to deliver 5040 cGy in 28 daily fractions.      At end of discussion, the patient agreed with the plan and signed a treatment consent.  She is scheduled for simulation on 3/18/2020.    She was found to have a small area of erythema on the lateral aspect of her incision.  She was instructed to monitor the area of redness and contact Dr. Chatterjee should this worsen.      In regard to CHECK 2 mutation, She is scheduled for genetic counseling on 4/15/2020.      We spent 30 min with the patient, >50% devoted to counseling. The patient and her  have  many questions during our conversation that were answered to their satisfaction and verbalized understanding.     The patient was seen and discussed with staff, Dr. Brunson. Thank you for involving us in the care of this patient. Please feel free to contact us with questions or concerns at any time.    Guicho Lara MD  PGY-3 Resident, Radiation Oncology  [facility]    I saw and examined the patient with the resident.  I have reviewed and edited the resident's note and agree with the plan of care.      Mary Brunson MD

## 2020-03-09 ENCOUNTER — OFFICE VISIT (OUTPATIENT)
Dept: RADIATION ONCOLOGY | Facility: CLINIC | Age: 55
End: 2020-03-09
Attending: INTERNAL MEDICINE
Payer: COMMERCIAL

## 2020-03-09 VITALS
DIASTOLIC BLOOD PRESSURE: 59 MMHG | BODY MASS INDEX: 30.11 KG/M2 | WEIGHT: 175.4 LBS | HEART RATE: 54 BPM | SYSTOLIC BLOOD PRESSURE: 176 MMHG

## 2020-03-09 DIAGNOSIS — Z17.0 MALIGNANT NEOPLASM OF UPPER-OUTER QUADRANT OF LEFT BREAST IN FEMALE, ESTROGEN RECEPTOR POSITIVE (H): Primary | ICD-10-CM

## 2020-03-09 DIAGNOSIS — C50.412 MALIGNANT NEOPLASM OF UPPER-OUTER QUADRANT OF LEFT BREAST IN FEMALE, ESTROGEN RECEPTOR POSITIVE (H): Primary | ICD-10-CM

## 2020-03-09 PROCEDURE — G0463 HOSPITAL OUTPT CLINIC VISIT: HCPCS | Performed by: RADIOLOGY

## 2020-03-09 ASSESSMENT — ENCOUNTER SYMPTOMS
CHILLS: 0
SEIZURES: 0
BLURRED VISION: 0
DOUBLE VISION: 0
NERVOUS/ANXIOUS: 1
DIZZINESS: 0
EYE PAIN: 0
FEVER: 0
DEPRESSION: 0
BLOOD IN STOOL: 0
NECK PAIN: 0
DYSURIA: 0
FREQUENCY: 0
CONSTIPATION: 0
HEMATURIA: 0
NAUSEA: 0
VOMITING: 0
BRUISES/BLEEDS EASILY: 0
TINGLING: 0
FALLS: 0
BACK PAIN: 0
SORE THROAT: 0
HEADACHES: 0
DIAPHORESIS: 0
SHORTNESS OF BREATH: 0
INSOMNIA: 0
WEIGHT LOSS: 0
DIARRHEA: 0

## 2020-03-09 NOTE — LETTER
3/9/2020       RE: Leigh Espinal  [address] [place] [address]     Dear Colleague,    Thank you for referring your patient, Leigh Espinal, to the RADIATION ONCOLOGY CLINIC. Please see a copy of my visit note below.       Department of Radiation Oncology  [facility]  [address] [place] [address]  [phone]       Consultation Note    Name: Leigh Espinal MRN: [id number]   : 1965   Date of Service: Mar 9, 2020 Referring: Dr. Shepherd     Reason for consultation: Consideration of adjuvant postmastectomy radiation therapy for management of bilateral breast cancer  1. Left Breast: pT1c(m) N1(mi) invasive lobular carcinoma ER/[place] +, HER-2 negative  2. Right Breast: pT2 N0(sn) invasive mixed ductal, papillary and cribriform carcinoma, ER/[place] +, HER-2 negative    History of Present Illness   Ms. Espinal is a 54 year old perimenopausal female with past medical history of HHT with pulmonary and liver AVMs, GERD and hypertension who was recently diagnosed with synchronous bilateral breast cancers status post bilateral skin sparing mastectomies. She was referred to us for consideration of adjuvant radiotherapy.    She initially underwent routine screening mammogram on 10/23/2019 which demonstrated possible architectural distortion and developing focal asymmetry in the right breast at 12 to 1:00, middle depth, and possible developing asymmetry in the left breast at 3:00, middle depth. Diagnostic mammogram on 2019 conformed these findings. On the ultrasound, the right breast mass was at 1:00, 5 cm from the nipple, measuring 2 x 1.4 cm; and the left breast mass was at 2:00, 6 cm from the nipple, measuring 0.9x 0.8 x0.8 cm.  There was no evidence of enlarged lymph nodes in both axillae (BI-RADS category 5).    A contrast mammogram was obtained on 2019 and redomonstrated the two suspicious lesions ( 2.0 cm in right and 1.4 cm in left)  as well as an additional satellite mass anterior to the main mass with total area of 2.1 cm in the left breast.     Bilateral ultrasound guided biopsy was performed on the same day. Right breast biopsy ([id number]) revealed invasive ductal carcinoma, Jesús grade 1, with associated ductal carcinoma in situ (DCIS), nuclear grade 2, cribriform type.  Invasive carcinoma was ER+ (98%), [place]+ (99%), HER2 non- amplified. Biopsy of the left breast revealed invasive lobular carcinoma, Jesús grade 2, with associated lobular carcinoma in situ, classical type, ER+ (95%), AZ+ (75%), and HER2 non- amplified.       She was seen by Dr. Smith on 12/5/2019 where her exam was notable for 2 cm mass in the upper inner quadrant of the right breast.  No evidence of left breast lesions or axillary lymphadenopathy. Different surgical options were discussed and patient initially elected to proceed with BCS. 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 She was interested in immediate reconstruction and met with Dr. Chatterjee.      Ms. Espinal underwent bilateral skin sparing mastectomies, and bilateral axillary sentinel lymph node biopsies and tissue expander placement on 1/29/2020 under the care of Tc Smith and Sen. Surgical pathology ([id number]) revealed   Right Breast: invasive carcinoma of the mixed mixed ductal, papillary and cribriform types.  Jesús grade 2.  Tumor size was 2.1x 1.6 x 1.2 cm in the upper inner quadrant at 1:00.  There was another smaller focus measuring 3.6 mm, superior to the dominant focus.  Associated DCIS was present in both upper inner and upper outer quadrant, measuring 1.5 cm, nuclear grade 2, involving 9 out of 27 blocks, cribriform type with lobular involvement and focal necrosis. DCIS was admixed with and adjacent to the dominant focus of invasive carcinoma in the  upper inner quadrant. There was indeterminate lymphovascular invasion. Surgical margins were uninvolved by invasive carcinoma and DCIS with the closest margin (anterior superior) at 0.2 mm for invasive carcinoma and 6 mm for DCIS. One sentinel node showed no evidence of metastatic carcinoma. Pathologic stage T2(m)N0(sn).    Left Breast:  invasive lobular carcinoma at 3:00 position.  Tumor size was 1.6 x 1.2 x 1.2 cm, Jesús grade 2.  Assassociated ductal carcinoma in situ measured 5 mm, nuclear grade 2, micropapillary and cribriform type, involving 1 of 20 blocks.  Lobular carcinoma in situ, classical type was also found. There was evidence of  lymphovascular invasion. Surgical margins were uninvolved by invasive carcinoma or DCIS with the closest margin (anterior inferior) at 1.2 cm for invasive carcinoma and 1 cm for DCIS. Micrometastatic carcinoma was evident in the sentinel lymph node biopsy, measuring 1.5 mm, without extranodal extension. Pathologic stage [id number](sn).     Post-op, Ms. Espinal developed skin flap ischemic changes that ultimately led to removal of her expander by Dr. Chatterjee on 2/14/2020. Skin excision ( [id number]) was negative for malignancy.      She met James Shepherd and Shree in Medical Oncology. Based on Oncotype Dx score of the left breast cancer being 10 and Oncotype Dx score on the right breast cancer being 18, the consensus was no role of chemotherapy.  She will proceed with endocrine therapy     She is referred to us for discussion of radiotherapy.  On interview today, she stated that she recovered well from her surgeries. Her Mirena IUD was removed on 12/6/2019. She reported having menses after that. She also endorsed on and off episodes of heavy bleeding. She will resume her full-time work from next week. She otherwise denied fever, chills, headaches, nausea, vomiting, axillary swelling, urinary or bowel symptoms, weight or appetite changes. Her ECOG is 0.    Past Medical  History:  Hypertension  pulmonary and liver AV malformation  hereditary hemorrhagic telangiectasias.   Anxiety and depression  Iron deficiency anemia    Past Surgical History:  Nissen fundoplication x2   S/p  Tubal ligation  S/p colonoscopy    OBGYN History:    Age at 1st pregnancy: 25  Age at menarche: 11  Breastfeeding history: Yes  She had a mirena IUD in place until shortly after breast cancer diagnosis.  She is perimenopausal    Chemotherapy History:  None    Radiation History:  None    Pregnant: No  Implanted Cardiac Devices: No    Medications:  Atenolol  Lisinopril  Sincerely  Trazodone    Allergies:  Sulfa    Social History:  Tobacco: former smoker, quit in   Alcohol: Yes - 2 drinks per day  Employment:  at the [employer] at [employer]    Family History:  NA (patient is adopted)     Review of Systems   A 10-point review of systems was performed. Pertinent findings are noted in the HPI.    Physical Exam   ECOG Status: 0    Vitals:  BP (!) 176/59   Pulse 54   Wt 79.6 kg (175 lb 6.4 oz)   LMP 2020 (Approximate)   BMI 30.11 kg/m      Gen: Alert, in NAD  BREAST: Bilateral dry clean mastectomy scars. Small erythematous area noted over the most lateral end of the left mastectomy scar with no tenderness on palpation. The area was marked with a marker pen.   Neck: No palpable lymphadenopathy  Axilla: No axillary lymphadenopathy  Arms no swelling. Full ROM    Imaging/Path/Labs   Imaging: Reviewed    Path: Reviewed     Assessment    Ms. 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 She has recovered well from surgery. Her ECOG is 0.     Plan   We discussed at length with the patient and her  the indications of  postmastectomy radiotherapy (PMRT) which typically are recommended for large tumors and/or node positive disease.      On the right side, she has a 2.1 cm tumor, grade 2, ER+/CA+/Her2-.  One sentinel lymph node was negative.  She has a focus suspicious for LVSI, but this was deemed indeterminate.  She also has close margin of 0.2 mm for invasive disease, which I suspect is close to the skin flap.  She otherwise does not have any high risk features.  Thus we do not recommend radiation therapy on the right side.    On the left side, despite tumor being somewhat smaller, she did have evidence of LVSI.  Most importantly, she had 1/1 positive SLN.  The size of the metastasis was small at 1.5 mm.  However, a dissection was not done, thus it is not clear if she has additional alex disease. These factors, coupled with her premenopausal status, do put her at an increased risk of locoregional recurrence, and we would therefore recommend a course of postmastectomy radiotherapy.  I do not think she should go back for dissection, as this would give her a higher risk of upper extremity lymphedema.  Given the low alex disease burden, radiation therapy should be able to sterilize subclinical disease.  Our treatment field will include left chest wall and low axilla (level I and II).      We discussed the rationale, logistics and side effects associated with the radiation. Ms. Espinal will proceed with reconstruction after radiation therapy, thus we will administer radiation using conventional radiation therapy as the the effect of hypofractionation on cosmesis of reconstruction is unknown at this time.  We plan to deliver 5040 cGy in 28 daily fractions.      At end of discussion, the patient agreed with the plan and signed a treatment consent.  She is scheduled for simulation on 3/18/2020.    She was found to have a small area of erythema on the lateral aspect of her incision.  She was instructed to monitor the area of redness  and contact Dr. Chatterjee should this worsen.      In regard to CHECK 2 mutation, She is scheduled for genetic counseling on 4/15/2020.      We spent 30 min with the patient, >50% devoted to counseling. The patient and her  have many questions during our conversation that were answered to their satisfaction and verbalized understanding.     The patient was seen and discussed with staff, Dr. Brunson. Thank you for involving us in the care of this patient. Please feel free to contact us with questions or concerns at any time.    Guicho Lara MD  PGY-3 Resident, Radiation Oncology  [facility]    I saw and examined the patient with the resident.  I have reviewed and edited the resident's note and agree with the plan of care.      Mary Brunson MD            HPI  INITIAL PATIENT ASSESSMENT    Diagnosis: Breast cancer.  1/29/2020 Bilateral mastectomies with exspanders, exspanders taken out 2/14/2020    Prior radiation therapy: None    Prior chemotherapy: None    Prior hormonal therapy:No    Pain Eval:  Denies    Psychosocial  Living arrangements:   Fall Risk: independent  Falls Risk Screening Questions -Radiation consult    1. Have you fallen in the past one week?  No.  2. Have you felt unsteady when walking or standing in the past one week?  No.     referral needs: Not needed    Advanced Directive: Yes - Location: at home  Implantable Cardiac Device? No    Onset of menarche: age 12  LMP: Patient's last menstrual period was 03/01/2020 (approximate).  Onset of menopause: no  Abnormal vaginal bleeding/discharge: Yes, very heavy flow. Will be having a procedure to stop them.  Are you pregnant? No  Reproductive note: 2 children    Review of Systems   Constitutional: Positive for malaise/fatigue (r/t surgery). Negative for chills, diaphoresis, fever and weight loss.   HENT: Negative for ear pain, hearing loss, nosebleeds and sore throat.    Eyes: Negative for blurred  vision, double vision and pain.   Respiratory: Negative for shortness of breath.    Cardiovascular: Negative for chest pain and leg swelling.   Gastrointestinal: Negative for blood in stool, constipation, diarrhea, nausea and vomiting.   Genitourinary: Negative for dysuria, frequency, hematuria and urgency.   Musculoskeletal: Negative for back pain, falls, joint pain and neck pain.   Skin: Negative for rash.   Neurological: Negative for dizziness, tingling, seizures and headaches.   Endo/Heme/Allergies: Does not bruise/bleed easily.   Psychiatric/Behavioral: Negative for depression. The patient is nervous/anxious (with appointment today ). The patient does not have insomnia.        Nurse face-to-face time: Level 4:  15 min face to face time          Again, thank you for allowing me to participate in the care of your patient.      Sincerely,    Mary Brunson MD

## 2020-03-09 NOTE — PROGRESS NOTES
HPI  INITIAL PATIENT ASSESSMENT    Diagnosis: Breast cancer.  1/29/2020 Bilateral mastectomies with aba troncoso taken out 2/14/2020    Prior radiation therapy: None    Prior chemotherapy: None    Prior hormonal therapy:No    Pain Eval:  Denies    Psychosocial  Living arrangements:   Fall Risk: independent  Falls Risk Screening Questions -Radiation consult    1. Have you fallen in the past one week?  No.  2. Have you felt unsteady when walking or standing in the past one week?  No.     referral needs: Not needed    Advanced Directive: Yes - Location: at home  Implantable Cardiac Device? No    Onset of menarche: age 12  LMP: Patient's last menstrual period was 03/01/2020 (approximate).  Onset of menopause: no  Abnormal vaginal bleeding/discharge: Yes, very heavy flow. Will be having a procedure to stop them.  Are you pregnant? No  Reproductive note: 2 children    Review of Systems   Constitutional: Positive for malaise/fatigue (r/t surgery). Negative for chills, diaphoresis, fever and weight loss.   HENT: Negative for ear pain, hearing loss, nosebleeds and sore throat.    Eyes: Negative for blurred vision, double vision and pain.   Respiratory: Negative for shortness of breath.    Cardiovascular: Negative for chest pain and leg swelling.   Gastrointestinal: Negative for blood in stool, constipation, diarrhea, nausea and vomiting.   Genitourinary: Negative for dysuria, frequency, hematuria and urgency.   Musculoskeletal: Negative for back pain, falls, joint pain and neck pain.   Skin: Negative for rash.   Neurological: Negative for dizziness, tingling, seizures and headaches.   Endo/Heme/Allergies: Does not bruise/bleed easily.   Psychiatric/Behavioral: Negative for depression. The patient is nervous/anxious (with appointment today ). The patient does not have insomnia.        Nurse face-to-face time: Level 4:  15 min face to face time

## 2020-03-11 ENCOUNTER — MYC MEDICAL ADVICE (OUTPATIENT)
Dept: OBGYN | Facility: CLINIC | Age: 55
End: 2020-03-11

## 2020-03-17 NOTE — TELEPHONE ENCOUNTER
"Leigh,  All of my partners are excellent surgeons; so you may make an appointment with any of them.   Since we are limiting face to face visits if possible,  at this time I think it would be appropriate to make a \"telephone visit\" with one of my partners to discuss your options further.  At the time being; only urgent surgeries are allowed to be scheduled; so your surgery (which ever you choose) may be postponed a bit. I still think discussing this sooner rather than later is a good idea.    "

## 2020-03-18 ENCOUNTER — ALLIED HEALTH/NURSE VISIT (OUTPATIENT)
Dept: RADIATION ONCOLOGY | Facility: CLINIC | Age: 55
End: 2020-03-18
Attending: RADIOLOGY
Payer: COMMERCIAL

## 2020-03-18 ENCOUNTER — PRE VISIT (OUTPATIENT)
Dept: ONCOLOGY | Facility: CLINIC | Age: 55
End: 2020-03-18

## 2020-03-18 DIAGNOSIS — C50.412 MALIGNANT NEOPLASM OF UPPER-OUTER QUADRANT OF LEFT BREAST IN FEMALE, ESTROGEN RECEPTOR POSITIVE (H): Primary | ICD-10-CM

## 2020-03-18 DIAGNOSIS — Z17.0 MALIGNANT NEOPLASM OF UPPER-OUTER QUADRANT OF LEFT BREAST IN FEMALE, ESTROGEN RECEPTOR POSITIVE (H): Primary | ICD-10-CM

## 2020-03-18 PROCEDURE — 77334 RADIATION TREATMENT AID(S): CPT | Performed by: RADIOLOGY

## 2020-03-18 PROCEDURE — 77290 THER RAD SIMULAJ FIELD CPLX: CPT | Performed by: RADIOLOGY

## 2020-03-18 PROCEDURE — 77332 RADIATION TREATMENT AID(S): CPT | Performed by: RADIOLOGY

## 2020-03-18 NOTE — NURSING NOTE
Radiation Therapy Patient Education    Person involved with teaching: Patient    Patient educational needs for self management of treatment-related side effects assessment completed.  [facility] Patient Ed tab contains Patient Learning Assessment    Education Materials Given  Skin Care During Radiation Treatment and Sim pamphlet and schedule    Educational Topics Discussed  Side effects expected, Pain management, Skin care, Activity, Nutrition and weight loss and When to call MD/RN    Response To Teaching  Verbalizes understanding    Referrals sent: None    Chemotherapy?  No    Falls Risk Screening Questions - Radiation teaching    1. Have you fallen in the past one week?  No.  2. Have you felt unsteady when walking or standing in the past one week?  No.

## 2020-03-25 ENCOUNTER — APPOINTMENT (OUTPATIENT)
Dept: RADIATION ONCOLOGY | Facility: CLINIC | Age: 55
End: 2020-03-25
Attending: RADIOLOGY
Payer: COMMERCIAL

## 2020-03-25 PROCEDURE — 77280 THER RAD SIMULAJ FIELD SMPL: CPT | Performed by: RADIOLOGY

## 2020-03-25 PROCEDURE — 77332 RADIATION TREATMENT AID(S): CPT | Performed by: RADIOLOGY

## 2020-03-26 ENCOUNTER — APPOINTMENT (OUTPATIENT)
Dept: RADIATION ONCOLOGY | Facility: CLINIC | Age: 55
End: 2020-03-26
Attending: RADIOLOGY
Payer: COMMERCIAL

## 2020-03-26 PROCEDURE — 77331 SPECIAL RADIATION DOSIMETRY: CPT | Performed by: RADIOLOGY

## 2020-03-26 PROCEDURE — 77412 RADIATION TX DELIVERY LVL 3: CPT | Performed by: RADIOLOGY

## 2020-03-26 PROCEDURE — 77387 GUIDANCE FOR RADJ TX DLVR: CPT | Performed by: RADIOLOGY

## 2020-03-27 ENCOUNTER — APPOINTMENT (OUTPATIENT)
Dept: RADIATION ONCOLOGY | Facility: CLINIC | Age: 55
End: 2020-03-27
Attending: RADIOLOGY
Payer: COMMERCIAL

## 2020-03-27 PROCEDURE — 77412 RADIATION TX DELIVERY LVL 3: CPT | Performed by: RADIOLOGY

## 2020-03-27 PROCEDURE — 77387 GUIDANCE FOR RADJ TX DLVR: CPT | Performed by: RADIOLOGY

## 2020-03-30 ENCOUNTER — OFFICE VISIT (OUTPATIENT)
Dept: RADIATION ONCOLOGY | Facility: CLINIC | Age: 55
End: 2020-03-30
Attending: RADIOLOGY
Payer: COMMERCIAL

## 2020-03-30 VITALS — BODY MASS INDEX: 29.87 KG/M2 | TEMPERATURE: 97.5 F | WEIGHT: 174 LBS

## 2020-03-30 DIAGNOSIS — Z17.0 MALIGNANT NEOPLASM OF UPPER-OUTER QUADRANT OF LEFT BREAST IN FEMALE, ESTROGEN RECEPTOR POSITIVE (H): Primary | ICD-10-CM

## 2020-03-30 DIAGNOSIS — C50.412 MALIGNANT NEOPLASM OF UPPER-OUTER QUADRANT OF LEFT BREAST IN FEMALE, ESTROGEN RECEPTOR POSITIVE (H): Primary | ICD-10-CM

## 2020-03-30 PROCEDURE — 77387 GUIDANCE FOR RADJ TX DLVR: CPT | Performed by: RADIOLOGY

## 2020-03-30 PROCEDURE — 77412 RADIATION TX DELIVERY LVL 3: CPT | Performed by: RADIOLOGY

## 2020-03-30 NOTE — LETTER
3/30/2020       RE: Leigh Espinal  [address] [place] [address]     Dear Colleague,    Thank you for referring your patient, Leigh Espinal, to the RADIATION ONCOLOGY CLINIC. Please see a copy of my visit note below.    [facility] PHYSICIANS  SPECIALIZING IN BREAKTHROUGHS  Radiation Oncology    On Treatment Visit Note      Leigh Espinal      Date: 3/30/2020   MRN: [id number]   : 1965  Diagnosis: Breast Cancer      Reason for Visit:  On Radiation Treatment Visit     Treatment Summary to Date   L Chest Wall Current Dose: 540/5040 cGy Fractions: 3/28      Chemotherapy  Chemo concurrent with radx?: No    ED Visit/Hosiptal Admission: None     Treatment Breaks: None     Subjective:   Leigh tolerated the first 3 treatments well. She has been using Aquaphor for skin care.      Nursing ROS:   Nutrition Alteration  Diet Type: Patient's Preference  Skin  Skin Reaction: 0 - No changes       Gastrointestinal  Nausea: 0 - None        Pain Assessment  0-10 Pain Scale: 0      Objective:   Temp 97.5  F (36.4  C)   Wt 78.9 kg (174 lb)   LMP 2020 (Approximate)   BMI 29.87 kg/m    Gen: Appears well, in no acute distress  Skin: No erythema    Labs:  CBC RESULTS:   Recent Labs   Lab Test 10/23/19  0751   WBC 6.4   RBC 4.96   HGB 14.7   HCT 44.9   MCV 91   MCH 29.6   MCHC 32.7   RDW 14.1        ELECTROLYTES:  Recent Labs   Lab Test 10/18/17  0814      POTASSIUM 3.8   CHLORIDE 105   ILIANA 8.8   CO2 26   BUN 13   CR 0.84   *       Assessment:    Tolerating radiation therapy well.  All questions and concerns addressed.    Toxicities:  Fatigue: Grade 0: No toxicity  Dermatitis: Grade 0: No toxicity    Plan:   1. Continue current therapy.        [employer] chart and setup information reviewed  Ports checked    Medication Review  Med list reviewed with patient?: Yes    Educational Topic Discussed  Education Instructions: Reviewed        Mary Brunson  MD/PhD  [phone] clinic  Pager [phone]    Please do not send letter to referring physician.      Again, thank you for allowing me to participate in the care of your patient.      Sincerely,    Mary Brunson MD

## 2020-03-30 NOTE — LETTER
Date:March 31, 2020      Provider requested that no letter be sent. Do not send.       [facility] Health Information

## 2020-03-30 NOTE — PROGRESS NOTES
[facility] PHYSICIANS  SPECIALIZING IN BREAKTHROUGHS  Radiation Oncology    On Treatment Visit Note      Leigh Espinal      Date: 3/30/2020   MRN: [id number]   : 1965  Diagnosis: Breast Cancer      Reason for Visit:  On Radiation Treatment Visit     Treatment Summary to Date   L Chest Wall Current Dose: 540/5040 cGy Fractions: 3/28      Chemotherapy  Chemo concurrent with radx?: No    ED Visit/Hosiptal Admission: None     Treatment Breaks: None     Subjective:   Leigh tolerated the first 3 treatments well. She has been using Aquaphor for skin care.      Nursing ROS:   Nutrition Alteration  Diet Type: Patient's Preference  Skin  Skin Reaction: 0 - No changes       Gastrointestinal  Nausea: 0 - None        Pain Assessment  0-10 Pain Scale: 0      Objective:   Temp 97.5  F (36.4  C)   Wt 78.9 kg (174 lb)   LMP 2020 (Approximate)   BMI 29.87 kg/m    Gen: Appears well, in no acute distress  Skin: No erythema    Labs:  CBC RESULTS:   Recent Labs   Lab Test 10/23/19  0751   WBC 6.4   RBC 4.96   HGB 14.7   HCT 44.9   MCV 91   MCH 29.6   MCHC 32.7   RDW 14.1        ELECTROLYTES:  Recent Labs   Lab Test 10/18/17  0814      POTASSIUM 3.8   CHLORIDE 105   ILIANA 8.8   CO2 26   BUN 13   CR 0.84   *       Assessment:    Tolerating radiation therapy well.  All questions and concerns addressed.    Toxicities:  Fatigue: Grade 0: No toxicity  Dermatitis: Grade 0: No toxicity    Plan:   1. Continue current therapy.        Mosaiq chart and setup information reviewed  Ports checked    Medication Review  Med list reviewed with patient?: Yes    Educational Topic Discussed  Education Instructions: Reviewed        Mary Brunson MD/PhD  [phone] clinic  Pager [phone]    Please do not send letter to referring physician.

## 2020-03-31 ENCOUNTER — APPOINTMENT (OUTPATIENT)
Dept: RADIATION ONCOLOGY | Facility: CLINIC | Age: 55
End: 2020-03-31
Attending: RADIOLOGY
Payer: COMMERCIAL

## 2020-03-31 PROCEDURE — 77412 RADIATION TX DELIVERY LVL 3: CPT | Performed by: RADIOLOGY

## 2020-03-31 PROCEDURE — 77387 GUIDANCE FOR RADJ TX DLVR: CPT | Performed by: RADIOLOGY

## 2020-04-01 ENCOUNTER — APPOINTMENT (OUTPATIENT)
Dept: RADIATION ONCOLOGY | Facility: CLINIC | Age: 55
End: 2020-04-01
Attending: RADIOLOGY
Payer: COMMERCIAL

## 2020-04-01 PROCEDURE — 77336 RADIATION PHYSICS CONSULT: CPT | Performed by: RADIOLOGY

## 2020-04-01 PROCEDURE — 77412 RADIATION TX DELIVERY LVL 3: CPT | Performed by: RADIOLOGY

## 2020-04-01 PROCEDURE — 77387 GUIDANCE FOR RADJ TX DLVR: CPT | Performed by: RADIOLOGY

## 2020-04-02 ENCOUNTER — APPOINTMENT (OUTPATIENT)
Dept: RADIATION ONCOLOGY | Facility: CLINIC | Age: 55
End: 2020-04-02
Attending: RADIOLOGY
Payer: COMMERCIAL

## 2020-04-02 PROCEDURE — 77387 GUIDANCE FOR RADJ TX DLVR: CPT | Performed by: RADIOLOGY

## 2020-04-02 PROCEDURE — 77412 RADIATION TX DELIVERY LVL 3: CPT | Performed by: RADIOLOGY

## 2020-04-03 ENCOUNTER — APPOINTMENT (OUTPATIENT)
Dept: RADIATION ONCOLOGY | Facility: CLINIC | Age: 55
End: 2020-04-03
Attending: RADIOLOGY
Payer: COMMERCIAL

## 2020-04-03 PROCEDURE — 77387 GUIDANCE FOR RADJ TX DLVR: CPT | Performed by: RADIOLOGY

## 2020-04-03 PROCEDURE — 77412 RADIATION TX DELIVERY LVL 3: CPT | Performed by: RADIOLOGY

## 2020-04-06 ENCOUNTER — APPOINTMENT (OUTPATIENT)
Dept: RADIATION ONCOLOGY | Facility: CLINIC | Age: 55
End: 2020-04-06
Attending: RADIOLOGY
Payer: COMMERCIAL

## 2020-04-06 ENCOUNTER — TELEPHONE (OUTPATIENT)
Dept: CARE COORDINATION | Facility: CLINIC | Age: 55
End: 2020-04-06

## 2020-04-06 VITALS — TEMPERATURE: 98.1 F | BODY MASS INDEX: 30.21 KG/M2 | WEIGHT: 176 LBS

## 2020-04-06 DIAGNOSIS — C50.412 MALIGNANT NEOPLASM OF UPPER-OUTER QUADRANT OF LEFT BREAST IN FEMALE, ESTROGEN RECEPTOR POSITIVE (H): Primary | ICD-10-CM

## 2020-04-06 DIAGNOSIS — Z17.0 MALIGNANT NEOPLASM OF UPPER-OUTER QUADRANT OF LEFT BREAST IN FEMALE, ESTROGEN RECEPTOR POSITIVE (H): Primary | ICD-10-CM

## 2020-04-06 PROCEDURE — 77412 RADIATION TX DELIVERY LVL 3: CPT | Performed by: RADIOLOGY

## 2020-04-06 PROCEDURE — 77387 GUIDANCE FOR RADJ TX DLVR: CPT | Performed by: RADIOLOGY

## 2020-04-06 NOTE — TELEPHONE ENCOUNTER
Social Work Note: Telephone Call  Oncology Clinic    Data/Intervention:  Patient Name:  Leigh Espinal  /Age:  1965 (54 year old)    Call From:  Social work contacted patient over the phone.,  Reason for Call:  Referral from radiation RN regarding insurance/financial resources.    Assessment:  SW spoke with patient over the phone to address referral. Patient explained she is currently working from home and her insurance is through her employer.  She has a copay for each radiation treatment and provider appointment and is concerned about paying for the frequent visits.  Patient has not yet received medical bills but stated her household income has been reduced recently as her 's job hours have been cut as a result of COVID-19.  SW talked with patient about setting up a payment plan through the billing office for her bills which she felt was a reasonable plan.    SW also discussed financial assistance through oncology grants.  SW assisted in completing [employer] applications (general, margies and angels in action).  SW also submitted [employer] application.  SW gave patient education on completing [employer] [facility] [employer] application, patient indicated ability to follow through on application independently. Pt indicated appreciation for SW outreach and no other needs directly identified at this time.    Plan:  SW contact information provided for any other needs. SW remains available to assist.     Soo Yeon Han, MSW, LICSW  Pager: [phone]  Phone: [phone]

## 2020-04-06 NOTE — LETTER
Date:April 7, 2020      Provider requested that no letter be sent. Do not send.       [facility] Health Information

## 2020-04-06 NOTE — PROGRESS NOTES
[facility] PHYSICIANS  SPECIALIZING IN BREAKTHROUGHS  Radiation Oncology    On Treatment Visit Note      Leigh Espinal      Date: 2020   MRN: [id number]   : 1965  Diagnosis: Breast Cancer      Reason for Visit:  On Radiation Treatment Visit     Treatment Summary to Date   L Chest Wall Current Dose: 1440/5040 cGy Fractions:       Chemotherapy  Chemo concurrent with radx?: No    ED Visit/Hosiptal Admission: None     Treatment Breaks: None     Subjective:   Leigh is doing well. She has been using Aquaphor for skin care.  She is concerned about the financial impact of her treatment.  Her  was recently laid off.      Nursing ROS:   Nutrition Alteration  Diet Type: Patient's Preference  Skin  Skin Reaction: 0 - No changes       Gastrointestinal  Nausea: 0 - None        Pain Assessment  0-10 Pain Scale: 0      Objective:   There were no vitals taken for this visit.  Gen: Appears well, in no acute distress  Skin: mild erythema over the treated area    Labs:  CBC RESULTS:   Recent Labs   Lab Test 10/23/19  0751   WBC 6.4   RBC 4.96   HGB 14.7   HCT 44.9   MCV 91   MCH 29.6   MCHC 32.7   RDW 14.1        ELECTROLYTES:  Recent Labs   Lab Test 10/18/17  0814      POTASSIUM 3.8   CHLORIDE 105   ILIANA 8.8   CO2 26   BUN 13   CR 0.84   *       Assessment:    Ms. Espinal is a 54 year old woman with a history of early stage bilateral breast cancer receiving adjuvant postmastectomy radiation therapy for management of left breast cancer. Tolerating radiation therapy well.  All questions and concerns addressed.    Toxicities:  Fatigue: grade 0  Skin: dermatitis grade 1    Plan:   1. Continue current therapy.    2. Will have social reach out to her to discuss financial assistance.       Mosaiq chart and setup information reviewed  Ports checked    Medication Review  Med list reviewed with patient?: Yes    Educational Topic Discussed  Education Instructions:  Reviewed    I saw and examined the patient with the resident.  I have reviewed and edited the resident's note and agree with the plan of care.      Mary Brunson MD

## 2020-04-06 NOTE — LETTER
2020       RE: Leigh Espinal  [address] [place] [address]     Dear Colleague,    Thank you for referring your patient, Leigh Espinal, to the RADIATION ONCOLOGY CLINIC. Please see a copy of my visit note below.    [facility] PHYSICIANS  SPECIALIZING IN BREAKTHROUGHS  Radiation Oncology    On Treatment Visit Note      Leigh Espinal      Date: 2020   MRN: [id number]   : 1965  Diagnosis: Breast Cancer      Reason for Visit:  On Radiation Treatment Visit     Treatment Summary to Date   L Chest Wall Current Dose: 1440/5040 cGy Fractions:       Chemotherapy  Chemo concurrent with radx?: No    ED Visit/Hosiptal Admission: None     Treatment Breaks: None     Subjective:   Leigh is doing well. She has been using Aquaphor for skin care.  She is concerned about the financial impact of her treatment.  Her  was recently laid off.      Nursing ROS:   Nutrition Alteration  Diet Type: Patient's Preference  Skin  Skin Reaction: 0 - No changes       Gastrointestinal  Nausea: 0 - None        Pain Assessment  0-10 Pain Scale: 0      Objective:   There were no vitals taken for this visit.  Gen: Appears well, in no acute distress  Skin: mild erythema over the treated area    Labs:  CBC RESULTS:   Recent Labs   Lab Test 10/23/19  0751   WBC 6.4   RBC 4.96   HGB 14.7   HCT 44.9   MCV 91   MCH 29.6   MCHC 32.7   RDW 14.1        ELECTROLYTES:  Recent Labs   Lab Test 10/18/17  0814      POTASSIUM 3.8   CHLORIDE 105   ILIANA 8.8   CO2 26   BUN 13   CR 0.84   *       Assessment:    Ms. Espinal is a 54 year old woman with a history of early stage bilateral breast cancer receiving adjuvant postmastectomy radiation therapy for management of left breast cancer. Tolerating radiation therapy well.  All questions and concerns addressed.    Toxicities:  Fatigue: grade 0  Skin: dermatitis grade 1    Plan:   1. Continue current therapy.    2. Will have  social reach out to her to discuss financial assistance.       Mosaiq chart and setup information reviewed  Ports checked    Medication Review  Med list reviewed with patient?: Yes    Educational Topic Discussed  Education Instructions: Reviewed    I saw and examined the patient with the resident.  I have reviewed and edited the resident's note and agree with the plan of care.      Mary Brunson MD    Again, thank you for allowing me to participate in the care of your patient.      Sincerely,    Mary Brunson MD

## 2020-04-07 ENCOUNTER — APPOINTMENT (OUTPATIENT)
Dept: RADIATION ONCOLOGY | Facility: CLINIC | Age: 55
End: 2020-04-07
Attending: RADIOLOGY
Payer: COMMERCIAL

## 2020-04-07 PROCEDURE — 77412 RADIATION TX DELIVERY LVL 3: CPT | Performed by: RADIOLOGY

## 2020-04-07 PROCEDURE — 77387 GUIDANCE FOR RADJ TX DLVR: CPT | Performed by: RADIOLOGY

## 2020-04-09 ENCOUNTER — APPOINTMENT (OUTPATIENT)
Dept: RADIATION ONCOLOGY | Facility: CLINIC | Age: 55
End: 2020-04-09
Attending: RADIOLOGY
Payer: COMMERCIAL

## 2020-04-09 PROCEDURE — 77336 RADIATION PHYSICS CONSULT: CPT | Performed by: RADIOLOGY

## 2020-04-09 PROCEDURE — 77387 GUIDANCE FOR RADJ TX DLVR: CPT | Performed by: RADIOLOGY

## 2020-04-09 PROCEDURE — 77412 RADIATION TX DELIVERY LVL 3: CPT | Performed by: RADIOLOGY

## 2020-04-10 ENCOUNTER — APPOINTMENT (OUTPATIENT)
Dept: RADIATION ONCOLOGY | Facility: CLINIC | Age: 55
End: 2020-04-10
Attending: RADIOLOGY
Payer: COMMERCIAL

## 2020-04-10 PROCEDURE — 77387 GUIDANCE FOR RADJ TX DLVR: CPT | Performed by: RADIOLOGY

## 2020-04-10 PROCEDURE — 77412 RADIATION TX DELIVERY LVL 3: CPT | Performed by: RADIOLOGY

## 2020-04-13 ENCOUNTER — OFFICE VISIT (OUTPATIENT)
Dept: RADIATION ONCOLOGY | Facility: CLINIC | Age: 55
End: 2020-04-13
Attending: RADIOLOGY
Payer: COMMERCIAL

## 2020-04-13 VITALS — WEIGHT: 171 LBS | BODY MASS INDEX: 29.35 KG/M2 | TEMPERATURE: 98.6 F

## 2020-04-13 DIAGNOSIS — C50.412 MALIGNANT NEOPLASM OF UPPER-OUTER QUADRANT OF LEFT BREAST IN FEMALE, ESTROGEN RECEPTOR POSITIVE (H): Primary | ICD-10-CM

## 2020-04-13 DIAGNOSIS — Z17.0 MALIGNANT NEOPLASM OF UPPER-OUTER QUADRANT OF LEFT BREAST IN FEMALE, ESTROGEN RECEPTOR POSITIVE (H): Primary | ICD-10-CM

## 2020-04-13 PROCEDURE — 77412 RADIATION TX DELIVERY LVL 3: CPT | Performed by: RADIOLOGY

## 2020-04-13 PROCEDURE — 77387 GUIDANCE FOR RADJ TX DLVR: CPT | Performed by: RADIOLOGY

## 2020-04-13 NOTE — PROGRESS NOTES
[facility] PHYSICIANS  SPECIALIZING IN BREAKTHROUGHS  Radiation Oncology    On Treatment Visit Note      Leigh Espinal      Date: 2020   MRN: [id number]   : 1965  Diagnosis: Breast Cancer      Reason for Visit:  On Radiation Treatment Visit     Treatment Summary to Date  Treatment Site: Lt chestwall and low ax Current Dose: 2160/5040 cGy Fractions:       Chemotherapy  Chemo concurrent with radx?: No    ED Visit/Hosiptal Admission: None    Treatment Breaks: None       Subjective:   Leigh continues to tolerate radiation therapy well.  She reports feeling tired usually towards the end of the week, but feels quite well today.  She has slight burning in the treated area.  She uses Aquaphor for skin care.     Nursing ROS:   Nutrition Alteration  Diet Type: Patient's Preference  Skin  Skin Reaction: 1 - Faint erythema or dry desquamation  Skin Note: Using aquaphor           Gastrointestinal  Nausea: 0 - None     Psychosocial  Pyschosocial Note: tired at the end of the week  Pain Assessment  0-10 Pain Scale: 0      Objective:   Temp 98.6  F (37  C)   Wt 77.6 kg (171 lb)   BMI 29.35 kg/m    Gen: Appears well, in no acute distress  Skin: Mild diffuse erythema over the treated area.     Labs:  CBC RESULTS:   Recent Labs   Lab Test 10/23/19  0751   WBC 6.4   RBC 4.96   HGB 14.7   HCT 44.9   MCV 91   MCH 29.6   MCHC 32.7   RDW 14.1        ELECTROLYTES:  Recent Labs   Lab Test 10/18/17  0814      POTASSIUM 3.8   CHLORIDE 105   ILIANA 8.8   CO2 26   BUN 13   CR 0.84   *       Assessment:    Tolerating radiation therapy well.  All questions and concerns addressed.    Toxicities:  Fatigue: Grade 1: Fatigue relieved by rest  Dermatitis: Grade 1: Faint erythema or dry desquamation    Plan:   1. Continue current therapy.        Mosaiq chart and setup information reviewed  Ports checked    Medication Review  Med Note: No changes per pt    Educational Topic Discussed  Education  Instructions: Reviewed        Mary Brunson MD/PhD  [phone] clinic  Pager [phone]    Please do not send letter to referring physician.

## 2020-04-13 NOTE — LETTER
Date:April 14, 2020      Patient was self referred, no letter generated. Do not send.        [facility] Health Information

## 2020-04-13 NOTE — LETTER
2020       RE: Leigh Espinal  [address] [place] [address]     Dear Colleague,    Thank you for referring your patient, Leigh Espinal, to the RADIATION ONCOLOGY CLINIC. Please see a copy of my visit note below.    [facility] PHYSICIANS  SPECIALIZING IN BREAKTHROUGHS  Radiation Oncology    On Treatment Visit Note      Leigh Espinal      Date: 2020   MRN: [id number]   : 1965  Diagnosis: Breast Cancer      Reason for Visit:  On Radiation Treatment Visit     Treatment Summary to Date  Treatment Site: Lt chestwall and low ax Current Dose: 2160/5040 cGy Fractions:       Chemotherapy  Chemo concurrent with radx?: No    ED Visit/Hosiptal Admission: None    Treatment Breaks: None       Subjective:   Leigh continues to tolerate radiation therapy well.  She reports feeling tired usually towards the end of the week, but feels quite well today.  She has slight burning in the treated area.  She uses Aquaphor for skin care.     Nursing ROS:   Nutrition Alteration  Diet Type: Patient's Preference  Skin  Skin Reaction: 1 - Faint erythema or dry desquamation  Skin Note: Using aquaphor           Gastrointestinal  Nausea: 0 - None     Psychosocial  Pyschosocial Note: tired at the end of the week  Pain Assessment  0-10 Pain Scale: 0      Objective:   Temp 98.6  F (37  C)   Wt 77.6 kg (171 lb)   BMI 29.35 kg/m    Gen: Appears well, in no acute distress  Skin: Mild diffuse erythema over the treated area.     Labs:  CBC RESULTS:   Recent Labs   Lab Test 10/23/19  0751   WBC 6.4   RBC 4.96   HGB 14.7   HCT 44.9   MCV 91   MCH 29.6   MCHC 32.7   RDW 14.1        ELECTROLYTES:  Recent Labs   Lab Test 10/18/17  0814      POTASSIUM 3.8   CHLORIDE 105   ILIANA 8.8   CO2 26   BUN 13   CR 0.84   *       Assessment:    Tolerating radiation therapy well.  All questions and concerns addressed.    Toxicities:  Fatigue: Grade 1: Fatigue relieved by  rest  Dermatitis: Grade 1: Faint erythema or dry desquamation    Plan:   1. Continue current therapy.        Mosaiq chart and setup information reviewed  Ports checked    Medication Review  Med Note: No changes per pt    Educational Topic Discussed  Education Instructions: Reviewed        Mary Brunson MD/PhD  [phone] clinic  Pager [phone]    Please do not send letter to referring physician.      Again, thank you for allowing me to participate in the care of your patient.      Sincerely,    Mary Brunson MD

## 2020-04-14 ENCOUNTER — APPOINTMENT (OUTPATIENT)
Dept: RADIATION ONCOLOGY | Facility: CLINIC | Age: 55
End: 2020-04-14
Attending: RADIOLOGY
Payer: COMMERCIAL

## 2020-04-14 PROCEDURE — 77412 RADIATION TX DELIVERY LVL 3: CPT | Performed by: RADIOLOGY

## 2020-04-14 PROCEDURE — 77387 GUIDANCE FOR RADJ TX DLVR: CPT | Performed by: RADIOLOGY

## 2020-04-15 ENCOUNTER — DOCUMENTATION ONLY (OUTPATIENT)
Dept: ONCOLOGY | Facility: CLINIC | Age: 55
End: 2020-04-15

## 2020-04-15 ENCOUNTER — APPOINTMENT (OUTPATIENT)
Dept: RADIATION ONCOLOGY | Facility: CLINIC | Age: 55
End: 2020-04-15
Attending: RADIOLOGY
Payer: COMMERCIAL

## 2020-04-15 PROCEDURE — 77412 RADIATION TX DELIVERY LVL 3: CPT | Performed by: RADIOLOGY

## 2020-04-15 PROCEDURE — 77387 GUIDANCE FOR RADJ TX DLVR: CPT | Performed by: RADIOLOGY

## 2020-04-15 NOTE — PROGRESS NOTES
Received request for notes from previous visit with provider.  LMN has been sent to Dr Smith.  When forms are completed, fax to [employer]@ [phone].  Yasemin Payne CMA ([facility])

## 2020-04-16 ENCOUNTER — TELEPHONE (OUTPATIENT)
Dept: ONCOLOGY | Facility: CLINIC | Age: 55
End: 2020-04-16

## 2020-04-16 ENCOUNTER — APPOINTMENT (OUTPATIENT)
Dept: RADIATION ONCOLOGY | Facility: CLINIC | Age: 55
End: 2020-04-16
Attending: RADIOLOGY
Payer: COMMERCIAL

## 2020-04-16 ENCOUNTER — PRE VISIT (OUTPATIENT)
Dept: ONCOLOGY | Facility: CLINIC | Age: 55
End: 2020-04-16

## 2020-04-16 PROCEDURE — 77387 GUIDANCE FOR RADJ TX DLVR: CPT | Performed by: RADIOLOGY

## 2020-04-16 PROCEDURE — 77412 RADIATION TX DELIVERY LVL 3: CPT | Performed by: RADIOLOGY

## 2020-04-16 PROCEDURE — 77336 RADIATION PHYSICS CONSULT: CPT | Performed by: RADIOLOGY

## 2020-04-16 NOTE — TELEPHONE ENCOUNTER
4/15/2020    I spoke to Leigh today regarding her visit with me tomorrow 4/16 at 9am and whether she would be open to switching it to a phone consult. Leigh stated that she would actually need to reschedule the visit due to a scheduling conflict. She also explained that she would be much more comfortable with a video or in person consult. As such, I offered to have my schedulers contact her to reschedule the visit as a video visit at a later date.    Leigh denied having any other questions at this time and was provided my contact information.    Samaria Gooden MS, [facility]  Licensed Genetic Counselor  Office: [phone]  Pager: [phone]

## 2020-04-17 ENCOUNTER — APPOINTMENT (OUTPATIENT)
Dept: RADIATION ONCOLOGY | Facility: CLINIC | Age: 55
End: 2020-04-17
Attending: RADIOLOGY
Payer: COMMERCIAL

## 2020-04-17 PROCEDURE — 77412 RADIATION TX DELIVERY LVL 3: CPT | Performed by: RADIOLOGY

## 2020-04-17 PROCEDURE — 77387 GUIDANCE FOR RADJ TX DLVR: CPT | Performed by: RADIOLOGY

## 2020-04-20 ENCOUNTER — OFFICE VISIT (OUTPATIENT)
Dept: RADIATION ONCOLOGY | Facility: CLINIC | Age: 55
End: 2020-04-20
Attending: RADIOLOGY
Payer: COMMERCIAL

## 2020-04-20 VITALS — BODY MASS INDEX: 29.44 KG/M2 | TEMPERATURE: 98.7 F | WEIGHT: 171.5 LBS

## 2020-04-20 DIAGNOSIS — Z17.0 MALIGNANT NEOPLASM OF UPPER-OUTER QUADRANT OF LEFT BREAST IN FEMALE, ESTROGEN RECEPTOR POSITIVE (H): Primary | ICD-10-CM

## 2020-04-20 DIAGNOSIS — C50.412 MALIGNANT NEOPLASM OF UPPER-OUTER QUADRANT OF LEFT BREAST IN FEMALE, ESTROGEN RECEPTOR POSITIVE (H): Primary | ICD-10-CM

## 2020-04-20 PROCEDURE — 77387 GUIDANCE FOR RADJ TX DLVR: CPT | Performed by: RADIOLOGY

## 2020-04-20 PROCEDURE — 77412 RADIATION TX DELIVERY LVL 3: CPT | Performed by: RADIOLOGY

## 2020-04-20 NOTE — PROGRESS NOTES
[facility] PHYSICIANS  SPECIALIZING IN BREAKTHROUGHS  Radiation Oncology    On Treatment Visit Note      Leigh Espinal      Date: 2020   MRN: [id number]   : 1965  Diagnosis: Breast Cancer      Reason for Visit:  On Radiation Treatment Visit     Treatment Summary to Date  Treatment Site: Lt chestwall and low ax Current Dose: 3060/5040 cGy Fractions:       Chemotherapy  Chemo concurrent with radx?: No    ED Visit/Hosiptal Admission: None     Treatment Breaks: None       Subjective:   Leigh is doing well.  She had a migraine attack last Friday, but otherwise feels her usual self.  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 She continues to apply Aquaphor twice daily for skin care.     Nursing ROS:   Nutrition Alteration  Diet Type: Patient's Preference  Skin  Skin Reaction: 1 - Faint erythema or dry desquamation  Skin Note: Using aquaphor           Gastrointestinal  Nausea: 0 - None     Psychosocial  Pyschosocial Note: tired at the end of the week  Pain Assessment  0-10 Pain Scale: 0      Objective:   Temp 98.7  F (37.1  C)   Wt 77.8 kg (171 lb 8 oz)   BMI 29.44 kg/m    Gen: Appears well, in no acute distress  Skin: Mild diffuse erythema over treatment field, a little bit worse on the lateral chest wall.    Labs:  CBC RESULTS:   Recent Labs   Lab Test 10/23/19  0751   WBC 6.4   RBC 4.96   HGB 14.7   HCT 44.9   MCV 91   MCH 29.6   MCHC 32.7   RDW 14.1        ELECTROLYTES:  Recent Labs   Lab Test 10/18/17  0814      POTASSIUM 3.8   CHLORIDE 105   ILIANA 8.8   CO2 26   BUN 13   CR 0.84   *       Assessment:    Tolerating radiation therapy well.  All questions and concerns addressed.    Toxicities:  Fatigue: Grade 0: No toxicity  Dermatitis: Grade 1: Faint erythema or dry desquamation    Plan:   1. Continue current therapy.        [employer] chart and setup information reviewed  Ports checked    Medication  Review  Med Note: No changes per pt    Educational Topic Discussed  Education Instructions: Reviewed        Mary Brunson MD/PhD  [phone] clinic  Pager [phone]    Please do not send letter to referring physician.

## 2020-04-20 NOTE — LETTER
2020       RE: Leigh Espinal  [address] [place] [address]     Dear Colleague,    Thank you for referring your patient, Leigh Espinal, to the RADIATION ONCOLOGY CLINIC. Please see a copy of my visit note below.    [facility] PHYSICIANS  SPECIALIZING IN BREAKTHROUGHS  Radiation Oncology    On Treatment Visit Note      Leigh Espinal      Date: 2020   MRN: [id number]   : 1965  Diagnosis: Breast Cancer      Reason for Visit:  On Radiation Treatment Visit     Treatment Summary to Date  Treatment Site: Lt chestwall and low ax Current Dose: 3060/5040 cGy Fractions:       Chemotherapy  Chemo concurrent with radx?: No    ED Visit/Hosiptal Admission: None     Treatment Breaks: None       Subjective:   Leigh is doing well.  She had a migraine attack last Friday, but otherwise feels her usual self.  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 She continues to apply Aquaphor twice daily for skin care.     Nursing ROS:   Nutrition Alteration  Diet Type: Patient's Preference  Skin  Skin Reaction: 1 - Faint erythema or dry desquamation  Skin Note: Using aquaphor           Gastrointestinal  Nausea: 0 - None     Psychosocial  Pyschosocial Note: tired at the end of the week  Pain Assessment  0-10 Pain Scale: 0      Objective:   Temp 98.7  F (37.1  C)   Wt 77.8 kg (171 lb 8 oz)   BMI 29.44 kg/m    Gen: Appears well, in no acute distress  Skin: Mild diffuse erythema over treatment field, a little bit worse on the lateral chest wall.    Labs:  CBC RESULTS:   Recent Labs   Lab Test 10/23/19  0751   WBC 6.4   RBC 4.96   HGB 14.7   HCT 44.9   MCV 91   MCH 29.6   MCHC 32.7   RDW 14.1        ELECTROLYTES:  Recent Labs   Lab Test 10/18/17  0814      POTASSIUM 3.8   CHLORIDE 105   ILIANA 8.8   CO2 26   BUN 13   CR 0.84   *       Assessment:    Tolerating radiation therapy well.  All questions and  concerns addressed.    Toxicities:  Fatigue: Grade 0: No toxicity  Dermatitis: Grade 1: Faint erythema or dry desquamation    Plan:   1. Continue current therapy.        Mosai[employer] chart and setup information reviewed  Ports checked    Medication Review  Med Note: No changes per pt    Educational Topic Discussed  Education Instructions: Reviewed        Mary Brunson MD/PhD  [phone] clinic  Pager [phone]    Please do not send letter to referring physician.      Again, thank you for allowing me to participate in the care of your patient.      Sincerely,    Mary Brunson MD

## 2020-04-20 NOTE — LETTER
Date:April 21, 2020      Provider requested that no letter be sent. Do not send.       [facility] Health Information

## 2020-04-21 ENCOUNTER — APPOINTMENT (OUTPATIENT)
Dept: RADIATION ONCOLOGY | Facility: CLINIC | Age: 55
End: 2020-04-21
Attending: RADIOLOGY
Payer: COMMERCIAL

## 2020-04-21 PROCEDURE — 77387 GUIDANCE FOR RADJ TX DLVR: CPT | Performed by: RADIOLOGY

## 2020-04-21 PROCEDURE — 77412 RADIATION TX DELIVERY LVL 3: CPT | Performed by: RADIOLOGY

## 2020-04-22 ENCOUNTER — APPOINTMENT (OUTPATIENT)
Dept: RADIATION ONCOLOGY | Facility: CLINIC | Age: 55
End: 2020-04-22
Attending: RADIOLOGY
Payer: COMMERCIAL

## 2020-04-22 PROCEDURE — 77387 GUIDANCE FOR RADJ TX DLVR: CPT | Performed by: RADIOLOGY

## 2020-04-22 PROCEDURE — 77412 RADIATION TX DELIVERY LVL 3: CPT | Performed by: RADIOLOGY

## 2020-04-23 ENCOUNTER — APPOINTMENT (OUTPATIENT)
Dept: RADIATION ONCOLOGY | Facility: CLINIC | Age: 55
End: 2020-04-23
Attending: RADIOLOGY
Payer: COMMERCIAL

## 2020-04-23 PROCEDURE — 77336 RADIATION PHYSICS CONSULT: CPT | Performed by: RADIOLOGY

## 2020-04-23 PROCEDURE — 77412 RADIATION TX DELIVERY LVL 3: CPT | Performed by: RADIOLOGY

## 2020-04-23 PROCEDURE — 77387 GUIDANCE FOR RADJ TX DLVR: CPT | Performed by: RADIOLOGY

## 2020-04-24 ENCOUNTER — APPOINTMENT (OUTPATIENT)
Dept: RADIATION ONCOLOGY | Facility: CLINIC | Age: 55
End: 2020-04-24
Attending: RADIOLOGY
Payer: COMMERCIAL

## 2020-04-24 PROCEDURE — 77387 GUIDANCE FOR RADJ TX DLVR: CPT | Performed by: RADIOLOGY

## 2020-04-24 PROCEDURE — 77412 RADIATION TX DELIVERY LVL 3: CPT | Performed by: RADIOLOGY

## 2020-04-24 NOTE — PROGRESS NOTES
Requested notes and letter faxed to [employer] at [phone].     Successful transmission verified by [employer].     Merle Melvin, CMA

## 2020-04-27 ENCOUNTER — OFFICE VISIT (OUTPATIENT)
Dept: RADIATION ONCOLOGY | Facility: CLINIC | Age: 55
End: 2020-04-27
Attending: RADIOLOGY
Payer: COMMERCIAL

## 2020-04-27 ENCOUNTER — TELEPHONE (OUTPATIENT)
Dept: CARE COORDINATION | Facility: CLINIC | Age: 55
End: 2020-04-27

## 2020-04-27 VITALS — WEIGHT: 171 LBS | BODY MASS INDEX: 29.35 KG/M2

## 2020-04-27 DIAGNOSIS — C50.412 MALIGNANT NEOPLASM OF UPPER-OUTER QUADRANT OF LEFT BREAST IN FEMALE, ESTROGEN RECEPTOR POSITIVE (H): Primary | ICD-10-CM

## 2020-04-27 DIAGNOSIS — Z17.0 MALIGNANT NEOPLASM OF UPPER-OUTER QUADRANT OF LEFT BREAST IN FEMALE, ESTROGEN RECEPTOR POSITIVE (H): Primary | ICD-10-CM

## 2020-04-27 PROCEDURE — 77412 RADIATION TX DELIVERY LVL 3: CPT | Performed by: RADIOLOGY

## 2020-04-27 PROCEDURE — 77387 GUIDANCE FOR RADJ TX DLVR: CPT | Performed by: RADIOLOGY

## 2020-04-27 NOTE — LETTER
Date:April 28, 2020      Provider requested that no letter be sent. Do not send.       [facility] Health Information

## 2020-04-27 NOTE — PROGRESS NOTES
[facility] PHYSICIANS  SPECIALIZING IN BREAKTHROUGHS  Radiation Oncology    On Treatment Visit Note      Leigh Espinal      Date: 2020   MRN: [id number]   : 1965  Diagnosis: Breast Cancer      Reason for Visit:  On Radiation Treatment Visit     Treatment Summary to Date  Treatment Site: Lt chestwall and low ax Current Dose: 3060/5040 cGy Fractions:       Chemotherapy  Chemo concurrent with radx?: No    ED Visit/Hosiptal Admission: None     Treatment Breaks: None      Subjective:   Leigh continues to do well.  She denies fatigue.  She has minimal pain at the treated site.  She has been using Aquaphor for skin care, but additionally also using Corn Starch to keep the skin dry.      Nursing ROS:   Nutrition Alteration  Diet Type: Patient's Preference  Skin  Skin Reaction: 2 - Moderate to brisk erythema, patchy moist desquamation, mostly confined to skin folds and creases, moderate edema  Skin Note: Using aquaphor. started using cornstarch under the breast last week.            Gastrointestinal  Nausea: 0 - None     Psychosocial  Pyschosocial Note: tired at the end of the week  Pain Assessment  0-10 Pain Scale: 0      Objective:   Wt 77.6 kg (171 lb)   BMI 29.35 kg/m    Gen: Appears well, in no acute distress  Skin: Brisk erythema of the skin over treated area, without overt breakdown.     Labs:  CBC RESULTS:   Recent Labs   Lab Test 10/23/19  0751   WBC 6.4   RBC 4.96   HGB 14.7   HCT 44.9   MCV 91   MCH 29.6   MCHC 32.7   RDW 14.1        ELECTROLYTES:  Recent Labs   Lab Test 10/18/17  0814      POTASSIUM 3.8   CHLORIDE 105   ILIANA 8.8   CO2 26   BUN 13   CR 0.84   *       Assessment:    Tolerating radiation therapy well.  All questions and concerns addressed.    Toxicities:  Fatigue: Grade 0: No toxicity  Dermatitis: Grade 2: Moderate to brisk erythema; patchy moist desquamation, mostly confined to skin folds and creases; moderate erythema    Plan:    1. Continue current therapy.    2. Advised her to discontinue Corn Starch.  Mepilex dressing samples given.       [employer] chart and setup information reviewed  Ports checked    Medication Review  Med Note: No changes per pt    Educational Topic Discussed  Education Instructions: Reviewed        Mary Brunson MD/PhD  [phone] clinic  Pager [phone]    Please do not send letter to referring physician.

## 2020-04-27 NOTE — LETTER
2020       RE: Leigh Espinal  [address] [place] [address]     Dear Colleague,    Thank you for referring your patient, Leigh Espinal, to the RADIATION ONCOLOGY CLINIC. Please see a copy of my visit note below.    [facility] PHYSICIANS  SPECIALIZING IN BREAKTHROUGHS  Radiation Oncology    On Treatment Visit Note      Leigh Espinal      Date: 2020   MRN: [id number]   : 1965  Diagnosis: Breast Cancer      Reason for Visit:  On Radiation Treatment Visit     Treatment Summary to Date  Treatment Site: Lt chestwall and low ax Current Dose: 3060/5040 cGy Fractions:       Chemotherapy  Chemo concurrent with radx?: No    ED Visit/Hosiptal Admission: None     Treatment Breaks: None      Subjective:   Leigh continues to do well.  She denies fatigue.  She has minimal pain at the treated site.  She has been using Aquaphor for skin care, but additionally also using Corn Starch to keep the skin dry.      Nursing ROS:   Nutrition Alteration  Diet Type: Patient's Preference  Skin  Skin Reaction: 2 - Moderate to brisk erythema, patchy moist desquamation, mostly confined to skin folds and creases, moderate edema  Skin Note: Using aquaphor. started using cornstarch under the breast last week.            Gastrointestinal  Nausea: 0 - None     Psychosocial  Pyschosocial Note: tired at the end of the week  Pain Assessment  0-10 Pain Scale: 0      Objective:   Wt 77.6 kg (171 lb)   BMI 29.35 kg/m    Gen: Appears well, in no acute distress  Skin: Brisk erythema of the skin over treated area, without overt breakdown.     Labs:  CBC RESULTS:   Recent Labs   Lab Test 10/23/19  0751   WBC 6.4   RBC 4.96   HGB 14.7   HCT 44.9   MCV 91   MCH 29.6   MCHC 32.7   RDW 14.1        ELECTROLYTES:  Recent Labs   Lab Test 10/18/17  0814      POTASSIUM 3.8   CHLORIDE 105   ILIANA 8.8   CO2 26   BUN 13   CR 0.84   *       Assessment:    Tolerating radiation therapy  well.  All questions and concerns addressed.    Toxicities:  Fatigue: Grade 0: No toxicity  Dermatitis: Grade 2: Moderate to brisk erythema; patchy moist desquamation, mostly confined to skin folds and creases; moderate erythema    Plan:   1. Continue current therapy.    2. Advised her to discontinue Corn Starch.  Mepilex dressing samples given.       [employer] chart and setup information reviewed  Ports checked    Medication Review  Med Note: No changes per pt    Educational Topic Discussed  Education Instructions: Reviewed        Mary Brunson MD/PhD  [phone] clinic  Pager [phone]    Please do not send letter to referring physician.      Again, thank you for allowing me to participate in the care of your patient.      Sincerely,    Mary Brunson MD

## 2020-04-27 NOTE — TELEPHONE ENCOUNTER
Pt indicated having questions about distribution of [employer] michelle.  Contact information for AF provided to patient to follow up.  No other needs reported.    Soo Yeon Han, MSW, [facility] Center[facility]  Pager: [phone]  Phone: [phone]

## 2020-04-28 ENCOUNTER — APPOINTMENT (OUTPATIENT)
Dept: RADIATION ONCOLOGY | Facility: CLINIC | Age: 55
End: 2020-04-28
Attending: RADIOLOGY
Payer: COMMERCIAL

## 2020-04-28 PROCEDURE — 77412 RADIATION TX DELIVERY LVL 3: CPT | Performed by: RADIOLOGY

## 2020-04-28 PROCEDURE — 77387 GUIDANCE FOR RADJ TX DLVR: CPT | Performed by: RADIOLOGY

## 2020-04-29 ENCOUNTER — APPOINTMENT (OUTPATIENT)
Dept: RADIATION ONCOLOGY | Facility: CLINIC | Age: 55
End: 2020-04-29
Attending: RADIOLOGY
Payer: COMMERCIAL

## 2020-04-29 PROCEDURE — 77412 RADIATION TX DELIVERY LVL 3: CPT | Performed by: RADIOLOGY

## 2020-04-29 PROCEDURE — 77387 GUIDANCE FOR RADJ TX DLVR: CPT | Performed by: RADIOLOGY

## 2020-04-30 ENCOUNTER — APPOINTMENT (OUTPATIENT)
Dept: RADIATION ONCOLOGY | Facility: CLINIC | Age: 55
End: 2020-04-30
Attending: RADIOLOGY
Payer: COMMERCIAL

## 2020-04-30 PROCEDURE — 77336 RADIATION PHYSICS CONSULT: CPT | Performed by: RADIOLOGY

## 2020-04-30 PROCEDURE — 77412 RADIATION TX DELIVERY LVL 3: CPT | Performed by: RADIOLOGY

## 2020-04-30 PROCEDURE — 77387 GUIDANCE FOR RADJ TX DLVR: CPT | Performed by: RADIOLOGY

## 2020-05-01 ENCOUNTER — ANCILLARY PROCEDURE (OUTPATIENT)
Dept: BONE DENSITY | Facility: CLINIC | Age: 55
End: 2020-05-01
Attending: INTERNAL MEDICINE
Payer: COMMERCIAL

## 2020-05-01 ENCOUNTER — APPOINTMENT (OUTPATIENT)
Dept: RADIATION ONCOLOGY | Facility: CLINIC | Age: 55
End: 2020-05-01
Attending: RADIOLOGY
Payer: COMMERCIAL

## 2020-05-01 DIAGNOSIS — Z17.0 MALIGNANT NEOPLASM OF UPPER-INNER QUADRANT OF RIGHT BREAST IN FEMALE, ESTROGEN RECEPTOR POSITIVE (H): ICD-10-CM

## 2020-05-01 DIAGNOSIS — C50.412 MALIGNANT NEOPLASM OF UPPER-OUTER QUADRANT OF LEFT BREAST IN FEMALE, ESTROGEN RECEPTOR POSITIVE (H): ICD-10-CM

## 2020-05-01 DIAGNOSIS — Z17.0 MALIGNANT NEOPLASM OF UPPER-OUTER QUADRANT OF LEFT BREAST IN FEMALE, ESTROGEN RECEPTOR POSITIVE (H): ICD-10-CM

## 2020-05-01 DIAGNOSIS — C50.211 MALIGNANT NEOPLASM OF UPPER-INNER QUADRANT OF RIGHT BREAST IN FEMALE, ESTROGEN RECEPTOR POSITIVE (H): ICD-10-CM

## 2020-05-01 PROCEDURE — 77412 RADIATION TX DELIVERY LVL 3: CPT | Performed by: RADIOLOGY

## 2020-05-01 PROCEDURE — 77387 GUIDANCE FOR RADJ TX DLVR: CPT | Performed by: RADIOLOGY

## 2020-05-04 ENCOUNTER — OFFICE VISIT (OUTPATIENT)
Dept: RADIATION ONCOLOGY | Facility: CLINIC | Age: 55
End: 2020-05-04
Attending: RADIOLOGY
Payer: COMMERCIAL

## 2020-05-04 VITALS — WEIGHT: 174 LBS | BODY MASS INDEX: 29.87 KG/M2

## 2020-05-04 DIAGNOSIS — C50.412 MALIGNANT NEOPLASM OF UPPER-OUTER QUADRANT OF LEFT BREAST IN FEMALE, ESTROGEN RECEPTOR POSITIVE (H): Primary | ICD-10-CM

## 2020-05-04 DIAGNOSIS — Z17.0 MALIGNANT NEOPLASM OF UPPER-OUTER QUADRANT OF LEFT BREAST IN FEMALE, ESTROGEN RECEPTOR POSITIVE (H): Primary | ICD-10-CM

## 2020-05-04 PROCEDURE — 77412 RADIATION TX DELIVERY LVL 3: CPT | Performed by: RADIOLOGY

## 2020-05-04 PROCEDURE — 77387 GUIDANCE FOR RADJ TX DLVR: CPT | Performed by: RADIOLOGY

## 2020-05-04 NOTE — PROGRESS NOTES
[facility] PHYSICIANS  SPECIALIZING IN BREAKTHROUGHS  Radiation Oncology    On Treatment Visit Note      Leigh Espinal      Date: 2020   MRN: [id number]   : 1965  Diagnosis: Breast cancer      Reason for Visit:  On Radiation Treatment Visit     Treatment Summary to Date  Treatment Site: Lt chestwall and low ax Current Dose: 4860/5040 cGy Fractions:            ED Visit/Hosiptal Admission: None    Treatment Breaks: No none      Subjective:   Leigh is nearly complete with radiation therapy.  She has only one more fraction to go.  She wonders if she can use Aloe Vera for skin care.  She otherwise has no complaints or concerns.          Objective:   Wt 78.9 kg (174 lb)   BMI 29.87 kg/m    Gen: Appears well, in no acute distress  Skin: Brisk erythema of the left chest wall.  Small area of desquamation along the mastectomy scar.     Labs:  CBC RESULTS:   Recent Labs   Lab Test 10/23/19  0751   WBC 6.4   RBC 4.96   HGB 14.7   HCT 44.9   MCV 91   MCH 29.6   MCHC 32.7   RDW 14.1        ELECTROLYTES:  Recent Labs   Lab Test 10/18/17  0814      POTASSIUM 3.8   CHLORIDE 105   ILIANA 8.8   CO2 26   BUN 13   CR 0.84   *       Assessment:    Tolerating radiation therapy well.  All questions and concerns addressed.    Toxicities:  Fatigue: Grade 1: Fatigue relieved by rest  Pain: Grade 1: Mild pain  Dermatitis: Grade 2: Moderate to brisk erythema; patchy moist desquamation, mostly confined to skin folds and creases; moderate erythema    Plan:   1. Continue current therapy.  Will finish treatment tomorrow.  2. She can alternate Aloe and Aquaphor for skin care.   3. Follow-up in 1 month, likely via telemedicine.       [employer] chart and setup information reviewed  Ports checked                  Mary Brunson MD/PhD  [phone] clinic  Pager [phone]    Please do not send letter to referring physician.

## 2020-05-04 NOTE — LETTER
Date:May 5, 2020      Provider requested that no letter be sent. Do not send.       [facility] Health Information

## 2020-05-04 NOTE — LETTER
2020       RE: Leigh Espinal  [address] [place] [address]     Dear Colleague,    Thank you for referring your patient, Leigh Espinal, to the RADIATION ONCOLOGY CLINIC. Please see a copy of my visit note below.    [facility] PHYSICIANS  SPECIALIZING IN BREAKTHROUGHS  Radiation Oncology    On Treatment Visit Note      Leigh Espinal      Date: 2020   MRN: [id number]   : 1965  Diagnosis: Breast cancer      Reason for Visit:  On Radiation Treatment Visit     Treatment Summary to Date  Treatment Site: Lt chestwall and low ax Current Dose: 4860/5040 cGy Fractions:            ED Visit/Hosiptal Admission: None    Treatment Breaks: No none      Subjective:   Leigh is nearly complete with radiation therapy.  She has only one more fraction to go.  She wonders if she can use Aloe Vera for skin care.  She otherwise has no complaints or concerns.          Objective:   Wt 78.9 kg (174 lb)   BMI 29.87 kg/m    Gen: Appears well, in no acute distress  Skin: Brisk erythema of the left chest wall.  Small area of desquamation along the mastectomy scar.     Labs:  CBC RESULTS:   Recent Labs   Lab Test 10/23/19  0751   WBC 6.4   RBC 4.96   HGB 14.7   HCT 44.9   MCV 91   MCH 29.6   MCHC 32.7   RDW 14.1        ELECTROLYTES:  Recent Labs   Lab Test 10/18/17  0814      POTASSIUM 3.8   CHLORIDE 105   ILIANA 8.8   CO2 26   BUN 13   CR 0.84   *       Assessment:    Tolerating radiation therapy well.  All questions and concerns addressed.    Toxicities:  Fatigue: Grade 1: Fatigue relieved by rest  Pain: Grade 1: Mild pain  Dermatitis: Grade 2: Moderate to brisk erythema; patchy moist desquamation, mostly confined to skin folds and creases; moderate erythema    Plan:   1. Continue current therapy.  Will finish treatment tomorrow.  2. She can alternate Aloe and Aquaphor for skin care.   3. Follow-up in 1 month, likely via telemedicine.       [employer] chart and  setup information reviewed  Ports checked                  Mary Brunson MD/PhD  [phone] clinic  Pager [phone]    Please do not send letter to referring physician.      Again, thank you for allowing me to participate in the care of your patient.      Sincerely,    Mary Brunson MD

## 2020-05-05 ENCOUNTER — APPOINTMENT (OUTPATIENT)
Dept: RADIATION ONCOLOGY | Facility: CLINIC | Age: 55
End: 2020-05-05
Attending: RADIOLOGY
Payer: COMMERCIAL

## 2020-05-05 PROCEDURE — 77412 RADIATION TX DELIVERY LVL 3: CPT | Performed by: RADIOLOGY

## 2020-05-05 PROCEDURE — 77336 RADIATION PHYSICS CONSULT: CPT | Performed by: RADIOLOGY

## 2020-05-05 PROCEDURE — 77387 GUIDANCE FOR RADJ TX DLVR: CPT | Performed by: RADIOLOGY

## 2020-05-13 ENCOUNTER — TELEPHONE (OUTPATIENT)
Dept: CARE COORDINATION | Facility: CLINIC | Age: 55
End: 2020-05-13

## 2020-05-13 NOTE — TELEPHONE ENCOUNTER
Social Work Note: Telephone Call  Oncology Clinic    Data/Intervention:  Patient Name:  Leigh Espinal  /Age:  1965 (54 year old)    Call From:  SW received call from patient   Reason for Call:  Request for application    Assessment:  Pt called to request application for Financial Cancer Care  program through [employer].  Pt expressed stress regarding finances related to possible copays and bills for her anticipated future treatments.  SW assisted in completing application and submitted packet to program. [employer] will follow up with patient directly/    Plan:  KASHMIR continues to be available to assist.     Soo Yeon Han, MSW, [facility] Hospital[facility]  Pager: [phone]  Phone: [phone]

## 2020-05-21 ENCOUNTER — VIRTUAL VISIT (OUTPATIENT)
Dept: ONCOLOGY | Facility: CLINIC | Age: 55
End: 2020-05-21
Attending: SURGERY
Payer: COMMERCIAL

## 2020-05-21 DIAGNOSIS — Z15.89 GENE MUTATION: ICD-10-CM

## 2020-05-21 DIAGNOSIS — C50.211 MALIGNANT NEOPLASM OF UPPER-INNER QUADRANT OF RIGHT BREAST IN FEMALE, ESTROGEN RECEPTOR POSITIVE (H): ICD-10-CM

## 2020-05-21 DIAGNOSIS — Z17.0 MALIGNANT NEOPLASM OF UPPER-OUTER QUADRANT OF LEFT BREAST IN FEMALE, ESTROGEN RECEPTOR POSITIVE (H): Primary | ICD-10-CM

## 2020-05-21 DIAGNOSIS — Z15.89 MONOALLELIC MUTATION OF CHEK2 GENE IN FEMALE PATIENT: ICD-10-CM

## 2020-05-21 DIAGNOSIS — Z15.09 MONOALLELIC MUTATION OF CHEK2 GENE IN FEMALE PATIENT: ICD-10-CM

## 2020-05-21 DIAGNOSIS — Z15.02 MONOALLELIC MUTATION OF CHEK2 GENE IN FEMALE PATIENT: ICD-10-CM

## 2020-05-21 DIAGNOSIS — Z15.01 MONOALLELIC MUTATION OF CHEK2 GENE IN FEMALE PATIENT: ICD-10-CM

## 2020-05-21 DIAGNOSIS — C50.412 MALIGNANT NEOPLASM OF UPPER-OUTER QUADRANT OF LEFT BREAST IN FEMALE, ESTROGEN RECEPTOR POSITIVE (H): Primary | ICD-10-CM

## 2020-05-21 DIAGNOSIS — Z17.0 MALIGNANT NEOPLASM OF UPPER-INNER QUADRANT OF RIGHT BREAST IN FEMALE, ESTROGEN RECEPTOR POSITIVE (H): ICD-10-CM

## 2020-05-21 NOTE — PROGRESS NOTES
"Leigh Espinal is a 55 year old female who is being evaluated via a billable video visit.      The patient has been notified of following:     \"This video visit will be conducted via a call between you and your physician/provider. We have found that certain health care needs can be provided without the need for an in-person physical exam.  This service lets us provide the care you need with a video conversation.  If a prescription is necessary we can send it directly to your pharmacy.  If lab work is needed we can place an order for that and you can then stop by our lab to have the test done at a later time.    Video visits are billed at different rates depending on your insurance coverage.  Please reach out to your insurance provider with any questions.    If during the course of the call the physician/provider feels a video visit is not appropriate, you will not be charged for this service.\"    Patient has given verbal consent for Video visit? Yes    How would you like to obtain your AVS? Cali    Patient would like the video invitation sent by: Send to e-mail at: [email or web address]    Will anyone else be joining your video visit? No        I have reviewed and updated the patient's allergies and medication list.    Concerns: No  Refills: No    Secondary Video Option (Doximity), send text message to:     Edwige Judge LPN      Video-Visit Details    Type of service:  Video Visit    Video Start Time: 8:36 AM  Video End Time: 8:56 AM    Originating Location (pt. Location): Home    Distant Location (provider location):  [facility]     Platform used for Video Visit: [employer]    ---    The above were completed by the medical assistant for the visit.    FOLLOW-UP  May 21, 2020    Leigh Espinal is a 55 year old female who is videoing in for follow-up for     Cancer Staging  Malignant neoplasm of upper-inner quadrant of right breast in female, estrogen receptor positive (H)  Staging form: Breast, " AJCC 8th Edition  - Pathologic stage from 2/25/2020: Stage IA (pT2, pN0, cM0, G2, ER+, OH+, HER2-) - Signed by Nicolle Shepherd MD on 2/25/2020    Malignant neoplasm of upper-outer quadrant of left breast in female, estrogen receptor positive (H)  Staging form: Breast, AJCC 8th Edition  - Clinical: Stage IB (cT2, cN0, cM0, G2, ER+, OH+, HER2-) - Signed by Paz Smith MD on 12/5/2019  - Pathologic stage from 2/25/2020: Stage IA (pT1c, pN1mi, cM0, G2, ER+, OH+, HER2-, Oncotype DX score: 10) - Signed by Nicolle Shepherd MD on 2/25/2020    Treatment to date:  1. Genetic testing (12/5/2019) - deleterious CHEK2 mutation identified  2. Bilateral skin-sparing mastectomy and bilateral axillary sentinel lymph node biopsy (1/29/2020)  3. Oncotype DX (left breast cancer) 10  4. Oncotype DX (right breast cancer) 18  5. Adjuvant radiation (3/27/2020 to 5/5/2020)    HPI:    Since her last visit, she has completed adjuvant radiation therapy.  She tolerated it well. She did have some peeling.  She is feeling better day by day.  She denies any changes to the skin flaps or masses in the axilla. She has great range of motion bilaterally and denies any upper extremity swelling.    She did have to have the expanders removed due to infection; she is planning on autologous reconstruction.    Physical Exam  Constitutional:       Appearance: Normal appearance. She is well-developed.      Comments: The rest of a comprehensive physical examination is deferred due to public health emergency video visit restrictions.   Pulmonary:      Effort: No respiratory distress.   Neurological:      Mental Status: She is alert and oriented to person, place, and time.          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    She is doing well.  We discussed that typically at a breast cancer follow up appointment, a physical exam is performed. Given that she has no concerns and we are minimizing face-to-face visits during the COVID-19 pandemic, the physical exam is deferred today.  I will see her in follow up in 3 months. We reviewed concerning signs and symptoms for recurrence and she knows to call to be seen if she develops any of them.     She asked about oophrectomy, as it was mentioned as part of options regarding endocrine therapy previously.  She would like to meet with a gyn oncologist to further discuss; a referral will be placed.    All of the above was discussed and all questions were answered.    PLAN:  1. Follow up with me in 3 months  2. Follow up with Dr Swann as scheduled  3. No routine screening imaging indicated  4. Referral to GYN Onc re: oophorectomy per patient request    Paz Smith MD MSc [facility] FACS    Division of Surgical Oncology  [facility]

## 2020-05-27 ENCOUNTER — VIRTUAL VISIT (OUTPATIENT)
Dept: PLASTIC SURGERY | Facility: CLINIC | Age: 55
End: 2020-05-27
Payer: COMMERCIAL

## 2020-05-27 ENCOUNTER — ONCOLOGY VISIT (OUTPATIENT)
Dept: RADIATION ONCOLOGY | Facility: CLINIC | Age: 55
End: 2020-05-27

## 2020-05-27 VITALS — HEIGHT: 64 IN | WEIGHT: 174 LBS | BODY MASS INDEX: 29.71 KG/M2

## 2020-05-27 DIAGNOSIS — Z98.890 S/P BREAST RECONSTRUCTION, BILATERAL: Primary | ICD-10-CM

## 2020-05-27 RX ORDER — CEFAZOLIN SODIUM 1 G/50ML
1 INJECTION, SOLUTION INTRAVENOUS SEE ADMIN INSTRUCTIONS
Status: CANCELLED | OUTPATIENT
Start: 2020-05-27

## 2020-05-27 RX ORDER — CEFAZOLIN SODIUM 2 G/50ML
2 SOLUTION INTRAVENOUS
Status: CANCELLED | OUTPATIENT
Start: 2020-05-27

## 2020-05-27 ASSESSMENT — PAIN SCALES - GENERAL: PAINLEVEL: NO PAIN (0)

## 2020-05-27 ASSESSMENT — MIFFLIN-ST. JEOR: SCORE: 1379.26

## 2020-05-27 NOTE — NURSING NOTE
"Chief Complaint   Patient presents with     RECHECK     virtual follow up       Vitals:    05/27/20 0952   Weight: 78.9 kg (174 lb)   Height: 1.626 m (5' 4\")       Body mass index is 29.87 kg/m .      MARIBETH Olmos NREMT                      "

## 2020-05-27 NOTE — PROGRESS NOTES
"Leigh Espinal is a 55 year old female who is being evaluated via a billable video visit.      The patient has been notified of following:     \"This video visit will be conducted via a call between you and your physician/provider. We have found that certain health care needs can be provided without the need for an in-person physical exam.  This service lets us provide the care you need with a video conversation.  If a prescription is necessary we can send it directly to your pharmacy.  If lab work is needed we can place an order for that and you can then stop by our lab to have the test done at a later time.    Video visits are billed at different rates depending on your insurance coverage.  Please reach out to your insurance provider with any questions.    If during the course of the call the physician/provider feels a video visit is not appropriate, you will not be charged for this service.\"    Patient has given verbal consent for Video visit? Yes    How would you like to obtain your AVS? Cali    Patient would like the video invitation sent by: Text to cell phone: [phone]    Will anyone else be joining your video visit? No      POSTOPERATIVE VISIT NOTE      PRESENTING COMPLAINT:  Left-sided breast cancer, underwent bilateral nipple non-sparing mastectomy followed by expander placements followed by ischemic issues requiring both expanders to be removed, last surgery done 02/14/2020.      HISTORY OF PRESENTING COMPLAINT:  Ms. Espinal is 55 years old.  She is here for regular postoperative visit.  She finished her radiation therapy about a couple weeks ago.  No major issues.  Now here to discuss next stages of reconstruction.      ASSESSMENT AND PLAN:  Based on above findings, a diagnosis of left breast cancer, underwent bilateral mastectomy and expander placements followed by removal of expander due to ischemic issues, then underwent radiation therapy after she healed and now has completed her " radiation therapy.  She will undergo bilateral breast reconstruction in a delayed fashion using MIKAL flaps.  I discussed with the patient that we should wait at least 3 months and then proceed as long as the ORs are open to proceed with the elective cases.  I will place the orders and then once she is able to be scheduled, she will be and then we will see her back in a preoperative visit to go over details.  She understood the plan.  All questions were answered.  She was happy with the visit.           Video-Visit Details    Type of service:  Video Visit    Video Start Time: 1015  Video End Time: 1030    Originating Location (pt. Location): Home    Distant Location (provider location):  [facility] PLASTIC AND RECONSTRUCTIVE SURGERY     Platform used for Video Visit: Ivana Chatterjee MD

## 2020-05-27 NOTE — LETTER
"5/27/2020     RE: Leigh Espinal  [address] [place] [address]     Dear Colleague,    Thank you for referring your patient, Leigh Espinal, to the [facility] PLASTIC AND RECONSTRUCTIVE SURGERY at [facility]. Please see a copy of my visit note below.    Leigh Espinal is a 55 year old female who is being evaluated via a billable video visit.      The patient has been notified of following:     \"This video visit will be conducted via a call between you and your physician/provider. We have found that certain health care needs can be provided without the need for an in-person physical exam.  This service lets us provide the care you need with a video conversation.  If a prescription is necessary we can send it directly to your pharmacy.  If lab work is needed we can place an order for that and you can then stop by our lab to have the test done at a later time.    Video visits are billed at different rates depending on your insurance coverage.  Please reach out to your insurance provider with any questions.    If during the course of the call the physician/provider feels a video visit is not appropriate, you will not be charged for this service.\"    Patient has given verbal consent for Video visit? Yes    How would you like to obtain your AVS? Cali    Patient would like the video invitation sent by: Text to cell phone: [phone]    Will anyone else be joining your video visit? No      POSTOPERATIVE VISIT NOTE      PRESENTING COMPLAINT:  Left-sided breast cancer, underwent bilateral nipple non-sparing mastectomy followed by expander placements followed by ischemic issues requiring both expanders to be removed, last surgery done 02/14/2020.      HISTORY OF PRESENTING COMPLAINT:  Ms. Espinal is 55 years old.  She is here for regular postoperative visit.  She finished her radiation therapy about a couple weeks ago.  No major issues.  Now here to " discuss next stages of reconstruction.      ASSESSMENT AND PLAN:  Based on above findings, a diagnosis of left breast cancer, underwent bilateral mastectomy and expander placements followed by removal of expander due to ischemic issues, then underwent radiation therapy after she healed and now has completed her radiation therapy.  She will undergo bilateral breast reconstruction in a delayed fashion using MIKAL flaps.  I discussed with the patient that we should wait at least 3 months and then proceed as long as the ORs are open to proceed with the elective cases.  I will place the orders and then once she is able to be scheduled, she will be and then we will see her back in a preoperative visit to go over details.  She understood the plan.  All questions were answered.  She was happy with the visit.           Video-Visit Details    Type of service:  Video Visit    Video Start Time: 1015  Video End Time: 1030    Originating Location (pt. Location): Home    Distant Location (provider location):  [facility] PLASTIC AND RECONSTRUCTIVE SURGERY     Platform used for Video Visit: Ivana Chatterjee MD

## 2020-05-28 ENCOUNTER — VIRTUAL VISIT (OUTPATIENT)
Dept: ONCOLOGY | Facility: CLINIC | Age: 55
End: 2020-05-28
Attending: SURGERY
Payer: COMMERCIAL

## 2020-05-28 DIAGNOSIS — N92.0 MENORRHAGIA WITH REGULAR CYCLE: ICD-10-CM

## 2020-05-28 DIAGNOSIS — Z15.89 MONOALLELIC MUTATION OF CHEK2 GENE IN FEMALE PATIENT: ICD-10-CM

## 2020-05-28 DIAGNOSIS — Z17.0 MALIGNANT NEOPLASM OF UPPER-OUTER QUADRANT OF LEFT BREAST IN FEMALE, ESTROGEN RECEPTOR POSITIVE (H): Primary | ICD-10-CM

## 2020-05-28 DIAGNOSIS — Z15.01 MONOALLELIC MUTATION OF CHEK2 GENE IN FEMALE PATIENT: ICD-10-CM

## 2020-05-28 DIAGNOSIS — C50.412 MALIGNANT NEOPLASM OF UPPER-OUTER QUADRANT OF LEFT BREAST IN FEMALE, ESTROGEN RECEPTOR POSITIVE (H): Primary | ICD-10-CM

## 2020-05-28 DIAGNOSIS — Z15.02 MONOALLELIC MUTATION OF CHEK2 GENE IN FEMALE PATIENT: ICD-10-CM

## 2020-05-28 DIAGNOSIS — Z15.09 MONOALLELIC MUTATION OF CHEK2 GENE IN FEMALE PATIENT: ICD-10-CM

## 2020-05-28 PROCEDURE — 99204 OFFICE O/P NEW MOD 45 MIN: CPT | Mod: GT | Performed by: OBSTETRICS & GYNECOLOGY

## 2020-05-28 NOTE — LETTER
"    2020         RE: Leigh Espinal  [address] [place] [address]        Dear Colleague,    Thank you for referring your patient, Leigh Espinal, to the [facility]. Please see a copy of my visit note below.    Leigh Espinal is a 55 year old female who is being evaluated via a billable video visit.      The patient has been notified of following:     \"This video visit will be conducted via a call between you and your physician/provider. We have found that certain health care needs can be provided without the need for an in-person physical exam.  This service lets us provide the care you need with a video conversation.  If a prescription is necessary we can send it directly to your pharmacy.  If lab work is needed we can place an order for that and you can then stop by our lab to have the test done at a later time.    Video visits are billed at different rates depending on your insurance coverage.  Please reach out to your insurance provider with any questions.    If during the course of the call the physician/provider feels a video visit is not appropriate, you will not be charged for this service.\"    Patient has given verbal consent for Video visit? Yes    How would you like to obtain your AVS? MyChart    Patient would like the video invitation sent by: Send to e-mail at: [email or web address]    Will anyone else be joining your video visit? No          Secondary Video Option (Doximity), send text message to:  [phone]    I have reviewed and updated the patient's allergies and medication list.    Concerns: Patient has no new concerns.      Refills: None      JAYJAY Mesa                    GYNECOLOGIC  ONCOLOGY CONSULT    Referring provider:    Paz Smith MD  [address] [facility] [address]  [place] [address]   RE: Leigh Espinal  : 1965  JONO: 2020    CC:     HPI: Ms Leigh Espinal is a 55 year old female who presents " for consultation regarding bilateral breast cancer, ER/GA+. She is considering oophorectomy as part of her breast cancer long term management. Patient has deleterious CHEK2 mutation.    Her past medical history is notable Hereditary hemorrhagic telangiectasia (HHT), pulmonary and liver AVS, asymptomatic.     Today she reports she is not experiencing any symptoms of menopause, no hot flashes, no night sweats. She was using Mirena IUD but had it removed when she was diagnosed with breast cancer in 12/2019. Subsequently she has one heavy menses that lasted 6 weeks. She had a follow up pelvic US in 1/2020 that showed normal endometrial stripe. Since then she has had no abnormal bleeding. Although she is concerned it may occur again.      Cancer History:  10/23/2019- abnormal screening mammogram, bilateral breast lesions noted.  1/29/2020-  bilateral nipple sparing mastectomies, tissue expander placement, and bilateral axillary sentinel lymph node biopsies on 1/29/20 under the care of Dr. Smith.  2/14/20- Due to ischemic skin flap issues, expander removal and washout was performed ( plan to perform breast reconstruction in 3 months)    Right breast Cancer- Stage Ia, right breast, T2(2)N0M0, grade 2, ER+, GA+, HER2 negative invasive mammary carcinoma   Left breast cancer- stage Ia, left breast, [id number], grade 2, ER+, GA+, HER2 negative invasive lobular carcinoma      Genetic testing (12/5/2019) - deleterious CHEK2 mutation identified,  c.1100delC (also known as p.[id number])    Bilateral skin-sparing mastectomy and bilateral axillary sentinel lymph node biopsy (1/29/2020)   Adjuvant radiation (3/27/2020 to 5/5/2020)    OBGYN history and Health Maintenance:    Pelvic US:January 23, 2020  A normal size uterus with 2 intramural fibroids in the anterior wall measuring 1.4 cm max.  Right ovary revealed a small hyperechoic focus measuring 4 mm thought to be calcification versus hemosiderin.  Left ovary normal.  Endometrial  stripe was 3 mm.      Last Pap Smear: 2020 Normal/ HPV negative    Last Colonoscopy:  normal ( f/u 10 years)    Review of Systems:  Systemic:No weight changes.    Skin : No skin changes or new lesions.   Eye : No changes in vision.   Pulmonary: No cough or SOB.   Cardiovascular: No CP or palpitations  Gastrointestinal : No diarrhea, constipation, abdominal pain. Bowel habits normal.   Genitourinary: No dysuria, urgency or bleeding  Psychiatric: No depression or anxiety  Hematologic : No palpable lymph nodes.   Endocrine : No hot flashes. No heat/cold intolerance.      Neurological: No headaches, no numbness.     Past Medical History:   Diagnosis Date     Anxiety and depression      AVM (arteriovenous malformation)     Right Pulmonary     Breast cancer (H)      GERD (gastroesophageal reflux disease)      HHT (hereditary hemorrhagic telangiectasia) (H) 2015    Possible- no ACVRL1, ENG or SMAD4 mutations found on sequencing 10/6/15      Hiatal hernia     nissen done in      HTN (hypertension)      Iron deficiency anemia 2012     Problem list name updated by automated process. Provider to review     Menorrhagia      Monoallelic mutation of CHEK2 gene in female patient 2020    CHEK2 c.1100delC [facility] 2019       Past Surgical History:   Procedure Laterality Date     BIOPSY NODE SENTINEL Bilateral 2020    Procedure: BILATERAL Axillary [place] Lymph Node Biopsy;  Surgeon: Paz Smith MD;  Location:  OR     COLONOSCOPY  2012    Procedure: COLONOSCOPY;;  Surgeon: Radha Hector MD;  Location:  GI     INSERT TISSUE EXPANDER BREAST BILATERAL Bilateral 2020    Procedure: bilateral breast expander removal and washout;  Surgeon: CEE Chatterjee MD;  Location:  OR     LAPAROSCOPIC NISSEN FUNDOPLICATION       MASTECTOMY SIMPLE Bilateral 2020    Procedure: BILATERAL Skin-Sparing Mastectomy;  Surgeon: Sarah  Paz Parker MD;  Location: [facility] OR     RECONSTRUCT BREAST Bilateral 2020    Procedure: Bilateral breast reconstruction with expanders and SPY;  Surgeon: CEE Chatterjee MD;  Location: [facility] OR     TUBAL LIGATION            Current Outpatient Medications   Medication Sig Dispense Refill     amoxicillin (AMOXIL) 500 MG tablet Take 4 pills (2000 mg) 30-60 minutes before dental procedures, including teeth cleaning. 4 tablet 3     atenolol (TENORMIN) 50 MG tablet Take 1 tablet by mouth daily       LISINOPRIL PO Take 20 mg by mouth daily       pantoprazole (PROTONIX) 20 MG EC tablet daily as needed   3     SERTRALINE HCL PO Take 25 mg by mouth daily        TRAZodone (DESYREL) 25 MG TABS Take 50 mg by mouth At Bedtime        order for DME Equipment being ordered: Prosthetic bra after bilateral mastectomy 2 each 0         Allergies   Allergen Reactions     Sulfa Drugs        Social History:  Social History     Tobacco Use     Smoking status: Former Smoker     Packs/day: 1.00     Years: 20.00     Pack years: 20.00     Types: Cigarettes     Start date: 1980     Last attempt to quit: 2011     Years since quittin.7     Smokeless tobacco: Never Used   Substance Use Topics     Alcohol use: Yes     Alcohol/week: 3.3 standard drinks     Types: 4 Standard drinks or equivalent per week     Comment: occ         Family History:   The patient's family history is notable for   Family History   Problem Relation Age of Onset     C.A.D. Maternal Grandfather      Hypertension Mother      Hypertension Father      Cancer No family hx of          Assessment: Leigh Espinal is a 55 year old woman with a new diagnosis of bilateral breast cancer, ER/[place] +,s/p bilateral mastectomy and radiation therapy.     Positive for CHEK2 mutation    Peg-menopause      Plan:     1.)   Leigh had multiple questions about risk and benefit of removing her ovaries and fallopian tube. Although CHEK2 mutation is recognized to  increase risk for breast cancer and colon cancer, there does not seem to be  increased risk of ovarian cancer. She is not aware of when her family normally goes through menopause but thinks she is several years away from menopause. Surgical menopause is a reasonable approach to management of hormone response breat cancer in pre-menopausal patient, although there are also alternatives. I discussed approach to surgery, laparoscopy and removal of both ovaries and fallopian tubes. At this time she is considering her options and may opt to undergo this procedure after her breast reconstruction.     I have informed her to contact our clinic if she has abnormal vaginal bleeding.     Will obtain FSH at there next lab visit.   Patient will call to schedule a follow up visit in 3-4 months or when she wants to proceed with surgery.      Abena Recio M.D., MPH,  F.A.C.O.G.  Professor  Division of Gynecologic Oncology    Total visit time 45 minutes, 30 minutes on the video visit

## 2020-05-28 NOTE — PROGRESS NOTES
"Leigh Espinal is a 55 year old female who is being evaluated via a billable video visit.      The patient has been notified of following:     \"This video visit will be conducted via a call between you and your physician/provider. We have found that certain health care needs can be provided without the need for an in-person physical exam.  This service lets us provide the care you need with a video conversation.  If a prescription is necessary we can send it directly to your pharmacy.  If lab work is needed we can place an order for that and you can then stop by our lab to have the test done at a later time.    Video visits are billed at different rates depending on your insurance coverage.  Please reach out to your insurance provider with any questions.    If during the course of the call the physician/provider feels a video visit is not appropriate, you will not be charged for this service.\"    Patient has given verbal consent for Video visit? Yes    How would you like to obtain your AVS? Cali    Patient would like the video invitation sent by: Send to e-mail at: [email or web address]    Will anyone else be joining your video visit? No          Secondary Video Option (Doximity), send text message to:  [phone]    I have reviewed and updated the patient's allergies and medication list.    Concerns: Patient has no new concerns.      Refills: None      Augustin Lopez, EMT                  "

## 2020-05-29 NOTE — PROCEDURES
Radiotherapy Treatment Summary          Date of Report: May 27, 2020     PATIENT: AZRA HERNADEZ  MEDICAL RECORD NO: [id number]  : 1965     DIAGNOSIS: C50.412 Malignant neoplasm of upper-outer quadrant of left female breast  INTENT OF RADIOTHERAPY: Cure  PATHOLOGY:     Invasive lobular carcinoma, ER+/[place]+/HER2-                              STAGE: [id number](sn)  CONCURRENT SYSTEMIC THERAPY:    None                Details of the treatments summarized below are found in records kept in the Department of Radiation Oncology at [facility].     Treatment Summary:  Radiation Oncology - Course: 1 Protocol:   Treatment Site  Current Dose Modality From To Elapsed Days Fx.  1 L CW/low axilla   5,040 cGy 10x/18x  3/26/2020  2020  40  28                Dose per Fraction:   180 cGy     Total Dose:      5040 cGy        COMMENTS: Ms. Hernadez is a 54 year old perimenopausal with synchronous bilateral breast cancers status   post bilateral skin sparing mastectomies. Routine screening mammogram on 10/23/2019 demonstrated   possible architectural distortion and developing focal asymmetry in the right breast at 12 to 1:00, middle   depth, and possible developing asymmetry in the left breast at 3:00, middle depth.  On the ultrasound, the right   breast mass was at 1:00, 5 cm from the nipple, measuring 2 x 1.4 cm; and the left breast mass was at 2:00, 6 cm   from the nipple, measuring 0.9x 0.8 x0.8 cm.  There was no evidence of enlarged lymph nodes in either   axillae (BI-RADS category 5). 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       Ms. Hernadez underwent bilateral skin sparing mastectomies, and bilateral axillary sentinel lymph node   biopsies and tissue expander placement on 2020 under the care of Tc Smith and   Sen. Surgical pathology ([id number]) revealed:   Right Breast: invasive carcinoma of the mixed mixed ductal, papillary and cribriform types.  [place]   grade 2.  Tumor size was  2.1x 1.6 x 1.2 cm in the upper inner quadrant at 1:00.  There was another smaller   focus measuring 3.6 mm, superior to the dominant focus.  Associated DCIS was present in both upper inner and   upper outer quadrant, measuring 1.5 cm, nuclear grade 2, involving 9 out of 27 blocks, cribriform type with   lobular involvement and focal necrosis. DCIS was admixed with and adjacent to the dominant focus of invasive   carcinoma in the upper inner quadrant. There was indeterminate lymphovascular invasion. Surgical margins   were uninvolved by invasive carcinoma and DCIS with the closest margin (anterior superior) at 0.2 mm for   invasive carcinoma and 6 mm for DCIS. One sentinel node showed no evidence of metastatic   carcinoma. Pathologic stage T2(m)N0(sn).     Left Breast:  invasive lobular carcinoma at 3:00 position.  Tumor size was 1.6 x 1.2 x 1.2 cm, Jes[place]   grade 2.  Associated ductal carcinoma in situ measured 5 mm, nuclear grade 2, micropapillary and cribriform   type, involving 1 of 20 blocks.  Lobular carcinoma in situ, classical type was also found. There was evidence   of lymphovascular invasion. Surgical margins were uninvolved by invasive carcinoma or DCIS with the closest   margin (anterior inferior) at 1.2 cm for invasive carcinoma and 1 cm for DCIS. Micrometastatic carcinoma was   evident in the sentinel lymph node biopsy, measuring 1.5 mm, without extranodal extension. Pathologic stage   [id number](sn).      Post-op, Ms. Espinal developed skin flap ischemic changes that ultimately led to removal of her expander by   Dr. Chatterjee on 2/14/2020. Skin excision ( [id number]) was negative for malignancy.       She met James Shepherd and Shree in Medical Oncology. Based on Oncotype Dx score of the left breast   cancer being 10 and Oncotype Dx score on the right breast cancer being 18, the consensus was no role of   chemotherapy.       She was then referred to us for adjuvant radiation therapy.  Given the alex  involvement on the left and   presence of lymphovascular space invasion, she received 5040 cGy in 28 fractions to the left chest wall and low   axilla.   She tolerated the treatment well with the expected dermatitis, mild pain and fatigue.              ED visits/hospitalizations: None     Missed treatments: None      Acute Toxicity Profile by CTC v5.0:  Fatigue: Grade 1: Fatigue relieved by rest  Pain: Grade 1: Mild pain  Dermatitis: Grade 2: Moderate to brisk erythema; patchy moist desquamation, mostly confined to skin folds and creases;   moderate erythema     PAIN MANAGEMENT:    Not required.                           FOLLOW UP PLAN:       1. Follow up in 1 month via virtual visit.                           Staff Physician: Mary Brunson M.D.  Physicist: John Grey , PhD     CC:   MD Nicolle Slaughter MD                                        Radiation Oncology:  [facility] [address] [place] [address]

## 2020-06-02 ENCOUNTER — VIRTUAL VISIT (OUTPATIENT)
Dept: ONCOLOGY | Facility: CLINIC | Age: 55
End: 2020-06-02
Attending: GENETIC COUNSELOR, MS
Payer: COMMERCIAL

## 2020-06-02 DIAGNOSIS — C50.911 BILATERAL MALIGNANT NEOPLASM OF BREAST IN FEMALE, ESTROGEN RECEPTOR POSITIVE, UNSPECIFIED SITE OF BREAST (H): Primary | ICD-10-CM

## 2020-06-02 DIAGNOSIS — Z15.02 MONOALLELIC MUTATION OF CHEK2 GENE IN FEMALE PATIENT: ICD-10-CM

## 2020-06-02 DIAGNOSIS — Z15.89 MONOALLELIC MUTATION OF CHEK2 GENE IN FEMALE PATIENT: ICD-10-CM

## 2020-06-02 DIAGNOSIS — Z15.01 MONOALLELIC MUTATION OF CHEK2 GENE IN FEMALE PATIENT: ICD-10-CM

## 2020-06-02 DIAGNOSIS — Z17.0 BILATERAL MALIGNANT NEOPLASM OF BREAST IN FEMALE, ESTROGEN RECEPTOR POSITIVE, UNSPECIFIED SITE OF BREAST (H): Primary | ICD-10-CM

## 2020-06-02 DIAGNOSIS — Z15.09 MONOALLELIC MUTATION OF CHEK2 GENE IN FEMALE PATIENT: ICD-10-CM

## 2020-06-02 DIAGNOSIS — C50.912 BILATERAL MALIGNANT NEOPLASM OF BREAST IN FEMALE, ESTROGEN RECEPTOR POSITIVE, UNSPECIFIED SITE OF BREAST (H): Primary | ICD-10-CM

## 2020-06-02 PROCEDURE — 40000072 ZZH STATISTIC GENETIC COUNSELING, < 16 MIN: Performed by: GENETIC COUNSELOR, MS

## 2020-06-02 PROCEDURE — 96040 ZZH GENETIC COUNSELING, EACH 30 MINUTES: CPT | Mod: 95 | Performed by: GENETIC COUNSELOR, MS

## 2020-06-02 NOTE — PROGRESS NOTES
"6/2/2020    Leigh Espinal is a 55 year old female who is being evaluated via a billable video visit.      The patient has been notified of following:     \"This video visit will be conducted via a call between you and your provider. We have found that certain health care needs can be provided without the need for an in-person physical exam. This service lets us provide the care you need with a video conversation. If lab work is needed we can place an order for that and you can then stop by our lab to have the test done at a later time.    Video visits are billed at different rates depending on your insurance coverage. Please reach out to your insurance provider with any questions.    If during the course of the call the provider feels a video visit is not appropriate, you will not be charged for this service.\"    Patient has given verbal consent for Video visit? Yes    How would you like to obtain your AVS? Cali    Patient would like the video invitation sent by: Send to e-mail at: [email or web address]    Will anyone else be joining your video visit? No      Referring Provider: Nicolle Shepherd MD    Presenting Information:   Given concerns regarding the potential for COVID-19 exposure during a clinic visit, Leigh elected for a video genetic counseling visit through the Cancer Risk Management Program to discuss her personal history of breast cancer, as well as her prior genetic testing results. We reviewed this history, cancer risks and screening recommendations, and additional genetic testing options.    Personal History:  Leigh is a 55 year old female. 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 Treatment has included a bilateral mastectomy, radiation, and endocrine therapy is planned.     Leigh has her ovaries, fallopian tubes and " uterus in place. She had a transvaginal ultrasound in January 2020 to address abnormal bleeding that identified fibroids and likely calcifications in the right ovary. She no longer requires regular breast imaging. Her most recent colonoscopy and upper endoscopy in June 2012 was normal and follow-up was recommended in 10 years. She does not regularly do any other cancer screening at this time.    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 Real. She had negative genetic testing of the ACVRL1, ENG, and SMAD4 genes in October 2015 through the [facility].    Family History: (Please see scanned pedigree for detailed family history information)    Leigh reports that she was adopted and has limited information regarding her biological relatives.    Leigh's biological mother was diagnosed with an unknown cancer in her 50's and passed away at age 58.    One paternal half sister has never had a cancer, but had a liver transplant at age 16 and again in her 40's for unknown reasons. Leigh reports that she is not aware of any other paternal biological relative with a similar history.    Her maternal ethnicity is [place]ian and Pashto. Her paternal ethnicity is . There is no known Ashkenazi Cheondoism ancestry on either side of her family.    Genetic Testing Results: POSITIVE  In order to make upcoming treatment decisions, Dr. Smith ordered the Breast Actionable Panel offered by the [facility] and Leigh's blood was drawn on 12/5/2019.     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 We discussed that this mutation is associated with increased risk for breast, colon, and possibly other cancers. We discussed the impact of this testing on Leigh in detail.     Of note,  "Leigh tested negative for mutations in the following genes by sequencing and deletion/duplication analysis: JEANNE, BRCA1, BRCA2, CDH1, NBN, NF1, PALB2, PTEN, STK11, and STK11. We reviewed the autosomal dominant inheritance of these genes. Leigh cannot pass on a mutation in any of these genes to her children based on this test result. Mutations in these genes do not skip generations.      A copy of the test report can be found in the Laboratory tab, dated 12/19/2019, and named \"HEREDITARY CANCER BREAST ACTIONABLE\".     CHEK2 Cancer Risks:    Mutations in the CHEK2 gene are associated with a moderate risk of breast and colon cancer. Of note, the 1100delC mutation in the CHEK2 gene is common in individuals of  ancestry (approximately 1 in 100 individuals).    The lifetime breast cancer risk for women who carry one CHEK2 mutation is approximately 24%-36%, compared to the general population lifetime risk of 12%.    - There is also an increased risk of a second breast cancer in women with a mutation in the CHEK2 gene, though the exact risk is unknown at this time.       The risk of colon cancer may be twice as high as the general population risk of 5%.     We also discussed the possible association of CHEK2 mutations with increased risk for prostate, melanoma, thyroid, and other cancers, however data is still limited in this area. No confirmed risk numbers are available for these additional cancers, though they have been reported in families that have a CHEK2 mutation.    Of note, we reviewed that the CHEK2 gene is currently considered a moderate-risk gene. This means that mutations in this gene increase the risk for certain cancers, but are unlikely to be the single cause for an individual's cancer. As such, there are likely other genetic and/or environmental risk factors that, in combination with the CHEK2 gene mutation, caused Leigh's bilateral breast cancers.    Cancer Screening and Prevention:  The " following screening is recommended for individuals who have a mutation in the CHEK2 gene, per current National Comprehensive Cancer Network (NCCN) guidelines.    Women with CHEK2 mutations qualify for high risk breast screening.  o High risk breast cancer screening recommendations include annual mammograms and annual breast MRI's, alternating every 6 months.    o It is typically recommended that this screening begin at age 40, though this can be discussed in more detail with a woman's medical providers.    Recent guidelines recommend increased colon cancer screening for individuals who have a mutation in the CHEK2 gene.   o Colonoscopies are done every 5 years, beginning at age 40 (or based on family history of colon cancer).     There are currently no other specific cancer screening guidelines for other cancers potentially associated with a CHEK2 mutation. As such, additional screening should be based on family history.    Other screening based on Leigh's personal and family history:    Leigh should continue with breast cancer treatment and future screening recommendations as made by her medical providers.    Leigh s close female relatives likely remain at increased risk for breast cancer given their family history, regardless of whether or not they carry the CHEK2 mutation. Breast cancer screening is generally recommended to begin approximately 10 years younger than the earliest age of breast cancer diagnosis in the family, or at age 40, whichever comes first. In this family, screening may begin at age 40. Breast screening options should be discussed with an individual's primary care provider and a genetic counselor, to determine at what age to begin screening, what screening is appropriate, and if additional screening (such as breast MRI) is necessary based on personal/family history factors.    Other population cancer screening options, such as those recommended by the American Cancer Society and NCCN,  are also appropriate for Leigh and her family. These screening recommendations may change if there are changes to Leigh's personal and/or family history of cancer. Final screening recommendations should be made by each individual's managing physician.    We discussed that Leigh could participate in our Cancer Risk Management Program in which our nursing specialist provides an individual screening plan and assists with medical management. A referral was placed to see ROBERT Gupta for this service.    Of note, the above information is based on our current understanding of Leigh's genetic findings. Leigh is encouraged to reach out to me regularly and with any pertinent updates to her personal and/or family history of cancer, as our understanding of the genetic findings in her family may change over time.     Implications for Family Members:  We reviewed that mutations in the CHEK2 gene are inherited in an autosomal dominant pattern. This means that each of Leigh's children have a 50% chance of inheriting the same mutation. Also, depending upon which of her biological parents carried the mutation, her maternal or paternal half siblings and other extended relatives may also carry this mutation. Leigh is encouraged to share this information with her biological family members on both sides of the family, if possible. I am happy to help her relatives connect with a genetic counselor in their area if they would like to discuss testing.    Additional Testing Considerations:  We then reviewed that given the moderate risk associated with a CHEK2 mutation, it is still possible Leigh does carry another gene mutation that increases her risk for certain cancers. We discussed that there are additional genes beyond the 11 genes on the Breast Actionable Panel that could cause increased risk for breast and related cancers. As many of these genes present with overlapping features in a family and  accurate cancer risk cannot always be established based upon the pedigree analysis alone (particularly as she was adopted and has limited information regarding her biological relatives), it would be reasonable for Leigh to consider additional genetic testing.    We reviewed additional genetic testing options for hereditary breast and related cancers: actionable high/moderate risk panel (Hereditary Breast/Gyn/Colon Cancer Actionable Panel, 29 genes) and expanded high and moderate risk panel (Hereditary Cancer Comprehensive 40 Gene Panel, 40 genes). Leigh expressed interest in only the actionable genes.  She opted for the Hereditary Breast/Gyn/Colon Cancer Actionable Panel.    Genetic testing is available for 29 genes (18 additional genes) associated with hereditary cancer: APC, JEANNE, AXIN2, BMPR1A, BRCA1, BRCA2, BRIP1, CDH1, CHEK2, EPCAM, GREM1, MLH1, MSH2, MSH3, MSH6, MUTYH, NBN, NF1, NTHL1, PALB2, PMS2, POLD1, POLE, PTEN, RAD51C, RAD51D, SMAD4, STK11, and TP53.    We discussed that many of the genes in the panel are associated with specific hereditary cancer syndromes and published management guidelines: Hereditary Breast and Ovarian Cancer syndrome (BRCA1, BRCA2), Kc syndrome (MLH1, MSH2, MSH6, PMS2, EPCAM), Familial Adenomatous Polyposis (APC), Hereditary Diffuse Gastric Cancer (CDH1), Juvenile Polyposis syndrome (BMPR1A, SMAD4), Cowden syndrome (PTEN), Li Fraumeni syndrome (TP53), Peutz-Jeghers syndrome (STK11), MUTYH Associated Polyposis (MUTYH), and Neurofibromatosis type 1 (NF1).     The JEANNE, AXIN2, BRIP1, CHEK2, GREM1, MSH3, NBN, NTHL1, PALB2, POLD1, POLE, RAD51C, and RAD51D genes are associated with increased cancer risk and have published management guidelines for certain cancers.      Consent was obtained over the phone. Additional genetic testing using the Hereditary Breast/Gyn/Colon Cancer Actionable Panel will be sent to [facility]. Turn around time:  "approximately 4 weeks.    Support Resources:  Leigh will be mailed information regarding national and local support resources including [employer], Luiz Jackman, and the American Cancer Society after her final test results become available.    If Leigh has additional questions, I encouraged her to contact me directly at [phone].     Plan:  1.  We reviewed cancer risks and screening recommendations associated with a CHEK2 mutation. A copy of her test results and support resources will be mailed to Leigh when her final test results become available.  2.  I will provide a \"Dear Relative\" letter for Leigh to share with her family members, once her final test results become available.  3.  A referral was placed for Leigh to meet with ROBERT Gupta to discuss screening associated with a CHEK2 mutation.  4.  Leigh elected to proceed with additional genetic testing using the Hereditary Breast/Gyn/Colon Cancer Actionable Panel offered by the [facility].  5.  The results should be available in approximately 4 weeks.  6.  Leigh will be called to clinic to discuss the results.    Samaria Gooden MS, formerly [facility]  Licensed Genetic Counselor  Office: [phone]  Pager: [phone]    Video-Visit Details    Type of service:  Video Visit    Video Start Time: 11:57am  Video End Time: 12:51pm    Originating Location (pt. Location): Home    Distant Location (provider location):  [facility]     Platform used for Video Visit: Levon"

## 2020-06-02 NOTE — LETTER
"    Cancer Risk Management  Program Locations    [facility]  [facility]  Mailing Address  Cancer Risk Management Program  [facility]  [address]  [place] [address]    New patient appointments  [phone]  June 5, 2020    Leigh Espinal  [address] [place] [address]      Dear Leigh,    It was a pleasure meeting with you on June 2, 2020. Here is a copy of the progress note from your recent genetic counseling visit to the Cancer Risk Management Program. If you have any additional questions, please feel free to call.    6/2/2020    Leigh Espinal is a 55 year old female who is being evaluated via a billable video visit.      The patient has been notified of following:     \"This video visit will be conducted via a call between you and your provider. We have found that certain health care needs can be provided without the need for an in-person physical exam. This service lets us provide the care you need with a video conversation. If lab work is needed we can place an order for that and you can then stop by our lab to have the test done at a later time.    Video visits are billed at different rates depending on your insurance coverage. Please reach out to your insurance provider with any questions.    If during the course of the call the provider feels a video visit is not appropriate, you will not be charged for this service.\"    Patient has given verbal consent for Video visit? Yes    How would you like to obtain your AVS? Kaminihart    Patient would like the video invitation sent by: Send to e-mail at: [email or web address]    Will anyone else be joining your video visit? No      Referring Provider: Nicolle Shepherd MD    Presenting Information:   Given concerns regarding the potential for COVID-19 " exposure during a clinic visit, Leigh elected for a video genetic counseling visit through the Cancer Risk Management Program to discuss her personal history of breast cancer, as well as her prior genetic testing results. We reviewed this history, cancer risks and screening recommendations, and additional genetic testing options.    Personal History:  Leigh is a 55 year old female. 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 Treatment has included a bilateral mastectomy, radiation, and endocrine therapy is planned.     Leigh has her ovaries, fallopian tubes and uterus in place. She had a transvaginal ultrasound in January 2020 to address abnormal bleeding that identified fibroids and likely calcifications in the right ovary. She no longer requires regular breast imaging. Her most recent colonoscopy and upper endoscopy in June 2012 was normal and follow-up was recommended in 10 years. She does not regularly do any other cancer screening at this time.    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 Real. She had negative genetic testing of the ACVRL1, ENG, and SMAD4 genes in October 2015 through the [facility].    Family History: (Please see scanned pedigree for detailed family history information)    Leigh reports that she was adopted and has limited information regarding her biological relatives.    Leigh's biological mother was diagnosed with an unknown cancer in her 50's and passed away at age 58.    One paternal half sister has never had a cancer, but had a liver transplant at age 16 and again in her 40's for unknown reasons. Leigh reports that she is not aware of any other paternal biological relative with a similar  "history.    Her maternal ethnicity is [place] and [place]. Her paternal ethnicity is . There is no known Ashkenazi Buddhist ancestry on either side of her family.    Genetic Testing Results: POSITIVE  In order to make upcoming treatment decisions, Dr. Smith ordered the Breast Actionable Panel offered by the [facility] and Leigh's blood was drawn on 12/5/2019.     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 We discussed that this mutation is associated with increased risk for breast, colon, and possibly other cancers. We discussed the impact of this testing on Leigh in detail.     Of note, Leigh tested negative for mutations in the following genes by sequencing and deletion/duplication analysis: JEANNE, BRCA1, BRCA2, CDH1, NBN, NF1, PALB2, PTEN, STK11, and STK11. We reviewed the autosomal dominant inheritance of these genes. Leigh cannot pass on a mutation in any of these genes to her children based on this test result. Mutations in these genes do not skip generations.      A copy of the test report can be found in the Laboratory tab, dated 12/19/2019, and named \"HEREDITARY CANCER BREAST ACTIONABLE\".     CHEK2 Cancer Risks:    Mutations in the CHEK2 gene are associated with a moderate risk of breast and colon cancer. Of note, the 1100delC mutation in the CHEK2 gene is common in individuals of  ancestry (approximately 1 in 100 individuals).    The lifetime breast cancer risk for women who carry one CHEK2 mutation is approximately 24%-36%, compared to the general population lifetime risk of 12%.    - There is also an increased risk of a second breast cancer in women with a mutation in the CHEK2 gene, though the exact risk is unknown at this time.       The risk of colon cancer may be twice as high as the general population risk of 5%.     We also " discussed the possible association of CHEK2 mutations with increased risk for prostate, melanoma, thyroid, and other cancers, however data is still limited in this area. No confirmed risk numbers are available for these additional cancers, though they have been reported in families that have a CHEK2 mutation.    Of note, we reviewed that the CHEK2 gene is currently considered a moderate-risk gene. This means that mutations in this gene increase the risk for certain cancers, but are unlikely to be the single cause for an individual's cancer. As such, there are likely other genetic and/or environmental risk factors that, in combination with the CHEK2 gene mutation, caused Leigh's bilateral breast cancers.    Cancer Screening and Prevention:  The following screening is recommended for individuals who have a mutation in the CHEK2 gene, per current National Comprehensive Cancer Network (NCCN) guidelines.    Women with CHEK2 mutations qualify for high risk breast screening.  o High risk breast cancer screening recommendations include annual mammograms and annual breast MRI's, alternating every 6 months.    o It is typically recommended that this screening begin at age 40, though this can be discussed in more detail with a woman's medical providers.    Recent guidelines recommend increased colon cancer screening for individuals who have a mutation in the CHEK2 gene.   o Colonoscopies are done every 5 years, beginning at age 40 (or based on family history of colon cancer).     There are currently no other specific cancer screening guidelines for other cancers potentially associated with a CHEK2 mutation. As such, additional screening should be based on family history.    Other screening based on Leigh's personal and family history:    Leigh should continue with breast cancer treatment and future screening recommendations as made by her medical providers.    Leigh s close female relatives likely remain at  increased risk for breast cancer given their family history, regardless of whether or not they carry the CHEK2 mutation. Breast cancer screening is generally recommended to begin approximately 10 years younger than the earliest age of breast cancer diagnosis in the family, or at age 40, whichever comes first. In this family, screening may begin at age 40. Breast screening options should be discussed with an individual's primary care provider and a genetic counselor, to determine at what age to begin screening, what screening is appropriate, and if additional screening (such as breast MRI) is necessary based on personal/family history factors.    Other population cancer screening options, such as those recommended by the American Cancer Society and NCCN, are also appropriate for Leigh and her family. These screening recommendations may change if there are changes to Leigh's personal and/or family history of cancer. Final screening recommendations should be made by each individual's managing physician.    We discussed that Leigh could participate in our Cancer Risk Management Program in which our nursing specialist provides an individual screening plan and assists with medical management. A referral was placed to see ROBERT Gupta for this service.    Of note, the above information is based on our current understanding of Leigh's genetic findings. Leigh is encouraged to reach out to me regularly and with any pertinent updates to her personal and/or family history of cancer, as our understanding of the genetic findings in her family may change over time.     Implications for Family Members:  We reviewed that mutations in the CHEK2 gene are inherited in an autosomal dominant pattern. This means that each of Leigh's children have a 50% chance of inheriting the same mutation. Also, depending upon which of her biological parents carried the mutation, her maternal or paternal half siblings and  other extended relatives may also carry this mutation. Leigh is encouraged to share this information with her biological family members on both sides of the family, if possible. I am happy to help her relatives connect with a genetic counselor in their area if they would like to discuss testing.    Additional Testing Considerations:  We then reviewed that given the moderate risk associated with a CHEK2 mutation, it is still possible Leigh does carry another gene mutation that increases her risk for certain cancers. We discussed that there are additional genes beyond the 11 genes on the Breast Actionable Panel that could cause increased risk for breast and related cancers. As many of these genes present with overlapping features in a family and accurate cancer risk cannot always be established based upon the pedigree analysis alone (particularly as she was adopted and has limited information regarding her biological relatives), it would be reasonable for Leigh to consider additional genetic testing.    We reviewed additional genetic testing options for hereditary breast and related cancers: actionable high/moderate risk panel (Hereditary Breast/Gyn/Colon Cancer Actionable Panel, 29 genes) and expanded high and moderate risk panel (Hereditary Cancer Comprehensive 40 Gene Panel, 40 genes). Leigh expressed interest in only the actionable genes.  She opted for the Hereditary Breast/Gyn/Colon Cancer Actionable Panel.    Genetic testing is available for 29 genes (18 additional genes) associated with hereditary cancer: APC, JEANNE, AXIN2, BMPR1A, BRCA1, BRCA2, BRIP1, CDH1, CHEK2, EPCAM, GREM1, MLH1, MSH2, MSH3, MSH6, MUTYH, NBN, NF1, NTHL1, PALB2, PMS2, POLD1, POLE, PTEN, RAD51C, RAD51D, SMAD4, STK11, and TP53.    We discussed that many of the genes in the panel are associated with specific hereditary cancer syndromes and published management guidelines: Hereditary Breast and Ovarian Cancer syndrome (BRCA1, BRCA2),  "Kc syndrome (MLH1, MSH2, MSH6, PMS2, EPCAM), Familial Adenomatous Polyposis (APC), Hereditary Diffuse Gastric Cancer (CDH1), Juvenile Polyposis syndrome (BMPR1A, SMAD4), Cowden syndrome (PTEN), Li Fraumeni syndrome (TP53), Peutz-Jeghers syndrome (STK11), MUTYH Associated Polyposis (MUTYH), and Neurofibromatosis type 1 (NF1).     The JEANNE, AXIN2, BRIP1, CHEK2, GREM1, MSH3, NBN, NTHL1, PALB2, POLD1, POLE, RAD51C, and RAD51D genes are associated with increased cancer risk and have published management guidelines for certain cancers.      Consent was obtained over the phone. Additional genetic testing using the Hereditary Breast/Gyn/Colon Cancer Actionable Panel will be sent to [facility]. Turn around time: approximately 4 weeks.    Support Resources:  Leigh will be mailed information regarding national and local support resources including [employer], [employer], and the American Cancer Society after her final test results become available.    If Leigh has additional questions, I encouraged her to contact me directly at [phone].     Plan:  1.  We reviewed cancer risks and screening recommendations associated with a CHEK2 mutation. A copy of her test results and support resources will be mailed to Leigh when her final test results become available.  2.  I will provide a \"Dear Relative\" letter for Leigh to share with her family members, once her final test results become available.  3.  A referral was placed for Leigh to meet with ROBERT Gupta to discuss screening associated with a CHEK2 mutation.  4.  Leigh elected to proceed with additional genetic testing using the Hereditary Breast/Gyn/Colon Cancer Actionable Panel offered by the [facility].  5.  The results should be available in approximately 4 weeks.  6.  Leigh will be called to clinic to discuss the results.    Samaria Gooden MS, [facility]  Licensed " Genetic Counselor  Office: [phone]  Pager: [phone]    Video-Visit Details    Type of service:  Video Visit    Video Start Time: 11:57am  Video End Time: 12:51pm    Originating Location (pt. Location): Home    Distant Location (provider location):  [facility]     Platform used for Video Visit: Levon

## 2020-06-05 PROBLEM — Z15.89 MONOALLELIC MUTATION OF CHEK2 GENE IN FEMALE PATIENT: Status: ACTIVE | Noted: 2020-06-05

## 2020-06-05 PROBLEM — Z15.01 MONOALLELIC MUTATION OF CHEK2 GENE IN FEMALE PATIENT: Status: ACTIVE | Noted: 2020-06-05

## 2020-06-05 PROBLEM — Z15.02 MONOALLELIC MUTATION OF CHEK2 GENE IN FEMALE PATIENT: Status: ACTIVE | Noted: 2020-06-05

## 2020-06-05 PROBLEM — Z15.09 MONOALLELIC MUTATION OF CHEK2 GENE IN FEMALE PATIENT: Status: ACTIVE | Noted: 2020-06-05

## 2020-06-05 NOTE — PATIENT INSTRUCTIONS
Assessing Cancer Risk  Only about 5-10% of cancers are thought to be due to an inherited cancer susceptibility gene.    These families often have:    Several people with the same or related types of cancer    Cancers diagnosed at a young age (before age 50)    Individuals with more than one primary cancer    Multiple generations of the family affected with cancer    Some people may be candidates for genetic testing of more than one gene.  For these families, genetic testing using a cancer panel may be offered.  These panels will test different genes known to increase the risk for breast, ovarian, uterine, and/or other cancers. All of the genes discussed below have published clinical management guidelines for individuals who are found to carry a mutation. The purpose of this handout is to serve as a brief summary of the genes analyzed by the panels used to inquire about hereditary breast and gynecologic cancer:  JEANNE, BRCA1, BRCA2, BRIP1, CDH1, CHEK2, MLH1, MSH2, MSH6, PMS2, EPCAM, PTEN, PALB2, RAD51C, RAD51D, and TP53.  ______________________________________________________________________________  Hereditary Breast and Ovarian Cancer Syndrome   (BRCA1 and BRCA2)  A single mutation in one of the copies of BRCA1 or BRCA2 increases the risk for breast and ovarian cancer, among others.  The risk for pancreatic cancer and melanoma may also be slightly increased in some families.  The chart below shows the chance that someone with a BRCA mutation would develop cancer in his or her lifetime1,2,3,4.        A person s ethnic background is also important to consider, as individuals of Ashkenazi [employer] ancestry have a higher chance of having a BRCA gene mutation.  There are three BRCA mutations that occur more frequently in this population.    Kc Syndrome   (MLH1, MSH2, MSH6, PMS2, and EPCAM)  Currently five genes are known to cause Kc Syndrome: MLH1, MSH2, MSH6, PMS2, and EPCAM.  A single mutation in one of the  Kc Syndrome genes increases the risk for colon, endometrial, ovarian, and stomach cancers.  Other cancers that occur less commonly in Kc Syndrome include urinary tract, skin, and brain cancers.  The chart below shows the chance that a person with Kc syndrome would develop cancer in his or her lifetime5.      *Cancer risk varies depending on Kc syndrome gene found    Cowden Syndrome   (PTEN)  Cowden syndrome is a hereditary condition that increases the risk for breast, thyroid, endometrial, colon, and kidney cancer.  Cowden syndrome is caused by a mutation in the PTEN gene.  A single mutation in one of the copies of PTEN causes Cowden syndrome and increases cancer risk.  The chart below shows the chance that someone with a PTEN mutation would develop cancer in their lifetime6,7.  Other benign features seen in some individuals with Cowden syndrome include benign skin lesions (facial papules, keratoses, lipomas), learning disability, autism, thyroid nodules, colon polyps, and larger head size.      *One recent study found breast cancer risk to be increased to 85%    Li-Fraumeni Syndrome   (TP53)  Li-Fraumeni Syndrome (LFS) is a cancer predisposition syndrome caused by a mutation in the TP53 gene. A single mutation in one of the copies of TP53 increases the risk for multiple cancers. Individuals with LFS are at an increased risk for developing cancer at a young age. The lifetime risk for development of a LFS-associated cancer is 50% by age 30 and 90% by age 60.   Core Cancers: Sarcomas, Breast, Brain, Lung, Leukemias/Lymphomas, Adrenocortical carcinomas  Other Cancers: Gastrointestinal, Thyroid, Skin, Genitourinary    Hereditary Diffuse Gastric Cancer   (CDH1)  Currently, one gene is known to cause hereditary diffuse gastric cancer (HDGC): CDH1.  Individuals with HDGC are at increased risk for diffuse gastric cancer and lobular breast cancer. Of people diagnosed with HDGC, 30-50% have a mutation in the CDH1  gene.  This suggests there are likely other genes that may cause HDGC that have not been identified yet.      Lifetime Cancer Risks    General Population HDGC    Diffuse Gastric  <1% ~80%   Breast 12% 39-52%         Additional Genes  JEANNE  JEANNE is a moderate-risk breast cancer gene. Women who have a mutation in JEANNE can have between a 2-4 fold increased risk for breast cancer compared to the general population8. JEANNE mutations have also been associated with increased risk for pancreatic cancer, however an estimate of this cancer risk is not well understood9. Individuals who inherit two JEANNE mutations have a condition called ataxia-telangiectasia (AT).  This rare autosomal recessive condition affects the nervous system and immune system, and is associated with progressive cerebellar ataxia beginning in childhood.  Individuals with ataxia-telangiectasia often have a weakened immune system and have an increased risk for childhood cancers.    PALB2  Mutations in PALB2 have been shown to increase the risk of breast cancer up to 33-58% in some families; where individuals fall within this risk range is dependent upon family [id number]. PALB2 mutations have also been associated with increased risk for pancreatic cancer, although this risk has not been quantified yet.  Individuals who inherit two PALB2 mutations--one from their mother and one from their father--have a condition called Fanconi Anemia.  This rare autosomal recessive condition is associated with short stature, developmental delay, bone marrow failure, and increased risk for childhood cancers.    CHEK2   CHEK2 is a moderate-risk breast cancer gene.  Women who have a mutation in CHEK2 have around a 2-fold increased risk for breast cancer compared to the general population, and this risk may be higher depending upon family history.11,12,13 Mutations in CHEK2 have also been shown to increase the risk of a number of other cancers, including colon and prostate, however  these cancer risks are currently not well understood.    BRIP1, RAD51C and RAD51D  Mutations in BRIP1, RAD51C, and RAD51D have been shown to increase the risk of ovarian cancer and possibly female breast cancer as well14,15 .       Lifetime Cancer Risk    General Population BRIP1 RAD51C RAD51D   Ovarian 1-2% ~5-8% ~5-9% ~7-15%           Inheritance  All of the cancer syndromes reviewed above are inherited in an autosomal dominant pattern.  This means that if a parent has a mutation, each of his or her children will have a 50% chance of inheriting that same mutation.  Therefore, each child--male or female--would have a 50% chance of being at increased risk for developing cancer.      Image obtained from Genetics Home Reference, 2013     Mutations in some genes can occur de stacy, which means that a person s mutation occurred for the first time in them and was not inherited from a parent.  Now that they have the mutation, however, it can be passed on to future generations.    Genetic Testing  Genetic testing involves a blood test and will look at the genetic information in the JEANNE, BRCA1, BRCA2, BRIP1, CDH1, CHEK2, MLH1, MSH2, MSH6, PMS2, EPCAM, PTEN, PALB2, RAD51C, RAD51D, and TP53 genes for any harmful mutations that are associated with increased cancer risk.  If possible, it is recommended that the person(s) who has had cancer be tested before other family members.  That person will give us the most useful information about whether or not a specific gene is associated with the cancer in the family.    Results  There are three possible results of genetic testing:    Positive--a harmful mutation was identified in one or more of the genes    Negative--no mutation was identified in any of the genes on this panel    Variant of unknown significance--a variation in one of the genes was identified, but it is unclear how this impacts cancer risk in the family    Advantages and Disadvantages   There are advantages and  disadvantages to genetic testing.    Advantages    May clarify your cancer risk    Can help you make medical decisions    May explain the cancers in your family    May give useful information to your family members (if you share your results)    Disadvantages    Possible negative emotional impact of learning about inherited cancer risk    Uncertainty in interpreting a negative test result in some situations    Possible genetic discrimination concerns (see below)    Genetic Information Nondiscrimination Act (FELIX)  FELIX is a federal law that protects individuals from health insurance or employment discrimination based on a genetic test result alone.  Although rare, there are currently no legal discrimination protections in terms of life insurance, long term care, or disability insurances.  Visit the [employer] website to learn more.    Reducing Cancer Risk  All of the genes described above have nationally recognized cancer screening guidelines that would be recommended for individuals who test positive.  In addition to increased cancer screening, surgeries may be offered or recommended to reduce cancer risk.  Recommendations are based upon an individual s genetic test result as well as their personal and family history of cancer.    Questions to Think About Regarding Genetic Testing:    What effect will the test result have on me and my relationship with my family members if I have an inherited gene mutation?  If I don t have a gene mutation?    Should I share my test results, and how will my family react to this news, which may also affect them?    Are my children ready to learn new information that may one day affect their own health?    Hereditary Cancer Resources    FORCE: Facing Our Risk of Cancer Empowered facingourrisk.org   Bright Pink bebrightpink.org   Li-Fraumeni Syndrome Association lfsassociation.org   PTEN World PTENworld.com   No stomach for cancer, Inc.  nostomachforcancer.org   Stomach cancer relief network Scrnet.org   Collaborative Group of the Americas on Inherited Colorectal Cancer (CGA) cgaicc.com    Cancer Care cancercare.org   American Cancer Society (ACS) cancer.org   National Cancer [place] (NCI) cancer.gov     Please call us if you have any questions or concerns.   Cancer Risk Management Program [phone]-CANCER ([phone])  ? Gerald Braswell, MS, [facility] [phone]  ? Nicki Durán, MS, [facility]  [phone]  ? Johana Macias, MS, [facility]  [phone]  ? Samaria Gooden, MS, [facility] [phone]  ? Leslie Ryanne, MS, [facility] [phone]  ? Ezio Price, MS, [facility]  [phone]  ? Kelsie Izaguirre, MS, [facility]  [phone]    References  1. Adolfo MAYS, Caitie PDP, You S, Reji MAR, Kurtis JE, Namrata JL, Chalino N, Norma H, Heather O, Jay A, Wilini B, Radicarmen P, Manrachelkidebby S, Amos DM, Whitehead N, Ramona E, Tarik H, Sanjiv E, Jay J, Gronjovanny J, Monika B, Sadeus H, Thorlacius S, Eerola H, Nevdebbylinna H, Paolo K, Tej OP. Average risks of breast and ovarian cancer associated with BRCA1 or BRCA2 mutations detected in case series unselected for family history: a combined analysis of 222 studies. Am J Hum Jennifer. 2003;72:1117-30.  2. Meir N, Tanya M, Huston G.  BRCA1 and BRCA2 Hereditary Breast and Ovarian Cancer. Gene Reviews online. 2013.  3. Jamey YC, Scott S, Kristal G, Yen S. Breast cancer risk among male BRCA1 and BRCA2 mutation carriers. J Natl Cancer Inst. 2007;99:1811-4.  4. Peter MCCLENDON, Brody I, Handy J, Ramiro E, Jennifer ER, Rob F. Risk of breast cancer in male BRCA2 carriers. J Med Jennifer. 2010;47:710-1.  5. National Comprehensive Cancer Network. Clinical practice guidelines in oncology, colorectal cancer screening. Available online (registration required). 2015.  6. Bryce ZAMORANO, Everton J, Alejandra J, Susan LA, Shaneka LOZANO, Yoshi C. Lifetime cancer risks in individuals with germline PTEN mutations. Clin Cancer Res. 2012;18:400-7.  7. Pilarski R. Cowden  Syndrome: A Critical Review of the Clinical Literature. J Jennifer . 2009:18:13-27.  8. Yani A, Krunal D, Jun S, Alecia P, Rosie T, Trudy M, Cj B, Nadege H, Brook R, Lou K, Magali L, Peter DG, Amos D, William DF, Truong MR, The Breast Cancer Susceptibility Collaboration ([place]) & Nevaeh CEE. JEANNE mutations that cause ataxia-telangiectasia are breast cancer susceptibility alleles. Nature Genetics. 2006;38:873-875  9. Ilan N , Gina Y, Alison J, Phoebe L, Autumn GM , Vashti ML, Gallinger S, Miller AG, Syngal S, Maria Eugenia ML, Giulia J , Evelyn R, Trung SZ, Jaime JR, Jorge VE, Karen M, Vorico B, Stacy N, Kim RH, Benita KW, and Manny AP. JEANNE mutations in patients with hereditary pancreatic cancer. Cancer Discover. 2012;2:41-46  10. Adolfo ROMAN, et al. Breast-Cancer Risk in Families with Mutations in PALB2. NEJM. 2014; 371(6):497-506.  11. CHEK2 Breast Cancer Case-Control Consortium. CHEK2*1100delC and susceptibility to breast cancer: A collaborative analysis involving 10,860 breast cancer cases and 9,065 controls from 10 studies. Am J Hum Jennifer, 74 (2004), pp. 1145-5076  12. Alessia T, Hossein S, Eulalia K, et al. Spectrum of Mutations in BRCA1, BRCA2, CHEK2, and TP53 in Families at High Risk of Breast Cancer. CANDIS. 2006;295(12):3988-5355.   13. Irene C, Wesley D, Zuhair A, et al. Risk of breast cancer in women with a CHEK2 mutation with and without a family history of breast cancer. J Clin Oncol. 2011;29:6479-7833.  14. Artemio H, Octavio E, Ale SJ, et al. Contribution of germline mutations in the RAD51B, RAD51C, and RAD51D genes to ovarian cancer in the population. J Clin Oncol. 2015;33(26):1984-3861. Doi:10.1200/JCO.2015.61.2408.  15. Teresita T, Cuca FLORES, Ezekiel P, et al. Mutations in BRIP1 confer high risk of ovarian cancer. Jocy Jennifer. 2011;43(11):5355-3392. doi:10.1038/ng.955.

## 2020-06-08 ENCOUNTER — TELEPHONE (OUTPATIENT)
Dept: ONCOLOGY | Facility: CLINIC | Age: 55
End: 2020-06-08

## 2020-06-08 NOTE — PROGRESS NOTES
Department of Therapeutic Radiology--Radiation Oncology                   [place] [address]  [address] [place]  [address]  Office:  [phone]  Fax:  [phone]   Radiation Oncology Clinic  [address] [place] [address]  Phone:  [phone]  Fax:  [phone]     RE: Leigh Espinal : 1965   MRN: [id number] JONO: 6/10/2020     OUTPATIENT VISIT NOTE       DIAGNOSIS: Bilateral breast cancer     AREAS TREATED: Left chest wall and low axila    DOSE:  5040 cGy in 28 fractions     TYPES OF RADIATION GIVEN:         INTERVAL SINCE COMPLETION OF RADIATION THERAPY: ~ 1 month (she completed treatment on 2020)    SUBJECTIVE: Ms. Espinal is a 55 year old perimenopausal with synchronous bilateral breast cancers status post bilateral skin sparing mastectomies. Routine screening mammogram on 10/23/2019 demonstrated possible architectural distortion and developing focal asymmetry in the right breast at 12 to 1:00, middle depth, and possible developing asymmetry in the left breast at 3:00, middle depth.  On the ultrasound, the right breast mass was at 1:00, 5 cm from the nipple, measuring 2 x 1.4 cm; and the left breast mass was at 2:00, 6 cm from the nipple, measuring 0.9x 0.8 x0.8 cm.  There was no evidence of enlarged lymph nodes in either axillae (BI-RADS category 5). 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       Ms. Espinal underwent bilateral skin sparing mastectomies, and bilateral axillary sentinel lymph node   biopsies and tissue expander placement on 2020 under the care of Tc Smith and Sen. Surgical pathology ([id number]) revealed:   Right Breast: invasive carcinoma of the mixed mixed ductal, papillary and cribriform types.  Jesús   grade 2.  Tumor size was 2.1x 1.6 x 1.2 cm in the upper inner quadrant at 1:00.  There was another smaller focus measuring 3.6 mm, superior to the dominant focus.  Associated DCIS was present in both  upper inner and upper outer quadrant, measuring 1.5 cm, nuclear grade 2, involving 9 out of 27 blocks, cribriform type with lobular involvement and focal necrosis. DCIS was admixed with and adjacent to the dominant focus of invasive carcinoma in the upper inner quadrant. There was indeterminate lymphovascular invasion. Surgical margins were uninvolved by invasive carcinoma and DCIS with the closest margin (anterior superior) at 0.2 mm for invasive carcinoma and 6 mm for DCIS. One sentinel node showed no evidence of metastatic carcinoma. Pathologic stage T2(m)N0(sn).     Left Breast:  invasive lobular carcinoma at 3:00 position.  Tumor size was 1.6 x 1.2 x 1.2 cm, [place] grade 2.  Associated ductal carcinoma in situ measured 5 mm, nuclear grade 2, micropapillary and cribriform type, involving 1 of 20 blocks.  Lobular carcinoma in situ, classical type was also found. There was evidence of lymphovascular invasion. Surgical margins were uninvolved by invasive carcinoma or DCIS with the closest margin (anterior inferior) at 1.2 cm for invasive carcinoma and 1 cm for DCIS. Micrometastatic carcinoma was evident in the sentinel lymph node biopsy, measuring 1.5 mm, without extranodal extension. Pathologic stage [id number](sn).      Post-op, Ms. Espinal developed skin flap ischemic changes that ultimately led to removal of her expander by Dr. Chatterjee on 2/14/2020. Skin excision ( [id number]) was negative for malignancy.       She met James Shepherd and Shree in Medical Oncology. Based on Oncotype Dx score of the left breast   cancer being 10 and Oncotype Dx score on the right breast cancer being 18, the consensus was no role of chemotherapy.       She was then referred to us for adjuvant radiation therapy.  Given the alex involvement on the left and   presence of lymphovascular space invasion, she received 5040 cGy in 28 fractions to the left chest wall and low axilla.   She tolerated the treatment well with the expected  dermatitis, mild pain and fatigue.           Ms. Abraham is followed up today via video visit.  She states that she has been doing well overall.  She works from home and has been busy with Zoom meetings.  She has excellent range of motion in both shoulders.  She notes slight hyperpigmentation of the skin.  She does have mild tenderness in her left chest wall and attributes it to not applying the moisturizer in the last few days.  She is interested in pursuing reconstruction and was told that this may occur in the fall.  She is wondering if she can get prosthetic bra in the meantime.         OBJECTIVE:   There were no vitals taken for this visit.   Gen: appears well  HEENT: unremarkable  CV: well perfused  Resp: breathing comfortably on room air  Chest wall: mild hyperpigmentation of the left chest wall.  Extra tissue vs. soft tissue edema in the lateral aspect of the chest wall.   Neuro: A&O, speech fluent.    ASSESSMENT AND PLAN: Ms. 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 She is 1 month status post adjuvant radiation therapy to the left chest wall and axilla. She is well recovered from the acute side effects.    We discussed ongoing skin care.  In anticipation of reconstruction, I recommended continued moisturizing the skin.  I also recommend lymphedema evaluation and possible therapy given soft tissue vs edema at the lateral chest wall and persistent tenderness.      She is interested in prosthetic bra until she can have reconstruction surgery.  A prescription of DME will be mailed to her.      I have asked Leigh to come back for in person follow-up visit in 6 months.        Mary Brunson M.D./Ph.D.  Radiation Oncologist    Department of Therapeutic Radiology   [facility], [place]  Phone: [phone]       All times are in your local time  1st VideoStart: 06/10/2020 12:55 pm   Stop: 06/10/2020 01:09 pm

## 2020-06-10 ENCOUNTER — VIRTUAL VISIT (OUTPATIENT)
Dept: RADIATION ONCOLOGY | Facility: CLINIC | Age: 55
End: 2020-06-10
Attending: RADIOLOGY
Payer: COMMERCIAL

## 2020-06-10 DIAGNOSIS — C50.911 BILATERAL MALIGNANT NEOPLASM OF BREAST IN FEMALE, ESTROGEN RECEPTOR POSITIVE, UNSPECIFIED SITE OF BREAST (H): Primary | ICD-10-CM

## 2020-06-10 DIAGNOSIS — R07.89 CHEST WALL PAIN: ICD-10-CM

## 2020-06-10 DIAGNOSIS — C50.912 BILATERAL MALIGNANT NEOPLASM OF BREAST IN FEMALE, ESTROGEN RECEPTOR POSITIVE, UNSPECIFIED SITE OF BREAST (H): Primary | ICD-10-CM

## 2020-06-10 DIAGNOSIS — Z17.0 BILATERAL MALIGNANT NEOPLASM OF BREAST IN FEMALE, ESTROGEN RECEPTOR POSITIVE, UNSPECIFIED SITE OF BREAST (H): Primary | ICD-10-CM

## 2020-06-10 PROCEDURE — 40001009 ZZH VIDEO/TELEPHONE VISIT; NO CHARGE: Mod: 95

## 2020-06-10 NOTE — LETTER
6/10/2020      RE: Leigh Espinal  [address] [place] [address]       Department of Therapeutic Radiology--Radiation Oncology                   [facility] [address]  [address] [place]  [address]  Office:  [phone]  Fax:  [phone]   Radiation Oncology Clinic  [address] [place] [address]  Phone:  [phone]  Fax:  [phone]     RE: Leigh Espinal : 1965   MRN: [id number] JONO: 6/10/2020     OUTPATIENT VISIT NOTE       DIAGNOSIS: Bilateral breast cancer     AREAS TREATED: Left chest wall and low axila    DOSE:  5040 cGy in 28 fractions     TYPES OF RADIATION GIVEN:         INTERVAL SINCE COMPLETION OF RADIATION THERAPY: ~ 1 month (she completed treatment on 2020)    SUBJECTIVE: Ms. Espinal is a 55 year old perimenopausal with synchronous bilateral breast cancers status post bilateral skin sparing mastectomies. Routine screening mammogram on 10/23/2019 demonstrated possible architectural distortion and developing focal asymmetry in the right breast at 12 to 1:00, middle depth, and possible developing asymmetry in the left breast at 3:00, middle depth.  On the ultrasound, the right breast mass was at 1:00, 5 cm from the nipple, measuring 2 x 1.4 cm; and the left breast mass was at 2:00, 6 cm from the nipple, measuring 0.9x 0.8 x0.8 cm.  There was no evidence of enlarged lymph nodes in either axillae (BI-RADS category 5). 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       Ms. Espinal underwent bilateral skin sparing mastectomies, and bilateral axillary sentinel lymph node   biopsies and tissue expander placement on 2020 under the care of Tc Smith and Sen. Surgical pathology ([id number]) revealed:   Right Breast: invasive carcinoma of the mixed mixed ductal, papillary and cribriform types.  [place]   grade 2.  Tumor size was 2.1x 1.6 x 1.2 cm in the upper inner quadrant at 1:00.  There was another smaller focus  measuring 3.6 mm, superior to the dominant focus.  Associated DCIS was present in both upper inner and upper outer quadrant, measuring 1.5 cm, nuclear grade 2, involving 9 out of 27 blocks, cribriform type with lobular involvement and focal necrosis. DCIS was admixed with and adjacent to the dominant focus of invasive carcinoma in the upper inner quadrant. There was indeterminate lymphovascular invasion. Surgical margins were uninvolved by invasive carcinoma and DCIS with the closest margin (anterior superior) at 0.2 mm for invasive carcinoma and 6 mm for DCIS. One sentinel node showed no evidence of metastatic carcinoma. Pathologic stage T2(m)N0(sn).     Left Breast:  invasive lobular carcinoma at 3:00 position.  Tumor size was 1.6 x 1.2 x 1.2 cm, Jesús grade 2.  Associated ductal carcinoma in situ measured 5 mm, nuclear grade 2, micropapillary and cribriform type, involving 1 of 20 blocks.  Lobular carcinoma in situ, classical type was also found. There was evidence of lymphovascular invasion. Surgical margins were uninvolved by invasive carcinoma or DCIS with the closest margin (anterior inferior) at 1.2 cm for invasive carcinoma and 1 cm for DCIS. Micrometastatic carcinoma was evident in the sentinel lymph node biopsy, measuring 1.5 mm, without extranodal extension. Pathologic stage [id number](sn).      Post-op, Ms. Espinal developed skin flap ischemic changes that ultimately led to removal of her expander by Dr. Chatterjee on 2/14/2020. Skin excision ( [id number]) was negative for malignancy.       She met James Shepherd and Shree in Medical Oncology. Based on Oncotype Dx score of the left breast   cancer being 10 and Oncotype Dx score on the right breast cancer being 18, the consensus was no role of chemotherapy.       She was then referred to us for adjuvant radiation therapy.  Given the alex involvement on the left and   presence of lymphovascular space invasion, she received 5040 cGy in 28 fractions to the  left chest wall and low axilla.   She tolerated the treatment well with the expected dermatitis, mild pain and fatigue.           Ms. Abraham is followed up today via video visit.  She states that she has been doing well overall.  She works from home and has been busy with Zoom meetings.  She has excellent range of motion in both shoulders.  She notes slight hyperpigmentation of the skin.  She does have mild tenderness in her left chest wall and attributes it to not applying the moisturizer in the last few days.  She is interested in pursuing reconstruction and was told that this may occur in the fall.  She is wondering if she can get prosthetic bra in the meantime.         OBJECTIVE:   There were no vitals taken for this visit.   Gen: appears well  HEENT: unremarkable  CV: well perfused  Resp: breathing comfortably on room air  Chest wall: mild hyperpigmentation of the left chest wall.  Extra tissue vs. soft tissue edema in the lateral aspect of the chest wall.   Neuro: A&O, speech fluent.    ASSESSMENT AND PLAN: Ms. Espinal is a 55 year old perimenopausal female with CHEK2 mutation and recently diagnosed bilateral breast cancer, status post bilateral skin sparing mastectomies, pT1cN1(mi) invasive lobular carcinoma of left breast ER/KS +, HER2 -,  and pT2(m)N0(sn) ER/KS +, HER-2 negative invasive mammary carcinoma of the right breast. She is 1 month status post adjuvant radiation therapy to the left chest wall and axilla. She is well recovered from the acute side effects.    We discussed ongoing skin care.  In anticipation of reconstruction, I recommended continued moisturizing the skin.  I also recommend lymphedema evaluation and possible therapy given soft tissue vs edema at the lateral chest wall and persistent tenderness.      She is interested in prosthetic bra until she can have reconstruction surgery.  A prescription of DME will be mailed to her.      I have asked Leigh to come back for in person  follow-up visit in 6 months.        Mary Brunson M.D./Ph.D.  Radiation Oncologist   Department of Therapeutic Radiology   [facility], [place]  Phone: [phone]       All times are in your local time  1st VideoStart: 06/10/2020 12:55 pm   Stop: 06/10/2020 01:09 pm    Mary Brunson MD

## 2020-06-10 NOTE — NURSING NOTE
"Leigh Espinal is a 55 year old female who is being evaluated via a billable video visit.      The patient has been notified of following:     \"This video visit will be conducted via a call between you and your physician/provider. We have found that certain health care needs can be provided without the need for an in-person physical exam.  This service lets us provide the care you need with a video conversation.  If a prescription is necessary we can send it directly to your pharmacy.  If lab work is needed we can place an order for that and you can then stop by our lab to have the test done at a later time.    Video visits are billed at different rates depending on your insurance coverage.  Please reach out to your insurance provider with any questions.    If during the course of the call the physician/provider feels a video visit is not appropriate, you will not be charged for this service.\"    Patient has given verbal consent for Video visit? No    How would you like to obtain your AVS? Kaminihart    Patient would like the video invitation sent by: Send to e-mail at: [email or web address]    Will anyone else be joining your video visit? No    Video-Visit Details    Originating Location (pt. Location): Home    Distant Location (provider location):  RADIATION ONCOLOGY CLINIC     FOLLOW-UP VISIT    Patient Name: Leigh Espinal      : 1965     Age: 55 year old        ______________________________________________________________________________     Chief Complaint   Patient presents with     Cancer     There were no vitals taken for this visit.      Date Radiation Completed: Breast Cancer:LCW and lower axilla 5040 cGy completed 20    Pain  Soreness at the treatment site. No meds needed    Labs  Other Labs: No    Imaging  None    Other Appointments: No    MD Name:  Appointment Date:    MD Name: Appointment Date:   MD Name: Appointment Date:   Other Appointment Notes:     Residual Radiation side " effect: Pt healing well r/t radiation     Additional Instructions:     Nurse face-to-face time: Level 3:  10 min phone time

## 2020-06-10 NOTE — LETTER
6/10/2020         RE: Leigh Espinal  [address] [place] [address]        Dear Colleague,    Thank you for referring your patient, Leigh Espinal, to the RADIATION ONCOLOGY CLINIC. Please see a copy of my visit note below.    Department of Therapeutic Radiology--Radiation Oncology                   [facility] [address]  [address] [place]  [address]  Office:  [phone]  Fax:  [phone]   Radiation Oncology Clinic  [address] [place] [address]  Phone:  [phone]  Fax:  [phone]     RE: Leigh Espinal : 1965   MRN: [id number] JONO: 6/10/2020     OUTPATIENT VISIT NOTE       DIAGNOSIS: Bilateral breast cancer     AREAS TREATED: Left chest wall and low axila    DOSE:  5040 cGy in 28 fractions     TYPES OF RADIATION GIVEN:         INTERVAL SINCE COMPLETION OF RADIATION THERAPY: ~ 1 month (she completed treatment on 2020)    SUBJECTIVE: Ms. Espinal is a 55 year old perimenopausal with synchronous bilateral breast cancers status post bilateral skin sparing mastectomies. Routine screening mammogram on 10/23/2019 demonstrated possible architectural distortion and developing focal asymmetry in the right breast at 12 to 1:00, middle depth, and possible developing asymmetry in the left breast at 3:00, middle depth.  On the ultrasound, the right breast mass was at 1:00, 5 cm from the nipple, measuring 2 x 1.4 cm; and the left breast mass was at 2:00, 6 cm from the nipple, measuring 0.9x 0.8 x0.8 cm.  There was no evidence of enlarged lymph nodes in either axillae (BI-RADS category 5). 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       Ms. Espinal underwent bilateral skin sparing mastectomies, and bilateral axillary sentinel lymph node   biopsies and tissue expander placement on 2020 under the care of Tc Smith and Sen. Surgical pathology ([id number]) revealed:   Right Breast: invasive carcinoma of the mixed mixed  ductal, papillary and cribriform types.  [place]   grade 2.  Tumor size was 2.1x 1.6 x 1.2 cm in the upper inner quadrant at 1:00.  There was another smaller focus measuring 3.6 mm, superior to the dominant focus.  Associated DCIS was present in both upper inner and upper outer quadrant, measuring 1.5 cm, nuclear grade 2, involving 9 out of 27 blocks, cribriform type with lobular involvement and focal necrosis. DCIS was admixed with and adjacent to the dominant focus of invasive carcinoma in the upper inner quadrant. There was indeterminate lymphovascular invasion. Surgical margins were uninvolved by invasive carcinoma and DCIS with the closest margin (anterior superior) at 0.2 mm for invasive carcinoma and 6 mm for DCIS. One sentinel node showed no evidence of metastatic carcinoma. Pathologic stage T2(m)N0(sn).     Left Breast:  invasive lobular carcinoma at 3:00 position.  Tumor size was 1.6 x 1.2 x 1.2 cm, Jesús grade 2.  Associated ductal carcinoma in situ measured 5 mm, nuclear grade 2, micropapillary and cribriform type, involving 1 of 20 blocks.  Lobular carcinoma in situ, classical type was also found. There was evidence of lymphovascular invasion. Surgical margins were uninvolved by invasive carcinoma or DCIS with the closest margin (anterior inferior) at 1.2 cm for invasive carcinoma and 1 cm for DCIS. Micrometastatic carcinoma was evident in the sentinel lymph node biopsy, measuring 1.5 mm, without extranodal extension. Pathologic stage [id number](sn).      Post-op, Ms. Espinal developed skin flap ischemic changes that ultimately led to removal of her expander by Dr. Chatterjee on 2/14/2020. Skin excision ( [id number]) was negative for malignancy.       She met James Shepherd and Shree in Medical Oncology. Based on Oncotype Dx score of the left breast   cancer being 10 and Oncotype Dx score on the right breast cancer being 18, the consensus was no role of chemotherapy.       She was then referred to us  for adjuvant radiation therapy.  Given the alex involvement on the left and   presence of lymphovascular space invasion, she received 5040 cGy in 28 fractions to the left chest wall and low axilla.   She tolerated the treatment well with the expected dermatitis, mild pain and fatigue.           Ms. Abraham is followed up today via video visit.  She states that she has been doing well overall.  She works from home and has been busy with Zoom meetings.  She has excellent range of motion in both shoulders.  She notes slight hyperpigmentation of the skin.  She does have mild tenderness in her left chest wall and attributes it to not applying the moisturizer in the last few days.  She is interested in pursuing reconstruction and was told that this may occur in the fall.  She is wondering if she can get prosthetic bra in the meantime.         OBJECTIVE:   There were no vitals taken for this visit.   Gen: appears well  HEENT: unremarkable  CV: well perfused  Resp: breathing comfortably on room air  Chest wall: mild hyperpigmentation of the left chest wall.  Extra tissue vs. soft tissue edema in the lateral aspect of the chest wall.   Neuro: A&O, speech fluent.    ASSESSMENT AND PLAN: Ms. Espinal is a 55 year old perimenopausal female with CHEK2 mutation and recently diagnosed bilateral breast cancer, status post bilateral skin sparing mastectomies, pT1cN1(mi) invasive lobular carcinoma of left breast ER/DC +, HER2 -,  and pT2(m)N0(sn) ER/DC +, HER-2 negative invasive mammary carcinoma of the right breast. She is 1 month status post adjuvant radiation therapy to the left chest wall and axilla. She is well recovered from the acute side effects.    We discussed ongoing skin care.  In anticipation of reconstruction, I recommended continued moisturizing the skin.  I also recommend lymphedema evaluation and possible therapy given soft tissue vs edema at the lateral chest wall and persistent tenderness.      She is interested  in prosthetic bra until she can have reconstruction surgery.  A prescription of DME will be mailed to her.      I have asked Leigh to come back for in person follow-up visit in 6 months.        Mary Brunson M.D./Ph.D.  Radiation Oncologist   Department of Therapeutic Radiology   [facility]  Phone: [phone]       All times are in your local time  1st VideoStart: 06/10/2020 12:55 pm   Stop: 06/10/2020 01:09 pm    Again, thank you for allowing me to participate in the care of your patient.        Sincerely,        Mary Brunson MD

## 2020-06-15 NOTE — PROGRESS NOTES
GYNECOLOGIC  ONCOLOGY CONSULT    Referring provider:    Paz Smith MD  [address] [facility] [address] [facility] [address]  [place] [address]   RE: Leigh Espinal  : 1965  JONO: 2020    CC:     HPI: Ms Leigh Espinal is a 55 year old female who presents for consultation regarding bilateral breast cancer, ER/IL+. She is considering oophorectomy as part of her breast cancer long term management. Patient has deleterious CHEK2 mutation.    Her past medical history is notable Hereditary hemorrhagic telangiectasia (HHT), pulmonary and liver AVS, asymptomatic.     Today she reports she is not experiencing any symptoms of menopause, no hot flashes, no night sweats. She was using Mirena IUD but had it removed when she was diagnosed with breast cancer in 2019. Subsequently she has one heavy menses that lasted 6 weeks. She had a follow up pelvic US in 2020 that showed normal endometrial stripe. Since then she has had no abnormal bleeding. Although she is concerned it may occur again.      Cancer History:  10/23/2019- abnormal screening mammogram, bilateral breast lesions noted.  2020-  bilateral nipple sparing mastectomies, tissue expander placement, and bilateral axillary sentinel lymph node biopsies on 20 under the care of Dr. Smith.  20- Due to ischemic skin flap issues, expander removal and washout was performed ( plan to perform breast reconstruction in 3 months)    Right breast Cancer- Stage Ia, right breast, T2(2)N0M0, grade 2, ER+, IL+, HER2 negative invasive mammary carcinoma   Left breast cancer- stage Ia, left breast, [id number], grade 2, ER+, IL+, HER2 negative invasive lobular carcinoma      Genetic testing (2019) - deleterious CHEK2 mutation identified,  c.1100delC (also known as [id number])    Bilateral skin-sparing mastectomy and bilateral axillary sentinel lymph node biopsy (2020)   Adjuvant radiation (3/27/2020 to 2020)    OBGYN history and Health  Maintenance:    Pelvic US:2020  A normal size uterus with 2 intramural fibroids in the anterior wall measuring 1.4 cm max.  Right ovary revealed a small hyperechoic focus measuring 4 mm thought to be calcification versus hemosiderin.  Left ovary normal.  Endometrial stripe was 3 mm.      Last Pap Smear: 2020 Normal/ HPV negative    Last Colonoscopy:  normal ( f/u 10 years)    Review of Systems:  Systemic:No weight changes.    Skin : No skin changes or new lesions.   Eye : No changes in vision.   Pulmonary: No cough or SOB.   Cardiovascular: No CP or palpitations  Gastrointestinal : No diarrhea, constipation, abdominal pain. Bowel habits normal.   Genitourinary: No dysuria, urgency or bleeding  Psychiatric: No depression or anxiety  Hematologic : No palpable lymph nodes.   Endocrine : No hot flashes. No heat/cold intolerance.      Neurological: No headaches, no numbness.     Past Medical History:   Diagnosis Date     Anxiety and depression      AVM (arteriovenous malformation)     Right Pulmonary     Breast cancer (H)      GERD (gastroesophageal reflux disease)      HHT (hereditary hemorrhagic telangiectasia) (H) 2015    Possible- no ACVRL1, ENG or SMAD4 mutations found on sequencing 10/6/15      Hiatal hernia     nissen done in      HTN (hypertension)      Iron deficiency anemia 2012     Problem list name updated by automated process. Provider to review     Menorrhagia      Monoallelic mutation of CHEK2 gene in female patient 2020    CHEK2 c.1100delC [facility] 2019       Past Surgical History:   Procedure Laterality Date     BIOPSY NODE SENTINEL Bilateral 2020    Procedure: BILATERAL Axillary Anderson Lymph Node Biopsy;  Surgeon: Paz Smith MD;  Location: [facility] OR     COLONOSCOPY  2012    Procedure: COLONOSCOPY;;  Surgeon: Radha Hector MD;  Location: [facility] GI     INSERT TISSUE EXPANDER BREAST BILATERAL  Bilateral 2020    Procedure: bilateral breast expander removal and washout;  Surgeon: CEE Chatterjee MD;  Location: [facility] OR     LAPAROSCOPIC NISSEN FUNDOPLICATION  2007     MASTECTOMY SIMPLE Bilateral 2020    Procedure: BILATERAL Skin-Sparing Mastectomy;  Surgeon: Paz Smith MD;  Location: [facility] OR     RECONSTRUCT BREAST Bilateral 2020    Procedure: Bilateral breast reconstruction with expanders and SPY;  Surgeon: CEE Chatterjee MD;  Location: [facility] OR     TUBAL LIGATION            Current Outpatient Medications   Medication Sig Dispense Refill     amoxicillin (AMOXIL) 500 MG tablet Take 4 pills (2000 mg) 30-60 minutes before dental procedures, including teeth cleaning. 4 tablet 3     atenolol (TENORMIN) 50 MG tablet Take 1 tablet by mouth daily       LISINOPRIL PO Take 20 mg by mouth daily       pantoprazole (PROTONIX) 20 MG EC tablet daily as needed   3     SERTRALINE HCL PO Take 25 mg by mouth daily        TRAZodone (DESYREL) 25 MG TABS Take 50 mg by mouth At Bedtime        order for DME Equipment being ordered: Prosthetic bra after bilateral mastectomy 2 each 0         Allergies   Allergen Reactions     Sulfa Drugs        Social History:  Social History     Tobacco Use     Smoking status: Former Smoker     Packs/day: 1.00     Years: 20.00     Pack years: 20.00     Types: Cigarettes     Start date: 1980     Last attempt to quit: 2011     Years since quittin.7     Smokeless tobacco: Never Used   Substance Use Topics     Alcohol use: Yes     Alcohol/week: 3.3 standard drinks     Types: 4 Standard drinks or equivalent per week     Comment: occ         Family History:   The patient's family history is notable for   Family History   Problem Relation Age of Onset     C.A.D. Maternal Grandfather      Hypertension Mother      Hypertension Father      Cancer No family hx of          Assessment: Leigh Espinal is a 55 year old woman with a new diagnosis of bilateral  breast cancer, ER/PA +,s/p bilateral mastectomy and radiation therapy.     Positive for CHEK2 mutation    Peg-menopause      Plan:     1.)   Leigh had multiple questions about risk and benefit of removing her ovaries and fallopian tube. Although CHEK2 mutation is recognized to increase risk for breast cancer and colon cancer, there does not seem to be  increased risk of ovarian cancer. She is not aware of when her family normally goes through menopause but thinks she is several years away from menopause. Surgical menopause is a reasonable approach to management of hormone response breat cancer in pre-menopausal patient, although there are also alternatives. I discussed approach to surgery, laparoscopy and removal of both ovaries and fallopian tubes. At this time she is considering her options and may opt to undergo this procedure after her breast reconstruction.     I have informed her to contact our clinic if she has abnormal vaginal bleeding.     Will obtain FSH at there next lab visit.   Patient will call to schedule a follow up visit in 3-4 months or when she wants to proceed with surgery.      Abena Recio M.D., MPH,  F.A.C.O.G.  Professor  Division of Gynecologic Oncology    Total visit time 45 minutes, 30 minutes on the video visit

## 2020-06-18 DIAGNOSIS — N92.0 MENORRHAGIA WITH REGULAR CYCLE: ICD-10-CM

## 2020-06-18 LAB — FSH SERPL-ACNC: 8.9 IU/L

## 2020-06-18 PROCEDURE — 83001 ASSAY OF GONADOTROPIN (FSH): CPT | Performed by: OBSTETRICS & GYNECOLOGY

## 2020-06-18 NOTE — NURSING NOTE
Chief Complaint   Patient presents with     Blood Draw     labs drawn by venipuncture by rn in lab.  lab only appointment.     Indira Diaz RN

## 2020-06-19 LAB — COPATH REPORT: NORMAL

## 2020-06-24 ENCOUNTER — TELEPHONE (OUTPATIENT)
Dept: SURGERY | Facility: CLINIC | Age: 55
End: 2020-06-24

## 2020-06-25 ENCOUNTER — HOSPITAL ENCOUNTER (OUTPATIENT)
Dept: PHYSICAL THERAPY | Facility: CLINIC | Age: 55
Setting detail: THERAPIES SERIES
End: 2020-06-25
Attending: RADIOLOGY
Payer: COMMERCIAL

## 2020-06-25 ENCOUNTER — TRANSFERRED RECORDS (OUTPATIENT)
Dept: HEALTH INFORMATION MANAGEMENT | Facility: CLINIC | Age: 55
End: 2020-06-25

## 2020-06-25 ENCOUNTER — MEDICAL CORRESPONDENCE (OUTPATIENT)
Dept: HEALTH INFORMATION MANAGEMENT | Facility: CLINIC | Age: 55
End: 2020-06-25

## 2020-06-25 DIAGNOSIS — Z17.0 BILATERAL MALIGNANT NEOPLASM OF BREAST IN FEMALE, ESTROGEN RECEPTOR POSITIVE, UNSPECIFIED SITE OF BREAST (H): ICD-10-CM

## 2020-06-25 DIAGNOSIS — C50.912 BILATERAL MALIGNANT NEOPLASM OF BREAST IN FEMALE, ESTROGEN RECEPTOR POSITIVE, UNSPECIFIED SITE OF BREAST (H): ICD-10-CM

## 2020-06-25 DIAGNOSIS — R07.89 CHEST WALL PAIN: ICD-10-CM

## 2020-06-25 DIAGNOSIS — I89.0 LYMPHEDEMA: Primary | ICD-10-CM

## 2020-06-25 DIAGNOSIS — C50.911 BILATERAL MALIGNANT NEOPLASM OF BREAST IN FEMALE, ESTROGEN RECEPTOR POSITIVE, UNSPECIFIED SITE OF BREAST (H): ICD-10-CM

## 2020-06-25 PROCEDURE — 97140 MANUAL THERAPY 1/> REGIONS: CPT | Mod: GP | Performed by: PHYSICAL THERAPIST

## 2020-06-25 PROCEDURE — 97161 PT EVAL LOW COMPLEX 20 MIN: CPT | Mod: GP | Performed by: PHYSICAL THERAPIST

## 2020-06-25 PROCEDURE — 97535 SELF CARE MNGMENT TRAINING: CPT | Mod: GP | Performed by: PHYSICAL THERAPIST

## 2020-06-25 PROCEDURE — 97110 THERAPEUTIC EXERCISES: CPT | Mod: GP | Performed by: PHYSICAL THERAPIST

## 2020-06-26 ENCOUNTER — PATIENT OUTREACH (OUTPATIENT)
Dept: ONCOLOGY | Facility: CLINIC | Age: 55
End: 2020-06-26

## 2020-06-26 DIAGNOSIS — Z98.890 S/P BREAST RECONSTRUCTION, BILATERAL: ICD-10-CM

## 2020-06-26 DIAGNOSIS — Z17.0 MALIGNANT NEOPLASM OF UPPER-OUTER QUADRANT OF LEFT BREAST IN FEMALE, ESTROGEN RECEPTOR POSITIVE (H): Primary | ICD-10-CM

## 2020-06-26 DIAGNOSIS — C50.412 MALIGNANT NEOPLASM OF UPPER-OUTER QUADRANT OF LEFT BREAST IN FEMALE, ESTROGEN RECEPTOR POSITIVE (H): Primary | ICD-10-CM

## 2020-06-26 NOTE — PROGRESS NOTES
"Oncology RN Care Coordination Note:     Patient left a voicemail for Ping Brennan earlier today requesting a call back.     I called Leigh back to see what I could help with.  She states she thinks she called because she was requesting some labs.  I asked why she wanted labs done?  She said she needed an iron study done because she is \"exhausted, tired all the time, feels like I'm dragging.\"  She also asked if they (Ping and Dr. Swann) have received a message from PT.  I said I wasn't sure, but I could check her chart.  Upon reviewing her chart, I saw there was a physical therapy encounter opened yesterday for lymphedema and I asked if that's what she was referring to?  She said yes.  I informed her that I could see that something from PT had been opened in reference to lymphedema, but I wasn't sure what exactly.  I reassured Leigh that I'd send a message to Ping Brennan RNCC with her questions and concerns.  I let her know that if Dr. Swann agrees to an iron study a clinic coordinator would call her to schedule an appointment.      Leigh was grateful for the follow up call and is looking forward to a call for a scheduling.    Julianne Saleh, RN BSN   [facility]  Nurse Coordinator        "

## 2020-06-29 NOTE — PROGRESS NOTES
"6/30/2020    Referring Provider: Nicolle Shepherd MD    Presenting Information:  I spoke to Leigh by via video today to discuss her expanded genetic testing results. She saw genetic counselor Samaria Gooden on 6/02/2020 and Hereditary Breast/Gyn/Colon Cancer Actionable Panel, 29 genes, was ordered via [facility]. This testing was ordered due to her personal history of breast cancer.     Of note, Leigh had the Breast Actionable Panel (11 genes) ordered by Dr. Smith and her blood was drawn on 12/5/2019. 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 She had previously discussed that this mutation is associated with increased risk for breast, colon, and possibly other cancers with Samaria Gooden, MS, [facility] [place]. Please see the clinic note from 6/02/2020 for a review of these risks and screening.     Genetic Testing Result: NEGATIVE for additional expanded genetic testing  The previously identified c.1100delC (also known as p.[id number]) CHEK2  mutation was confirmed in this analysis.      Of note, Leigh tested negative for mutations in the following genes by sequencing and deletion/duplication analysis: APC, JEANNE, AXIN2, BMPR1A, BRCA1, BRCA2, BRIP1, CDH1, EPCAM, GREM1, MLH1, MSH2, MSH3, MSH6, MUTYH, NBN, NF1, NTHL1, PALB2, PMS2, POLD1, POLE, PTEN, RAD51C, RAD51D, SMAD4, STK11, and TP53. No new mutations were found in 28 of 29 genes.  Leigh cannot pass on a mutation in any of these 28 genes to her children based on this test result. Mutations in these genes do not skip generations.      A copy of the test report can be found in the Laboratory tab, dated 6/02/2020, and named \"Next generation sequencing\".     Interpretation of additional testing:  We discussed several different interpretations of this expanded, additional test result.    1. One explanation may be that there is a different gene or " combination of genes and environment that are associated with the cancers in this family.  2. It is possible that her maternal or paternal relatives did have an additional mutation in a cancer susceptibility gene and she did not inherit it.  3. There is also a small possibility that there is a mutation in one of these genes, and the testing laboratory could not find it with their current testing methods.       Screening:  Based on this test result, it is important for Leigh and her relatives to refer back to the family history for appropriate cancer screening.      Follow-up on high risk breast screening and colon cancer screening based on her previously detected CHEK2 mutation.   ? High risk breast cancer screening recommendations include annual mammograms and annual breast MRI's, alternating every 6 months.  It is typically recommended that this screening begin at age 40, though this can be discussed in more detail with a woman's medical providers.  ? Recent guidelines recommend increased colon cancer screening for individuals who have a mutation in the CHEK2 gene. Colonoscopies are done every 5 years, beginning at age 40 (or based on family history of colon cancer).     Leigh s close female relatives remain at increased risk for breast cancer given their family history. Breast cancer screening is generally recommended to begin approximately 10 years younger than the earliest age of breast cancer diagnosis in the family, or at age 40, whichever comes first. In this family, screening may begin at age 40. Breast screening options should be discussed with an individual's primary care provider and a genetic counselor, to determine at what age to begin screening, what screening is appropriate, and if additional screening (such as breast MRI) is necessary based on personal/family history factors.     Other population cancer screening options, such as those recommended by the American Cancer Society and the  National Comprehensive Cancer Network (NCCN), are also appropriate for Leigh and her family. These screening recommendations may change if there are changes to Leigh's personal and/or family history of cancer. Final screening recommendations should be made by each individual's managing physician.    Inheritance:  We reviewed the autosomal dominant inheritance of these 29 genes.     Implications for Family Members:  We reviewed that mutations in the CHEK2 gene are inherited in an autosomal dominant pattern. This means that each of Leigh's children have a 50% chance of inheriting the same CHEK2 mutation. Also, depending upon which of her biological parents carried the mutation, her maternal or paternal half siblings and other extended relatives may also carry this mutation. Leigh is encouraged to share this information with her biological family members on both sides of the family, if possible. I am happy to help her relatives connect with a genetic counselor in their area if they would like to discuss testing.    Summary:  We do not have an entire explanation for Leigh's history of breast cancer. While moderate genetic changes were identified in the CHEK2 gene previously, Leigh may still be at risk for certain cancers due to family history, environmental factors, or other genetic causes not identified by this test.  Because of that, it is important that she continue with cancer screening based on her personal and family history as discussed above.    Genetic testing is rapidly advancing, and new cancer susceptibility genes will most likely be identified in the future.  Therefore, I encouraged Leigh to contact me annually or if there are changes in her personal or family history.  This may change how we assess her cancer risk, screening, and the testing we would offer.    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 Real. She had  negative genetic testing of the ACVRL1, ENG, and SMAD4 genes in October 2015 through the [facility].     Genetic counselor Samaria Gooden spoke with Dr. Real recently because there are additional genes related to HHT Leigh could have analyzed. If she is interested, Leigh could meet with genetic counselor Sanaz Ghosh in [place] ([phone]) and/or discuss these additional genes with Dr. Real at her upcoming appointment in October 2020.       Plan:  1. A copy of the test results will be mailed to Leigh. Of note, she mentioned that her previous clinic notes with Samaria Giacomo have not yet been sent. I plan to follow-up with Samaria on this.   2. She plans to follow-up with her medical providers including ROBERT Gupta, to discuss screening associated with CHEK2 mutations.  3. She should contact me annually, or sooner if her family history changes.  4. If she plans to have additional genes for HHT analyzed she can call [phone] to schedule an appointment with Sanaz Ghosh in [facility] [facility] [place].     If Leigh has any further questions, I encouraged her to contact me at [phone].    Time spent on video: 12 minutes.    Ezio Price MS, [facility]  Licensed, certified genetic counselor  [facility]  Cancer Risk Management Program  [phone]

## 2020-06-30 ENCOUNTER — VIRTUAL VISIT (OUTPATIENT)
Dept: ONCOLOGY | Facility: CLINIC | Age: 55
End: 2020-06-30
Attending: GENETIC COUNSELOR, MS
Payer: COMMERCIAL

## 2020-06-30 ENCOUNTER — HOSPITAL ENCOUNTER (OUTPATIENT)
Dept: PHYSICAL THERAPY | Facility: CLINIC | Age: 55
Setting detail: THERAPIES SERIES
End: 2020-06-30
Attending: RADIOLOGY
Payer: COMMERCIAL

## 2020-06-30 DIAGNOSIS — Z15.02 MONOALLELIC MUTATION OF CHEK2 GENE IN FEMALE PATIENT: ICD-10-CM

## 2020-06-30 DIAGNOSIS — I78.0 HHT (HEREDITARY HEMORRHAGIC TELANGIECTASIA) (H): ICD-10-CM

## 2020-06-30 DIAGNOSIS — Z15.01 MONOALLELIC MUTATION OF CHEK2 GENE IN FEMALE PATIENT: ICD-10-CM

## 2020-06-30 DIAGNOSIS — Z15.89 MONOALLELIC MUTATION OF CHEK2 GENE IN FEMALE PATIENT: ICD-10-CM

## 2020-06-30 DIAGNOSIS — Z17.0 MALIGNANT NEOPLASM OF UPPER-OUTER QUADRANT OF LEFT BREAST IN FEMALE, ESTROGEN RECEPTOR POSITIVE (H): Primary | ICD-10-CM

## 2020-06-30 DIAGNOSIS — Z15.09 MONOALLELIC MUTATION OF CHEK2 GENE IN FEMALE PATIENT: ICD-10-CM

## 2020-06-30 DIAGNOSIS — C50.412 MALIGNANT NEOPLASM OF UPPER-OUTER QUADRANT OF LEFT BREAST IN FEMALE, ESTROGEN RECEPTOR POSITIVE (H): Primary | ICD-10-CM

## 2020-06-30 PROCEDURE — 40000072 ZZH STATISTIC GENETIC COUNSELING, < 16 MIN

## 2020-06-30 PROCEDURE — 97140 MANUAL THERAPY 1/> REGIONS: CPT | Mod: GP

## 2020-06-30 NOTE — Clinical Note
Please send a copy of this letter to the patient and include a copy of their genetic testing results: Order: [id number].     Thank you,  Ezio Price MS, [facility]  Licensed, certified genetic counselor

## 2020-06-30 NOTE — LETTER
6/30/2020         RE: Leigh Espianl  [address] [place] [address]        Dear Colleague,    Thank you for referring your patient, Leigh Espinal, to [facility]. Please see a copy of my visit note below.    6/30/2020    Referring Provider: Nicolle Shepherd MD    Presenting Information:  I spoke to Leigh by via video today to discuss her expanded genetic testing results. She saw genetic counselor Samaria Gooden on 6/02/2020 and Hereditary Breast/Gyn/Colon Cancer Actionable Panel, 29 genes, was ordered via [facility]. This testing was ordered due to her personal history of breast cancer.     Of note, Leigh had the Breast Actionable Panel (11 genes) ordered by Dr. Smith and her blood was drawn on 12/5/2019. 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 She had previously discussed that this mutation is associated with increased risk for breast, colon, and possibly other cancers with Samaria Gooden, MS, [facility] [place]. Please see the clinic note from 6/02/2020 for a review of these risks and screening.     Genetic Testing Result: NEGATIVE for additional expanded genetic testing  The previously identified c.1100delC (also known as p.[id number]) CHEK2  mutation was confirmed in this analysis.      Of note, Leigh tested negative for mutations in the following genes by sequencing and deletion/duplication analysis: APC, JEANNE, AXIN2, BMPR1A, BRCA1, BRCA2, BRIP1, CDH1, EPCAM, GREM1, MLH1, MSH2, MSH3, MSH6, MUTYH, NBN, NF1, NTHL1, PALB2, PMS2, POLD1, POLE, PTEN, RAD51C, RAD51D, SMAD4, STK11, and TP53. No new mutations were found in 28 of 29 genes.  Leigh cannot pass on a mutation in any of these 28 genes to her children based on this test result. Mutations in these genes do not skip generations.      A copy of the test report can be found in the Laboratory tab,  "dated 6/02/2020, and named \"Next generation sequencing\".     Interpretation of additional testing:  We discussed several different interpretations of this expanded, additional test result.    1. One explanation may be that there is a different gene or combination of genes and environment that are associated with the cancers in this family.  2. It is possible that her maternal or paternal relatives did have an additional mutation in a cancer susceptibility gene and she did not inherit it.  3. There is also a small possibility that there is a mutation in one of these genes, and the testing laboratory could not find it with their current testing methods.       Screening:  Based on this test result, it is important for Leigh and her relatives to refer back to the family history for appropriate cancer screening.      Follow-up on high risk breast screening and colon cancer screening based on her previously detected CHEK2 mutation.   ? High risk breast cancer screening recommendations include annual mammograms and annual breast MRI's, alternating every 6 months.  It is typically recommended that this screening begin at age 40, though this can be discussed in more detail with a woman's medical providers.  ? Recent guidelines recommend increased colon cancer screening for individuals who have a mutation in the CHEK2 gene. Colonoscopies are done every 5 years, beginning at age 40 (or based on family history of colon cancer).     Leigh s close female relatives remain at increased risk for breast cancer given their family history. Breast cancer screening is generally recommended to begin approximately 10 years younger than the earliest age of breast cancer diagnosis in the family, or at age 40, whichever comes first. In this family, screening may begin at age 40. Breast screening options should be discussed with an individual's primary care provider and a genetic counselor, to determine at what age to begin screening, " what screening is appropriate, and if additional screening (such as breast MRI) is necessary based on personal/family history factors.     Other population cancer screening options, such as those recommended by the American Cancer Society and the National Comprehensive Cancer Network (NCCN), are also appropriate for Leigh and her family. These screening recommendations may change if there are changes to Leigh's personal and/or family history of cancer. Final screening recommendations should be made by each individual's managing physician.    Inheritance:  We reviewed the autosomal dominant inheritance of these 29 genes.     Implications for Family Members:  We reviewed that mutations in the CHEK2 gene are inherited in an autosomal dominant pattern. This means that each of Leigh's children have a 50% chance of inheriting the same CHEK2 mutation. Also, depending upon which of her biological parents carried the mutation, her maternal or paternal half siblings and other extended relatives may also carry this mutation. Leigh is encouraged to share this information with her biological family members on both sides of the family, if possible. I am happy to help her relatives connect with a genetic counselor in their area if they would like to discuss testing.    Summary:  We do not have an entire explanation for Leigh's history of breast cancer. While moderate genetic changes were identified in the CHEK2 gene previously, Leigh may still be at risk for certain cancers due to family history, environmental factors, or other genetic causes not identified by this test.  Because of that, it is important that she continue with cancer screening based on her personal and family history as discussed above.    Genetic testing is rapidly advancing, and new cancer susceptibility genes will most likely be identified in the future.  Therefore, I encouraged Leigh to contact me annually or if there are changes in her  personal or family history.  This may change how we assess her cancer risk, screening, and the testing we would offer.    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 Real. She had negative genetic testing of the ACVRL1, ENG, and SMAD4 genes in October 2015 through the [facility].     Genetic counselor Samaria Gooden spoke with Dr. Real recently because there are additional genes related to HHT eLigh could have analyzed. If she is interested, Leigh could meet with genetic counselor Sanaz Ghosh in [facility] ([phone]) and/or discuss these additional genes with Dr. Real at her upcoming appointment in October 2020.       Plan:  1. A copy of the test results will be mailed to Leigh. Of note, she mentioned that her previous clinic notes with Samaria Gooden have not yet been sent. I plan to follow-up with Samaria on this.   2. She plans to follow-up with her medical providers including ROBERT Gupta, to discuss screening associated with CHEK2 mutations.  3. She should contact me annually, or sooner if her family history changes.  4. If she plans to have additional genes for HHT analyzed she can call [phone] to schedule an appointment with Sanaz Ghosh in [facility].     If Leigh has any further questions, I encouraged her to contact me at [phone].    Time spent on video: 12 minutes.    Ezio Price MS, [facility]  Licensed, certified genetic counselor  [facility]  Cancer Risk Management Program  [phone]        Again, thank you for allowing me to participate in the care of your patient.        Sincerely,        JULIÁN Price, ARMANDO

## 2020-06-30 NOTE — LETTER
Cancer Risk Management  Program Locations    [facility]  [facility]  [facility]  Mailing Address  Cancer Risk Management Program  [facility]  [address]  [place] [address]    New patient appointments  [phone]  June 30, 2020    Leigh Denniskaren  [address] [place] [address]      Dear Leigh,    It was a pleasure speaking with you via video visit on 6/30/2020. Here is a copy of the progress note from our discussion. If you have any additional questions, please feel free to call.    Referring Provider: Nicolle Shepherd MD    Presenting Information:  I spoke to Leigh by via video today to discuss her expanded genetic testing results. She saw genetic counselor Samaria Gooden on 6/02/2020 and Hereditary Breast/Gyn/Colon Cancer Actionable Panel, 29 genes, was ordered via [facility]. This testing was ordered due to her personal history of breast cancer.     Of note, Leigh had the Breast Actionable Panel (11 genes) ordered by Dr. Smith and her blood was drawn on 12/5/2019. 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 She had previously discussed that this mutation is associated with increased risk for breast, colon, and possibly other cancers with Samaria Gooden, MS, [facility]. Please see the clinic note from 6/02/2020 for a review of these risks and screening.     Genetic Testing Result: NEGATIVE for additional, expanded genetic testing  The previously identified c.1100delC (also known as p.[id number]) CHEK2  mutation was confirmed in this analysis.      Of note, Leigh tested negative for mutations in the following genes by sequencing and deletion/duplication analysis: APC, JEANNE, AXIN2,  BMPR1A, BRCA1, BRCA2, BRIP1, CDH1, EPCAM, GREM1, MLH1, MSH2, MSH3, MSH6, MUTYH, NBN, NF1, NTHL1, PALB2, PMS2, POLD1, POLE, PTEN, RAD51C, RAD51D, SMAD4, STK11, and TP53. No new mutations were found in 28 of 29 genes.  Leigh cannot pass on a mutation in any of these 28 genes to her children based on this test result. Mutations in these genes do not skip generations.      Interpretation of additional testing:  We discussed several different interpretations of this expanded, additional test result.    1. One explanation may be that there is a different gene or combination of genes and environment that are associated with the cancers in this family.  2. It is possible that her maternal or paternal relatives did have an additional mutation in a cancer susceptibility gene and she did not inherit it.  3. There is also a small possibility that there is a mutation in one of these genes, and the testing laboratory could not find it with their current testing methods.       Screening:  Based on this test result, it is important for Leigh and her relatives to refer back to the family history for appropriate cancer screening.      Follow-up on high risk breast screening and colon cancer screening based on her previously detected CHEK2 mutation.   ? High risk breast cancer screening recommendations include annual mammograms and annual breast MRI's, alternating every 6 months.  It is typically recommended that this screening begin at age 40, though this can be discussed in more detail with a woman's medical providers.  ? Recent guidelines recommend increased colon cancer screening for individuals who have a mutation in the CHEK2 gene. Colonoscopies are done every 5 years, beginning at age 40 (or based on family history of colon cancer).     Leigh s close female relatives remain at increased risk for breast cancer given their family history. Breast cancer screening is generally recommended to begin approximately 10 years  younger than the earliest age of breast cancer diagnosis in the family, or at age 40, whichever comes first. In this family, screening may begin at age 40. Breast screening options should be discussed with an individual's primary care provider and a genetic counselor, to determine at what age to begin screening, what screening is appropriate, and if additional screening (such as breast MRI) is necessary based on personal/family history factors.     Other population cancer screening options, such as those recommended by the American Cancer Society and the National Comprehensive Cancer Network (NCCN), are also appropriate for Leigh and her family. These screening recommendations may change if there are changes to Leigh's personal and/or family history of cancer. Final screening recommendations should be made by each individual's managing physician.    Inheritance:  We reviewed the autosomal dominant inheritance of these 29 genes.     Implications for Family Members:  We reviewed that mutations in the CHEK2 gene are inherited in an autosomal dominant pattern. This means that each of Leigh's children have a 50% chance of inheriting the same CHEK2 mutation. Also, depending upon which of her biological parents carried the mutation, her maternal or paternal half siblings and other extended relatives may also carry this mutation. Leigh is encouraged to share this information with her biological family members on both sides of the family, if possible. I am happy to help her relatives connect with a genetic counselor in their area if they would like to discuss testing.        Summary:  We do not have an entire explanation for Leigh's history of breast cancer. While moderate genetic changes were identified in the CHEK2 gene previously, Leigh may still be at risk for certain cancers due to family history, environmental factors, or other genetic causes not identified by this test.  Because of that, it is  important that she continue with cancer screening based on her personal and family history as discussed above.    Genetic testing is rapidly advancing, and new cancer susceptibility genes will most likely be identified in the future.  Therefore, I encouraged Leigh to contact me annually or if there are changes in her personal or family history.  This may change how we assess her cancer risk, screening, and the testing we would offer.    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 Real. She had negative genetic testing of the ACVRL1, ENG, and SMAD4 genes in October 2015 through the [facility].     Genetic counselor Samaria Gooden spoke with Dr. Real recently because there are additional genes related to HHT Leigh could have analyzed. If she is interested, Leigh could meet with genetic counselor Sanaz Ghosh in [facility] ([phone]) and/or discuss these additional genes with Dr. Real at her upcoming appointment in October 2020.       Plan:  1. A copy of the test results will be mailed to Leigh. Of note, she mentioned that her previous clinic notes with Samaria Gooden have not yet been sent. I plan to follow-up with Samaria on this.   2. She plans to follow-up with her medical providers including ROBERT Gupta, to discuss screening associated with CHEK2 mutations.  3. She should contact me annually, or sooner if her family history changes.  4. If she plans to have additional genes for HHT analyzed she can call [phone] to schedule an appointment with Sanaz Ghosh in [facility].     If Leigh has any further questions, I encouraged her to contact me at [phone].    Ezio Price MS, [facility]  Licensed, certified genetic counselor  [facility]  Cancer Risk Management Program  [phone]

## 2020-07-01 DIAGNOSIS — Z98.890 S/P BREAST RECONSTRUCTION, BILATERAL: ICD-10-CM

## 2020-07-01 DIAGNOSIS — C50.412 MALIGNANT NEOPLASM OF UPPER-OUTER QUADRANT OF LEFT BREAST IN FEMALE, ESTROGEN RECEPTOR POSITIVE (H): ICD-10-CM

## 2020-07-01 DIAGNOSIS — Z17.0 MALIGNANT NEOPLASM OF UPPER-OUTER QUADRANT OF LEFT BREAST IN FEMALE, ESTROGEN RECEPTOR POSITIVE (H): ICD-10-CM

## 2020-07-01 LAB
ALBUMIN SERPL-MCNC: 3.4 G/DL (ref 3.4–5)
ALP SERPL-CCNC: 99 U/L (ref 40–150)
ALT SERPL W P-5'-P-CCNC: 32 U/L (ref 0–50)
ANION GAP SERPL CALCULATED.3IONS-SCNC: 6 MMOL/L (ref 3–14)
AST SERPL W P-5'-P-CCNC: 17 U/L (ref 0–45)
BASOPHILS # BLD AUTO: 0 10E9/L (ref 0–0.2)
BASOPHILS NFR BLD AUTO: 0.7 %
BILIRUB SERPL-MCNC: 0.3 MG/DL (ref 0.2–1.3)
BUN SERPL-MCNC: 10 MG/DL (ref 7–30)
CALCIUM SERPL-MCNC: 8.9 MG/DL (ref 8.5–10.1)
CHLORIDE SERPL-SCNC: 107 MMOL/L (ref 94–109)
CO2 SERPL-SCNC: 26 MMOL/L (ref 20–32)
CREAT SERPL-MCNC: 0.81 MG/DL (ref 0.52–1.04)
DIFFERENTIAL METHOD BLD: NORMAL
EOSINOPHIL # BLD AUTO: 0.1 10E9/L (ref 0–0.7)
EOSINOPHIL NFR BLD AUTO: 1.1 %
ERYTHROCYTE [DISTWIDTH] IN BLOOD BY AUTOMATED COUNT: 13.8 % (ref 10–15)
FERRITIN SERPL-MCNC: 8 NG/ML (ref 8–252)
GFR SERPL CREATININE-BSD FRML MDRD: 83 ML/MIN/{1.73_M2}
GLUCOSE SERPL-MCNC: 89 MG/DL (ref 70–99)
HCT VFR BLD AUTO: 43.3 % (ref 35–47)
HGB BLD-MCNC: 13.9 G/DL (ref 11.7–15.7)
IMM GRANULOCYTES # BLD: 0 10E9/L (ref 0–0.4)
IMM GRANULOCYTES NFR BLD: 0.4 %
LYMPHOCYTES # BLD AUTO: 1.6 10E9/L (ref 0.8–5.3)
LYMPHOCYTES NFR BLD AUTO: 28.7 %
MCH RBC QN AUTO: 29 PG (ref 26.5–33)
MCHC RBC AUTO-ENTMCNC: 32.1 G/DL (ref 31.5–36.5)
MCV RBC AUTO: 90 FL (ref 78–100)
MONOCYTES # BLD AUTO: 0.5 10E9/L (ref 0–1.3)
MONOCYTES NFR BLD AUTO: 9.2 %
NEUTROPHILS # BLD AUTO: 3.4 10E9/L (ref 1.6–8.3)
NEUTROPHILS NFR BLD AUTO: 59.9 %
NRBC # BLD AUTO: 0 10*3/UL
NRBC BLD AUTO-RTO: 0 /100
PLATELET # BLD AUTO: 191 10E9/L (ref 150–450)
POTASSIUM SERPL-SCNC: 3.8 MMOL/L (ref 3.4–5.3)
PROT SERPL-MCNC: 6.8 G/DL (ref 6.8–8.8)
RBC # BLD AUTO: 4.8 10E12/L (ref 3.8–5.2)
SODIUM SERPL-SCNC: 139 MMOL/L (ref 133–144)
WBC # BLD AUTO: 5.7 10E9/L (ref 4–11)

## 2020-07-02 ENCOUNTER — VIRTUAL VISIT (OUTPATIENT)
Dept: ONCOLOGY | Facility: CLINIC | Age: 55
End: 2020-07-02
Attending: NURSE PRACTITIONER
Payer: COMMERCIAL

## 2020-07-02 ENCOUNTER — HOSPITAL ENCOUNTER (OUTPATIENT)
Dept: PHYSICAL THERAPY | Facility: CLINIC | Age: 55
Setting detail: THERAPIES SERIES
End: 2020-07-02
Attending: RADIOLOGY
Payer: COMMERCIAL

## 2020-07-02 DIAGNOSIS — C50.412 MALIGNANT NEOPLASM OF UPPER-OUTER QUADRANT OF LEFT BREAST IN FEMALE, ESTROGEN RECEPTOR POSITIVE (H): Primary | ICD-10-CM

## 2020-07-02 DIAGNOSIS — I78.0 HHT (HEREDITARY HEMORRHAGIC TELANGIECTASIA) (H): ICD-10-CM

## 2020-07-02 DIAGNOSIS — N92.1 MENORRHAGIA WITH IRREGULAR CYCLE: ICD-10-CM

## 2020-07-02 DIAGNOSIS — Z15.01 MONOALLELIC MUTATION OF CHEK2 GENE IN FEMALE PATIENT: ICD-10-CM

## 2020-07-02 DIAGNOSIS — Z17.0 MALIGNANT NEOPLASM OF UPPER-OUTER QUADRANT OF LEFT BREAST IN FEMALE, ESTROGEN RECEPTOR POSITIVE (H): Primary | ICD-10-CM

## 2020-07-02 DIAGNOSIS — Z15.02 MONOALLELIC MUTATION OF CHEK2 GENE IN FEMALE PATIENT: ICD-10-CM

## 2020-07-02 DIAGNOSIS — Z15.09 MONOALLELIC MUTATION OF CHEK2 GENE IN FEMALE PATIENT: ICD-10-CM

## 2020-07-02 DIAGNOSIS — Z15.89 MONOALLELIC MUTATION OF CHEK2 GENE IN FEMALE PATIENT: ICD-10-CM

## 2020-07-02 PROCEDURE — 99214 OFFICE O/P EST MOD 30 MIN: CPT | Mod: 95 | Performed by: NURSE PRACTITIONER

## 2020-07-02 PROCEDURE — 97140 MANUAL THERAPY 1/> REGIONS: CPT | Mod: GP

## 2020-07-02 PROCEDURE — 40001009 ZZH VIDEO/TELEPHONE VISIT; NO CHARGE

## 2020-07-02 PROCEDURE — 97110 THERAPEUTIC EXERCISES: CPT | Mod: GP

## 2020-07-02 RX ORDER — HEPARIN SODIUM (PORCINE) LOCK FLUSH IV SOLN 100 UNIT/ML 100 UNIT/ML
5 SOLUTION INTRAVENOUS
Status: CANCELLED | OUTPATIENT
Start: 2020-07-09

## 2020-07-02 RX ORDER — HEPARIN SODIUM,PORCINE 10 UNIT/ML
5 VIAL (ML) INTRAVENOUS
Status: CANCELLED | OUTPATIENT
Start: 2020-07-09

## 2020-07-02 NOTE — PROGRESS NOTES
"Leigh Espinal is a 55 year old female who is being evaluated via a billable video visit.      The patient has been notified of following:     \"This video visit will be conducted via a call between you and your physician/provider. We have found that certain health care needs can be provided without the need for an in-person physical exam.  This service lets us provide the care you need with a video conversation.  If a prescription is necessary we can send it directly to your pharmacy.  If lab work is needed we can place an order for that and you can then stop by our lab to have the test done at a later time.    Video visits are billed at different rates depending on your insurance coverage.  Please reach out to your insurance provider with any questions.    If during the course of the call the physician/provider feels a video visit is not appropriate, you will not be charged for this service.\"    Patient has given verbal consent for Video visit? Yes  How would you like to obtain your AVS? Cali  Patient would like the video invitation sent by: Send to e-mail at: [email or web address]  Will anyone else be joining your video visit? No      Vitals - Patient Reported  Weight (Patient Reported): 78.9 kg (174 lb)  Height (Patient Reported): 162.6 cm (5' 4.02\")  BMI (Based on Pt Reported Ht/Wt): 29.85    Debbie Ureña CMA July 2, 2020  12:47 PM       Video-Visit Details    Type of service:  Video Visit    Video Start Time: 1:22 pm  Video End Time: 01:43 pm    Originating Location (pt. Location): Home    Distant Location (provider location):  [facility]     Platform used for Video Visit: MATTHEW Beal CNP          Oncology/Hematology Visit Note  Jul 2, 2020    Reason for Visit: Follow up of bilateral breast cancer, fatigue    History of Present Illness: Ms. Espinal is a 54 year old female is here for follow-up of locally advanced breast cancer.     Bilateral screening " mammograms on 10/23/19 showed architectural distortion and developing focal asymmetry in the right breast, and possible developing asymmetry in the left breast. Diagnostic mammograms showed a 2 x 1.4 cm mass at 1:00 right breast and a 9 mm mass at 2:00 left breast. An additional 1.4 cm satellite mass in the left breast (total diameter of both left breast masses, 2.1 cm) was noted on contrast mammogram on 11/21/19.  Biopsy of the right breast at 1:00 revealed invasive ductal carcinoma, grade 1, ER+ (98%), MS+ (99%), HER2 negative. Biopsy of the left breast 2:00 revealed invasive lobular carcinoma, grade 2, ER+ (95%), MS+ (75%), and HER2 negative.  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 She had bilateral nipple sparing mastectomies, tissue expander placement, and bilateral axillary sentinel lymph node biopsies on 1/29/20 by Dr. Smith. Final pathology revealed grade 2 invasive mammary carcinoma in the right breast, a smaller focus of invasive mammary carcinoma (mixed ductal and cribriform), admixed DCIS, and negative margins. The left breast showed grade 2 invasive lobular carcinoma, DCIS and LCIS, and negative margins. The right axillary sentinel node was benign; the left axillary sentinel node showed micrometastatic carcinoma without extranodal extension. OncotypeDx recurrence score of the left breast cancer was 10. Oncotype on the right breast cancer was 18.       Interval History:  Leigh CARLSON Sonnykaren was met with via video call for follow up today. Her main concern is progressive fatigue over the past few weeks and she wonders if she is iron deficient. She has some mild associated headaches with the fatigue. She continues with heavy, irregular periods which have been occurring about two times per month. Denies any changes in vision or focal neurologic symptoms.  She has a history of iron deficiency in the past due to heavy menstrual cycles, but does  not tolerate oral iron due to GI upset. She denies bleeding elsewhere. She has been working with the lymphedema team for chest wall swelling, which has been helpful. Otherwise she feels she is healing well from surgery. She denies any recent changes in medications, fevers, chills, cough, shortness of breath, urinary issues. She has yet to decide what she would like to proceed for endocrine therapy. She is fearful of the hot flashes she may have with induction of menopause.      Review of Systems:  Patient denies fevers, chills, dizziness, vision or hearing changes, new lumps or bumps, chest pain, shortness of breath, cough,  rash.    Current Outpatient Medications   Medication Sig Dispense Refill     amoxicillin (AMOXIL) 500 MG tablet Take 4 pills (2000 mg) 30-60 minutes before dental procedures, including teeth cleaning. 4 tablet 3     atenolol (TENORMIN) 50 MG tablet Take 1 tablet by mouth daily       LISINOPRIL PO Take 20 mg by mouth daily       order for DME Equipment being ordered: 6 compression bras and 2 sets of breast prostheses (bilateral) per year 3 each 1     order for DME Equipment being ordered: Prosthetic bra after bilateral mastectomy 2 each 0     pantoprazole (PROTONIX) 20 MG EC tablet daily as needed   3     SERTRALINE HCL PO Take 25 mg by mouth daily        TRAZodone (DESYREL) 25 MG TABS Take 50 mg by mouth At Bedtime          Physical Examination:  There were no vitals taken for this visit.  Wt Readings from Last 10 Encounters:   05/27/20 78.9 kg (174 lb)   05/04/20 78.9 kg (174 lb)   04/27/20 77.6 kg (171 lb)   04/20/20 77.8 kg (171 lb 8 oz)   04/13/20 77.6 kg (171 lb)   04/06/20 79.8 kg (176 lb)   03/30/20 78.9 kg (174 lb)   03/09/20 79.6 kg (175 lb 6.4 oz)   03/06/20 77.6 kg (171 lb)   03/06/20 77.8 kg (171 lb 9.6 oz)     GENERAL: Healthy, alert and no distress  EYES: Eyes grossly normal to inspection.  No discharge or erythema, or obvious scleral/conjunctival abnormalities.  RESP: No audible  wheeze, cough, or visible cyanosis.  No visible retractions or increased work of breathing.    SKIN: Visible skin clear. No significant rash, abnormal pigmentation or lesions.  NEURO: Cranial nerves grossly intact.  Mentation and speech appropriate for age.  PSYCH: Mentation appears normal, affect normal/bright, judgement and insight intact, normal speech and appearance well-groomed.      Laboratory Data:  Orders Only on 07/01/2020   Component Date Value Ref Range Status     WBC 07/01/2020 5.7  4.0 - 11.0 10e9/L Final     RBC Count 07/01/2020 4.80  3.8 - 5.2 10e12/L Final     Hemoglobin 07/01/2020 13.9  11.7 - 15.7 g/dL Final     Hematocrit 07/01/2020 43.3  35.0 - 47.0 % Final     MCV 07/01/2020 90  78 - 100 fl Final     MCH 07/01/2020 29.0  26.5 - 33.0 pg Final     MCHC 07/01/2020 32.1  31.5 - 36.5 g/dL Final     RDW 07/01/2020 13.8  10.0 - 15.0 % Final     Platelet Count 07/01/2020 191  150 - 450 10e9/L Final     Diff Method 07/01/2020 Automated Method   Final     % Neutrophils 07/01/2020 59.9  % Final     % Lymphocytes 07/01/2020 28.7  % Final     % Monocytes 07/01/2020 9.2  % Final     % Eosinophils 07/01/2020 1.1  % Final     % Basophils 07/01/2020 0.7  % Final     % Immature Granulocytes 07/01/2020 0.4  % Final     Nucleated RBCs 07/01/2020 0  0 /100 Final     Absolute Neutrophil 07/01/2020 3.4  1.6 - 8.3 10e9/L Final     Absolute Lymphocytes 07/01/2020 1.6  0.8 - 5.3 10e9/L Final     Absolute Monocytes 07/01/2020 0.5  0.0 - 1.3 10e9/L Final     Absolute Eosinophils 07/01/2020 0.1  0.0 - 0.7 10e9/L Final     Absolute Basophils 07/01/2020 0.0  0.0 - 0.2 10e9/L Final     Abs Immature Granulocytes 07/01/2020 0.0  0 - 0.4 10e9/L Final     Absolute Nucleated RBC 07/01/2020 0.0   Final     Sodium 07/01/2020 139  133 - 144 mmol/L Final     Potassium 07/01/2020 3.8  3.4 - 5.3 mmol/L Final     Chloride 07/01/2020 107  94 - 109 mmol/L Final     Carbon Dioxide 07/01/2020 26  20 - 32 mmol/L Final     Anion Gap 07/01/2020  6  3 - 14 mmol/L Final     Glucose 07/01/2020 89  70 - 99 mg/dL Final     Urea Nitrogen 07/01/2020 10  7 - 30 mg/dL Final     Creatinine 07/01/2020 0.81  0.52 - 1.04 mg/dL Final     GFR Estimate 07/01/2020 83  >60 mL/min/[1.73_m2] Final    Comment: Non  GFR Calc  Starting 12/18/2018, serum creatinine based estimated GFR (eGFR) will be   calculated using the Chronic Kidney Disease Epidemiology Collaboration   (CKD-EPI) equation.       GFR Estimate If Black 07/01/2020 >90  >60 mL/min/[1.73_m2] Final    Comment:  GFR Calc  Starting 12/18/2018, serum creatinine based estimated GFR (eGFR) will be   calculated using the Chronic Kidney Disease Epidemiology Collaboration   (CKD-EPI) equation.       Calcium 07/01/2020 8.9  8.5 - 10.1 mg/dL Final     Bilirubin Total 07/01/2020 0.3  0.2 - 1.3 mg/dL Final     Albumin 07/01/2020 3.4  3.4 - 5.0 g/dL Final     Protein Total 07/01/2020 6.8  6.8 - 8.8 g/dL Final     Alkaline Phosphatase 07/01/2020 99  40 - 150 U/L Final     ALT 07/01/2020 32  0 - 50 U/L Final     AST 07/01/2020 17  0 - 45 U/L Final     Ferritin 07/01/2020 8  8 - 252 ng/mL Final           Assessment and Plan:  1. Bilateral breast cancer, CHEK 2 mutation. She is s/p bilateral mastectomy with SLNB and radiation therapy. Radiation therapy complete 5/2020.       - Left breast cancer [id number] ER + NH + her2 negative, oncotype 10    -Right breast cancer T2(2)N0 ER + NH + her2 negative, oncotype 18  - Given low oncotype, we are not recommending chemotherapy. Given hormone status, recommend adjuvant endocrine therapy. She is perimenopausal. Again discussed options of initiating tamoxifen followed by AI vs either zoladex or oophorectomy following by AI. She is fearful of the side effects from transitioning into menopause. She is still weighing her options and would like some more time to think. Will follow up with Dr. Swann in 1 month.    2. CHEK 2 mutation, has met with genetic. s/p bilateral  mastectomy as above, colonoscopies every 5 years.   - Breast/Gyn/Colon Cancer Actionable Panel ordered by genetics was negative per notes.   -it appears she is due for a colonoscopy, last one I see documented is in 2012. Recommended she have this done now.      3. HHT, following with Dr. Real.     4. Lymphedema- following with  Lymphedema team with improvement.     5. Fatigue  - Hgb 13.9 on 7/1/2020- which is baseline for her- ferritin 8. She does continue to have irregular, heavy periods. Does not tolerate PO Fe. Recommend trying to increase PO iron. Discussed one benefit of having zoladex or oophorectomy would be to stop her periods and decrease her risk or iron deficiency. She doesn't want to do that now.  She may also consider following up with her PCP to discuss.     Leslie Becerra PA-C  [facility]  [address] [place] [id number] [phone]    The patient was seen in conjunction with Leslie Becerra PA-C who served as a scribe for today's visit. I have reviewed the note and agree with the above findings and plan. TW

## 2020-07-02 NOTE — LETTER
"    7/2/2020         RE: Leigh Espinal  [address] [place] [address]        Dear Colleague,    Thank you for referring your patient, Leigh Espinal, to the [facility]. Please see a copy of my visit note below.    Leigh Espinal is a 55 year old female who is being evaluated via a billable video visit.          Vitals - Patient Reported  Weight (Patient Reported): 78.9 kg (174 lb)  Height (Patient Reported): 162.6 cm (5' 4.02\")  BMI (Based on Pt Reported Ht/Wt): 29.85    Debbie Ureña CMA July 2, 2020  12:47 PM       Video-Visit Details    Type of service:  Video Visit    Video Start Time: 1:22 pm  Video End Time: 01:43 pm    Originating Location (pt. Location): Home    Distant Location (provider location):  [facility]     Platform used for Video Visit: MATTHEW Beal CNP          Oncology/Hematology Visit Note  Jul 2, 2020    Reason for Visit: Follow up of bilateral breast cancer, fatigue    History of Present Illness: Ms. Espinal is a 54 year old female is here for follow-up of locally advanced breast cancer.     Bilateral screening mammograms on 10/23/19 showed architectural distortion and developing focal asymmetry in the right breast, and possible developing asymmetry in the left breast. Diagnostic mammograms showed a 2 x 1.4 cm mass at 1:00 right breast and a 9 mm mass at 2:00 left breast. An additional 1.4 cm satellite mass in the left breast (total diameter of both left breast masses, 2.1 cm) was noted on contrast mammogram on 11/21/19.  Biopsy of the right breast at 1:00 revealed invasive ductal carcinoma, grade 1, ER+ (98%), NC+ (99%), HER2 negative. Biopsy of the left breast 2:00 revealed invasive lobular carcinoma, grade 2, ER+ (95%), NC+ (75%), and HER2 negative.  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 She had bilateral nipple " sparing mastectomies, tissue expander placement, and bilateral axillary sentinel lymph node biopsies on 1/29/20 by Dr. Smith. Final pathology revealed grade 2 invasive mammary carcinoma in the right breast, a smaller focus of invasive mammary carcinoma (mixed ductal and cribriform), admixed DCIS, and negative margins. The left breast showed grade 2 invasive lobular carcinoma, DCIS and LCIS, and negative margins. The right axillary sentinel node was benign; the left axillary sentinel node showed micrometastatic carcinoma without extranodal extension. OncotypeDx recurrence score of the left breast cancer was 10. Oncotype on the right breast cancer was 18.       Interval History:  Leigh Espinal was met with via video call for follow up today. Her main concern is progressive fatigue over the past few weeks and she wonders if she is iron deficient. She has some mild associated headaches with the fatigue. She continues with heavy, irregular periods which have been occurring about two times per month. Denies any changes in vision or focal neurologic symptoms.  She has a history of iron deficiency in the past due to heavy menstrual cycles, but does not tolerate oral iron due to GI upset. She denies bleeding elsewhere. She has been working with the lymphedema team for chest wall swelling, which has been helpful. Otherwise she feels she is healing well from surgery. She denies any recent changes in medications, fevers, chills, cough, shortness of breath, urinary issues. She has yet to decide what she would like to proceed for endocrine therapy. She is fearful of the hot flashes she may have with induction of menopause.      Review of Systems:  Patient denies fevers, chills, dizziness, vision or hearing changes, new lumps or bumps, chest pain, shortness of breath, cough,  rash.    Current Outpatient Medications   Medication Sig Dispense Refill     amoxicillin (AMOXIL) 500 MG tablet Take 4 pills (2000 mg) 30-60 minutes  before dental procedures, including teeth cleaning. 4 tablet 3     atenolol (TENORMIN) 50 MG tablet Take 1 tablet by mouth daily       LISINOPRIL PO Take 20 mg by mouth daily       order for DME Equipment being ordered: 6 compression bras and 2 sets of breast prostheses (bilateral) per year 3 each 1     order for DME Equipment being ordered: Prosthetic bra after bilateral mastectomy 2 each 0     pantoprazole (PROTONIX) 20 MG EC tablet daily as needed   3     SERTRALINE HCL PO Take 25 mg by mouth daily        TRAZodone (DESYREL) 25 MG TABS Take 50 mg by mouth At Bedtime          Physical Examination:  There were no vitals taken for this visit.  Wt Readings from Last 10 Encounters:   05/27/20 78.9 kg (174 lb)   05/04/20 78.9 kg (174 lb)   04/27/20 77.6 kg (171 lb)   04/20/20 77.8 kg (171 lb 8 oz)   04/13/20 77.6 kg (171 lb)   04/06/20 79.8 kg (176 lb)   03/30/20 78.9 kg (174 lb)   03/09/20 79.6 kg (175 lb 6.4 oz)   03/06/20 77.6 kg (171 lb)   03/06/20 77.8 kg (171 lb 9.6 oz)     GENERAL: Healthy, alert and no distress  EYES: Eyes grossly normal to inspection.  No discharge or erythema, or obvious scleral/conjunctival abnormalities.  RESP: No audible wheeze, cough, or visible cyanosis.  No visible retractions or increased work of breathing.    SKIN: Visible skin clear. No significant rash, abnormal pigmentation or lesions.  NEURO: Cranial nerves grossly intact.  Mentation and speech appropriate for age.  PSYCH: Mentation appears normal, affect normal/bright, judgement and insight intact, normal speech and appearance well-groomed.      Laboratory Data:  Orders Only on 07/01/2020   Component Date Value Ref Range Status     WBC 07/01/2020 5.7  4.0 - 11.0 10e9/L Final     RBC Count 07/01/2020 4.80  3.8 - 5.2 10e12/L Final     Hemoglobin 07/01/2020 13.9  11.7 - 15.7 g/dL Final     Hematocrit 07/01/2020 43.3  35.0 - 47.0 % Final     MCV 07/01/2020 90  78 - 100 fl Final     MCH 07/01/2020 29.0  26.5 - 33.0 pg Final     MCHC  07/01/2020 32.1  31.5 - 36.5 g/dL Final     RDW 07/01/2020 13.8  10.0 - 15.0 % Final     Platelet Count 07/01/2020 191  150 - 450 10e9/L Final     Diff Method 07/01/2020 Automated Method   Final     % Neutrophils 07/01/2020 59.9  % Final     % Lymphocytes 07/01/2020 28.7  % Final     % Monocytes 07/01/2020 9.2  % Final     % Eosinophils 07/01/2020 1.1  % Final     % Basophils 07/01/2020 0.7  % Final     % Immature Granulocytes 07/01/2020 0.4  % Final     Nucleated RBCs 07/01/2020 0  0 /100 Final     Absolute Neutrophil 07/01/2020 3.4  1.6 - 8.3 10e9/L Final     Absolute Lymphocytes 07/01/2020 1.6  0.8 - 5.3 10e9/L Final     Absolute Monocytes 07/01/2020 0.5  0.0 - 1.3 10e9/L Final     Absolute Eosinophils 07/01/2020 0.1  0.0 - 0.7 10e9/L Final     Absolute Basophils 07/01/2020 0.0  0.0 - 0.2 10e9/L Final     Abs Immature Granulocytes 07/01/2020 0.0  0 - 0.4 10e9/L Final     Absolute Nucleated RBC 07/01/2020 0.0   Final     Sodium 07/01/2020 139  133 - 144 mmol/L Final     Potassium 07/01/2020 3.8  3.4 - 5.3 mmol/L Final     Chloride 07/01/2020 107  94 - 109 mmol/L Final     Carbon Dioxide 07/01/2020 26  20 - 32 mmol/L Final     Anion Gap 07/01/2020 6  3 - 14 mmol/L Final     Glucose 07/01/2020 89  70 - 99 mg/dL Final     Urea Nitrogen 07/01/2020 10  7 - 30 mg/dL Final     Creatinine 07/01/2020 0.81  0.52 - 1.04 mg/dL Final     GFR Estimate 07/01/2020 83  >60 mL/min/[1.73_m2] Final    Comment: Non  GFR Calc  Starting 12/18/2018, serum creatinine based estimated GFR (eGFR) will be   calculated using the Chronic Kidney Disease Epidemiology Collaboration   (CKD-EPI) equation.       GFR Estimate If Black 07/01/2020 >90  >60 mL/min/[1.73_m2] Final    Comment:  GFR Calc  Starting 12/18/2018, serum creatinine based estimated GFR (eGFR) will be   calculated using the Chronic Kidney Disease Epidemiology Collaboration   (CKD-EPI) equation.       Calcium 07/01/2020 8.9  8.5 - 10.1 mg/dL Final      Bilirubin Total 07/01/2020 0.3  0.2 - 1.3 mg/dL Final     Albumin 07/01/2020 3.4  3.4 - 5.0 g/dL Final     Protein Total 07/01/2020 6.8  6.8 - 8.8 g/dL Final     Alkaline Phosphatase 07/01/2020 99  40 - 150 U/L Final     ALT 07/01/2020 32  0 - 50 U/L Final     AST 07/01/2020 17  0 - 45 U/L Final     Ferritin 07/01/2020 8  8 - 252 ng/mL Final           Assessment and Plan:  1. Bilateral breast cancer, CHEK 2 mutation. She is s/p bilateral mastectomy with SLNB and radiation therapy. Radiation therapy complete 5/2020.       - Left breast cancer [id number] ER + [place] + her2 negative, oncotype 10    -Right breast cancer T2(2)N0 ER + LA + her2 negative, oncotype 18  - Given low oncotype, we are not recommending chemotherapy. Given hormone status, recommend adjuvant endocrine therapy. She is perimenopausal. Again discussed options of initiating tamoxifen followed by AI vs either zoladex or oophorectomy following by AI. She is fearful of the side effects from transitioning into menopause. She is still weighing her options and would like some more time to think. Will follow up with Dr. Swann in 1 month.    2. CHEK 2 mutation, has met with genetic. s/p bilateral mastectomy as above, colonoscopies every 5 years.   - Breast/Gyn/Colon Cancer Actionable Panel ordered by genetics was negative per notes.   -it appears she is due for a colonoscopy, last one I see documented is in 2012. Recommended she have this done now.      3. HHT, following with Dr. Real.     4. Lymphedema- following with  Lymphedema team with improvement.     5. Fatigue  - Hgb 13.9 on 7/1/2020- which is baseline for her- ferritin 8. She does continue to have irregular, heavy periods. Does not tolerate PO Fe. Recommend trying to increase PO iron. Discussed one benefit of having zoladex or oophorectomy would be to stop her periods and decrease her risk or iron deficiency. She doesn't want to do that now.  She may also consider following up with her PCP to discuss.      Leslie Becerra PA-C  [facility] [id number] [place] [address]  [phone]    The patient was seen in conjunction with Leslie Becerra PA-C who served as a scribe for today's visit. I have reviewed the note and agree with the above findings and plan. TW      Again, thank you for allowing me to participate in the care of your patient.        Sincerely,        MATTHEW Macias CNP

## 2020-07-06 NOTE — TELEPHONE ENCOUNTER
RECORDS STATUS - BREAST    RECORDS REQUESTED FROM: EPIC   DATE REQUESTED: 9/4/2020    NOTES DETAILS STATUS   OFFICE NOTE from referring provider Complete Paz Smith MD    OFFICE NOTE from medical oncologist Complete [facility]   OFFICE NOTE from surgeon Complete [facility]- 2/14/2020 Plastics and Reconstruction - bilateral breast expander removal and washout    1/29/2020  Bilateral Skin-Sparing Mastectomy   More in [facility]   OFFICE NOTE from radiation oncologist     OPERATIVE REPORT Complete Surgical Path in [facility]   LABS     PATHOLOGY REPORTS  (Tissue diagnosis, Stage, ER/MT percentage positive and intensity of staining, HER2 IHC, FISH, and all biopsies from breast and any distant metastasis)                 Complete Surgical Pathology 2/14/2020   The patient is a 54-year-old woman with a recent history of bilateral   breast carcinoma, treated with bilateral   mastectomy (immediate bilateral breast reconstruction with tissue   expanders) and bilateral axillary sentinel   lymph node biopsy on 1/29/20. Diagnosis: mastectomy skin flap ischemia.   Procedure: excision of ischemic skin    GENONOMIC TESTING     TYPE:   (Next Generation Sequencing, including Foundation One testing, and Oncotype score) Complete Next Generation 6/2/2020   Her 2 Reynaldo Fish 1/29/2020    IMAGING (NEED IMAGES & REPORT)     CT SCANS     MRI     MAMMO Complete 11/21/2019, 12/6/2019 more in PACS   ULTRASOUND Complete   US Pelvic Complete 1/23/2020        PET     BONE SCAN     BRAIN MRI

## 2020-07-07 ENCOUNTER — HOSPITAL ENCOUNTER (OUTPATIENT)
Dept: PHYSICAL THERAPY | Facility: CLINIC | Age: 55
Setting detail: THERAPIES SERIES
End: 2020-07-07
Attending: RADIOLOGY
Payer: COMMERCIAL

## 2020-07-07 PROCEDURE — 97535 SELF CARE MNGMENT TRAINING: CPT | Mod: GP | Performed by: PHYSICAL THERAPIST

## 2020-07-07 PROCEDURE — 97140 MANUAL THERAPY 1/> REGIONS: CPT | Mod: GP | Performed by: PHYSICAL THERAPIST

## 2020-07-08 DIAGNOSIS — Z15.02 MONOALLELIC MUTATION OF CHEK2 GENE IN FEMALE PATIENT: Primary | ICD-10-CM

## 2020-07-08 DIAGNOSIS — Z15.89 MONOALLELIC MUTATION OF CHEK2 GENE IN FEMALE PATIENT: Primary | ICD-10-CM

## 2020-07-08 DIAGNOSIS — Z15.01 MONOALLELIC MUTATION OF CHEK2 GENE IN FEMALE PATIENT: Primary | ICD-10-CM

## 2020-07-08 DIAGNOSIS — Z15.09 MONOALLELIC MUTATION OF CHEK2 GENE IN FEMALE PATIENT: Primary | ICD-10-CM

## 2020-07-09 ENCOUNTER — TELEPHONE (OUTPATIENT)
Dept: GASTROENTEROLOGY | Facility: CLINIC | Age: 55
End: 2020-07-09

## 2020-07-10 DIAGNOSIS — Z11.59 ENCOUNTER FOR SCREENING FOR OTHER VIRAL DISEASES: Primary | ICD-10-CM

## 2020-07-16 ENCOUNTER — HOSPITAL ENCOUNTER (OUTPATIENT)
Dept: PHYSICAL THERAPY | Facility: CLINIC | Age: 55
Setting detail: THERAPIES SERIES
End: 2020-07-16
Attending: RADIOLOGY
Payer: COMMERCIAL

## 2020-07-16 PROCEDURE — 97140 MANUAL THERAPY 1/> REGIONS: CPT | Mod: GP | Performed by: PHYSICAL THERAPIST

## 2020-07-16 PROCEDURE — 97535 SELF CARE MNGMENT TRAINING: CPT | Mod: GP | Performed by: PHYSICAL THERAPIST

## 2020-07-24 ENCOUNTER — TELEPHONE (OUTPATIENT)
Dept: SURGERY | Facility: CLINIC | Age: 55
End: 2020-07-24

## 2020-07-29 ENCOUNTER — TELEPHONE (OUTPATIENT)
Dept: SURGERY | Facility: CLINIC | Age: 55
End: 2020-07-29

## 2020-07-30 ENCOUNTER — ONCOLOGY VISIT (OUTPATIENT)
Dept: ONCOLOGY | Facility: CLINIC | Age: 55
End: 2020-07-30
Attending: SURGERY
Payer: COMMERCIAL

## 2020-07-30 VITALS
WEIGHT: 175.1 LBS | TEMPERATURE: 98.4 F | DIASTOLIC BLOOD PRESSURE: 73 MMHG | HEART RATE: 59 BPM | OXYGEN SATURATION: 98 % | SYSTOLIC BLOOD PRESSURE: 117 MMHG | BODY MASS INDEX: 30.06 KG/M2

## 2020-07-30 DIAGNOSIS — Z17.0 MALIGNANT NEOPLASM OF UPPER-INNER QUADRANT OF RIGHT BREAST IN FEMALE, ESTROGEN RECEPTOR POSITIVE (H): ICD-10-CM

## 2020-07-30 DIAGNOSIS — Z15.01 MONOALLELIC MUTATION OF CHEK2 GENE IN FEMALE PATIENT: Primary | ICD-10-CM

## 2020-07-30 DIAGNOSIS — C50.211 MALIGNANT NEOPLASM OF UPPER-INNER QUADRANT OF RIGHT BREAST IN FEMALE, ESTROGEN RECEPTOR POSITIVE (H): ICD-10-CM

## 2020-07-30 DIAGNOSIS — Z15.89 MONOALLELIC MUTATION OF CHEK2 GENE IN FEMALE PATIENT: Primary | ICD-10-CM

## 2020-07-30 DIAGNOSIS — I89.0 LYMPHEDEMA: ICD-10-CM

## 2020-07-30 DIAGNOSIS — Z15.02 MONOALLELIC MUTATION OF CHEK2 GENE IN FEMALE PATIENT: Primary | ICD-10-CM

## 2020-07-30 DIAGNOSIS — Z17.0 MALIGNANT NEOPLASM OF UPPER-OUTER QUADRANT OF LEFT BREAST IN FEMALE, ESTROGEN RECEPTOR POSITIVE (H): Primary | ICD-10-CM

## 2020-07-30 DIAGNOSIS — C50.412 MALIGNANT NEOPLASM OF UPPER-OUTER QUADRANT OF LEFT BREAST IN FEMALE, ESTROGEN RECEPTOR POSITIVE (H): Primary | ICD-10-CM

## 2020-07-30 DIAGNOSIS — Z15.09 MONOALLELIC MUTATION OF CHEK2 GENE IN FEMALE PATIENT: Primary | ICD-10-CM

## 2020-07-30 PROCEDURE — G0463 HOSPITAL OUTPT CLINIC VISIT: HCPCS | Mod: ZF

## 2020-07-30 RX ORDER — BISACODYL 5 MG/1
10 TABLET, DELAYED RELEASE ORAL DAILY
Qty: 4 TABLET | Refills: 0 | Status: SHIPPED | OUTPATIENT
Start: 2020-07-30 | End: 2020-08-01

## 2020-07-30 ASSESSMENT — PAIN SCALES - GENERAL: PAINLEVEL: NO PAIN (0)

## 2020-07-30 NOTE — NURSING NOTE
eRx Dulcolax et Golytely: [facility] - [place], [place] - [address] [place] [address] [address]     Katarzyna Matthew RN   [facility]

## 2020-07-30 NOTE — NURSING NOTE
"Oncology Rooming Note    July 30, 2020 9:12 AM   Leigh Espinal is a 55 year old female who presents for:    Chief Complaint   Patient presents with     Oncology Clinic Visit     Breast Cancer      Initial Vitals: There were no vitals taken for this visit. Estimated body mass index is 29.87 kg/m  as calculated from the following:    Height as of 5/27/20: 1.626 m (5' 4\").    Weight as of 5/27/20: 78.9 kg (174 lb). There is no height or weight on file to calculate BSA.  Data Unavailable Comment: Data Unavailable   No LMP recorded.  Allergies reviewed: Yes  Medications reviewed: Yes    Medications: Medication refills not needed today.  Pharmacy name entered into EPIC:    [facility] [place] - [id number] [facility] - [place], [place] - [address] [facility] [address]  [facility] - [place], [place] - [address] [facility]    Clinical concerns: None       Kelley Leo CMA              "

## 2020-07-30 NOTE — PROGRESS NOTES
FOLLOW-UP  Jul 30, 2020    Leigh Espinal is a 55 year old female who returns for follow-up for     Cancer Staging  Malignant neoplasm of upper-inner quadrant of right breast in female, estrogen receptor positive (H)  Staging form: Breast, AJCC 8th Edition  - Pathologic stage from 2/25/2020: Stage IA (pT2, pN0, cM0, G2, ER+, SC+, HER2-) - Signed by Nicolle Shepherd MD on 2/25/2020    Malignant neoplasm of upper-outer quadrant of left breast in female, estrogen receptor positive (H)  Staging form: Breast, AJCC 8th Edition  - Clinical: Stage IB (cT2, cN0, cM0, G2, ER+, SC+, HER2-) - Signed by Paz Smith MD on 12/5/2019  - Pathologic stage from 2/25/2020: Stage IA (pT1c, pN1mi, cM0, G2, ER+, SC+, HER2-, Oncotype DX score: 10) - Signed by Nicolle Shepherd MD on 2/25/2020    Treatment to date:  1. Genetic testing (12/5/2019) - deleterious CHEK2 mutation identified  2. Bilateral skin-sparing mastectomy and bilateral axillary sentinel lymph node biopsy (1/29/2020)  3. Oncotype DX (left breast cancer) 10  4. Oncotype DX (right breast cancer) 18  5. Adjuvant radiation (3/27/2020 to 5/5/2020)    HPI:    Since her last visit, she has been doing well.  She denies any headaches, dizziness, vision changes, back pain, abdominal pain, bowel habit changes, rectal bleeding, melena, chest pain, shortness of breath, or unintentional weight loss.  She noted no masses in either breast, axilla, or neck. She denies any nipple discharge or nipple inversion.  She has good range of motion of her shoulders bilaterally and denies any upper extremity swelling.  The left chest wall lymphedema is improving with massage therapy with lymphedema clinic.  She has hot flashes and her periods are now occurring every 2 weeks.    /73   Pulse 59   Temp 98.4  F (36.9  C) (Oral)   Wt 79.4 kg (175 lb 1.6 oz)   SpO2 98%   BMI 30.06 kg/m     Physical Exam  Constitutional:       Appearance: She is  well-developed.   Chest:      Breasts: Breasts are symmetrical.         Right: No mass, skin change or tenderness.         Left: Skin change (Hyperpigmentation c/w prior RT. Lymphedema in the inferior flap.) present. No mass or tenderness.      Comments: Patient was examined in both supine and upright positions.   Lymphadenopathy:      Cervical: No cervical adenopathy.      Right cervical: No superficial, deep or posterior cervical adenopathy.     Left cervical: No superficial, deep or posterior cervical adenopathy.      Upper Body:      Right upper body: No supraclavicular, axillary or pectoral adenopathy.      Left upper body: No supraclavicular, axillary or pectoral adenopathy.      Comments: No lymphedema in bilateral upper extremities.   Skin:     General: Skin is warm and dry.        INVESTIGATIONS:  None    ASSESSMENT:    Leigh Espinal is a 55 year old female with bilateral breast cancer, 6 months s/p surgery, and 3 months s/p left radiation therapy.    She does have lymphedema of the left chest wall.  She finds lymphedema clinic and compression bra/inserts are significantly improving her symptoms.  We discussed continuing this to optimize drainage prior to second stage of reconstruction, which will likely the end of this year.    We also discussed adjuvant endocrine therapy.  She seems to be having perimenopausal symptoms and had questions about this.  I recommended that she speak with Dr Swann at their follow up next week; she is interested in ovarian suppression (over oophorectomy) and aromatase inhibitor.      She is doing well without recurrence. I will see her in 3 months' time for follow up physical exam.    Total time spent with the patient was 15 minutes, of which more than half was counseling.     PLAN:  1. Continue lymphedema clinic  2. Follow up with me in 3 months  3. Follow up with Dr Swann as scheduled in August 2020    Paz Smith MD MSc [facility] FACS    Division of  Surgical Oncology  [facility]

## 2020-07-30 NOTE — LETTER
7/30/2020         RE: Leigh Espinal  [address] [place] [address]        Dear Colleague,    Thank you for referring your patient, Leigh Espinal, to the [facility] [place]. Please see a copy of my visit note below.    FOLLOW-UP  Jul 30, 2020    Leigh Espinal is a 55 year old female who returns for follow-up for     Cancer Staging  Malignant neoplasm of upper-inner quadrant of right breast in female, estrogen receptor positive (H)  Staging form: Breast, AJCC 8th Edition  - Pathologic stage from 2/25/2020: Stage IA (pT2, pN0, cM0, G2, ER+, SC+, HER2-) - Signed by Nicolle Shepherd MD on 2/25/2020    Malignant neoplasm of upper-outer quadrant of left breast in female, estrogen receptor positive (H)  Staging form: Breast, AJCC 8th Edition  - Clinical: Stage IB (cT2, cN0, cM0, G2, ER+, SC+, HER2-) - Signed by Paz Smith MD on 12/5/2019  - Pathologic stage from 2/25/2020: Stage IA (pT1c, pN1mi, cM0, G2, ER+, SC+, HER2-, Oncotype DX score: 10) - Signed by Nicolle Shepherd MD on 2/25/2020    Treatment to date:  1. Genetic testing (12/5/2019) - deleterious CHEK2 mutation identified  2. Bilateral skin-sparing mastectomy and bilateral axillary sentinel lymph node biopsy (1/29/2020)  3. Oncotype DX (left breast cancer) 10  4. Oncotype DX (right breast cancer) 18  5. Adjuvant radiation (3/27/2020 to 5/5/2020)    HPI:    Since her last visit, she has been doing well.  She denies any headaches, dizziness, vision changes, back pain, abdominal pain, bowel habit changes, rectal bleeding, melena, chest pain, shortness of breath, or unintentional weight loss.  She noted no masses in either breast, axilla, or neck. She denies any nipple discharge or nipple inversion.  She has good range of motion of her shoulders bilaterally and denies any upper extremity swelling.  The left chest wall lymphedema is improving with massage therapy with lymphedema clinic.  She has  hot flashes and her periods are now occurring every 2 weeks.    /73   Pulse 59   Temp 98.4  F (36.9  C) (Oral)   Wt 79.4 kg (175 lb 1.6 oz)   SpO2 98%   BMI 30.06 kg/m     Physical Exam  Constitutional:       Appearance: She is well-developed.   Chest:      Breasts: Breasts are symmetrical.         Right: No mass, skin change or tenderness.         Left: Skin change (Hyperpigmentation c/w prior RT. Lymphedema in the inferior flap.) present. No mass or tenderness.      Comments: Patient was examined in both supine and upright positions.   Lymphadenopathy:      Cervical: No cervical adenopathy.      Right cervical: No superficial, deep or posterior cervical adenopathy.     Left cervical: No superficial, deep or posterior cervical adenopathy.      Upper Body:      Right upper body: No supraclavicular, axillary or pectoral adenopathy.      Left upper body: No supraclavicular, axillary or pectoral adenopathy.      Comments: No lymphedema in bilateral upper extremities.   Skin:     General: Skin is warm and dry.        INVESTIGATIONS:  None    ASSESSMENT:    Leigh Espinal is a 55 year old female with bilateral breast cancer, 6 months s/p surgery, and 3 months s/p left radiation therapy.    She does have lymphedema of the left chest wall.  She finds lymphedema clinic and compression bra/inserts are significantly improving her symptoms.  We discussed continuing this to optimize drainage prior to second stage of reconstruction, which will likely the end of this year.    We also discussed adjuvant endocrine therapy.  She seems to be having perimenopausal symptoms and had questions about this.  I recommended that she speak with Dr Swann at their follow up next week; she is interested in ovarian suppression (over oophorectomy) and aromatase inhibitor.      She is doing well without recurrence. I will see her in 3 months' time for follow up physical exam.    Total time spent with the patient was 15 minutes, of  which more than half was counseling.     PLAN:  1. Continue lymphedema clinic  2. Follow up with me in 3 months  3. Follow up with Dr Swann as scheduled in August 2020    Paz Smith MD [facility]    Division of Surgical Oncology  [facility]

## 2020-07-31 ENCOUNTER — TELEPHONE (OUTPATIENT)
Dept: GASTROENTEROLOGY | Facility: CLINIC | Age: 55
End: 2020-07-31

## 2020-07-31 NOTE — PROVIDER NOTIFICATION
"Pt stated that she was unwilling to get up between [id number] to complete split-dose prep. Discussed rationale for same.  She stated that she would read prep instructions but she didn't think she \"would do that.\"  I offered her a late afternoon appointment with Dr. Bertrand the same day.  She did not accept stating that she would have to see what her  would be doing.     I encouraged her to not delay regarding her decision about the afternoon appointment since it may not remain available.      Resent via [email or web address]  - This includes Split-dose Golytely  instructions, ASC MAC Instructions, procedure date/time/location/provider.      Katarzyna Matthew RN  [facility]   "

## 2020-08-06 ENCOUNTER — HOSPITAL ENCOUNTER (OUTPATIENT)
Facility: AMBULATORY SURGERY CENTER | Age: 55
End: 2020-08-06
Attending: INTERNAL MEDICINE
Payer: COMMERCIAL

## 2020-08-13 ENCOUNTER — ONCOLOGY VISIT (OUTPATIENT)
Dept: ONCOLOGY | Facility: CLINIC | Age: 55
End: 2020-08-13
Attending: INTERNAL MEDICINE
Payer: COMMERCIAL

## 2020-08-13 VITALS
OXYGEN SATURATION: 98 % | SYSTOLIC BLOOD PRESSURE: 176 MMHG | TEMPERATURE: 96.9 F | HEART RATE: 54 BPM | WEIGHT: 177.7 LBS | DIASTOLIC BLOOD PRESSURE: 79 MMHG | BODY MASS INDEX: 30.5 KG/M2

## 2020-08-13 DIAGNOSIS — C50.412 MALIGNANT NEOPLASM OF UPPER-OUTER QUADRANT OF LEFT BREAST IN FEMALE, ESTROGEN RECEPTOR POSITIVE (H): Primary | ICD-10-CM

## 2020-08-13 DIAGNOSIS — Z17.0 MALIGNANT NEOPLASM OF UPPER-OUTER QUADRANT OF LEFT BREAST IN FEMALE, ESTROGEN RECEPTOR POSITIVE (H): Primary | ICD-10-CM

## 2020-08-13 PROCEDURE — 99215 OFFICE O/P EST HI 40 MIN: CPT | Mod: ZP | Performed by: INTERNAL MEDICINE

## 2020-08-13 PROCEDURE — 96402 CHEMO HORMON ANTINEOPL SQ/IM: CPT

## 2020-08-13 PROCEDURE — 25000128 H RX IP 250 OP 636: Mod: ZF | Performed by: INTERNAL MEDICINE

## 2020-08-13 RX ADMIN — GOSERELIN ACETATE 3.6 MG: 3.6 IMPLANT SUBCUTANEOUS at 09:25

## 2020-08-13 ASSESSMENT — PAIN SCALES - GENERAL: PAINLEVEL: NO PAIN (0)

## 2020-08-13 NOTE — PROGRESS NOTES
Oncology Follow-up Visit:  August 13, 2020    Diagnosis: locally advanced breast cancer, s/p bilateral breast cancer, radiation    History Of Present Illness:  Ms. Espinal is a 55 year old female is here for follow-up of locally advanced breast cancer.    Ms. Espinal is a 56 year old female with bilateral breast cancers. Other past medical history is notable for HHT (follows with Dr. Real), pulmonary and liver AVMs, GERD and HTN. Bilateral screening mammograms on 10/23/19 showed architectural distortion and developing focal asymmetry in the right breast, and possible developing asymmetry in the left breast. Diagnostic mammograms showed a 2 x 1.4 cm mass at 1:00 right breast and a 9 mm mass at 2:00 left breast. An additional 1.4 cm satellite mass in the left breast (total diameter of both left breast masses, 2.1 cm) was noted on contrast mammogram on 11/21/19.  Biopsy of the right breast at 1:00 revealed invasive ductal carcinoma, grade 1, ER+ (98%), MS+ (99%), HER2 negative. Biopsy of the left breast 2:00 revealed invasive lobular carcinoma, grade 2, ER+ (95%), MS+ (75%), and HER2 negative.  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      She underwent bilateral nipple sparing mastectomies, tissue expander placement, and bilateral axillary sentinel lymph node biopsies on 1/29/20 under the care of Dr. Smith. Final pathology revealed grade 2 invasive mammary carcinoma in the right breast measuring 2.1 cm, a smaller focus of invasive mammary carcinoma (mixed ductal and cribriform) measuring 3.6 mm, admixed DCIS, and negative margins. The left breast showed grade 2 invasive lobular carcinoma, measuring 1.6 cm, DCIS and LCIS, and negative margins. The right axillary sentinel node was benign; the left axillary sentinel node showed micrometastatic carcinoma measuring 1.5 mm without extranodal extension.     OncotypeDx was ordered for right and left breast  cancers.  Oncotype dx recurrence score of the left breast cancer was 10. Oncotype on the right breast cancer was 18.      Her surgery was complicated by skin necrosis requiring additional surgery.    She received adjuvant left chest wall and low axillary radiation: 5040 cGy in 28 fractions      She is feeling well.  She feels like she has recovered from her surgeries.  She notices some irritation under her left axilla.  No f/c.  No chest pain or shortness of breath.  No n/v/d/c.      Review Of Systems:   ROS: 10 point ROS neg other than the symptoms noted above in the HPI.      Past medical, social, surgical, and family histories reviewed.  Patient underwent menarche at 10 yo.  She has had 2 pregnancies and has 2 children.  First full term pregnancy at 24 yo.  She  both of her children.  She had a mirena IUD in place until shortly after breast cancer diagnosis.  She had menstrual bleeding for about 6 weeks after the IUD was removed.    Allergies:  Allergies as of 08/13/2020 - Reviewed 08/13/2020   Allergen Reaction Noted     Sulfa drugs  10/18/2011       Current Medications:  Current Outpatient Medications   Medication Sig Dispense Refill     atenolol (TENORMIN) 50 MG tablet Take 1 tablet by mouth daily       LISINOPRIL PO Take 20 mg by mouth daily       order for DME Equipment being ordered: 6 compression bras and 2 sets of breast prostheses (bilateral) per year 3 each 1     order for DME Equipment being ordered: Prosthetic bra after bilateral mastectomy 2 each 0     pantoprazole (PROTONIX) 20 MG EC tablet daily as needed   3     SERTRALINE HCL PO Take 25 mg by mouth daily        TRAZodone (DESYREL) 25 MG TABS Take 50 mg by mouth At Bedtime        amoxicillin (AMOXIL) 500 MG tablet Take 4 pills (2000 mg) 30-60 minutes before dental procedures, including teeth cleaning. (Patient not taking: Reported on 7/2/2020) 4 tablet 3        Physical Exam:  BP (!) 176/79   Pulse 54   Temp 96.9  F (36.1  C) (Tympanic)    Wt 80.6 kg (177 lb 11.2 oz)   SpO2 98%   BMI 30.50 kg/m      GENERAL APPEARANCE: healthy, alert and no distress     HENT: Mouth without ulcers or lesions     NECK: no adenopathy, no asymmetry or masses     LYMPHATICS: No cervical, supraclavicular, axillary or inguinal lymphadenopathy     RESP: lungs clear to auscultation - no rales, rhonchi or wheezes     CARDIOVASCULAR: regular rates and rhythm      CHEST: s/p bilateral mastectomies, incisions are c/d/i     ABDOMEN:  soft, nontender, no HSM or masses and bowel sounds normal     MUSCULOSKELETAL: extremities normal- no gross deformities noted, no evidence of inflammation in joints, FROM in all extremities. No edema b/l LE.     SKIN: no suspicious lesions or rashes     PSYCHIATRIC: mentation appears normal and affect normal    Laboratory/Imaging Studies     Lab Results   Component Value Date    WBC 5.7 07/01/2020     Lab Results   Component Value Date    RBC 4.80 07/01/2020     Lab Results   Component Value Date    HGB 13.9 07/01/2020     Lab Results   Component Value Date    HCT 43.3 07/01/2020     No components found for: MCT  Lab Results   Component Value Date    MCV 90 07/01/2020     Lab Results   Component Value Date    MCH 29.0 07/01/2020     Lab Results   Component Value Date    MCHC 32.1 07/01/2020     Lab Results   Component Value Date    RDW 13.8 07/01/2020     Lab Results   Component Value Date     07/01/2020       Recent Labs   Lab Test 07/01/20  0848 10/18/17  0814    138   POTASSIUM 3.8 3.8   CHLORIDE 107 105   CO2 26 26   ANIONGAP 6 7   GLC 89 130*   BUN 10 13   CR 0.81 0.84   ILIANA 8.9 8.8     Liver Function Studies -   Recent Labs   Lab Test 07/01/20  0848   PROTTOTAL 6.8   ALBUMIN 3.4   BILITOTAL 0.3   ALKPHOS 99   AST 17   ALT 32             ASSESSMENT/PLAN:  54 y/o female with bilateral breast cancer as outlined below:  1. Left breast cancer [id number] ER + [place] + her2 negative, oncotype 10 s/p mastectomy and SNLB  2. Right breast cancer  T2(2)N0 ER + [place] + her2 negative, oncotype 18 s/p mastectomy and SNLB  3. CHEK 2 mutation  4. HHT    1.  I reviewed with Leigh today that she has had curative-intent therapy with a mastectomy and sentinel lymph node biopsy. She completed all her surgery, and radiation.        I reviewed with her that with an estrogen- and progesterone-positive tumor, I would recommend the use of adjuvant endocrine therapy.  She is currently perimenopausal and is having active menstrual periods.  We discussed that typically in these situations, I recommend the use of tamoxifen with a transition over to an aromatase inhibitor once she is menopausal.  The other option would be to put her into menopause with Zoladex or an oophorectomy followed by the use of an aromatase inhibitor.  We reviewed that the use of adjuvant endocrine therapy will reduce her risk of recurrence by approximately 30%-40%.  She has met with several providers and feels ready to pursue   Zoladex.    We discussed starting zoladex.  If she tolerates this, then we will start an AI in one month.     2.She does have a CHEK2 mutation.  She is scheduled to see Genetics next month.  She has had a colonoscopy in 2012, which was normal.   3.  She sees Dr. Kacie Real for HHT.   4.  I reviewed with Leigh today that I also recommend lifestyle management to help prevent breast cancer recurrence and her overall health.  This includes regular exercise 150 minutes per week as well as a diet high in fruits and vegetables.  We will continue to discuss this with future visits.       Katarzyna Swann MD

## 2020-08-13 NOTE — PATIENT INSTRUCTIONS
Contact Numbers  [facility]: [phone]    After Hours:  [phone]  Triage: [phone]    Please call the [facility] Triage line if you experience a temperature greater than or equal to 100.5, shaking chills, have uncontrolled nausea, vomiting and/or diarrhea, dizziness, shortness of breath, chest pain, bleeding, unexplained bruising, or if you have any other new/concerning symptoms, questions or concerns.     If it is after hours, weekends, or holidays, please call the main hospital  at  [phone] and ask to speak to the Oncology doctor on call.     If you are having any concerning symptoms or wish to speak to a provider before your next infusion visit, please call your care coordinator or triage to notify them so we can adequately serve you.     If you need a refill on a narcotic prescription or other medication, please call triage before your infusion appointment.

## 2020-08-13 NOTE — LETTER
8/13/2020         RE: Leigh Espinal  [address] [place] [address]        Dear Colleague,    Thank you for referring your patient, Leigh Espinal, to the [facility]. Please see a copy of my visit note below.    Oncology Follow-up Visit:  August 13, 2020    Diagnosis: locally advanced breast cancer, s/p bilateral breast cancer, radiation    History Of Present Illness:  Ms. Espinal is a 55 year old female is here for follow-up of locally advanced breast cancer.    Ms. Espinal is a 56 year old female with bilateral breast cancers. Other past medical history is notable for HHT (follows with Dr. Real), pulmonary and liver AVMs, GERD and HTN. Bilateral screening mammograms on 10/23/19 showed architectural distortion and developing focal asymmetry in the right breast, and possible developing asymmetry in the left breast. Diagnostic mammograms showed a 2 x 1.4 cm mass at 1:00 right breast and a 9 mm mass at 2:00 left breast. An additional 1.4 cm satellite mass in the left breast (total diameter of both left breast masses, 2.1 cm) was noted on contrast mammogram on 11/21/19.  Biopsy of the right breast at 1:00 revealed invasive ductal carcinoma, grade 1, ER+ (98%), DC+ (99%), HER2 negative. Biopsy of the left breast 2:00 revealed invasive lobular carcinoma, grade 2, ER+ (95%), DC+ (75%), and HER2 negative.  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      She underwent bilateral nipple sparing mastectomies, tissue expander placement, and bilateral axillary sentinel lymph node biopsies on 1/29/20 under the care of Dr. Smith. Final pathology revealed grade 2 invasive mammary carcinoma in the right breast measuring 2.1 cm, a smaller focus of invasive mammary carcinoma (mixed ductal and cribriform) measuring 3.6 mm, admixed DCIS, and negative margins. The left breast showed grade 2 invasive lobular carcinoma, measuring  1.6 cm, DCIS and LCIS, and negative margins. The right axillary sentinel node was benign; the left axillary sentinel node showed micrometastatic carcinoma measuring 1.5 mm without extranodal extension.     OncotypeDx was ordered for right and left breast cancers.  Oncotype dx recurrence score of the left breast cancer was 10. Oncotype on the right breast cancer was 18.      Her surgery was complicated by skin necrosis requiring additional surgery.    She received adjuvant left chest wall and low axillary radiation: 5040 cGy in 28 fractions      She is feeling well.  She feels like she has recovered from her surgeries.  She notices some irritation under her left axilla.  No f/c.  No chest pain or shortness of breath.  No n/v/d/c.      Review Of Systems:   ROS: 10 point ROS neg other than the symptoms noted above in the HPI.      Past medical, social, surgical, and family histories reviewed.  Patient underwent menarche at 10 yo.  She has had 2 pregnancies and has 2 children.  First full term pregnancy at 26 yo.  She  both of her children.  She had a mirena IUD in place until shortly after breast cancer diagnosis.  She had menstrual bleeding for about 6 weeks after the IUD was removed.    Allergies:  Allergies as of 08/13/2020 - Reviewed 08/13/2020   Allergen Reaction Noted     Sulfa drugs  10/18/2011       Current Medications:  Current Outpatient Medications   Medication Sig Dispense Refill     atenolol (TENORMIN) 50 MG tablet Take 1 tablet by mouth daily       LISINOPRIL PO Take 20 mg by mouth daily       order for DME Equipment being ordered: 6 compression bras and 2 sets of breast prostheses (bilateral) per year 3 each 1     order for DME Equipment being ordered: Prosthetic bra after bilateral mastectomy 2 each 0     pantoprazole (PROTONIX) 20 MG EC tablet daily as needed   3     SERTRALINE HCL PO Take 25 mg by mouth daily        TRAZodone (DESYREL) 25 MG TABS Take 50 mg by mouth At Bedtime         amoxicillin (AMOXIL) 500 MG tablet Take 4 pills (2000 mg) 30-60 minutes before dental procedures, including teeth cleaning. (Patient not taking: Reported on 7/2/2020) 4 tablet 3        Physical Exam:  BP (!) 176/79   Pulse 54   Temp 96.9  F (36.1  C) (Tympanic)   Wt 80.6 kg (177 lb 11.2 oz)   SpO2 98%   BMI 30.50 kg/m      GENERAL APPEARANCE: healthy, alert and no distress     HENT: Mouth without ulcers or lesions     NECK: no adenopathy, no asymmetry or masses     LYMPHATICS: No cervical, supraclavicular, axillary or inguinal lymphadenopathy     RESP: lungs clear to auscultation - no rales, rhonchi or wheezes     CARDIOVASCULAR: regular rates and rhythm      CHEST: s/p bilateral mastectomies, incisions are c/d/i     ABDOMEN:  soft, nontender, no HSM or masses and bowel sounds normal     MUSCULOSKELETAL: extremities normal- no gross deformities noted, no evidence of inflammation in joints, FROM in all extremities. No edema b/l LE.     SKIN: no suspicious lesions or rashes     PSYCHIATRIC: mentation appears normal and affect normal    Laboratory/Imaging Studies     Lab Results   Component Value Date    WBC 5.7 07/01/2020     Lab Results   Component Value Date    RBC 4.80 07/01/2020     Lab Results   Component Value Date    HGB 13.9 07/01/2020     Lab Results   Component Value Date    HCT 43.3 07/01/2020     No components found for: MCT  Lab Results   Component Value Date    MCV 90 07/01/2020     Lab Results   Component Value Date    MCH 29.0 07/01/2020     Lab Results   Component Value Date    MCHC 32.1 07/01/2020     Lab Results   Component Value Date    RDW 13.8 07/01/2020     Lab Results   Component Value Date     07/01/2020       Recent Labs   Lab Test 07/01/20  0848 10/18/17  0814    138   POTASSIUM 3.8 3.8   CHLORIDE 107 105   CO2 26 26   ANIONGAP 6 7   GLC 89 130*   BUN 10 13   CR 0.81 0.84   ILIANA 8.9 8.8     Liver Function Studies -   Recent Labs   Lab Test 07/01/20  0848   PROTTOTAL 6.8    ALBUMIN 3.4   BILITOTAL 0.3   ALKPHOS 99   AST 17   ALT 32             ASSESSMENT/PLAN:  54 y/o female with bilateral breast cancer as outlined below:  1. Left breast cancer [id number] ER + AL + her2 negative, oncotype 10 s/p mastectomy and SNLB  2. Right breast cancer T2(2)N0 ER + AL + her2 negative, oncotype 18 s/p mastectomy and SNLB  3. CHEK 2 mutation  4. HHT    1.  I reviewed with Leigh today that she has had curative-intent therapy with a mastectomy and sentinel lymph node biopsy. She completed all her surgery, and radiation.        I reviewed with her that with an estrogen- and progesterone-positive tumor, I would recommend the use of adjuvant endocrine therapy.  She is currently perimenopausal and is having active menstrual periods.  We discussed that typically in these situations, I recommend the use of tamoxifen with a transition over to an aromatase inhibitor once she is menopausal.  The other option would be to put her into menopause with Zoladex or an oophorectomy followed by the use of an aromatase inhibitor.  We reviewed that the use of adjuvant endocrine therapy will reduce her risk of recurrence by approximately 30%-40%.  She has met with several providers and feels ready to pursue   Zoladex.    We discussed starting zoladex.  If she tolerates this, then we will start an AI in one month.     2.She does have a CHEK2 mutation.  She is scheduled to see Genetics next month.  She has had a colonoscopy in 2012, which was normal.   3.  She sees Dr. Kacie Real for HHT.   4.  I reviewed with Leigh today that I also recommend lifestyle management to help prevent breast cancer recurrence and her overall health.  This includes regular exercise 150 minutes per week as well as a diet high in fruits and vegetables.  We will continue to discuss this with future visits.       Katarznya Swann MD

## 2020-08-13 NOTE — NURSING NOTE
"Oncology Rooming Note    August 13, 2020 7:58 AM   Leigh Espinal is a 55 year old female who presents for:    Chief Complaint   Patient presents with     Oncology Clinic Visit     Breast Cancer     Initial Vitals: BP (!) 176/79   Pulse 54   Temp 96.9  F (36.1  C) (Tympanic)   Wt 80.6 kg (177 lb 11.2 oz)   SpO2 98%   BMI 30.50 kg/m   Estimated body mass index is 30.5 kg/m  as calculated from the following:    Height as of 5/27/20: 1.626 m (5' 4\").    Weight as of this encounter: 80.6 kg (177 lb 11.2 oz). Body surface area is 1.91 meters squared.  No Pain (0) Comment: Data Unavailable   No LMP recorded.  Allergies reviewed: Yes  Medications reviewed: Yes    Medications: Medication refills not needed today.  Pharmacy name entered into EPIC:    [facility] [place] - [id number] [facility] - [place], [place] - [address]  [facility] - [place], [place] - [address]    Clinical concerns: Wants shot provider discussed previously Provider was NOT notified.      Vanessa Wiggins MA            "

## 2020-08-13 NOTE — PROGRESS NOTES
Infusion Nursing Note:  Leigh Espinal presents today for #1 Zoladex.    Patient seen by provider today: Yes: Dr Swann   present during visit today: Not Applicable.    Note: PT saw provider prior to infusion, ok for treatment.    Intravenous Access:  No Intravenous access/labs at this visit.    Post Infusion Assessment:  Patient tolerated injection into LUQ without incident. Ice applied to site prior to injection.      Discharge Plan:   Patient declined prescription refills.  Discharge instructions reviewed with: Patient.  Patient and/or family verbalized understanding of discharge instructions and all questions answered.  AVS to patient via [facility].  IB sent to scheduling to make appt for next month and notify pt. Checkout orders not done prior to discharge.  Patient discharged in stable condition accompanied by: self.  Departure Mode: Ambulatory.    Bebe Stokes RN

## 2020-08-19 ENCOUNTER — HOSPITAL ENCOUNTER (OUTPATIENT)
Dept: PHYSICAL THERAPY | Facility: CLINIC | Age: 55
Setting detail: THERAPIES SERIES
End: 2020-08-19
Attending: RADIOLOGY
Payer: COMMERCIAL

## 2020-08-19 PROCEDURE — 97110 THERAPEUTIC EXERCISES: CPT | Mod: GP | Performed by: PHYSICAL THERAPIST

## 2020-08-19 PROCEDURE — 97140 MANUAL THERAPY 1/> REGIONS: CPT | Mod: GP | Performed by: PHYSICAL THERAPIST

## 2020-09-01 ENCOUNTER — TELEPHONE (OUTPATIENT)
Dept: ONCOLOGY | Facility: CLINIC | Age: 55
End: 2020-09-01

## 2020-09-01 NOTE — TELEPHONE ENCOUNTER
Left vml for patient to contact scheduling to reschedule 9/4 GC visit with Trini Johnson per IB request  (see below)    Trini has extended her leave for another week and there are 4 new patients this Friday 9/4. Would you please re-schedule these?     Thank you!   Sofi BEGUM, Lead Clinic Coordinator

## 2020-09-04 ENCOUNTER — PRE VISIT (OUTPATIENT)
Dept: ONCOLOGY | Facility: CLINIC | Age: 55
End: 2020-09-04

## 2020-09-09 ENCOUNTER — ONCOLOGY VISIT (OUTPATIENT)
Dept: ONCOLOGY | Facility: CLINIC | Age: 55
End: 2020-09-09
Attending: INTERNAL MEDICINE
Payer: COMMERCIAL

## 2020-09-09 VITALS
TEMPERATURE: 98.1 F | DIASTOLIC BLOOD PRESSURE: 82 MMHG | HEART RATE: 34 BPM | SYSTOLIC BLOOD PRESSURE: 138 MMHG | RESPIRATION RATE: 18 BRPM | OXYGEN SATURATION: 98 %

## 2020-09-09 DIAGNOSIS — Z17.0 MALIGNANT NEOPLASM OF UPPER-OUTER QUADRANT OF LEFT BREAST IN FEMALE, ESTROGEN RECEPTOR POSITIVE (H): Primary | ICD-10-CM

## 2020-09-09 DIAGNOSIS — I78.0 HHT (HEREDITARY HEMORRHAGIC TELANGIECTASIA) (H): ICD-10-CM

## 2020-09-09 DIAGNOSIS — R00.1 BRADYCARDIA: ICD-10-CM

## 2020-09-09 DIAGNOSIS — C50.412 MALIGNANT NEOPLASM OF UPPER-OUTER QUADRANT OF LEFT BREAST IN FEMALE, ESTROGEN RECEPTOR POSITIVE (H): Primary | ICD-10-CM

## 2020-09-09 DIAGNOSIS — D50.0 IRON DEFICIENCY ANEMIA DUE TO CHRONIC BLOOD LOSS: ICD-10-CM

## 2020-09-09 DIAGNOSIS — R00.1 BRADYCARDIA: Primary | ICD-10-CM

## 2020-09-09 DIAGNOSIS — R00.1 SINUS BRADYCARDIA: ICD-10-CM

## 2020-09-09 LAB
ALBUMIN SERPL-MCNC: 3.3 G/DL (ref 3.4–5)
ALP SERPL-CCNC: 116 U/L (ref 40–150)
ALT SERPL W P-5'-P-CCNC: 30 U/L (ref 0–50)
ANION GAP SERPL CALCULATED.3IONS-SCNC: 9 MMOL/L (ref 3–14)
AST SERPL W P-5'-P-CCNC: 22 U/L (ref 0–45)
BASOPHILS # BLD AUTO: 0 10E9/L (ref 0–0.2)
BASOPHILS NFR BLD AUTO: 0.5 %
BILIRUB SERPL-MCNC: 0.3 MG/DL (ref 0.2–1.3)
BUN SERPL-MCNC: 15 MG/DL (ref 7–30)
CALCIUM SERPL-MCNC: 9.3 MG/DL (ref 8.5–10.1)
CHLORIDE SERPL-SCNC: 109 MMOL/L (ref 94–109)
CO2 SERPL-SCNC: 22 MMOL/L (ref 20–32)
CREAT SERPL-MCNC: 0.89 MG/DL (ref 0.52–1.04)
DIFFERENTIAL METHOD BLD: NORMAL
EOSINOPHIL # BLD AUTO: 0 10E9/L (ref 0–0.7)
EOSINOPHIL NFR BLD AUTO: 0.6 %
ERYTHROCYTE [DISTWIDTH] IN BLOOD BY AUTOMATED COUNT: 15 % (ref 10–15)
FERRITIN SERPL-MCNC: 10 NG/ML (ref 8–252)
GFR SERPL CREATININE-BSD FRML MDRD: 74 ML/MIN/{1.73_M2}
GLUCOSE SERPL-MCNC: 108 MG/DL (ref 70–99)
HCT VFR BLD AUTO: 42.1 % (ref 35–47)
HGB BLD-MCNC: 13.6 G/DL (ref 11.7–15.7)
IMM GRANULOCYTES # BLD: 0 10E9/L (ref 0–0.4)
IMM GRANULOCYTES NFR BLD: 0.3 %
LYMPHOCYTES # BLD AUTO: 1.7 10E9/L (ref 0.8–5.3)
LYMPHOCYTES NFR BLD AUTO: 27.1 %
MAGNESIUM SERPL-MCNC: 2.2 MG/DL (ref 1.6–2.3)
MCH RBC QN AUTO: 28.8 PG (ref 26.5–33)
MCHC RBC AUTO-ENTMCNC: 32.3 G/DL (ref 31.5–36.5)
MCV RBC AUTO: 89 FL (ref 78–100)
MONOCYTES # BLD AUTO: 0.5 10E9/L (ref 0–1.3)
MONOCYTES NFR BLD AUTO: 8.2 %
NEUTROPHILS # BLD AUTO: 3.9 10E9/L (ref 1.6–8.3)
NEUTROPHILS NFR BLD AUTO: 63.3 %
NRBC # BLD AUTO: 0 10*3/UL
NRBC BLD AUTO-RTO: 0 /100
NT-PROBNP SERPL-MCNC: 108 PG/ML (ref 0–125)
PLATELET # BLD AUTO: 207 10E9/L (ref 150–450)
POTASSIUM SERPL-SCNC: 3.6 MMOL/L (ref 3.4–5.3)
PROT SERPL-MCNC: 6.8 G/DL (ref 6.8–8.8)
RBC # BLD AUTO: 4.72 10E12/L (ref 3.8–5.2)
SODIUM SERPL-SCNC: 140 MMOL/L (ref 133–144)
TROPONIN I SERPL-MCNC: <0.015 UG/L (ref 0–0.04)
TSH SERPL DL<=0.005 MIU/L-ACNC: 2.54 MU/L (ref 0.4–4)
WBC # BLD AUTO: 6.2 10E9/L (ref 4–11)

## 2020-09-09 PROCEDURE — 36415 COLL VENOUS BLD VENIPUNCTURE: CPT

## 2020-09-09 PROCEDURE — 84484 ASSAY OF TROPONIN QUANT: CPT | Performed by: PHYSICIAN ASSISTANT

## 2020-09-09 PROCEDURE — 80053 COMPREHEN METABOLIC PANEL: CPT | Performed by: INTERNAL MEDICINE

## 2020-09-09 PROCEDURE — 85025 COMPLETE CBC W/AUTO DIFF WBC: CPT | Performed by: INTERNAL MEDICINE

## 2020-09-09 PROCEDURE — 84443 ASSAY THYROID STIM HORMONE: CPT | Performed by: PHYSICIAN ASSISTANT

## 2020-09-09 PROCEDURE — 93005 ELECTROCARDIOGRAM TRACING: CPT

## 2020-09-09 PROCEDURE — 82728 ASSAY OF FERRITIN: CPT | Performed by: INTERNAL MEDICINE

## 2020-09-09 PROCEDURE — 93010 ELECTROCARDIOGRAM REPORT: CPT | Performed by: INTERNAL MEDICINE

## 2020-09-09 PROCEDURE — G0463 HOSPITAL OUTPT CLINIC VISIT: HCPCS | Mod: ZF

## 2020-09-09 PROCEDURE — 99214 OFFICE O/P EST MOD 30 MIN: CPT | Mod: ZP | Performed by: PHYSICIAN ASSISTANT

## 2020-09-09 PROCEDURE — 83735 ASSAY OF MAGNESIUM: CPT | Performed by: PHYSICIAN ASSISTANT

## 2020-09-09 PROCEDURE — 83880 ASSAY OF NATRIURETIC PEPTIDE: CPT | Performed by: PHYSICIAN ASSISTANT

## 2020-09-09 ASSESSMENT — PAIN SCALES - GENERAL: PAINLEVEL: NO PAIN (0)

## 2020-09-09 NOTE — NURSING NOTE
Add on provider appointment requested by Leslie Becerra. Pt was bradycardic in the lab. Rapid response called for EKG. EKG given to Leslie. Labs drawn in lab, but patient did not want an IV until fluids were confirmed. I requested an infusion appointment as well per Leslie's request. Meds and allergies reviewed by provider.     TOYA SMITH RN

## 2020-09-09 NOTE — NURSING NOTE
Chief Complaint   Patient presents with     Blood Draw     Labs drawn via  by RN in lab. VS taken.      Tin Cheung RN

## 2020-09-09 NOTE — NURSING NOTE
Chief Complaint   Patient presents with     Blood Draw     Labs drawn via  by RN in lab. VS taken.      Lab Only     Pt had arrythmia and paramedic was called and provider contacted. Pt is taken to clinic for fluisds and consultation.      Tin Chenug RN

## 2020-09-09 NOTE — PROGRESS NOTES
Oncology/Hematology Visit Note  Sep 9, 2020    Reason for Visit: Follow up of bilateral breast cancer, add on for bradycardia    History of Present Illness:   Ms. Espinal is a 55 year old female is here for follow-up of locally advanced breast cancer.     Ms. Espinal is a 56 year old female with bilateral breast cancers. Other past medical history is notable for HHT (follows with Dr. Real), pulmonary and liver AVMs, GERD and HTN. Bilateral screening mammograms on 10/23/19 showed architectural distortion and developing focal asymmetry in the right breast, and possible developing asymmetry in the left breast. Diagnostic mammograms showed a 2 x 1.4 cm mass at 1:00 right breast and a 9 mm mass at 2:00 left breast. An additional 1.4 cm satellite mass in the left breast (total diameter of both left breast masses, 2.1 cm) was noted on contrast mammogram on 11/21/19.  Biopsy of the right breast at 1:00 revealed invasive ductal carcinoma, grade 1, ER+ (98%), TX+ (99%), HER2 negative. Biopsy of the left breast 2:00 revealed invasive lobular carcinoma, grade 2, ER+ (95%), TX+ (75%), and HER2 negative.  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       She underwent bilateral nipple sparing mastectomies, tissue expander placement, and bilateral axillary sentinel lymph node biopsies on 1/29/20 under the care of Dr. Smith. Final pathology revealed grade 2 invasive mammary carcinoma in the right breast measuring 2.1 cm, a smaller focus of invasive mammary carcinoma (mixed ductal and cribriform) measuring 3.6 mm, admixed DCIS, and negative margins. The left breast showed grade 2 invasive lobular carcinoma, measuring 1.6 cm, DCIS and LCIS, and negative margins. The right axillary sentinel node was benign; the left axillary sentinel node showed micrometastatic carcinoma measuring 1.5 mm without extranodal extension.     OncotypeDx was ordered for right and left breast  cancers.  Oncotype dx recurrence score of the left breast cancer was 10. Oncotype on the right breast cancer was 18.       Her surgery was complicated by skin necrosis requiring additional surgery.     She received adjuvant left chest wall and low axillary radiation: 5040 cGy in 28 fractions      Started Zoladex 8/2020.    Interval History:  Leigh Espinal was met with for evaluation of low heart rate and PVCs. She was coming in for her lab draw when they noticed her heart rate was low and BP was not reading on electronic machine. Paramedic responded and was able to get a manual BP and ecg.   She overall is feeling fatigued, but not significantly more than usual. She is unaware of any cardiac history aside from HTN for which she takes BP meds for. She has not seen a cardiologist before. Denies lightheadedness, dizziness, chest pain, shortness of breath, fevers, chills, new swelling. Admits to at least 2 alcoholic beverages a day and 2 cups of coffee a day. No recent change in medications. Remains on chronic doses of atenolol, lisinopril possibly with a diuretic, and sertraline. No recent dose changes. She is unsure of the doses. Does not monitor BP at home.   She had her Zoladex injection last month and overall tolerated well. Noted 3 episodes of hot flashes since, otherwise no major side effects.   Notes occasional vaginal bleeding, but not significant. No recurrent epistaxis or blood in stools.     Current Outpatient Medications   Medication Sig Dispense Refill     amoxicillin (AMOXIL) 500 MG tablet Take 4 pills (2000 mg) 30-60 minutes before dental procedures, including teeth cleaning. (Patient not taking: Reported on 7/2/2020) 4 tablet 3     atenolol (TENORMIN) 50 MG tablet Take 1 tablet by mouth daily       LISINOPRIL PO Take 20 mg by mouth daily       order for DME Equipment being ordered: 6 compression bras and 2 sets of breast prostheses (bilateral) per year 3 each 1     order for DME Equipment being  ordered: Prosthetic bra after bilateral mastectomy 2 each 0     pantoprazole (PROTONIX) 20 MG EC tablet daily as needed   3     SERTRALINE HCL PO Take 25 mg by mouth daily        TRAZodone (DESYREL) 25 MG TABS Take 50 mg by mouth At Bedtime          Physical Examination:  There were no vitals taken for this visit.  Wt Readings from Last 10 Encounters:   08/13/20 80.6 kg (177 lb 11.2 oz)   07/30/20 79.4 kg (175 lb 1.6 oz)   05/27/20 78.9 kg (174 lb)   05/04/20 78.9 kg (174 lb)   04/27/20 77.6 kg (171 lb)   04/20/20 77.8 kg (171 lb 8 oz)   04/13/20 77.6 kg (171 lb)   04/06/20 79.8 kg (176 lb)   03/30/20 78.9 kg (174 lb)   03/09/20 79.6 kg (175 lb 6.4 oz)     Constitutional: Well-appearing female in no acute distress.  Eyes: EOMI, PERRL. No scleral icterus.  ENT: Oral mucosa is moist without lesions or thrush.   Lymphatic: Neck is supple without cervical or supraclavicular lymphadenopathy.   Cardiovascular: Slow rate with an additional beat every 1-2 beats. No peripheral edema.  Respiratory: Clear to auscultation bilaterally. No wheezes or crackles.  Neurologic: Cranial nerves II through XII are grossly intact.  Skin: No rashes, petechiae, or bruising noted on exposed skin.    Laboratory Data:  Results for MARICARMEN AZRA J (MRN [id number]) as of 9/9/2020 17:38   Ref. Range 9/9/2020 15:45   Sodium Latest Ref Range: 133 - 144 mmol/L 140   Potassium Latest Ref Range: 3.4 - 5.3 mmol/L 3.6   Chloride Latest Ref Range: 94 - 109 mmol/L 109   Carbon Dioxide Latest Ref Range: 20 - 32 mmol/L 22   Urea Nitrogen Latest Ref Range: 7 - 30 mg/dL 15   Creatinine Latest Ref Range: 0.52 - 1.04 mg/dL 0.89   GFR Estimate Latest Ref Range: >60 mL/min/1.73_m2 74   GFR Estimate If Black Latest Ref Range: >60 mL/min/1.73_m2 86   Calcium Latest Ref Range: 8.5 - 10.1 mg/dL 9.3   Anion Gap Latest Ref Range: 3 - 14 mmol/L 9   Magnesium Latest Ref Range: 1.6 - 2.3 mg/dL 2.2   Albumin Latest Ref Range: 3.4 - 5.0 g/dL 3.3 (L)   Protein Total  Latest Ref Range: 6.8 - 8.8 g/dL 6.8   Bilirubin Total Latest Ref Range: 0.2 - 1.3 mg/dL 0.3   Alkaline Phosphatase Latest Ref Range: 40 - 150 U/L 116   ALT Latest Ref Range: 0 - 50 U/L 30   AST Latest Ref Range: 0 - 45 U/L 22   Ferritin Latest Ref Range: 8 - 252 ng/mL 10   N-Terminal Pro Bnp Latest Ref Range: 0 - 125 pg/mL 108   Troponin I ES Latest Ref Range: 0.000 - 0.045 ug/L <0.015   TSH Latest Ref Range: 0.40 - 4.00 mU/L 2.54   Glucose Latest Ref Range: 70 - 99 mg/dL 108 (H)   WBC Latest Ref Range: 4.0 - 11.0 10e9/L 6.2   Hemoglobin Latest Ref Range: 11.7 - 15.7 g/dL 13.6   Hematocrit Latest Ref Range: 35.0 - 47.0 % 42.1   Platelet Count Latest Ref Range: 150 - 450 10e9/L 207   RBC Count Latest Ref Range: 3.8 - 5.2 10e12/L 4.72   MCV Latest Ref Range: 78 - 100 fl 89   MCH Latest Ref Range: 26.5 - 33.0 pg 28.8   MCHC Latest Ref Range: 31.5 - 36.5 g/dL 32.3   RDW Latest Ref Range: 10.0 - 15.0 % 15.0   Diff Method Unknown Automated Method   % Neutrophils Latest Units: % 63.3   % Lymphocytes Latest Units: % 27.1   % Monocytes Latest Units: % 8.2   % Eosinophils Latest Units: % 0.6   % Basophils Latest Units: % 0.5   % Immature Granulocytes Latest Units: % 0.3   Nucleated RBCs Latest Ref Range: 0 /100 0   Absolute Neutrophil Latest Ref Range: 1.6 - 8.3 10e9/L 3.9   Absolute Lymphocytes Latest Ref Range: 0.8 - 5.3 10e9/L 1.7   Absolute Monocytes Latest Ref Range: 0.0 - 1.3 10e9/L 0.5   Absolute Eosinophils Latest Ref Range: 0.0 - 0.7 10e9/L 0.0   Absolute Basophils Latest Ref Range: 0.0 - 0.2 10e9/L 0.0   Abs Immature Granulocytes Latest Ref Range: 0 - 0.4 10e9/L 0.0   Absolute Nucleated RBC Unknown 0.0         Assessment and Plan:  1. Left breast cancer [id number] ER + MA + her2 negative, oncotype 10 s/p mastectomy and SNLB  2. Right breast cancer T2(2)N0 ER + MA + her2 negative, oncotype 18 s/p mastectomy and SNLB  - S/p bilateral mastectomy and left chest wall RT.   - Started Zoladex last month and overall  tolerating well. Will proceed with 2nd dose tomorrow. Will consider starting an aromatase inhibitor in 1 month's time. Previously considered starting this now, but given new cardiac findings will hold off for now per discussion with Dr. Swann.      Bradycardia and frequent PVCs. Essentially asymptomatic. Troponin negative, BNP/TSH/lytes unremarkable. Asked to limit EtOH and caffeine use. Hold atenolol tonight and will get repeat ECG and IVF tomorrow at infusion appointment. Advised to present to ED if any new cardiopulmonary symptoms arise. Was able to secure an appointment with cardiology tomorrow at 11:30 am.     CHEK2 mutation.  Did not discuss today.  She has had a colonoscopy in 2012, which was normal.     Lymphedema, following with PT.     HHT, follows with Dr. Real. No significant bleeding at this time. Hgb is stable.     Leslie Becerra PA-C  [facility]  [address] [place] [address] [phone]

## 2020-09-10 ENCOUNTER — VIRTUAL VISIT (OUTPATIENT)
Dept: ONCOLOGY | Facility: CLINIC | Age: 55
End: 2020-09-10
Attending: INTERNAL MEDICINE
Payer: COMMERCIAL

## 2020-09-10 ENCOUNTER — PRE VISIT (OUTPATIENT)
Dept: CARDIOLOGY | Facility: CLINIC | Age: 55
End: 2020-09-10

## 2020-09-10 ENCOUNTER — VIRTUAL VISIT (OUTPATIENT)
Dept: CARDIOLOGY | Facility: CLINIC | Age: 55
End: 2020-09-10
Attending: INTERNAL MEDICINE
Payer: COMMERCIAL

## 2020-09-10 VITALS
TEMPERATURE: 98 F | OXYGEN SATURATION: 98 % | SYSTOLIC BLOOD PRESSURE: 144 MMHG | HEART RATE: 63 BPM | DIASTOLIC BLOOD PRESSURE: 74 MMHG

## 2020-09-10 DIAGNOSIS — C50.412 MALIGNANT NEOPLASM OF UPPER-OUTER QUADRANT OF LEFT BREAST IN FEMALE, ESTROGEN RECEPTOR POSITIVE (H): Primary | ICD-10-CM

## 2020-09-10 DIAGNOSIS — R00.1 SINUS BRADYCARDIA: ICD-10-CM

## 2020-09-10 DIAGNOSIS — Z17.0 MALIGNANT NEOPLASM OF UPPER-OUTER QUADRANT OF LEFT BREAST IN FEMALE, ESTROGEN RECEPTOR POSITIVE (H): Primary | ICD-10-CM

## 2020-09-10 DIAGNOSIS — I10 ESSENTIAL HYPERTENSION: ICD-10-CM

## 2020-09-10 DIAGNOSIS — R00.1 BRADYCARDIA: ICD-10-CM

## 2020-09-10 DIAGNOSIS — I49.3 PVC'S (PREMATURE VENTRICULAR CONTRACTIONS): Primary | ICD-10-CM

## 2020-09-10 PROCEDURE — G0463 HOSPITAL OUTPT CLINIC VISIT: HCPCS | Mod: 25

## 2020-09-10 PROCEDURE — 99203 OFFICE O/P NEW LOW 30 MIN: CPT | Mod: 95 | Performed by: INTERNAL MEDICINE

## 2020-09-10 PROCEDURE — 96402 CHEMO HORMON ANTINEOPL SQ/IM: CPT

## 2020-09-10 PROCEDURE — 25800030 ZZH RX IP 258 OP 636: Mod: ZF | Performed by: PHYSICIAN ASSISTANT

## 2020-09-10 PROCEDURE — 93005 ELECTROCARDIOGRAM TRACING: CPT

## 2020-09-10 PROCEDURE — 96360 HYDRATION IV INFUSION INIT: CPT

## 2020-09-10 PROCEDURE — 25000128 H RX IP 250 OP 636: Mod: ZF | Performed by: PHYSICIAN ASSISTANT

## 2020-09-10 PROCEDURE — 93010 ELECTROCARDIOGRAM REPORT: CPT | Mod: 76 | Performed by: INTERNAL MEDICINE

## 2020-09-10 RX ORDER — HEPARIN SODIUM,PORCINE 10 UNIT/ML
5 VIAL (ML) INTRAVENOUS
Status: CANCELLED | OUTPATIENT
Start: 2020-09-10

## 2020-09-10 RX ORDER — HEPARIN SODIUM (PORCINE) LOCK FLUSH IV SOLN 100 UNIT/ML 100 UNIT/ML
5 SOLUTION INTRAVENOUS
Status: CANCELLED | OUTPATIENT
Start: 2020-09-10

## 2020-09-10 RX ADMIN — GOSERELIN ACETATE 3.6 MG: 3.6 IMPLANT SUBCUTANEOUS at 10:21

## 2020-09-10 RX ADMIN — SODIUM CHLORIDE 500 ML: 9 INJECTION, SOLUTION INTRAVENOUS at 08:43

## 2020-09-10 ASSESSMENT — PAIN SCALES - GENERAL: PAINLEVEL: NO PAIN (0)

## 2020-09-10 NOTE — PATIENT INSTRUCTIONS
"You were evaluated today in the Cardiovascular Telemedicine Clinic at the [facility].     Cardiology Provider during your visit: Dr. Paz Yen    Diagnosis:  Premature ventricular contractions (PVCs)    Results: discussed with patient    Orders:   None    Medication Changes:   None    Recommendations:   OK to continue atenolol   No need to further testing at this time    Follow-up:   As needed  Please follow up with primary care provider for medication refills         Please feel free to call me with any questions or concerns.       Sofi Tariq RN     Questions and [phone].   First press #1 for the Needle HR and then press #3 for \"Medical Questions\" to reach us Cardiology Nurses.      On Call Cardiologist for after hours or on weekends: [phone]   option #4 and ask to speak to the on-call Cardiologist.          If you need a medication refill please contact your pharmacy.  Please allow 3 business days for your refill to be completed.   "

## 2020-09-10 NOTE — LETTER
9/10/2020     RE: Leigh Espinal  [address] [place] [address]    Dear Colleague,    Thank you for referring your patient, Leigh Espinal, to the [facility]. Please see a copy of my visit note below.    Not seen as she was seen yesterday by Leslie Becerra PA-C. TW    Again, thank you for allowing me to participate in the care of your patient.        Sincerely,        MATTHEW Macias CNP

## 2020-09-10 NOTE — PROGRESS NOTES
"Leigh Espinal is a 55 year old female who is being evaluated via a billable video visit.      The patient has been notified of following:     \"This video visit will be conducted via a call between you and your physician/provider. We have found that certain health care needs can be provided without the need for an in-person physical exam.  This service lets us provide the care you need with a video conversation.  If a prescription is necessary we can send it directly to your pharmacy.  If lab work is needed we can place an order for that and you can then stop by our lab to have the test done at a later time.    Video visits are billed at different rates depending on your insurance coverage.  Please reach out to your insurance provider with any questions.    If during the course of the call the physician/provider feels a video visit is not appropriate, you will not be charged for this service.\"    Patient has given verbal consent for Video visit? Yes  How would you like to obtain your AVS? MyChart  If you are dropped from the video visit, the video invite should be resent to: Text to cell phone: My Chart  Will anyone else be joining your video visit? No        Vitals - Patient Reported  Systolic (Patient Reported): (!) 144  Diastolic (Patient Reported): 74  Weight (Patient Reported): 78.5 kg (173 lb)  Pulse (Patient Reported): 57  Pain Score: No Pain (0)    Electrophysiology Clinic Video Virtual Visit    Leigh Espinal is a 55 year old female who is being evaluated via a billable video visit.      The patient has been notified of following:     \"This video visit will be conducted via a call between you and your physician/provider. We have found that certain health care needs can be provided without the need for an in-person physical exam.  This service lets us provide the care you need with a video conversation.  If a prescription is necessary we can send it directly to your pharmacy.  If lab work is " "needed we can place an order for that and you can then stop by our lab to have the test done at a later time.    If during the course of the call the physician/provider feels a video visit is not appropriate, you will not be charged for this service.\"     Physician has received verbal consent for a Video Visit from the patient? Yes    Video Start Time: 11:40 am    Video Stop Time: 11:54 am    Mode of Communication:  Video Conference via [employer]    Originating Location (pt. Location): Home    Distant Location (provider location): [facility]    Mode of Communication:  Video Conference via [employer]      HPI:   54 yo referred for bradycardia.  She has a history of iron-deficiency anemia and was at [facility] yesterday getting routine blood draws to see if more iron infusions were needed. Her pulse was noted to be low and staff was unable to obtain BP readings with automatic cuff. Manual readings showed HR 46 bpm per patient, and she remembers that her SBP was 134. An EKG showed PVCs. She was asked to hold atenolol. She denies any palpitations, dizziness, chest discomfort, or dyspnea at that time. Labs were all within normal limits and she did not require any iron transfusion. She did go back today and received IV fluids and another EKG.    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 Able to do all household activities including stairs, laundry without any symptoms.       PAST MEDICAL HISTORY:  Bilateral breast cancer 2019 s/p bilateral mastectomy, s/p XRT for 6 weeks.  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  Hypertension  Frederic fundiplication  Depression    CURRENT MEDICATIONS:  Lisinopril 20 qd  Atenolol 50 every day  Protonix  Sertraline  Trazodone    ALLERGIES:     Allergies   Allergen Reactions     Sulfa Drugs        FAMILY " HISTORY:  Family History   Problem Relation Age of Onset     C.A.D. Maternal Grandfather      Hypertension Mother      Hypertension Father      Cancer No family hx of        SOCIAL HISTORY:  Social History     Tobacco Use     Smoking status: Former Smoker     Packs/day: 1.00     Years: 20.00     Pack years: 20.00     Types: Cigarettes     Start date: 1980     Last attempt to quit: 2011     Years since quittin.0     Smokeless tobacco: Never Used   Substance Use Topics     Alcohol use: Yes     Alcohol/week: 3.3 standard drinks     Types: 4 Standard drinks or equivalent per week     Comment: occ     Drug use: No       ROS:  10 point ROS neg other than the symptoms noted above in the HPI.    Labs:  2020: , TSH 2.54, Troponin < 0.015, ferritin normal at 10, creatinine 0.89, K 3.6, Mg 2.2, hgb 13, plt 207K    Testing/Procedures:  ECHOCARDIOGRAM: 2015 - EF 60-65%, normal RV, normal atria. Positive bubble study thought to be more likely pulmonary AVM rather than ASD.    EK/10/2020: sinus 53 bpm, nonspecific inferolateral T waves  2020: sinus 65 bpm with frequent PVCS with RB/superior axis  2020: sinus 50 bpm    Exam:  The rest of a comprehensive physical examination is deferred due to public health emergency video visit restrictions.  CONSITUTIONAL: no acute distress  HEENT: no icterus, no redness or discharge, neck supple  CV: no visible edema of visualized extremities. No JVD.   RESPIRATORY: respirations nonlabored, no cough  NEURO: AA&Ox3, speech fluent/appropriate, motor grossly nonfocal  PSYCH: cooperative, affect appropriate  DERM: no rashes on visualized face/neck/upper extremities      Assessment and Plan:   1. PVCs. Likely cause of her low peripheral pulse since PVCs are frequently not palpable by radial pulse. She was asymptomatic and had stable BP. No PVCs seen on EKG today. Offered reassurance. No specific treatment is needed.  2. Bradycardia - her baseline sinus rate  has been 50-55 bpm on atenolol. No change. OK to continue atenolol which can be used to treat symptomatic PVCs.  3. Hypertension. On lisinopril and atenolol. Would continue.    In addition to video time documented above, I spent an additional 15 minutes on data review and documentation.    I have reviewed the note as documented above.  This accurately captures the substance of my virtual visit with the patient. The patient states understanding and is agreeable with the plan.     Paz Yen MD  Cardiology        CC  MEGHANA GARCÍA

## 2020-09-10 NOTE — TELEPHONE ENCOUNTER
RECORDS RECEIVED FROM:  APPT NOTES: Video visit: MyChart, Sinus Bradycardia, frequent PVC, history of breast cancer, referral and records in chart from [facility], per [facility]   DATE RECEIVED: 9.10.20   NOTES STATUS DETAILS   OFFICE NOTE from referring provider    Internal 9.9.20 [facility]   OFFICE NOTE from other cardiologist    N/A    DISCHARGE SUMMARY from hospital    N/A    DISCHARGE REPORT from the ER   N/A    OPERATIVE REPORT    N/A    MEDICATION LIST   Internal    LABS     BMP   N/A    CBC   Internal 7.1.20   CMP   Internal 9.10.20   Lipids   N/A    TSH   Internal 9.9.20   DIAGNOSTIC PROCEDURES     EKG   Internal 1.10.20   Monitor Reports   N/A    IMAGING (DISC & REPORT)      Echo   Internal 1.26.15  4.26.10   Stress Tests   N/A    Cath   N/A    MRI/MRA   N/A    CT/CTA   Internal 10.26.16

## 2020-09-10 NOTE — NURSING NOTE
EKG done today (2nd). Patient tolerated well.  Sanaz Crews, [facility] September 10, 2020

## 2020-09-10 NOTE — PROGRESS NOTES
Infusion Nursing Note:  Leigh CARLSON Sonnykaren presents today for Cycle 2 Zoladex and 500cc NS.    Patient seen yesterday by Leslie Becerra in clinic    Note:   Zoladex injected to LLQ abdomen without incident. Ice applied prior to injection    Pt came in to clinic bradycardic yesterday.  Please see Leslie Becerra note for details.  EKG done on arrival as ordered by Leslie.  Leslie read EKG and ordered 500cc NS and then repeat EKG.  Post IVF EKG Leslie stated pt could discharge home with the following instruction. Please go to the ED immediately with chest pain, chest pressure, SOB, or new cough.  Pt verbalized understanding for this information.  Pt will follow up with cardiology clinic today.      Intravenous Access:  Peripheral IV placed.          Post Infusion Assessment:  Patient tolerated infusion without incident.       Discharge Plan:   Patient declined prescription refills.  AVS to patient via [employer].  Patient will return 10/8/20 for cycle 3.  Face to Face time: 0.    Sanam Abad RN

## 2020-09-10 NOTE — LETTER
"9/10/2020      RE: Leigh Espinal  [address] [place] [address]       Dear Colleague,    Thank you for the opportunity to participate in the care of your patient, Leigh Espinal, at the [facility]. Please see a copy of my visit note below.    Leigh Espinal is a 55 year old female who is being evaluated via a billable video visit.      The patient has been notified of following:     \"This video visit will be conducted via a call between you and your physician/provider. We have found that certain health care needs can be provided without the need for an in-person physical exam.  This service lets us provide the care you need with a video conversation.  If a prescription is necessary we can send it directly to your pharmacy.  If lab work is needed we can place an order for that and you can then stop by our lab to have the test done at a later time.    Video visits are billed at different rates depending on your insurance coverage.  Please reach out to your insurance provider with any questions.    If during the course of the call the physician/provider feels a video visit is not appropriate, you will not be charged for this service.\"    Patient has given verbal consent for Video visit? Yes  How would you like to obtain your AVS? MyChart  If you are dropped from the video visit, the video invite should be resent to: Text to cell phone: My Chart  Will anyone else be joining your video visit? No        Vitals - Patient Reported  Systolic (Patient Reported): (!) 144  Diastolic (Patient Reported): 74  Weight (Patient Reported): 78.5 kg (173 lb)  Pulse (Patient Reported): 57  Pain Score: No Pain (0)    Electrophysiology Clinic Video Virtual Visit    Leigh Espinal is a 55 year old female who is being evaluated via a billable video visit.      The patient has been notified of following:     \"This video visit will be conducted " "via a call between you and your physician/provider. We have found that certain health care needs can be provided without the need for an in-person physical exam.  This service lets us provide the care you need with a video conversation.  If a prescription is necessary we can send it directly to your pharmacy.  If lab work is needed we can place an order for that and you can then stop by our lab to have the test done at a later time.    If during the course of the call the physician/provider feels a video visit is not appropriate, you will not be charged for this service.\"     Physician has received verbal consent for a Video Visit from the patient? Yes    Video Start Time: 11:40 am    Video Stop Time: 11:54 am    Mode of Communication:  Video Conference via [employer]    Originating Location (pt. Location): Home    Distant Location (provider location): [facility]    Mode of Communication:  Video Conference via [employer]      HPI:   56 yo referred for bradycardia.  She has a history of iron-deficiency anemia and was at [facility] yesterday getting routine blood draws to see if more iron infusions were needed. Her pulse was noted to be low and staff was unable to obtain BP readings with automatic cuff. Manual readings showed HR 46 bpm per patient, and she remembers that her SBP was 134. An EKG showed PVCs. She was asked to hold atenolol. She denies any palpitations, dizziness, chest discomfort, or dyspnea at that time. Labs were all within normal limits and she did not require any iron transfusion. She did go back today and received IV fluids and another EKG.    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 Able to do all household activities including stairs, laundry without any symptoms.       PAST MEDICAL HISTORY:  Bilateral breast cancer 2019 s/p bilateral mastectomy, s/p XRT for 6 weeks.  Hereditary " hemorrhagic telangiectasia (HHT) with pulmonary and liver AVM, s/p embolization lung AVM 3/2015.  Hypertension  Frederic fundiplication  Depression    CURRENT MEDICATIONS:  Lisinopril 20 qd  Atenolol 50 every day  Protonix  Sertraline  Trazodone    ALLERGIES:     Allergies   Allergen Reactions     Sulfa Drugs        FAMILY HISTORY:  Family History   Problem Relation Age of Onset     C.A.D. Maternal Grandfather      Hypertension Mother      Hypertension Father      Cancer No family hx of        SOCIAL HISTORY:  Social History     Tobacco Use     Smoking status: Former Smoker     Packs/day: 1.00     Years: 20.00     Pack years: 20.00     Types: Cigarettes     Start date: 1980     Last attempt to quit: 2011     Years since quittin.0     Smokeless tobacco: Never Used   Substance Use Topics     Alcohol use: Yes     Alcohol/week: 3.3 standard drinks     Types: 4 Standard drinks or equivalent per week     Comment: occ     Drug use: No       ROS:  10 point ROS neg other than the symptoms noted above in the HPI.    Labs:  2020: , TSH 2.54, Troponin < 0.015, ferritin normal at 10, creatinine 0.89, K 3.6, Mg 2.2, hgb 13, plt 207K    Testing/Procedures:  ECHOCARDIOGRAM: 2015 - EF 60-65%, normal RV, normal atria. Positive bubble study thought to be more likely pulmonary AVM rather than ASD.    EK/10/2020: sinus 53 bpm, nonspecific inferolateral T waves  2020: sinus 65 bpm with frequent PVCS with RB/superior axis  2020: sinus 50 bpm    Exam:  The rest of a comprehensive physical examination is deferred due to public health emergency video visit restrictions.  CONSITUTIONAL: no acute distress  HEENT: no icterus, no redness or discharge, neck supple  CV: no visible edema of visualized extremities. No JVD.   RESPIRATORY: respirations nonlabored, no cough  NEURO: AA&Ox3, speech fluent/appropriate, motor grossly nonfocal  PSYCH: cooperative, affect appropriate  DERM: no rashes on visualized  face/neck/upper extremities      Assessment and Plan:   1. PVCs. Likely cause of her low peripheral pulse since PVCs are frequently not palpable by radial pulse. She was asymptomatic and had stable BP. No PVCs seen on EKG today. Offered reassurance. No specific treatment is needed.  2. Bradycardia - her baseline sinus rate has been 50-55 bpm on atenolol. No change. OK to continue atenolol which can be used to treat symptomatic PVCs.  3. Hypertension. On lisinopril and atenolol. Would continue.    In addition to video time documented above, I spent an additional 15 minutes on data review and documentation.    I have reviewed the note as documented above.  This accurately captures the substance of my virtual visit with the patient. The patient states understanding and is agreeable with the plan.     Paz Yen MD  Cardiology        CC  MEGHANA GARCÍA

## 2020-09-11 LAB
INTERPRETATION ECG - MUSE: NORMAL
INTERPRETATION ECG - MUSE: NORMAL

## 2020-10-06 ENCOUNTER — VIRTUAL VISIT (OUTPATIENT)
Dept: ONCOLOGY | Facility: CLINIC | Age: 55
End: 2020-10-06
Attending: NURSE PRACTITIONER
Payer: COMMERCIAL

## 2020-10-06 DIAGNOSIS — Z79.811 ENCOUNTER FOR MONITORING AROMATASE INHIBITOR THERAPY: ICD-10-CM

## 2020-10-06 DIAGNOSIS — Z17.0 MALIGNANT NEOPLASM OF UPPER-OUTER QUADRANT OF LEFT BREAST IN FEMALE, ESTROGEN RECEPTOR POSITIVE (H): Primary | ICD-10-CM

## 2020-10-06 DIAGNOSIS — Z51.81 ENCOUNTER FOR MONITORING AROMATASE INHIBITOR THERAPY: ICD-10-CM

## 2020-10-06 DIAGNOSIS — C50.412 MALIGNANT NEOPLASM OF UPPER-OUTER QUADRANT OF LEFT BREAST IN FEMALE, ESTROGEN RECEPTOR POSITIVE (H): Primary | ICD-10-CM

## 2020-10-06 PROCEDURE — 99214 OFFICE O/P EST MOD 30 MIN: CPT | Mod: TEL | Performed by: NURSE PRACTITIONER

## 2020-10-06 PROCEDURE — 999N001193 HC VIDEO/TELEPHONE VISIT; NO CHARGE

## 2020-10-06 RX ORDER — LETROZOLE 2.5 MG/1
2.5 TABLET, FILM COATED ORAL DAILY
Qty: 30 TABLET | Refills: 1 | Status: SHIPPED | OUTPATIENT
Start: 2020-10-06 | End: 2021-07-20

## 2020-10-06 NOTE — LETTER
"    10/6/2020         RE: Leigh Espinal  [address] [place] [address]        Dear Colleague,    Thank you for referring your patient, Leigh Espinal, to the [facility]. Please see a copy of my visit note below.    Leigh Espinal is a 55 year old female who is being evaluated via a billable telephone visit.      The patient has been notified of following:     \"This telephone visit will be conducted via a call between you and your physician/provider. We have found that certain health care needs can be provided without the need for a physical exam.  This service lets us provide the care you need with a short phone conversation.  If a prescription is necessary we can send it directly to your pharmacy.  If lab work is needed we can place an order for that and you can then stop by our lab to have the test done at a later time.    Telephone visits are billed at different rates depending on your insurance coverage. During this emergency period, for some insurers they may be billed the same as an in-person visit.  Please reach out to your insurance provider with any questions.    If during the course of the call the physician/provider feels a telephone visit is not appropriate, you will not be charged for this service.\"    Patient has given verbal consent for Telephone visit?  Yes    What phone number would you like to be contacted at? [phone]    How would you like to obtain your AVS? Cali    Patient had no vitals to report.   0/10 on pain scale.     No refills needed.   No additional concerns.     Merle Melvin CMA      Phone call duration: 16 minutes    MATTHEW Min CNP/Molly Chacon    Reason for Visit: Follow up of bilateral breast cancer    Oncology HPI:   Ms. Espinal is a 55 year old female is here for follow-up of locally advanced breast cancer.     Ms. Espinal is a 56 year old female with bilateral breast cancers. Other past " medical history is notable for HHT (follows with Dr. Real), pulmonary and liver AVMs, GERD and HTN. Bilateral screening mammograms on 10/23/19 showed architectural distortion and developing focal asymmetry in the right breast, and possible developing asymmetry in the left breast. Diagnostic mammograms showed a 2 x 1.4 cm mass at 1:00 right breast and a 9 mm mass at 2:00 left breast. An additional 1.4 cm satellite mass in the left breast (total diameter of both left breast masses, 2.1 cm) was noted on contrast mammogram on 11/21/19.  Biopsy of the right breast at 1:00 revealed invasive ductal carcinoma, grade 1, ER+ (98%), FL+ (99%), HER2 negative. Biopsy of the left breast 2:00 revealed invasive lobular carcinoma, grade 2, ER+ (95%), FL+ (75%), and HER2 negative.  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       She underwent bilateral nipple sparing mastectomies, tissue expander placement, and bilateral axillary sentinel lymph node biopsies on 1/29/20 under the care of Dr. Smith. Final pathology revealed grade 2 invasive mammary carcinoma in the right breast measuring 2.1 cm, a smaller focus of invasive mammary carcinoma (mixed ductal and cribriform) measuring 3.6 mm, admixed DCIS, and negative margins. The left breast showed grade 2 invasive lobular carcinoma, measuring 1.6 cm, DCIS and LCIS, and negative margins. The right axillary sentinel node was benign; the left axillary sentinel node showed micrometastatic carcinoma measuring 1.5 mm without extranodal extension.     OncotypeDx was ordered for right and left breast cancers.  Oncotype dx recurrence score of the left breast cancer was 10. Oncotype on the right breast cancer was 18.       Her surgery was complicated by skin necrosis requiring additional surgery.     She received adjuvant left chest wall and low axillary radiation: 5040 cGy in 28 fractions      Started Zoladex 8/2020    Interval history:    -Reports things are going fine. Is tolerating Zoladex well without side effects. Denies hot flashes, night sweats, and mood changes. Discussed letrozole and pt is willing to try. She is otherwise feeling well. Eating and drinking well. Denies fever, chills, vision changes, headaches, no pain, cough, dyspnea, chest pain, and bladder/bowel concerns      Past Medical History:   Diagnosis Date     Anxiety and depression      AVM (arteriovenous malformation)     Right Pulmonary     Breast cancer (H) 2019     GERD (gastroesophageal reflux disease)      HHT (hereditary hemorrhagic telangiectasia) (H) 11/25/2015    Possible- no ACVRL1, ENG or SMAD4 mutations found on sequencing 10/6/15      Hiatal hernia     nissen done in 2007     HTN (hypertension)      Iron deficiency anemia 6/8/2012     Problem list name updated by automated process. 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 (Patient not taking: Reported on 7/2/2020) 4 tablet 3     atenolol (TENORMIN) 50 MG tablet Take 1 tablet by mouth daily       LISINOPRIL PO Take 20 mg by mouth daily       order for DME Equipment being ordered: 6 compression bras and 2 sets of breast prostheses (bilateral) per year 3 each 1     order for DME Equipment being ordered: Prosthetic bra after bilateral mastectomy 2 each 0     pantoprazole (PROTONIX) 20 MG EC tablet daily as needed   3     SERTRALINE HCL PO Take 25 mg by mouth daily        TRAZodone (DESYREL) 25 MG TABS Take 50 mg by mouth At Bedtime             Allergies   Allergen Reactions     Sulfa Drugs        Exam:  not currently breastfeeding.  Wt Readings from Last 4 Encounters:   08/13/20 80.6 kg (177 lb 11.2 oz)  "  07/30/20 79.4 kg (175 lb 1.6 oz)   05/27/20 78.9 kg (174 lb)   05/04/20 78.9 kg (174 lb)     There were no vitals taken for this visit.   General: alert and no distress   Psych: Alert and oriented times; coherent speech, normal rate and volume, able to articulate logical thoughts, able to abstract reason, no tangential thoughts, no hallucinations or delusions  Patient's affect is appropriate.    Pulm: Speaking in full sentences, unlabored, no audible wheezes or cough.   The rest of a comprehensive physical examination is deferred due to PHE (public health emergency) video restrictions\"     No labs or imaging to review    Assessment/plan:   1. Bilateral Breast Cancer (Left breast cancer [id number] ER + ID + her2 negative, oncotype 10 s/p mastectomy and SNLB and Right breast cancer T2(2)N0 ER + ID + her2 negative, oncotype 18 s/p mastectomy and SNLB)  - S/p bilateral mastectomy and left chest wall RT.   - Started Zoladex in August and overall tolerating well. Will proceed with 3nd dose Tuesday. Does not need labs so we will cancel that appointment but will add on lipid panel to Dr. Real labs on 10/22 for monitoring while on AI. Did instruct pt to fast prior to labs. Discussed starting AI, letrozole, and pt is agreeable. Reviewed side effects, monitoring, and encouraged calcium/vitamin D supplement along with weight bearing exercises  -Will have her f/u with Molly in 1 month to assess how she is tolerating letrozole. Scheduled with Dr. Swann in December 2.) Bradycardia and frequent PVCs  -Stable, no chest pain, dyspnea, or lightheadedness today. Was seen by cardiology and they did not recommend additional treatment. Will continue to monitor. Encouraged pt to f/u for PCP for regular care/wellness exams     Not discussed today:  CHEK2 mutation: She has had a colonoscopy in 2012, which was normal.   Lymphedema: Following with PT.   HHT: follows with Dr. Real. No significant bleeding at this time.       Melba " MATTHEW Carrillo, CNP  10/6/2020    The patient was seen in conjunction with Melba Carrillo CNP who served as a scribe for today's visit. I have reviewed the note and agree with the above findings and plan.   TW      Again, thank you for allowing me to participate in the care of your patient.        Sincerely,        MATTHEW Macias CNP

## 2020-10-06 NOTE — PROGRESS NOTES
"Leigh Espinal is a 55 year old female who is being evaluated via a billable telephone visit.      The patient has been notified of following:     \"This telephone visit will be conducted via a call between you and your physician/provider. We have found that certain health care needs can be provided without the need for a physical exam.  This service lets us provide the care you need with a short phone conversation.  If a prescription is necessary we can send it directly to your pharmacy.  If lab work is needed we can place an order for that and you can then stop by our lab to have the test done at a later time.    Telephone visits are billed at different rates depending on your insurance coverage. During this emergency period, for some insurers they may be billed the same as an in-person visit.  Please reach out to your insurance provider with any questions.    If during the course of the call the physician/provider feels a telephone visit is not appropriate, you will not be charged for this service.\"    Patient has given verbal consent for Telephone visit?  Yes    What phone number would you like to be contacted at? [phone]    How would you like to obtain your AVS? Cali    Patient had no vitals to report.   0/10 on pain scale.     No refills needed.   No additional concerns.     Merle Melvin, SALONI      Phone call duration: 16 minutes    MATTHEW Min CNP/Molly Chacon    Reason for Visit: Follow up of bilateral breast cancer    Oncology HPI:   Ms. Espinal is a 55 year old female is here for follow-up of locally advanced breast cancer.     Ms. Espinal is a 56 year old female with bilateral breast cancers. Other past medical history is notable for HHT (follows with Dr. Real), pulmonary and liver AVMs, GERD and HTN. Bilateral screening mammograms on 10/23/19 showed architectural distortion and developing focal asymmetry in the right breast, and possible developing asymmetry in the left " breast. Diagnostic mammograms showed a 2 x 1.4 cm mass at 1:00 right breast and a 9 mm mass at 2:00 left breast. An additional 1.4 cm satellite mass in the left breast (total diameter of both left breast masses, 2.1 cm) was noted on contrast mammogram on 11/21/19.  Biopsy of the right breast at 1:00 revealed invasive ductal carcinoma, grade 1, ER+ (98%), IN+ (99%), HER2 negative. Biopsy of the left breast 2:00 revealed invasive lobular carcinoma, grade 2, ER+ (95%), IN+ (75%), and HER2 negative.  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       She underwent bilateral nipple sparing mastectomies, tissue expander placement, and bilateral axillary sentinel lymph node biopsies on 1/29/20 under the care of Dr. Smith. Final pathology revealed grade 2 invasive mammary carcinoma in the right breast measuring 2.1 cm, a smaller focus of invasive mammary carcinoma (mixed ductal and cribriform) measuring 3.6 mm, admixed DCIS, and negative margins. The left breast showed grade 2 invasive lobular carcinoma, measuring 1.6 cm, DCIS and LCIS, and negative margins. The right axillary sentinel node was benign; the left axillary sentinel node showed micrometastatic carcinoma measuring 1.5 mm without extranodal extension.     OncotypeDx was ordered for right and left breast cancers.  Oncotype dx recurrence score of the left breast cancer was 10. Oncotype on the right breast cancer was 18.       Her surgery was complicated by skin necrosis requiring additional surgery.     She received adjuvant left chest wall and low axillary radiation: 5040 cGy in 28 fractions      Started Zoladex 8/2020    Interval history:   -Reports things are going fine. Is tolerating Zoladex well without side effects. Denies hot flashes, night sweats, and mood changes. Discussed letrozole and pt is willing to try. She is otherwise feeling well. Eating and drinking well. 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 (Patient not taking: Reported on 7/2/2020) 4 tablet 3     atenolol (TENORMIN) 50 MG tablet Take 1 tablet by mouth daily       LISINOPRIL PO Take 20 mg by mouth daily       order for DME Equipment being ordered: 6 compression bras and 2 sets of breast prostheses (bilateral) per year 3 each 1     order for DME Equipment being ordered: Prosthetic bra after bilateral mastectomy 2 each 0     pantoprazole (PROTONIX) 20 MG EC tablet daily as needed   3     SERTRALINE HCL PO Take 25 mg by mouth daily        TRAZodone (DESYREL) 25 MG TABS Take 50 mg by mouth At Bedtime             Allergies   Allergen Reactions     Sulfa Drugs        Exam:  not currently breastfeeding.  Wt Readings from Last 4 Encounters:   08/13/20 80.6 kg (177 lb 11.2 oz)   07/30/20 79.4 kg (175 lb 1.6 oz)   05/27/20 78.9 kg (174 lb)   05/04/20 78.9 kg (174 lb)     There were no vitals taken for this visit.   General: alert and no distress   Psych: Alert and oriented times; coherent speech, normal rate and volume, able to articulate  "logical thoughts, able to abstract reason, no tangential thoughts, no hallucinations or delusions  Patient's affect is appropriate.    Pulm: Speaking in full sentences, unlabored, no audible wheezes or cough.   The rest of a comprehensive physical examination is deferred due to PHE (public health emergency) video restrictions\"     No labs or imaging to review    Assessment/plan:   1. Bilateral Breast Cancer (Left breast cancer [id number] ER + MA + her2 negative, oncotype 10 s/p mastectomy and SNLB and Right breast cancer T2(2)N0 ER + MA + her2 negative, oncotype 18 s/p mastectomy and SNLB)  - S/p bilateral mastectomy and left chest wall RT.   - Started Zoladex in August and overall tolerating well. Will proceed with 3nd dose Tuesday. Does not need labs so we will cancel that appointment but will add on lipid panel to Dr. Real labs on 10/22 for monitoring while on AI. Did instruct pt to fast prior to labs. Discussed starting AI, letrozole, and pt is agreeable. Reviewed side effects, monitoring, and encouraged calcium/vitamin D supplement along with weight bearing exercises  -Will have her f/u with Molly in 1 month to assess how she is tolerating letrozole. Scheduled with Dr. Swann in December 2.) Bradycardia and frequent PVCs  -Stable, no chest pain, dyspnea, or lightheadedness today. Was seen by cardiology and they did not recommend additional treatment. Will continue to monitor. Encouraged pt to f/u for PCP for regular care/wellness exams     Not discussed today:  CHEK2 mutation: She has had a colonoscopy in 2012, which was normal.   Lymphedema: Following with PT.   HHT: follows with Dr. Real. No significant bleeding at this time.       MATTHEW Min, CNP  10/6/2020    The patient was seen in conjunction with Melba Carrillo CNP who served as a scribe for today's visit. I have reviewed the note and agree with the above findings and plan.   TW  "

## 2020-10-08 ENCOUNTER — INFUSION THERAPY VISIT (OUTPATIENT)
Dept: ONCOLOGY | Facility: CLINIC | Age: 55
End: 2020-10-08
Attending: INTERNAL MEDICINE
Payer: COMMERCIAL

## 2020-10-08 VITALS
TEMPERATURE: 98.1 F | SYSTOLIC BLOOD PRESSURE: 178 MMHG | OXYGEN SATURATION: 97 % | HEART RATE: 56 BPM | RESPIRATION RATE: 16 BRPM | DIASTOLIC BLOOD PRESSURE: 79 MMHG

## 2020-10-08 DIAGNOSIS — Z17.0 MALIGNANT NEOPLASM OF UPPER-OUTER QUADRANT OF LEFT BREAST IN FEMALE, ESTROGEN RECEPTOR POSITIVE (H): ICD-10-CM

## 2020-10-08 DIAGNOSIS — Z17.0 MALIGNANT NEOPLASM OF UPPER-OUTER QUADRANT OF LEFT BREAST IN FEMALE, ESTROGEN RECEPTOR POSITIVE (H): Primary | ICD-10-CM

## 2020-10-08 DIAGNOSIS — C50.412 MALIGNANT NEOPLASM OF UPPER-OUTER QUADRANT OF LEFT BREAST IN FEMALE, ESTROGEN RECEPTOR POSITIVE (H): ICD-10-CM

## 2020-10-08 DIAGNOSIS — I78.0 HHT (HEREDITARY HEMORRHAGIC TELANGIECTASIA) (H): ICD-10-CM

## 2020-10-08 DIAGNOSIS — C50.412 MALIGNANT NEOPLASM OF UPPER-OUTER QUADRANT OF LEFT BREAST IN FEMALE, ESTROGEN RECEPTOR POSITIVE (H): Primary | ICD-10-CM

## 2020-10-08 DIAGNOSIS — D50.0 IRON DEFICIENCY ANEMIA DUE TO CHRONIC BLOOD LOSS: Primary | ICD-10-CM

## 2020-10-08 PROCEDURE — 99207 PR NO CHARGE LOS: CPT

## 2020-10-08 PROCEDURE — 96372 THER/PROPH/DIAG INJ SC/IM: CPT | Performed by: INTERNAL MEDICINE

## 2020-10-08 PROCEDURE — 250N000011 HC RX IP 250 OP 636: Performed by: INTERNAL MEDICINE

## 2020-10-08 PROCEDURE — 96402 CHEMO HORMON ANTINEOPL SQ/IM: CPT | Performed by: INTERNAL MEDICINE

## 2020-10-08 RX ADMIN — GOSERELIN ACETATE 3.6 MG: 3.6 IMPLANT SUBCUTANEOUS at 15:11

## 2020-10-08 ASSESSMENT — PAIN SCALES - GENERAL: PAINLEVEL: NO PAIN (0)

## 2020-10-08 NOTE — PROGRESS NOTES
Infusion Nursing Note:  Leigh ROBYN Espinal presents today for Cycle 3 Zoladex.    Patient seen by provider today: Yes: Melba Carrillo NP on 10/6/20.   present during visit today: Not Applicable.    Note: Pt arrives to infusion today feeling well. Telephone visit with Melba Carrillo NP on 10/6/20. Pt denies any new complaints, concerns, or changes since visit.     Intravenous Access:  No Intravenous access/labs at this visit.    Treatment Conditions:  Not Applicable.    Post Infusion Assessment:  Patient tolerated injection without incident to the RLQ.     Discharge Plan:   Patient declined prescription refills.  AVS to patient via [employer]force[employer].  Patient will return 11/5 for next appointment.   Patient discharged in stable condition accompanied by: self.  Departure Mode: Ambulatory.  Face to Face time: 0.    Jaida Villanueva RN

## 2020-10-28 ENCOUNTER — MYC MEDICAL ADVICE (OUTPATIENT)
Dept: ONCOLOGY | Facility: CLINIC | Age: 55
End: 2020-10-28

## 2020-10-28 NOTE — TELEPHONE ENCOUNTER
Spoke with patient by phone regarding symptomatic mychart message. She reports starting letrozole on Thursday 10/22. On Thursday she began to feel stomach discomfort, nausea, cloudy/foggy thinking, slight headache and states she felt as though she had influenza. She mentions feeling as though she had a fever but did not take her temperature. She then missed taking the medication on Friday and felt an improvement in her symptoms. She restarted the medication this weekend and states she began to feel ill again. Her last dose was Sunday. She reports an improvement in her symptoms today. She states she does not want to continue taking Letrozole.

## 2020-10-29 ENCOUNTER — MYC MEDICAL ADVICE (OUTPATIENT)
Dept: ONCOLOGY | Facility: CLINIC | Age: 55
End: 2020-10-29

## 2020-10-29 NOTE — TELEPHONE ENCOUNTER
Left a message for patient with request for return call.    We can keep her off of the letrozole.   She should continue her zoladex though.   Katarzyna

## 2020-10-30 DIAGNOSIS — I78.0 HHT (HEREDITARY HEMORRHAGIC TELANGIECTASIA) (H): ICD-10-CM

## 2020-10-30 DIAGNOSIS — D50.0 IRON DEFICIENCY ANEMIA DUE TO CHRONIC BLOOD LOSS: Primary | ICD-10-CM

## 2020-11-04 DIAGNOSIS — Z17.0 MALIGNANT NEOPLASM OF UPPER-OUTER QUADRANT OF LEFT BREAST IN FEMALE, ESTROGEN RECEPTOR POSITIVE (H): Primary | ICD-10-CM

## 2020-11-04 DIAGNOSIS — C50.412 MALIGNANT NEOPLASM OF UPPER-OUTER QUADRANT OF LEFT BREAST IN FEMALE, ESTROGEN RECEPTOR POSITIVE (H): Primary | ICD-10-CM

## 2020-11-05 ENCOUNTER — INFUSION THERAPY VISIT (OUTPATIENT)
Dept: ONCOLOGY | Facility: CLINIC | Age: 55
End: 2020-11-05
Attending: INTERNAL MEDICINE
Payer: COMMERCIAL

## 2020-11-05 VITALS
OXYGEN SATURATION: 97 % | TEMPERATURE: 98.3 F | DIASTOLIC BLOOD PRESSURE: 63 MMHG | SYSTOLIC BLOOD PRESSURE: 178 MMHG | HEART RATE: 56 BPM | RESPIRATION RATE: 16 BRPM

## 2020-11-05 DIAGNOSIS — C50.412 MALIGNANT NEOPLASM OF UPPER-OUTER QUADRANT OF LEFT BREAST IN FEMALE, ESTROGEN RECEPTOR POSITIVE (H): Primary | ICD-10-CM

## 2020-11-05 DIAGNOSIS — Z17.0 MALIGNANT NEOPLASM OF UPPER-OUTER QUADRANT OF LEFT BREAST IN FEMALE, ESTROGEN RECEPTOR POSITIVE (H): Primary | ICD-10-CM

## 2020-11-05 PROCEDURE — 96402 CHEMO HORMON ANTINEOPL SQ/IM: CPT

## 2020-11-05 PROCEDURE — 250N000011 HC RX IP 250 OP 636: Performed by: INTERNAL MEDICINE

## 2020-11-05 PROCEDURE — 99207 PR NO CHARGE LOS: CPT

## 2020-11-05 RX ADMIN — GOSERELIN ACETATE 3.6 MG: 3.6 IMPLANT SUBCUTANEOUS at 15:13

## 2020-11-05 NOTE — PROGRESS NOTES
Infusion Nursing Note:  Leigh CARLSON Sienamoo presents today for Cycle 4 Zoladex.    Patient seen by provider today: No   present during visit today: Not Applicable.    Note: Pt arrives to infusion today feeling well. She states her Blood Pressure is always high when she comes to the clinic. She states she does take her blood pressure at home and it is better. Denies headache. No new complaints or concerns.     Intravenous Access:  No Intravenous access/labs at this visit.    Treatment Conditions:  Not Applicable.    Post Infusion Assessment:  Patient tolerated injection without incident to the Left Lower Quadrant of abdomen.     Discharge Plan:   Patient declined prescription refills.  AVS to patient via Mobile[employer] (fka [employer]).  Patient will return 12/3 for next appointment.   Patient discharged in stable condition accompanied by: self.  Departure Mode: Ambulatory.  Face to Face time: 0.    Jaida Villanueva RN

## 2020-12-03 ENCOUNTER — INFUSION THERAPY VISIT (OUTPATIENT)
Dept: ONCOLOGY | Facility: CLINIC | Age: 55
End: 2020-12-03
Attending: INTERNAL MEDICINE
Payer: COMMERCIAL

## 2020-12-03 VITALS
DIASTOLIC BLOOD PRESSURE: 82 MMHG | HEART RATE: 51 BPM | SYSTOLIC BLOOD PRESSURE: 158 MMHG | RESPIRATION RATE: 18 BRPM | OXYGEN SATURATION: 98 % | TEMPERATURE: 97.9 F

## 2020-12-03 DIAGNOSIS — C50.412 MALIGNANT NEOPLASM OF UPPER-OUTER QUADRANT OF LEFT BREAST IN FEMALE, ESTROGEN RECEPTOR POSITIVE (H): Primary | ICD-10-CM

## 2020-12-03 DIAGNOSIS — Z17.0 MALIGNANT NEOPLASM OF UPPER-OUTER QUADRANT OF LEFT BREAST IN FEMALE, ESTROGEN RECEPTOR POSITIVE (H): Primary | ICD-10-CM

## 2020-12-03 PROCEDURE — 99207 PR NO BILLABLE SERVICE THIS VISIT: CPT

## 2020-12-03 PROCEDURE — 96402 CHEMO HORMON ANTINEOPL SQ/IM: CPT

## 2020-12-03 PROCEDURE — 250N000011 HC RX IP 250 OP 636: Performed by: INTERNAL MEDICINE

## 2020-12-03 RX ADMIN — GOSERELIN ACETATE 3.6 MG: 3.6 IMPLANT SUBCUTANEOUS at 15:30

## 2020-12-03 ASSESSMENT — PAIN SCALES - GENERAL: PAINLEVEL: NO PAIN (0)

## 2020-12-03 NOTE — PROGRESS NOTES
Infusion Nursing Note:  Leigh CARLSON Kylie presents for Zoladex    Note: pt feeling well today, no new issues or concerns to report.    Pain: denies    Treatment Conditions:  Not Applicable.    Intravenous Access:  No Intravenous access/labs at this visit.    Post Infusion Assessment:  Patient tolerated injection without incident to RIGHT lower quadrant, area iced prior to injection    Discharge Plan:   Patient declined prescription refills.  Discharge instructions reviewed with: Patient.  Patient and/or family verbalized understanding of discharge instructions and all questions answered.  AVS to patient via [employer].  Patient will return 12/29 for next appointment.   Patient discharged in stable condition accompanied by: self.    Elisabeth Lopez, RN, RN

## 2020-12-29 ENCOUNTER — VIRTUAL VISIT (OUTPATIENT)
Dept: ONCOLOGY | Facility: CLINIC | Age: 55
End: 2020-12-29
Attending: INTERNAL MEDICINE
Payer: COMMERCIAL

## 2020-12-29 ENCOUNTER — TELEPHONE (OUTPATIENT)
Dept: GASTROENTEROLOGY | Facility: CLINIC | Age: 55
End: 2020-12-29

## 2020-12-29 DIAGNOSIS — Z15.01 MONOALLELIC MUTATION OF CHEK2 GENE IN FEMALE PATIENT: ICD-10-CM

## 2020-12-29 DIAGNOSIS — Z15.09 MONOALLELIC MUTATION OF CHEK2 GENE IN FEMALE PATIENT: ICD-10-CM

## 2020-12-29 DIAGNOSIS — Z15.02 MONOALLELIC MUTATION OF CHEK2 GENE IN FEMALE PATIENT: ICD-10-CM

## 2020-12-29 DIAGNOSIS — Z12.11 COLON CANCER SCREENING: ICD-10-CM

## 2020-12-29 DIAGNOSIS — C50.412 MALIGNANT NEOPLASM OF UPPER-OUTER QUADRANT OF LEFT BREAST IN FEMALE, ESTROGEN RECEPTOR POSITIVE (H): Primary | ICD-10-CM

## 2020-12-29 DIAGNOSIS — Z17.0 MALIGNANT NEOPLASM OF UPPER-OUTER QUADRANT OF LEFT BREAST IN FEMALE, ESTROGEN RECEPTOR POSITIVE (H): Primary | ICD-10-CM

## 2020-12-29 DIAGNOSIS — Z15.89 MONOALLELIC MUTATION OF CHEK2 GENE IN FEMALE PATIENT: ICD-10-CM

## 2020-12-29 PROCEDURE — 99214 OFFICE O/P EST MOD 30 MIN: CPT | Mod: 95 | Performed by: INTERNAL MEDICINE

## 2020-12-29 PROCEDURE — 999N001193 HC VIDEO/TELEPHONE VISIT; NO CHARGE

## 2020-12-29 NOTE — PROGRESS NOTES
"Leigh Espinal is a 55 year old female who is being evaluated via a billable video visit.      The patient has been notified of following:     \"This video visit will be conducted via a call between you and your physician/provider. We have found that certain health care needs can be provided without the need for an in-person physical exam.  This service lets us provide the care you need with a video conversation.  If a prescription is necessary we can send it directly to your pharmacy.  If lab work is needed we can place an order for that and you can then stop by our lab to have the test done at a later time.    Video visits are billed at different rates depending on your insurance coverage.  Please reach out to your insurance provider with any questions.    If during the course of the call the physician/provider feels a video visit is not appropriate, you will not be charged for this service.\"    Patient has given verbal consent for Video visit? Yes  How would you like to obtain your AVS? MyChart  If you are dropped from the video visit, the video invite should be resent to: Send to e-mail at: [email or web address]  Will anyone else be joining your video visit? No      Vitals - Patient Reported  Weight (Patient Reported): 83.9 kg (185 lb)  Height (Patient Reported): 162.6 cm (5' 4\")  BMI (Based on Pt Reported Ht/Wt): 31.75  Pain Score: No Pain (0)    Refills: Sertraline and Trazodone     Debbie Ureña CMA December 29, 2020  2:28 PM     Video-Visit Details    Type of service:  Video Visit    Video Time:  20 min --> then switched to telephone visit as we were unable to connect.      Originating Location (pt. Location): Home    Distant Location (provider location):  [facility]     Platform used for Video Visit: Levon Swann MD    Oncology Follow-up Visit:  December 29, 2020    Diagnosis: locally advanced breast cancer, s/p bilateral breast cancer, radiation    History " Of Present Illness:  Ms. Espinal is a 55 year old female is here for follow-up of locally advanced breast cancer.    Ms. Espinal is a 55year old female with bilateral breast cancers. Other past medical history is notable for HHT (follows with Dr. Real), pulmonary and liver AVMs, GERD and HTN. Bilateral screening mammograms on 10/23/19 showed architectural distortion and developing focal asymmetry in the right breast, and possible developing asymmetry in the left breast. Diagnostic mammograms showed a 2 x 1.4 cm mass at 1:00 right breast and a 9 mm mass at 2:00 left breast. An additional 1.4 cm satellite mass in the left breast (total diameter of both left breast masses, 2.1 cm) was noted on contrast mammogram on 11/21/19.  Biopsy of the right breast at 1:00 revealed invasive ductal carcinoma, grade 1, ER+ (98%), MD+ (99%), HER2 negative. Biopsy of the left breast 2:00 revealed invasive lobular carcinoma, grade 2, ER+ (95%), MD+ (75%), and HER2 negative.  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      She underwent bilateral nipple sparing mastectomies, tissue expander placement, and bilateral axillary sentinel lymph node biopsies on 1/29/20 under the care of Dr. Smith. Final pathology revealed grade 2 invasive mammary carcinoma in the right breast measuring 2.1 cm, a smaller focus of invasive mammary carcinoma (mixed ductal and cribriform) measuring 3.6 mm, admixed DCIS, and negative margins. The left breast showed grade 2 invasive lobular carcinoma, measuring 1.6 cm, DCIS and LCIS, and negative margins. The right axillary sentinel node was benign; the left axillary sentinel node showed micrometastatic carcinoma measuring 1.5 mm without extranodal extension.     OncotypeDx was ordered for right and left breast cancers.  Oncotype dx recurrence score of the left breast cancer was 10. Oncotype on the right breast cancer was 18.      Her surgery was  "complicated by skin necrosis requiring additional surgery.    She received adjuvant left chest wall and low axillary radiation: 5040 cGy in 28 fractions     She is on zoladex since August 2020; she tried AI and did not tolerate it ( felt \"terrible\" with nausea, fatigue, etc).      She is using breast prosthesis and this helps with edema in her chest wall.    She wants to keep taking sertraline.  She is out of the medication.  It helps with hot flashes.       She is feeling well.    No f/c.  No chest pain or shortness of breath.  No n/v/d/c.      Review Of Systems:   ROS: 10 point ROS neg other than the symptoms noted above in the HPI.      Past medical, social, surgical, and family histories reviewed.  Patient underwent menarche at 10 yo.  She has had 2 pregnancies and has 2 children.  First full term pregnancy at 24 yo.  She  both of her children.  She had a mirena IUD in place until shortly after breast cancer diagnosis.  She had menstrual bleeding for about 6 weeks after the IUD was removed.    Allergies:  Allergies as of 12/29/2020 - Reviewed 12/29/2020   Allergen Reaction Noted     Sulfa drugs  10/18/2011       Current Medications:  Current Outpatient Medications   Medication Sig Dispense Refill     atenolol (TENORMIN) 50 MG tablet Take 1 tablet by mouth daily       LISINOPRIL PO Take 20 mg by mouth daily       pantoprazole (PROTONIX) 20 MG EC tablet daily as needed   3     SERTRALINE HCL PO Take 50 mg by mouth daily        TRAZodone (DESYREL) 25 MG TABS Take 50 mg by mouth At Bedtime        amoxicillin (AMOXIL) 500 MG tablet Take 4 pills (2000 mg) 30-60 minutes before dental procedures, including teeth cleaning. (Patient not taking: Reported on 7/2/2020) 4 tablet 3     letrozole (FEMARA) 2.5 MG tablet Take 1 tablet (2.5 mg) by mouth daily (Patient not taking: Reported on 11/5/2020) 30 tablet 1     order for DME Equipment being ordered: 6 compression bras and 2 sets of breast prostheses (bilateral) per " year (Patient not taking: Reported on 12/29/2020) 3 each 1     order for DME Equipment being ordered: Prosthetic bra after bilateral mastectomy (Patient not taking: Reported on 12/29/2020) 2 each 0        Physical Exam:   Speaking clearly and fluently  Alert and oriented x 3      Laboratory/Imaging Studies      Lab Results   Component Value Date    WBC 6.2 09/09/2020     Lab Results   Component Value Date    RBC 4.72 09/09/2020     Lab Results   Component Value Date    HGB 13.6 09/09/2020     Lab Results   Component Value Date    HCT 42.1 09/09/2020     No components found for: MCT  Lab Results   Component Value Date    MCV 89 09/09/2020     Lab Results   Component Value Date    MCH 28.8 09/09/2020     Lab Results   Component Value Date    MCHC 32.3 09/09/2020     Lab Results   Component Value Date    RDW 15.0 09/09/2020     Lab Results   Component Value Date     09/09/2020       Recent Labs   Lab Test 09/09/20  1545 07/01/20  0848    139   POTASSIUM 3.6 3.8   CHLORIDE 109 107   CO2 22 26   ANIONGAP 9 6   * 89   BUN 15 10   CR 0.89 0.81   ILIANA 9.3 8.9     Liver Function Studies -   Recent Labs   Lab Test 09/09/20  1545   PROTTOTAL 6.8   ALBUMIN 3.3*   BILITOTAL 0.3   ALKPHOS 116   AST 22   ALT 30         ASSESSMENT/PLAN:  56 y/o female with bilateral breast cancer as outlined below:  1. Left breast cancer [id number] ER + ID + her2 negative, oncotype 10 s/p mastectomy and SNLB  2. Right breast cancer T2(2)N0 ER + ID + her2 negative, oncotype 18 s/p mastectomy and SNLB  3. CHEK 2 mutation  4. HHT    I reviewed with Leigh today that she has had curative-intent therapy with a mastectomy and sentinel lymph node biopsy. She completed all her surgery, and radiation and is on zoladex shots.        I reviewed with her that with an estrogen- and progesterone-positive tumor, I would recommend the use of adjuvant endocrine therapy.  She was perimenopausal and is now on zoladex.  She does not tolerate the AIs  and is not interested in any other endocrine therapy.      We reviewed that second breast reconstruction will likely be delayed until summer 2021 due to covid 19.     2.She does have a CHEK2 mutation.  She is scheduled to see Genetics next month.  She has had a colonoscopy in 2012, which was normal.   Referral placed.    3.  She sees Dr. Kacie Real for HHT.     4.  I reviewed with Leigh today that I also recommend lifestyle management to help prevent breast cancer recurrence and her overall health.  This includes regular exercise 150 minutes per week as well as a diet high in fruits and vegetables.  We will continue to discuss this with future visits.       Katarzyna Swann MD

## 2020-12-29 NOTE — TELEPHONE ENCOUNTER
Patient is scheduled for COLONOSCOPY with Dr. BROWNE    Spoke with: AZRA    Date of Procedure: 02/02/2021    Location: [facility]    Sedation Type CS    Pre-op for Unit J MAC NOT NEEDED    (if yes advise patient they will need a pre-op prior to procedure)      Is patient on blood thinners? -NO (If yes- inform patient to follow up with PCP or provider for follow up instructions)     Informed patient they will need an adult  YES    Informed Patient of COVID Test Requirement YES    Preferred Pharmacy for Pre Prescription NA    Confirmed Nurse will call to complete assessment YES    Additional comments: NA

## 2020-12-29 NOTE — LETTER
"    12/29/2020         RE: Leigh Espinal  [address] [place] [address]        Dear Colleague,    Thank you for referring your patient, Leigh Espinal, to the [facility]. Please see a copy of my visit note below.    Leigh Espinal is a 55 year old female who is being evaluated via a billable video visit.      The patient has been notified of following:     \"This video visit will be conducted via a call between you and your physician/provider. We have found that certain health care needs can be provided without the need for an in-person physical exam.  This service lets us provide the care you need with a video conversation.  If a prescription is necessary we can send it directly to your pharmacy.  If lab work is needed we can place an order for that and you can then stop by our lab to have the test done at a later time.    Video visits are billed at different rates depending on your insurance coverage.  Please reach out to your insurance provider with any questions.    If during the course of the call the physician/provider feels a video visit is not appropriate, you will not be charged for this service.\"    Patient has given verbal consent for Video visit? Yes  How would you like to obtain your AVS? MyChart  If you are dropped from the video visit, the video invite should be resent to: Send to e-mail at: [email or web address]  Will anyone else be joining your video visit? No      Vitals - Patient Reported  Weight (Patient Reported): 83.9 kg (185 lb)  Height (Patient Reported): 162.6 cm (5' 4\")  BMI (Based on Pt Reported Ht/Wt): 31.75  Pain Score: No Pain (0)    Refills: Sertraline and Trazodone     Debbie Ureña CMA December 29, 2020  2:28 PM     Video-Visit Details    Type of service:  Video Visit    Video Time:  20 min --> then switched to telephone visit as we were unable to connect.      Originating Location (pt. Location): Home    Distant Location " (provider location):  [facility]     Platform used for Video Visit: Levon Swann MD    Oncology Follow-up Visit:  December 29, 2020    Diagnosis: locally advanced breast cancer, s/p bilateral breast cancer, radiation    History Of Present Illness:  Ms. Espinal is a 55 year old female is here for follow-up of locally advanced breast cancer.    Ms. Espinal is a 55year old female with bilateral breast cancers. Other past medical history is notable for HHT (follows with Dr. Real), pulmonary and liver AVMs, GERD and HTN. Bilateral screening mammograms on 10/23/19 showed architectural distortion and developing focal asymmetry in the right breast, and possible developing asymmetry in the left breast. Diagnostic mammograms showed a 2 x 1.4 cm mass at 1:00 right breast and a 9 mm mass at 2:00 left breast. An additional 1.4 cm satellite mass in the left breast (total diameter of both left breast masses, 2.1 cm) was noted on contrast mammogram on 11/21/19.  Biopsy of the right breast at 1:00 revealed invasive ductal carcinoma, grade 1, ER+ (98%), OR+ (99%), HER2 negative. Biopsy of the left breast 2:00 revealed invasive lobular carcinoma, grade 2, ER+ (95%), OR+ (75%), and HER2 negative.  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      She underwent bilateral nipple sparing mastectomies, tissue expander placement, and bilateral axillary sentinel lymph node biopsies on 1/29/20 under the care of Dr. Smith. Final pathology revealed grade 2 invasive mammary carcinoma in the right breast measuring 2.1 cm, a smaller focus of invasive mammary carcinoma (mixed ductal and cribriform) measuring 3.6 mm, admixed DCIS, and negative margins. The left breast showed grade 2 invasive lobular carcinoma, measuring 1.6 cm, DCIS and LCIS, and negative margins. The right axillary sentinel node was benign; the left axillary sentinel node  "showed micrometastatic carcinoma measuring 1.5 mm without extranodal extension.     OncotypeDx was ordered for right and left breast cancers.  Oncotype dx recurrence score of the left breast cancer was 10. Oncotype on the right breast cancer was 18.      Her surgery was complicated by skin necrosis requiring additional surgery.    She received adjuvant left chest wall and low axillary radiation: 5040 cGy in 28 fractions     She is on zoladex since August 2020; she tried AI and did not tolerate it ( felt \"terrible\" with nausea, fatigue, etc).      She is using breast prosthesis and this helps with edema in her chest wall.    She wants to keep taking sertraline.  She is out of the medication.  It helps with hot flashes.       She is feeling well.    No f/c.  No chest pain or shortness of breath.  No n/v/d/c.      Review Of Systems:   ROS: 10 point ROS neg other than the symptoms noted above in the HPI.      Past medical, social, surgical, and family histories reviewed.  Patient underwent menarche at 10 yo.  She has had 2 pregnancies and has 2 children.  First full term pregnancy at 24 yo.  She  both of her children.  She had a mirena IUD in place until shortly after breast cancer diagnosis.  She had menstrual bleeding for about 6 weeks after the IUD was removed.    Allergies:  Allergies as of 12/29/2020 - Reviewed 12/29/2020   Allergen Reaction Noted     Sulfa drugs  10/18/2011       Current Medications:  Current Outpatient Medications   Medication Sig Dispense Refill     atenolol (TENORMIN) 50 MG tablet Take 1 tablet by mouth daily       LISINOPRIL PO Take 20 mg by mouth daily       pantoprazole (PROTONIX) 20 MG EC tablet daily as needed   3     SERTRALINE HCL PO Take 50 mg by mouth daily        TRAZodone (DESYREL) 25 MG TABS Take 50 mg by mouth At Bedtime        amoxicillin (AMOXIL) 500 MG tablet Take 4 pills (2000 mg) 30-60 minutes before dental procedures, including teeth cleaning. (Patient not taking: " Reported on 7/2/2020) 4 tablet 3     letrozole (FEMARA) 2.5 MG tablet Take 1 tablet (2.5 mg) by mouth daily (Patient not taking: Reported on 11/5/2020) 30 tablet 1     order for DME Equipment being ordered: 6 compression bras and 2 sets of breast prostheses (bilateral) per year (Patient not taking: Reported on 12/29/2020) 3 each 1     order for DME Equipment being ordered: Prosthetic bra after bilateral mastectomy (Patient not taking: Reported on 12/29/2020) 2 each 0        Physical Exam:   Speaking clearly and fluently  Alert and oriented x 3      Laboratory/Imaging Studies      Lab Results   Component Value Date    WBC 6.2 09/09/2020     Lab Results   Component Value Date    RBC 4.72 09/09/2020     Lab Results   Component Value Date    HGB 13.6 09/09/2020     Lab Results   Component Value Date    HCT 42.1 09/09/2020     No components found for: MCT  Lab Results   Component Value Date    MCV 89 09/09/2020     Lab Results   Component Value Date    MCH 28.8 09/09/2020     Lab Results   Component Value Date    MCHC 32.3 09/09/2020     Lab Results   Component Value Date    RDW 15.0 09/09/2020     Lab Results   Component Value Date     09/09/2020       Recent Labs   Lab Test 09/09/20  1545 07/01/20  0848    139   POTASSIUM 3.6 3.8   CHLORIDE 109 107   CO2 22 26   ANIONGAP 9 6   * 89   BUN 15 10   CR 0.89 0.81   ILIANA 9.3 8.9     Liver Function Studies -   Recent Labs   Lab Test 09/09/20  1545   PROTTOTAL 6.8   ALBUMIN 3.3*   BILITOTAL 0.3   ALKPHOS 116   AST 22   ALT 30         ASSESSMENT/PLAN:  54 y/o female with bilateral breast cancer as outlined below:  1. Left breast cancer P3dF8vd ER + OK + her2 negative, oncotype 10 s/p mastectomy and SNLB  2. Right breast cancer T2(2)N0 ER + OK + her2 negative, oncotype 18 s/p mastectomy and SNLB  3. CHEK 2 mutation  4. HHT    I reviewed with Leigh today that she has had curative-intent therapy with a mastectomy and sentinel lymph node biopsy. She completed  all her surgery, and radiation and is on zoladex shots.        I reviewed with her that with an estrogen- and progesterone-positive tumor, I would recommend the use of adjuvant endocrine therapy.  She was perimenopausal and is now on zoladex.  She does not tolerate the AIs and is not interested in any other endocrine therapy.      We reviewed that second breast reconstruction will likely be delayed until summer 2021 due to covid 19.     2.She does have a CHEK2 mutation.  She is scheduled to see Genetics next month.  She has had a colonoscopy in 2012, which was normal.   Referral placed.    3.  She sees Dr. Kacie Real for HHT.     4.  I reviewed with Leigh today that I also recommend lifestyle management to help prevent breast cancer recurrence and her overall health.  This includes regular exercise 150 minutes per week as well as a diet high in fruits and vegetables.  We will continue to discuss this with future visits.       Katarzyna Swann MD

## 2020-12-31 ENCOUNTER — INFUSION THERAPY VISIT (OUTPATIENT)
Dept: ONCOLOGY | Facility: CLINIC | Age: 55
End: 2020-12-31
Attending: INTERNAL MEDICINE
Payer: COMMERCIAL

## 2020-12-31 VITALS
TEMPERATURE: 98.1 F | OXYGEN SATURATION: 98 % | SYSTOLIC BLOOD PRESSURE: 136 MMHG | RESPIRATION RATE: 16 BRPM | DIASTOLIC BLOOD PRESSURE: 80 MMHG | HEART RATE: 60 BPM

## 2020-12-31 DIAGNOSIS — Z17.0 MALIGNANT NEOPLASM OF UPPER-OUTER QUADRANT OF LEFT BREAST IN FEMALE, ESTROGEN RECEPTOR POSITIVE (H): Primary | ICD-10-CM

## 2020-12-31 DIAGNOSIS — C50.412 MALIGNANT NEOPLASM OF UPPER-OUTER QUADRANT OF LEFT BREAST IN FEMALE, ESTROGEN RECEPTOR POSITIVE (H): Primary | ICD-10-CM

## 2020-12-31 PROCEDURE — 250N000011 HC RX IP 250 OP 636: Performed by: INTERNAL MEDICINE

## 2020-12-31 PROCEDURE — 99207 PR NO BILLABLE SERVICE THIS VISIT: CPT

## 2020-12-31 PROCEDURE — 96402 CHEMO HORMON ANTINEOPL SQ/IM: CPT

## 2020-12-31 RX ADMIN — GOSERELIN ACETATE 3.6 MG: 3.6 IMPLANT SUBCUTANEOUS at 15:12

## 2020-12-31 ASSESSMENT — PAIN SCALES - GENERAL: PAINLEVEL: NO PAIN (0)

## 2020-12-31 NOTE — PROGRESS NOTES
Infusion Nursing Note:  Leigh ROBYN Espinal presents today for Zoladex.    Patient seen by provider today: No   present during visit today: Not Applicable.    Note: Patient arrives feeling well. Hot flashes tolerable and stable. No further complaints or concerns.    Post Infusion Assessment:  Patient tolerated injection without incident. Zoladex administered into LLQ. Ice applied prior.       Discharge Plan:   Patient declined prescription refills.  Discharge instructions reviewed with: Patient.  Patient and/or family verbalized understanding of discharge instructions and all questions answered.  AVS to patient via [employer].  Patient will return 1/28/21 for next appointment.   Patient discharged in stable condition accompanied by: self.  Departure Mode: Ambulatory.    Maria Guadalupe Shin RN

## 2021-01-13 ENCOUNTER — DOCUMENTATION ONLY (OUTPATIENT)
Dept: SURGERY | Facility: CLINIC | Age: 56
End: 2021-01-13

## 2021-01-13 NOTE — PROGRESS NOTES
Sent [facility] offering 3/15 as surgical date with Dr. Chatterjee.    Will call if she does not respond.

## 2021-01-15 ENCOUNTER — HEALTH MAINTENANCE LETTER (OUTPATIENT)
Age: 56
End: 2021-01-15

## 2021-01-15 ENCOUNTER — TELEPHONE (OUTPATIENT)
Dept: SURGERY | Facility: CLINIC | Age: 56
End: 2021-01-15

## 2021-01-15 NOTE — TELEPHONE ENCOUNTER
LVM regarding scheduling surgery with Dr. Chatterjee. Requested a call or a response via [employer].

## 2021-01-17 DIAGNOSIS — Z11.59 ENCOUNTER FOR SCREENING FOR OTHER VIRAL DISEASES: Primary | ICD-10-CM

## 2021-01-22 ENCOUNTER — TELEPHONE (OUTPATIENT)
Dept: SURGERY | Facility: CLINIC | Age: 56
End: 2021-01-22

## 2021-01-22 NOTE — TELEPHONE ENCOUNTER
Spoke with Leigh regarding scheduling her surgery with Dr. Chatterjee. She would like to plan for June 7th.    She is aware that we can't schedule out that far but will contact her when we can.    Held time on calendar and spreadsheet.    Will schedule CT/PAC/consult/COVID when we schedule surgery.

## 2021-01-28 ENCOUNTER — INFUSION THERAPY VISIT (OUTPATIENT)
Dept: ONCOLOGY | Facility: CLINIC | Age: 56
End: 2021-01-28
Attending: INTERNAL MEDICINE
Payer: COMMERCIAL

## 2021-01-28 VITALS
HEART RATE: 54 BPM | DIASTOLIC BLOOD PRESSURE: 88 MMHG | OXYGEN SATURATION: 99 % | TEMPERATURE: 98 F | RESPIRATION RATE: 16 BRPM | SYSTOLIC BLOOD PRESSURE: 164 MMHG

## 2021-01-28 DIAGNOSIS — C50.412 MALIGNANT NEOPLASM OF UPPER-OUTER QUADRANT OF LEFT BREAST IN FEMALE, ESTROGEN RECEPTOR POSITIVE (H): Primary | ICD-10-CM

## 2021-01-28 DIAGNOSIS — Z17.0 MALIGNANT NEOPLASM OF UPPER-OUTER QUADRANT OF LEFT BREAST IN FEMALE, ESTROGEN RECEPTOR POSITIVE (H): Primary | ICD-10-CM

## 2021-01-28 PROCEDURE — 96402 CHEMO HORMON ANTINEOPL SQ/IM: CPT

## 2021-01-28 PROCEDURE — 250N000011 HC RX IP 250 OP 636: Performed by: INTERNAL MEDICINE

## 2021-01-28 RX ADMIN — GOSERELIN ACETATE 3.6 MG: 3.6 IMPLANT SUBCUTANEOUS at 15:05

## 2021-01-28 ASSESSMENT — PAIN SCALES - GENERAL: PAINLEVEL: NO PAIN (0)

## 2021-01-28 NOTE — PROGRESS NOTES
"Infusion Nursing Note:  Leigh Espinal presents today for Zoladex.    Patient seen by provider today: No   present during visit today: Not Applicable.    Note: Patient states she has been \"feeling ok.\"  Reports no changes in anything.  Patient offers no new concerns at this time.    Patient hypertensive upon arrival today.  Checked patient's BP a couple of times with not much change.  BP running 160s/80s-90s.  Patient states she has been \"running high\" and needs to call her doctor to ask about getting her blood pressure medications adjusted.  Encouraged patient to call and to get a blood pressure cuff to use at home for monitoring.  Patient verbalized understanding.    Intravenous Access:  No Intravenous access/labs at this visit.    Treatment Conditions:  Not Applicable.      Post Infusion Assessment:  Patient tolerated injection without incident.  Patient iced prior to injection for comfort.  Zoladex administered subcutaneously into patient's right side of abdomen.     Discharge Plan:   Patient declined prescription refills.  Discharge instructions reviewed with: Patient.  Patient and/or family verbalized understanding of discharge instructions and all questions answered.  AVS to patient via [employer].  Patient will return 2/25/21 for next appointment.   Patient discharged in stable condition accompanied by: self.  Departure Mode: Ambulatory.  Face to Face time: 0.    JESSICA NEWELL RN                      "

## 2021-01-29 DIAGNOSIS — Z17.0 MALIGNANT NEOPLASM OF UPPER-OUTER QUADRANT OF LEFT BREAST IN FEMALE, ESTROGEN RECEPTOR POSITIVE (H): ICD-10-CM

## 2021-01-29 DIAGNOSIS — Z51.81 ENCOUNTER FOR MONITORING AROMATASE INHIBITOR THERAPY: ICD-10-CM

## 2021-01-29 DIAGNOSIS — C50.412 MALIGNANT NEOPLASM OF UPPER-OUTER QUADRANT OF LEFT BREAST IN FEMALE, ESTROGEN RECEPTOR POSITIVE (H): ICD-10-CM

## 2021-01-29 DIAGNOSIS — Z11.59 ENCOUNTER FOR SCREENING FOR OTHER VIRAL DISEASES: ICD-10-CM

## 2021-01-29 DIAGNOSIS — Z79.811 ENCOUNTER FOR MONITORING AROMATASE INHIBITOR THERAPY: ICD-10-CM

## 2021-01-29 LAB
CHOLEST SERPL-MCNC: 236 MG/DL
HDLC SERPL-MCNC: 53 MG/DL
LABORATORY COMMENT REPORT: NORMAL
LDLC SERPL CALC-MCNC: 148 MG/DL
NONHDLC SERPL-MCNC: 183 MG/DL
SARS-COV-2 RNA RESP QL NAA+PROBE: NEGATIVE
SARS-COV-2 RNA RESP QL NAA+PROBE: NORMAL
SPECIMEN SOURCE: NORMAL
SPECIMEN SOURCE: NORMAL
TRIGL SERPL-MCNC: 175 MG/DL

## 2021-01-29 PROCEDURE — U0003 INFECTIOUS AGENT DETECTION BY NUCLEIC ACID (DNA OR RNA); SEVERE ACUTE RESPIRATORY SYNDROME CORONAVIRUS 2 (SARS-COV-2) (CORONAVIRUS DISEASE [COVID-19]), AMPLIFIED PROBE TECHNIQUE, MAKING USE OF HIGH THROUGHPUT TECHNOLOGIES AS DESCRIBED BY CMS-2020-01-R: HCPCS | Mod: 90 | Performed by: PATHOLOGY

## 2021-01-29 PROCEDURE — 80061 LIPID PANEL: CPT | Performed by: PATHOLOGY

## 2021-01-29 PROCEDURE — 99000 SPECIMEN HANDLING OFFICE-LAB: CPT | Performed by: PATHOLOGY

## 2021-01-29 PROCEDURE — U0005 INFEC AGEN DETEC AMPLI PROBE: HCPCS | Mod: 90 | Performed by: PATHOLOGY

## 2021-01-29 PROCEDURE — 36415 COLL VENOUS BLD VENIPUNCTURE: CPT | Performed by: PATHOLOGY

## 2021-02-02 ENCOUNTER — ANESTHESIA EVENT (OUTPATIENT)
Dept: SURGERY | Facility: AMBULATORY SURGERY CENTER | Age: 56
End: 2021-02-02

## 2021-02-02 ENCOUNTER — ANESTHESIA (OUTPATIENT)
Dept: SURGERY | Facility: AMBULATORY SURGERY CENTER | Age: 56
End: 2021-02-02

## 2021-02-02 ENCOUNTER — HOSPITAL ENCOUNTER (OUTPATIENT)
Facility: AMBULATORY SURGERY CENTER | Age: 56
Discharge: HOME | End: 2021-02-02
Attending: INTERNAL MEDICINE | Admitting: INTERNAL MEDICINE
Payer: COMMERCIAL

## 2021-02-02 VITALS
BODY MASS INDEX: 29.16 KG/M2 | SYSTOLIC BLOOD PRESSURE: 144 MMHG | RESPIRATION RATE: 18 BRPM | DIASTOLIC BLOOD PRESSURE: 73 MMHG | TEMPERATURE: 97.5 F | OXYGEN SATURATION: 97 % | HEART RATE: 44 BPM | HEIGHT: 65 IN | WEIGHT: 175 LBS

## 2021-02-02 VITALS — HEART RATE: 50 BPM

## 2021-02-02 DIAGNOSIS — Z12.12 ENCOUNTER FOR COLORECTAL CANCER SCREENING: Primary | ICD-10-CM

## 2021-02-02 DIAGNOSIS — Z12.11 ENCOUNTER FOR COLORECTAL CANCER SCREENING: Primary | ICD-10-CM

## 2021-02-02 LAB
COLONOSCOPY: NORMAL
HCG UR QL: NEGATIVE
INTERNAL QC OK POCT: YES

## 2021-02-02 PROCEDURE — 81025 URINE PREGNANCY TEST: CPT | Performed by: PATHOLOGY

## 2021-02-02 PROCEDURE — 45378 DIAGNOSTIC COLONOSCOPY: CPT | Mod: 74

## 2021-02-02 RX ORDER — PROCHLORPERAZINE MALEATE 10 MG
10 TABLET ORAL EVERY 6 HOURS PRN
Status: DISCONTINUED | OUTPATIENT
Start: 2021-02-02 | End: 2021-02-03 | Stop reason: HOSPADM

## 2021-02-02 RX ORDER — FLUMAZENIL 0.1 MG/ML
0.2 INJECTION, SOLUTION INTRAVENOUS
Status: DISCONTINUED | OUTPATIENT
Start: 2021-02-02 | End: 2021-02-03 | Stop reason: HOSPADM

## 2021-02-02 RX ORDER — NALOXONE HYDROCHLORIDE 0.4 MG/ML
0.4 INJECTION, SOLUTION INTRAMUSCULAR; INTRAVENOUS; SUBCUTANEOUS
Status: DISCONTINUED | OUTPATIENT
Start: 2021-02-02 | End: 2021-02-03 | Stop reason: HOSPADM

## 2021-02-02 RX ORDER — PROPOFOL 10 MG/ML
INJECTION, EMULSION INTRAVENOUS CONTINUOUS PRN
Status: DISCONTINUED | OUTPATIENT
Start: 2021-02-02 | End: 2021-02-02

## 2021-02-02 RX ORDER — LIDOCAINE 40 MG/G
CREAM TOPICAL
Status: DISCONTINUED | OUTPATIENT
Start: 2021-02-02 | End: 2021-02-02 | Stop reason: HOSPADM

## 2021-02-02 RX ORDER — NALOXONE HYDROCHLORIDE 0.4 MG/ML
0.2 INJECTION, SOLUTION INTRAMUSCULAR; INTRAVENOUS; SUBCUTANEOUS
Status: DISCONTINUED | OUTPATIENT
Start: 2021-02-02 | End: 2021-02-03 | Stop reason: HOSPADM

## 2021-02-02 RX ORDER — SIMETHICONE
LIQUID (ML) MISCELLANEOUS PRN
Status: DISCONTINUED | OUTPATIENT
Start: 2021-02-02 | End: 2021-02-02 | Stop reason: HOSPADM

## 2021-02-02 RX ORDER — LIDOCAINE HYDROCHLORIDE 20 MG/ML
INJECTION, SOLUTION INFILTRATION; PERINEURAL PRN
Status: DISCONTINUED | OUTPATIENT
Start: 2021-02-02 | End: 2021-02-02

## 2021-02-02 RX ORDER — PROPOFOL 10 MG/ML
INJECTION, EMULSION INTRAVENOUS PRN
Status: DISCONTINUED | OUTPATIENT
Start: 2021-02-02 | End: 2021-02-02

## 2021-02-02 RX ORDER — ONDANSETRON 4 MG/1
4 TABLET, ORALLY DISINTEGRATING ORAL EVERY 6 HOURS PRN
Status: DISCONTINUED | OUTPATIENT
Start: 2021-02-02 | End: 2021-02-03 | Stop reason: HOSPADM

## 2021-02-02 RX ORDER — ONDANSETRON 2 MG/ML
4 INJECTION INTRAMUSCULAR; INTRAVENOUS EVERY 6 HOURS PRN
Status: DISCONTINUED | OUTPATIENT
Start: 2021-02-02 | End: 2021-02-03 | Stop reason: HOSPADM

## 2021-02-02 RX ORDER — SODIUM CHLORIDE, SODIUM LACTATE, POTASSIUM CHLORIDE, CALCIUM CHLORIDE 600; 310; 30; 20 MG/100ML; MG/100ML; MG/100ML; MG/100ML
INJECTION, SOLUTION INTRAVENOUS CONTINUOUS
Status: DISCONTINUED | OUTPATIENT
Start: 2021-02-02 | End: 2021-02-02 | Stop reason: HOSPADM

## 2021-02-02 RX ORDER — ONDANSETRON 2 MG/ML
4 INJECTION INTRAMUSCULAR; INTRAVENOUS
Status: COMPLETED | OUTPATIENT
Start: 2021-02-02 | End: 2021-02-02

## 2021-02-02 RX ADMIN — PROPOFOL 60 MG: 10 INJECTION, EMULSION INTRAVENOUS at 13:38

## 2021-02-02 RX ADMIN — SODIUM CHLORIDE, SODIUM LACTATE, POTASSIUM CHLORIDE, CALCIUM CHLORIDE: 600; 310; 30; 20 INJECTION, SOLUTION INTRAVENOUS at 13:26

## 2021-02-02 RX ADMIN — PROPOFOL 20 MG: 10 INJECTION, EMULSION INTRAVENOUS at 13:39

## 2021-02-02 RX ADMIN — LIDOCAINE HYDROCHLORIDE 60 MG: 20 INJECTION, SOLUTION INFILTRATION; PERINEURAL at 13:38

## 2021-02-02 RX ADMIN — PROPOFOL 150 MCG/KG/MIN: 10 INJECTION, EMULSION INTRAVENOUS at 13:38

## 2021-02-02 RX ADMIN — ONDANSETRON 4 MG: 2 INJECTION INTRAMUSCULAR; INTRAVENOUS at 13:38

## 2021-02-02 ASSESSMENT — MIFFLIN-ST. JEOR: SCORE: 1396.49

## 2021-02-02 NOTE — ANESTHESIA POSTPROCEDURE EVALUATION
Patient: Leigh Espinal    Procedure(s):  COLONOSCOPY    Diagnosis:Colon cancer screening [Z12.11]  Diagnosis Additional Information: No value filed.    Anesthesia Type:  MAC    Note:  Disposition: Outpatient   Postop Pain Control: Uneventful            Sign Out: Well controlled pain   PONV: No   Neuro/Psych: Uneventful            Sign Out: Acceptable/Baseline neuro status   Airway/Respiratory: Uneventful            Sign Out: Acceptable/Baseline resp. status   CV/Hemodynamics: Uneventful            Sign Out: Acceptable CV status   Other NRE: NONE   DID A NON-ROUTINE EVENT OCCUR? No         Last vitals:  Vitals:    02/02/21 1328 02/02/21 1407 02/02/21 1420   BP: (!) 182/92 (!) 158/61 (!) 144/73   Pulse: (!) 44     Resp:  16 18   Temp:  36.1  C (97  F) 36.4  C (97.5  F)   SpO2:  95% 97%       Electronically Signed By: Tolu Hayden DO  February 2, 2021  3:49 PM

## 2021-02-02 NOTE — ANESTHESIA CARE TRANSFER NOTE
Patient: Leigh Espinal    Procedure(s):  COLONOSCOPY    Diagnosis: Colon cancer screening [Z12.11]  Diagnosis Additional Information: No value filed.    Anesthesia Type:   MAC     Note:    Oropharynx: oropharynx clear of all foreign objects  Level of Consciousness: awake  Oxygen Supplementation: room air    Independent Airway: airway patency satisfactory and stable  Dentition: dentition unchanged  Vital Signs Stable: post-procedure vital signs reviewed and stable  Report to RN Given: handoff report given  Patient transferred to: Phase II  Comments: Vital signs per nursing documentation.     Handoff Report: Identifed the Patient, Identified the Reponsible Provider, Reviewed the pertinent medical history, Discussed the surgical course, Reviewed Intra-OP anesthesia mangement and issues during anesthesia, Set expectations for post-procedure period and Allowed opportunity for questions and acknowledgement of understanding      Vitals: (Last set prior to Anesthesia Care Transfer)  CRNA VITALS  2/2/2021 1328 - 2/2/2021 1407      2/2/2021             Pulse:  54    SpO2:  95 %        Electronically Signed By: MATTHEW Garcia CRNA  February 2, 2021  2:07 PM

## 2021-02-02 NOTE — H&P
Leigh CARLSON Kylie  [id number]  female  55 year old      Reason for procedure/surgery: genetic predisposition identified during breast cancer, colonoscopy    Patient Active Problem List   Diagnosis     Iron deficiency anemia     Pulmonary arteriovenous malformation     HHT (hereditary hemorrhagic telangiectasia) (H)     Malignant neoplasm of upper-outer quadrant of left breast in female, estrogen receptor positive (H)     Malignant neoplasm of upper-inner quadrant of right breast in female, estrogen receptor positive (H)     S/P breast reconstruction, bilateral     Menorrhagia     Anxiety and depression     Breast cancer (H)     Monoallelic mutation of CHEK2 gene in female patient     Bradycardia       Past Surgical History:    Past Surgical History:   Procedure Laterality Date     BIOPSY NODE SENTINEL Bilateral 1/29/2020    Procedure: BILATERAL Axillary [place] Lymph Node Biopsy;  Surgeon: Paz Smith MD;  Location: [facility]     COLONOSCOPY  6/21/2012    Procedure: COLONOSCOPY;;  Surgeon: Radha Hector MD;  Location: [facility] GI     INSERT TISSUE EXPANDER BREAST BILATERAL Bilateral 2/14/2020    Procedure: bilateral breast expander removal and washout;  Surgeon: CEE Chatterjee MD;  Location: [facility] OR     LAPAROSCOPIC NISSEN FUNDOPLICATION  2007     MASTECTOMY SIMPLE Bilateral 1/29/2020    Procedure: BILATERAL Skin-Sparing Mastectomy;  Surgeon: Paz Smith MD;  Location: [facility] OR     RECONSTRUCT BREAST Bilateral 1/29/2020    Procedure: Bilateral breast reconstruction with expanders and SPY;  Surgeon: CEE Chatterjee MD;  Location: [facility] OR     TUBAL LIGATION  1990       Past Medical History:   Past Medical History:   Diagnosis Date     Anxiety and depression      AVM (arteriovenous malformation)     Right Pulmonary     Breast cancer (H) 2019     GERD (gastroesophageal reflux disease)      HHT (hereditary hemorrhagic telangiectasia) (H) 11/25/2015    Possible- no ACVRL1, ENG or SMAD4  mutations found on sequencing 10/6/15      Hiatal hernia     nissen done in      HTN (hypertension)      Iron deficiency anemia 2012     Problem list name updated by automated process. Provider to review     Menorrhagia      Monoallelic mutation of CHEK2 gene in female patient 2020    CHEK2 c.1100delC [facility] 2019       Social History:   Social History     Tobacco Use     Smoking status: Former Smoker     Packs/day: 1.00     Years: 20.00     Pack years: 20.00     Types: Cigarettes     Start date: 1980     Quit date: 2011     Years since quittin.3     Smokeless tobacco: Never Used   Substance Use Topics     Alcohol use: Yes     Alcohol/week: 3.3 standard drinks     Types: 4 Standard drinks or equivalent per week     Comment: occ       Family History:   Family History   Problem Relation Age of Onset     C.A.D. Maternal Grandfather      Hypertension Mother      Hypertension Father      Cancer No family hx of        Allergies:   Allergies   Allergen Reactions     Sulfa Drugs        Active Medications:   Current Outpatient Medications   Medication Sig Dispense Refill     atenolol (TENORMIN) 50 MG tablet Take 1 tablet by mouth daily       LISINOPRIL PO Take 20 mg by mouth daily       SERTRALINE HCL PO Take 50 mg by mouth daily        TRAZodone (DESYREL) 25 MG TABS Take 50 mg by mouth At Bedtime        amoxicillin (AMOXIL) 500 MG tablet Take 4 pills (2000 mg) 30-60 minutes before dental procedures, including teeth cleaning. (Patient not taking: Reported on 2020) 4 tablet 3     letrozole (FEMARA) 2.5 MG tablet Take 1 tablet (2.5 mg) by mouth daily (Patient not taking: Reported on 2020) 30 tablet 1     order for DME Equipment being ordered: 6 compression bras and 2 sets of breast prostheses (bilateral) per year (Patient not taking: Reported on 2020) 3 each 1     order for DME Equipment being ordered: Prosthetic bra after bilateral mastectomy (Patient not  "taking: Reported on 12/29/2020) 2 each 0     pantoprazole (PROTONIX) 20 MG EC tablet daily as needed   3       Systemic Review:   CONSTITUTIONAL: NEGATIVE for fever, chills, change in weight  ENT/MOUTH: NEGATIVE for ear, mouth and throat problems  RESP: NEGATIVE for significant cough or SOB  CV: NEGATIVE for chest pain, palpitations or peripheral edema    Physical Examination:   Vital Signs: BP (!) 192/92   Pulse (!) 46   Temp 97.3  F (36.3  C) (Temporal)   Resp 16   Ht 1.646 m (5' 4.8\")   Wt 79.4 kg (175 lb)   SpO2 97%   BMI 29.30 kg/m    GENERAL: healthy, alert and no distress  NECK: no adenopathy, no asymmetry, masses, or scars  RESP: lungs clear to auscultation - no rales, rhonchi or wheezes  CV: regular rate and rhythm, normal S1 S2, no S3 or S4, no murmur, click or rub, no peripheral edema and peripheral pulses strong  ABDOMEN: soft, nontender, no hepatosplenomegaly, no masses and bowel sounds normal  MS: no gross musculoskeletal defects noted, no edema    Plan: Appropriate to proceed as scheduled.      Wally Padilla DO  2/2/2021    PCP:  Darleen Ventura    "

## 2021-02-02 NOTE — ANESTHESIA PREPROCEDURE EVALUATION
Anesthesia Pre-Procedure Evaluation    Patient: Leigh Espinal   MRN: [id number] : 1965        Preoperative Diagnosis: Colon cancer screening [Z12.11]   Procedure : Procedure(s):  COLONOSCOPY     Past Medical History:   Diagnosis Date     Anxiety and depression      AVM (arteriovenous malformation)     Right Pulmonary     Breast cancer (H)      GERD (gastroesophageal reflux disease)      HHT (hereditary hemorrhagic telangiectasia) (H) 2015    Possible- no ACVRL1, ENG or SMAD4 mutations found on sequencing 10/6/15      Hiatal hernia     nissen done in      HTN (hypertension)      Iron deficiency anemia 2012     Problem list name updated by automated process. Provider to review     Menorrhagia      Monoallelic mutation of CHEK2 gene in female patient 2020    CHEK2 c.1100delC [facility] 2019      Past Surgical History:   Procedure Laterality Date     BIOPSY NODE SENTINEL Bilateral 2020    Procedure: BILATERAL Axillary [place] Lymph Node Biopsy;  Surgeon: Paz Smith MD;  [facility]: [facility]     COLONOSCOPY  2012    Procedure: COLONOSCOPY;;  Surgeon: Radha Hector MD;  Location: [facility]     INSERT TISSUE EXPANDER BREAST BILATERAL Bilateral 2020    Procedure: bilateral breast expander removal and washout;  Surgeon: CEE Chatterjee MD;  Location: [facility]     LAPAROSCOPIC NISSEN FUNDOPLICATION       MASTECTOMY SIMPLE Bilateral 2020    Procedure: BILATERAL Skin-Sparing Mastectomy;  Surgeon: Paz Smith MD;  Location: [facility]     RECONSTRUCT BREAST Bilateral 2020    Procedure: Bilateral breast reconstruction with expanders and SPY;  Surgeon: CEE Chatterjee MD;  Location: [facility]     TUBAL LIGATION        Allergies   Allergen Reactions     Sulfa Drugs       Social History     Tobacco Use     Smoking status: Former Smoker     Packs/day: 1.00     Years: 20.00     Pack years: 20.00     Types:  Cigarettes     Start date: 1980     Quit date: 2011     Years since quittin.3     Smokeless tobacco: Never Used   Substance Use Topics     Alcohol use: Yes     Alcohol/week: 3.3 standard drinks     Types: 4 Standard drinks or equivalent per week     Comment: occ      Wt Readings from Last 1 Encounters:   20 80.6 kg (177 lb 11.2 oz)        Anesthesia Evaluation   Pt has had prior anesthetic.         ROS/MED HX  ENT/Pulmonary:  - neg pulmonary ROS     Neurologic:  - neg neurologic ROS     Cardiovascular:     (+) hypertension-----    METS/Exercise Tolerance: >4 METS    Hematologic:     (+) anemia,     Musculoskeletal:  - neg musculoskeletal ROS     GI/Hepatic:     (+) GERD, hiatal hernia,     Renal/Genitourinary:  - neg Renal ROS     Endo:  - neg endo ROS     Psychiatric/Substance Use:     (+) psychiatric history anxiety and depression     Infectious Disease:  - neg infectious disease ROS     Malignancy:   (+) Malignancy, History of Breast.    Other:  - neg other ROS          Physical Exam    Airway  airway exam normal      Mallampati: II   TM distance: > 3 FB   Neck ROM: full   Mouth opening: > 3 cm    Respiratory Devices and Support         Dental  no notable dental history         Cardiovascular   cardiovascular exam normal       Rhythm and rate: regular and normal     Pulmonary   pulmonary exam normal        breath sounds clear to auscultation           OUTSIDE LABS:  CBC:   Lab Results   Component Value Date    WBC 6.2 2020    WBC 5.7 2020    HGB 13.6 2020    HGB 13.9 2020    HCT 42.1 2020    HCT 43.3 2020     2020     2020     BMP:   Lab Results   Component Value Date     2020     2020    POTASSIUM 3.6 2020    POTASSIUM 3.8 2020    CHLORIDE 109 2020    CHLORIDE 107 2020    CO2 22 2020    CO2 26 2020    BUN 15 2020    BUN 10 2020    CR 0.89 2020    CR 0.81  07/01/2020     (H) 09/09/2020    GLC 89 07/01/2020     COAGS:   Lab Results   Component Value Date    INR 1.11 03/04/2015     POC:   Lab Results   Component Value Date     (H) 01/29/2020    HCG Negative 01/29/2020    HCGS Negative 03/04/2015     HEPATIC:   Lab Results   Component Value Date    ALBUMIN 3.3 (L) 09/09/2020    PROTTOTAL 6.8 09/09/2020    ALT 30 09/09/2020    AST 22 09/09/2020    ALKPHOS 116 09/09/2020    BILITOTAL 0.3 09/09/2020     OTHER:   Lab Results   Component Value Date    ILIANA 9.3 09/09/2020    MAG 2.2 09/09/2020    TSH 2.54 09/09/2020    CRP <5.0 04/18/2012       Anesthesia Plan     History & Physical Review      ASA Status:  2. NPO Status:  NPO Appropriate. .  Plan for MAC Reason for MAC:  Deep or markedly invasive procedure (G8).         PONV prophylaxis:  Ondansetron (or other 5HT-3).       Consents  Anesthesia Plan(s) and associated risks, benefits, and realistic alternatives discussed.    Questions answered and patient/representative(s) expressed understanding.    Discussed with:  Patient.           Use of blood products discussed: No.          Postoperative Care  Postoperative pain management: Multi-modal analgesia.           Tolu Hayden DO

## 2021-02-03 ENCOUNTER — TELEPHONE (OUTPATIENT)
Dept: GASTROENTEROLOGY | Facility: CLINIC | Age: 56
End: 2021-02-03

## 2021-02-03 NOTE — TELEPHONE ENCOUNTER
Patient is scheduled for COLONOSCOPY with Dr. RICO    Spoke with: AZRA    Date of Procedure: 03/01/2021    Location: [facility]    Sedation Type MAC    Pre-op for Unit J MAC NOT NEEDED    (if yes advise patient they will need a pre-op prior to procedure)      Is patient on blood thinners? -NO (If yes- inform patient to follow up with PCP or provider for follow up instructions)     Informed patient they will need an adult  YES    Informed Patient of COVID Test Requirement YES    Preferred Pharmacy for Pre Prescription NA    Confirmed Nurse will call to complete assessment YES    Additional comments: NA

## 2021-02-15 DIAGNOSIS — Z11.59 ENCOUNTER FOR SCREENING FOR OTHER VIRAL DISEASES: ICD-10-CM

## 2021-02-22 ENCOUNTER — PREP FOR PROCEDURE (OUTPATIENT)
Dept: SURGERY | Facility: CLINIC | Age: 56
End: 2021-02-22

## 2021-02-22 DIAGNOSIS — Z98.890 S/P BREAST RECONSTRUCTION, BILATERAL: Primary | ICD-10-CM

## 2021-02-23 ENCOUNTER — TELEPHONE (OUTPATIENT)
Dept: GASTROENTEROLOGY | Facility: OUTPATIENT CENTER | Age: 56
End: 2021-02-23

## 2021-02-23 NOTE — TELEPHONE ENCOUNTER
Patient taking any blood thinners ? No      Heart disease ? Denies      Lung disease ? Denies      Sleep apnea ? Denies      Diabetic ? Denies      Kidney disease ? Denies      Electronic implanted medical devices ? Denies       PTSD ? N/a      Prep instructions reviewed with patient ? Instructions, policy,MAC sedation plan reviewed. Advised patient to have someone stay with her for 24 hours post exam post exam.     Yes    Pharmacy : N/a     Indication for procedure : Monoallelic mutation of CHEK2 gene in female patient     Referring provider : Molly Chacon APRN CNP      Arrival Time : 12:30 PM

## 2021-02-24 ENCOUNTER — TELEPHONE (OUTPATIENT)
Dept: SURGERY | Facility: CLINIC | Age: 56
End: 2021-02-24

## 2021-02-24 NOTE — TELEPHONE ENCOUNTER
Surgery is scheduled with Dr. Chatterjee and Dr. Jacobson on 6/7 at [place].  Scheduled per patient requested date.    H&P: to be completed by PAC: 5/25 in person    Additional appointments:   CT on 5/25 at 9 AM  Consult with Dr. Chatterjee on 5/25 at 9:30 AM    COVID-19 test: 6/5 at [facility] [place], [place]     Post-op: 6/22 as a in person visit.    The RN completed the education regarding the surgery.     Patient will receive surgery arrival and start time from PAC.    The surgery packet was sent via US mail. and sent via [employer].    Patient will complete COVID-19 test that was scheduled by surgical coordinator 2-4 days prior to surgery.     I left a detailed voicemail and sent a [employer] message regarding the above information and asked for a call back.

## 2021-02-25 ENCOUNTER — INFUSION THERAPY VISIT (OUTPATIENT)
Dept: ONCOLOGY | Facility: CLINIC | Age: 56
End: 2021-02-25
Attending: INTERNAL MEDICINE
Payer: COMMERCIAL

## 2021-02-25 VITALS
SYSTOLIC BLOOD PRESSURE: 156 MMHG | RESPIRATION RATE: 16 BRPM | TEMPERATURE: 98.4 F | OXYGEN SATURATION: 97 % | HEART RATE: 52 BPM | DIASTOLIC BLOOD PRESSURE: 93 MMHG

## 2021-02-25 DIAGNOSIS — Z17.0 MALIGNANT NEOPLASM OF UPPER-OUTER QUADRANT OF LEFT BREAST IN FEMALE, ESTROGEN RECEPTOR POSITIVE (H): Primary | ICD-10-CM

## 2021-02-25 DIAGNOSIS — C50.412 MALIGNANT NEOPLASM OF UPPER-OUTER QUADRANT OF LEFT BREAST IN FEMALE, ESTROGEN RECEPTOR POSITIVE (H): Primary | ICD-10-CM

## 2021-02-25 PROCEDURE — 96402 CHEMO HORMON ANTINEOPL SQ/IM: CPT

## 2021-02-25 PROCEDURE — 250N000011 HC RX IP 250 OP 636: Performed by: INTERNAL MEDICINE

## 2021-02-25 RX ADMIN — GOSERELIN ACETATE 3.6 MG: 3.6 IMPLANT SUBCUTANEOUS at 15:22

## 2021-02-25 ASSESSMENT — PAIN SCALES - GENERAL: PAINLEVEL: NO PAIN (0)

## 2021-02-25 NOTE — PROGRESS NOTES
Infusion Nursing Note:  Leigh Espinal presents today for Zoladex.    Patient seen by provider today: No   present during visit today: Not Applicable.    Note: No complaints other than persistent hot flashes.    Covid test sent today for procedure Monday.      Post Infusion Assessment:  Patient tolerated subcutaneous Zoladex injection without incident to LEFT abdomen.       Discharge Plan:   AVS to patient via [employer].  Patient will return 3/25/21 for next appointment.   Patient discharged in stable condition accompanied by: self.  Departure Mode: Ambulatory.  Face to Face time: 0.    Swati Garcia RN

## 2021-03-01 NOTE — TELEPHONE ENCOUNTER
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

## 2021-03-04 DIAGNOSIS — Z11.59 ENCOUNTER FOR SCREENING FOR OTHER VIRAL DISEASES: ICD-10-CM

## 2021-03-10 DIAGNOSIS — Z17.0 MALIGNANT NEOPLASM OF UPPER-OUTER QUADRANT OF LEFT BREAST IN FEMALE, ESTROGEN RECEPTOR POSITIVE (H): Primary | ICD-10-CM

## 2021-03-10 DIAGNOSIS — C50.412 MALIGNANT NEOPLASM OF UPPER-OUTER QUADRANT OF LEFT BREAST IN FEMALE, ESTROGEN RECEPTOR POSITIVE (H): Primary | ICD-10-CM

## 2021-03-15 ENCOUNTER — IMMUNIZATION (OUTPATIENT)
Dept: NURSING | Facility: CLINIC | Age: 56
End: 2021-03-15
Payer: COMMERCIAL

## 2021-03-15 ENCOUNTER — TELEPHONE (OUTPATIENT)
Dept: SURGERY | Facility: CLINIC | Age: 56
End: 2021-03-15

## 2021-03-15 DIAGNOSIS — Z98.890 S/P BREAST RECONSTRUCTION, BILATERAL: Primary | ICD-10-CM

## 2021-03-15 PROCEDURE — 91300 PR COVID VAC PFIZER DIL RECON 30 MCG/0.3 ML IM: CPT

## 2021-03-15 PROCEDURE — 0001A PR COVID VAC PFIZER DIL RECON 30 MCG/0.3 ML IM: CPT

## 2021-03-15 NOTE — TELEPHONE ENCOUNTER
Surgery is scheduled with Dr. Chatterjee and Dr. Jacobson on 9/20 at [place].  Scheduled per patient wanted a later date.    H&P: to be completed by PAC    Additional appointments:   CT on 9/7 at 9 AM  Consult w/Sen on 9/7 at 9:30 AM  PAC on 9/7 at 10:15 AM    COVID-19 test: 9/18 at [facility], [place]     Post-op: 10/5 as a in person visit.    The RN completed the education regarding the surgery.     Patient will receive surgery arrival and start time from PAC.    The surgery packet was sent via US mail.    Patient will complete COVID-19 test that was scheduled by surgical coordinator 2-4 days prior to surgery.     I called the patient and was able to confirm the scheduled information above.

## 2021-03-15 NOTE — TELEPHONE ENCOUNTER
Spoke with Leigh regarding surgery, she would like surgery on September 20th.     Will reschedule appointments and send a new surgery packet.    Patient aware of plan, no further questions.

## 2021-03-17 ENCOUNTER — TELEPHONE (OUTPATIENT)
Dept: GASTROENTEROLOGY | Facility: OUTPATIENT CENTER | Age: 56
End: 2021-03-17

## 2021-03-17 NOTE — TELEPHONE ENCOUNTER
Patient taking any blood thinners ? No      Heart disease ? Denies      Lung disease ? Denies      Sleep apnea ? Denies      Diabetic ? Denies      Kidney disease ? Denies      Electronic implanted medical devices ? Denies      PTSD ? N/a      Prep instructions reviewed with patient ? Instructions, policy,MAC sedation plan reviewed. Advised patient to have someone stay with them for 24 hours post exam.     Yes    Pharmacy : N/a     Indication for procedure : Monoallelic mutation of CHEK2 gene in female patient     Referring provider : Molly Chacon APRN CNP      Arrival Time : 1:30 PM    Covid test 3-19

## 2021-03-19 DIAGNOSIS — Z11.59 ENCOUNTER FOR SCREENING FOR OTHER VIRAL DISEASES: ICD-10-CM

## 2021-03-19 LAB
SARS-COV-2 RNA RESP QL NAA+PROBE: NORMAL
SPECIMEN SOURCE: NORMAL

## 2021-03-19 PROCEDURE — U0005 INFEC AGEN DETEC AMPLI PROBE: HCPCS | Mod: 90 | Performed by: PATHOLOGY

## 2021-03-19 PROCEDURE — U0003 INFECTIOUS AGENT DETECTION BY NUCLEIC ACID (DNA OR RNA); SEVERE ACUTE RESPIRATORY SYNDROME CORONAVIRUS 2 (SARS-COV-2) (CORONAVIRUS DISEASE [COVID-19]), AMPLIFIED PROBE TECHNIQUE, MAKING USE OF HIGH THROUGHPUT TECHNOLOGIES AS DESCRIBED BY CMS-2020-01-R: HCPCS | Mod: 90 | Performed by: PATHOLOGY

## 2021-03-19 PROCEDURE — 99000 SPECIMEN HANDLING OFFICE-LAB: CPT | Performed by: PATHOLOGY

## 2021-03-20 LAB
LABORATORY COMMENT REPORT: NORMAL
SARS-COV-2 RNA RESP QL NAA+PROBE: NEGATIVE
SPECIMEN SOURCE: NORMAL

## 2021-03-22 ENCOUNTER — TRANSFERRED RECORDS (OUTPATIENT)
Dept: HEALTH INFORMATION MANAGEMENT | Facility: CLINIC | Age: 56
End: 2021-03-22

## 2021-03-22 ENCOUNTER — DOCUMENTATION ONLY (OUTPATIENT)
Dept: GASTROENTEROLOGY | Facility: OUTPATIENT CENTER | Age: 56
End: 2021-03-22
Attending: INTERNAL MEDICINE
Payer: COMMERCIAL

## 2021-03-22 DIAGNOSIS — Z15.01 MONOALLELIC MUTATION OF CHEK2 GENE IN FEMALE PATIENT: Primary | ICD-10-CM

## 2021-03-22 DIAGNOSIS — Z15.09 MONOALLELIC MUTATION OF CHEK2 GENE IN FEMALE PATIENT: Primary | ICD-10-CM

## 2021-03-22 DIAGNOSIS — Z15.02 MONOALLELIC MUTATION OF CHEK2 GENE IN FEMALE PATIENT: Primary | ICD-10-CM

## 2021-03-22 DIAGNOSIS — Z15.89 MONOALLELIC MUTATION OF CHEK2 GENE IN FEMALE PATIENT: Primary | ICD-10-CM

## 2021-03-22 DIAGNOSIS — Z12.12 ENCOUNTER FOR COLORECTAL CANCER SCREENING: ICD-10-CM

## 2021-03-22 DIAGNOSIS — Z12.11 ENCOUNTER FOR COLORECTAL CANCER SCREENING: ICD-10-CM

## 2021-03-24 LAB — COPATH REPORT: NORMAL

## 2021-03-25 ENCOUNTER — INFUSION THERAPY VISIT (OUTPATIENT)
Dept: ONCOLOGY | Facility: CLINIC | Age: 56
End: 2021-03-25
Attending: INTERNAL MEDICINE
Payer: COMMERCIAL

## 2021-03-25 VITALS
RESPIRATION RATE: 16 BRPM | OXYGEN SATURATION: 97 % | DIASTOLIC BLOOD PRESSURE: 80 MMHG | TEMPERATURE: 98.1 F | HEART RATE: 50 BPM | SYSTOLIC BLOOD PRESSURE: 124 MMHG

## 2021-03-25 DIAGNOSIS — C50.412 MALIGNANT NEOPLASM OF UPPER-OUTER QUADRANT OF LEFT BREAST IN FEMALE, ESTROGEN RECEPTOR POSITIVE (H): Primary | ICD-10-CM

## 2021-03-25 DIAGNOSIS — Z17.0 MALIGNANT NEOPLASM OF UPPER-OUTER QUADRANT OF LEFT BREAST IN FEMALE, ESTROGEN RECEPTOR POSITIVE (H): Primary | ICD-10-CM

## 2021-03-25 PROCEDURE — 250N000011 HC RX IP 250 OP 636: Performed by: INTERNAL MEDICINE

## 2021-03-25 PROCEDURE — 96402 CHEMO HORMON ANTINEOPL SQ/IM: CPT

## 2021-03-25 RX ADMIN — GOSERELIN ACETATE 3.6 MG: 3.6 IMPLANT SUBCUTANEOUS at 15:19

## 2021-03-25 ASSESSMENT — PAIN SCALES - GENERAL: PAINLEVEL: NO PAIN (0)

## 2021-03-25 NOTE — PATIENT INSTRUCTIONS
Contact Numbers:   [facility] Main Line: [phone]    Call triage to speak with triage if you are experiencing chills and/or temperature greater than or equal to 100.5, uncontrolled nausea/vomiting, diarrhea, constipation, dizziness, shortness of breath, chest pain, bleeding, unexplained bruising, or any new/concerning symptoms, questions/concerns.     If you are having any concerning symptoms or wish to speak to a provider before your next infusion visit, please call your care coordinator or triage to notify them so we can adequately serve you.     If you need a refill on a medication or narcotic prescription, please call triage or your care coordinator before your infusion appointment.                 March 2021 Sunday Monday Tuesday Wednesday Thursday Friday Saturday        1     2     3     4     5     6       7     8     9     10     11     12     13       14     15    COVID-19 VACCINE INITIAL   3:35 PM   (5 min.)   [facility] COVID VACCINE INITIAL   [facility] 16     17     18     19    PRE-PROCEDURE COVID PCR   9:00 AM   (15 min.)   [facility] LAB   [facility] [place] 20       21     22    [facility] COLONOSCOPY   1:30 PM   (30 min.)   Zev Tan MD   [facility] 23     24     25    [facility] ONC INFUSION 30   3:00 PM   (30 min.)    ONCOLOGY INFUSION   [facility] 26     27       28     29    VIDEO VISIT RETURN   3:35 PM   (50 min.)   Makenna Abrams PA-C   [facility] 30 31 April 2021 Sunday Monday Tuesday Wednesday Thursday Friday Saturday                       1     2     3       4     5    COVID-19 PFIZER (21 DAY)   3:05 PM   (5 min.)   [facility] COVID VACCINE 21 DAY PFIZER   [facility] 6     7     8     9     10       11     12     13     14     15     16     17       18     19     20     21     22    [facility] ONC  INFUSION 30   3:00 PM   (30 min.)    ONCOLOGY INFUSION   [facility] 23     24       25     26     27     28     29     30                          Lab Results:  No results found for this or any previous visit (from the past 12 hour(s)).

## 2021-03-25 NOTE — PROGRESS NOTES
Infusion Nursing Note:  Leigh ROBYN Espinal presents today for zoladex injection.    Patient seen by provider today: No   present during visit today: Not Applicable.    Note: patient arrives feeling well, no new complaints/concerns      Post Infusion Assessment:  Patient tolerated one injection into RLQ without incident.   Pt applied ice prior    Discharge Plan:   Patient declined prescription refills.  Discharge instructions reviewed with: Patient.  Patient and/or family verbalized understanding of discharge instructions and all questions answered.  AVS to patient via [employer].  Patient will return 4/22/21 for next appointment.   Patient discharged in stable condition accompanied by: self.  Departure Mode: Ambulatory.    Debbie Sanchez RN

## 2021-04-05 ENCOUNTER — IMMUNIZATION (OUTPATIENT)
Dept: NURSING | Facility: CLINIC | Age: 56
End: 2021-04-05
Attending: INTERNAL MEDICINE
Payer: COMMERCIAL

## 2021-04-05 PROCEDURE — 0002A PR COVID VAC PFIZER DIL RECON 30 MCG/0.3 ML IM: CPT

## 2021-04-05 PROCEDURE — 91300 PR COVID VAC PFIZER DIL RECON 30 MCG/0.3 ML IM: CPT

## 2021-04-22 ENCOUNTER — INFUSION THERAPY VISIT (OUTPATIENT)
Dept: ONCOLOGY | Facility: CLINIC | Age: 56
End: 2021-04-22
Attending: INTERNAL MEDICINE
Payer: COMMERCIAL

## 2021-04-22 VITALS
OXYGEN SATURATION: 98 % | RESPIRATION RATE: 18 BRPM | HEART RATE: 72 BPM | SYSTOLIC BLOOD PRESSURE: 169 MMHG | DIASTOLIC BLOOD PRESSURE: 85 MMHG | TEMPERATURE: 98.1 F

## 2021-04-22 DIAGNOSIS — C50.412 MALIGNANT NEOPLASM OF UPPER-OUTER QUADRANT OF LEFT BREAST IN FEMALE, ESTROGEN RECEPTOR POSITIVE (H): Primary | ICD-10-CM

## 2021-04-22 DIAGNOSIS — Z17.0 MALIGNANT NEOPLASM OF UPPER-OUTER QUADRANT OF LEFT BREAST IN FEMALE, ESTROGEN RECEPTOR POSITIVE (H): Primary | ICD-10-CM

## 2021-04-22 PROCEDURE — 250N000011 HC RX IP 250 OP 636: Performed by: INTERNAL MEDICINE

## 2021-04-22 PROCEDURE — 96402 CHEMO HORMON ANTINEOPL SQ/IM: CPT

## 2021-04-22 RX ADMIN — GOSERELIN ACETATE 3.6 MG: 3.6 IMPLANT SUBCUTANEOUS at 15:11

## 2021-04-22 ASSESSMENT — PAIN SCALES - GENERAL: PAINLEVEL: NO PAIN (0)

## 2021-04-22 NOTE — PROGRESS NOTES
Infusion Nursing Note:  Leigh Espinal presents for Zoladex    Note: pt feeling well today, no new issues or concerns going on. BP noted to be elevated by pt forgot to take her BP meds today. Denies any headaches or vision changes, states she will take them when she gets home today.    Pain: denies    Treatment Conditions:  Not Applicable.    Intravenous Access:  No Intravenous access/labs at this visit.    Post Infusion Assessment:  Patient tolerated injection without incident to LEFT abdomen, area iced prior    Discharge Plan:   Patient declined prescription refills.  Discharge instructions reviewed with: Patient.  Patient and/or family verbalized understanding of discharge instructions and all questions answered.  AVS to patient via [employer].  Patient will return 5/20 for next appointment.   Patient discharged in stable condition accompanied by: self.    Elisabeth Lopez, RN, RN

## 2021-05-25 ENCOUNTER — PRE VISIT (OUTPATIENT)
Dept: SURGERY | Facility: CLINIC | Age: 56
End: 2021-05-25

## 2021-05-26 ENCOUNTER — INFUSION THERAPY VISIT (OUTPATIENT)
Dept: ONCOLOGY | Facility: CLINIC | Age: 56
End: 2021-05-26
Attending: INTERNAL MEDICINE
Payer: COMMERCIAL

## 2021-05-26 VITALS
SYSTOLIC BLOOD PRESSURE: 167 MMHG | OXYGEN SATURATION: 96 % | TEMPERATURE: 98 F | RESPIRATION RATE: 18 BRPM | DIASTOLIC BLOOD PRESSURE: 91 MMHG | HEART RATE: 57 BPM

## 2021-05-26 DIAGNOSIS — Z17.0 MALIGNANT NEOPLASM OF UPPER-OUTER QUADRANT OF LEFT BREAST IN FEMALE, ESTROGEN RECEPTOR POSITIVE (H): Primary | ICD-10-CM

## 2021-05-26 DIAGNOSIS — C50.412 MALIGNANT NEOPLASM OF UPPER-OUTER QUADRANT OF LEFT BREAST IN FEMALE, ESTROGEN RECEPTOR POSITIVE (H): Primary | ICD-10-CM

## 2021-05-26 PROCEDURE — 250N000011 HC RX IP 250 OP 636: Performed by: INTERNAL MEDICINE

## 2021-05-26 PROCEDURE — 96402 CHEMO HORMON ANTINEOPL SQ/IM: CPT

## 2021-05-26 RX ADMIN — GOSERELIN ACETATE 3.6 MG: 3.6 IMPLANT SUBCUTANEOUS at 12:11

## 2021-05-26 NOTE — PROGRESS NOTES
Infusion Nursing Note:  Leigh Espinal presents today for zoladex.    Patient seen by provider today: No   present during visit today: Not Applicable.    Note: Patient's blood pressure is elevated today, she states that she forgot to take her blood pressure medication at home this morning. She is going to follow up with her PCP to see if she can get a prescription for a blood pressure cuff so she can monitor at home.  Patient reports ongoing hot flashes and fatigue, exacerbated by recent warm and humid weather.      Intravenous Access:  No Intravenous access/labs at this visit.    Treatment Conditions:  Not Applicable.      Post Infusion Assessment:  Patient tolerated injection without incident. Zoladex given subcutaneously into RIGHT abdomen. Patient iced prior to the injection      Discharge Plan:   Patient declined prescription refills.  Discharge instructions reviewed with: Patient.  Patient and/or family verbalized understanding of discharge instructions and all questions answered.  AVS to patient via [email or web address].  Patient will return in 4 weeks for next appointment. Message sent to scheduling to move her 6/17 appointment as it is a week too soon.  Patient discharged in stable condition accompanied by: self.  Departure Mode: Ambulatory.  Face to Face time: 0.      Jaky Zavala RN

## 2021-05-26 NOTE — PATIENT INSTRUCTIONS
[facility] Triage and after hours / weekends / holidays:  [phone]    Please call the triage or after hours line if you experience a temperature greater than or equal to 100.5, shaking chills, have uncontrolled nausea, vomiting and/or diarrhea, dizziness, shortness of breath, chest pain, bleeding, unexplained bruising, or if you have any other new/concerning symptoms, questions or concerns.      If you are having any concerning symptoms or wish to speak to a provider before your next infusion visit, please call your care coordinator or triage to notify them so we can adequately serve you.     If you need a refill on a narcotic prescription or other medication, please call before your infusion appointment.           May 2021      Daniel Monday Tuesday Wednesday Thursday Friday Saturday                                 1       2     3     4     5     6     7     8       9     10     11     12     13     14  Happy Birthday!     15       16     17     18     19     20    ONC INFUSION 0.5 HR (30 MIN)   3:00 PM   (30 min.)    ONC INFUSION NURSE   [facility] 21     22       23    ONC INFUSION 1 HR (60 MIN)  11:30 AM   (60 min.)    ONC INFUSION NURSE   [facility] 24     25     26    ONC INFUSION 0.5 HR (30 MIN)  12:30 PM   (30 min.)    ONC INFUSION NURSE   [facility] 27     28     29       30     31 June 2021 Sunday Monday Tuesday Wednesday Thursday Friday Saturday             1     2     3     4     5       6     7     8     9     10     11     12       13     14     15     16     17    ONC INFUSION 0.5 HR (30 MIN)   3:00 PM   (30 min.)    ONC INFUSION NURSE   [facility] 18     19       20     21     22     23     24     25     26       27     28    VIDEO VISIT RETURN   9:15 AM   (30 min.)   Katarzyna Swann MD   [facility]  Cl[facility] 29     30                                 No results found for this or any previous visit (from the past 24 hour(s)).

## 2021-06-09 NOTE — TELEPHONE ENCOUNTER
FUTURE VISIT INFORMATION      SURGERY INFORMATION:    Date: 21    Location: [facility] OR    Surgeon:  CEE Chatterjee MD    Anesthesia Type:  General    Procedure: Bilateral breast reconstruction with free abdominal flaps and SPY    RECORDS REQUESTED FROM:       Primary Care Provider: Darleen Ventura MD- [place]    Pertinent Medical History: bradycardia, HHT    Most recent EKG+ Tracin/10/20    Most recent ECHO: 1/26/15    Most recent PFT's: 1/26/15

## 2021-06-19 DIAGNOSIS — Z17.0 MALIGNANT NEOPLASM OF UPPER-OUTER QUADRANT OF LEFT BREAST IN FEMALE, ESTROGEN RECEPTOR POSITIVE (H): Primary | ICD-10-CM

## 2021-06-19 DIAGNOSIS — C50.412 MALIGNANT NEOPLASM OF UPPER-OUTER QUADRANT OF LEFT BREAST IN FEMALE, ESTROGEN RECEPTOR POSITIVE (H): Primary | ICD-10-CM

## 2021-06-19 DIAGNOSIS — Z11.59 ENCOUNTER FOR SCREENING FOR OTHER VIRAL DISEASES: ICD-10-CM

## 2021-06-20 ENCOUNTER — INFUSION THERAPY VISIT (OUTPATIENT)
Dept: ONCOLOGY | Facility: CLINIC | Age: 56
End: 2021-06-20
Attending: INTERNAL MEDICINE
Payer: COMMERCIAL

## 2021-06-20 VITALS
SYSTOLIC BLOOD PRESSURE: 163 MMHG | RESPIRATION RATE: 16 BRPM | HEART RATE: 55 BPM | OXYGEN SATURATION: 97 % | TEMPERATURE: 98 F | DIASTOLIC BLOOD PRESSURE: 82 MMHG

## 2021-06-20 DIAGNOSIS — Z17.0 MALIGNANT NEOPLASM OF UPPER-OUTER QUADRANT OF LEFT BREAST IN FEMALE, ESTROGEN RECEPTOR POSITIVE (H): Primary | ICD-10-CM

## 2021-06-20 DIAGNOSIS — C50.412 MALIGNANT NEOPLASM OF UPPER-OUTER QUADRANT OF LEFT BREAST IN FEMALE, ESTROGEN RECEPTOR POSITIVE (H): Primary | ICD-10-CM

## 2021-06-20 PROCEDURE — 250N000011 HC RX IP 250 OP 636: Performed by: INTERNAL MEDICINE

## 2021-06-20 PROCEDURE — 96402 CHEMO HORMON ANTINEOPL SQ/IM: CPT

## 2021-06-20 RX ADMIN — GOSERELIN ACETATE 3.6 MG: 3.6 IMPLANT SUBCUTANEOUS at 11:16

## 2021-06-20 ASSESSMENT — PAIN SCALES - GENERAL: PAINLEVEL: NO PAIN (0)

## 2021-06-20 NOTE — PROGRESS NOTES
Infusion Nursing Note:  Leigh Espinal presents today for Zoladex. Patient seen by provider today: No    Intravenous Access:  No Intravenous access/labs at this visit.    Note: Patient reports continued hot flashes, but a bit better with the cooler, less humid weather. BP noted to be elevated. Reports taking BP meds. Will reach out to PCP for BP cuff order to monitor at home. Otherwise, denied s/s of infection or other issues/concerns.    Post Infusion Assessment:  Patient tolerated Zoladex injection in LEFT abdomen without incident.    Discharge Plan:   Patient declined prescription refills.  Discharge instructions reviewed with: Patient.  Patient and/or family verbalized understanding of discharge instructions and all questions answered.  AVS to patient via [employer].  Patient will return 6/28/21-MD for next appointment.   Patient discharged in stable condition accompanied by: self.  Departure Mode: Ambulatory.    Jazmin Stein RN

## 2021-06-20 NOTE — PATIENT INSTRUCTIONS
Contact Numbers  [facility] Nurse Triage: [phone]    Please call the [facility] Triage line if you experience a temperature greater than or equal to 100.5, shaking chills, have uncontrolled nausea, vomiting and/or diarrhea, dizziness, shortness of breath, chest pain, bleeding, unexplained bruising, or if you have any other new/concerning symptoms, questions or concerns.     If you are having any concerning symptoms or wish to speak to a provider before your next infusion visit, please call your care coordinator or triage to notify them so we can adequately serve you.     If you need a refill on a prescription or other medication, please call triage before your infusion appointment.       June 2021 Sunday Monday Tuesday Wednesday Thursday Friday Saturday             1     2     3     4     5       6     7     8     9     10     11     12       13     14     15     16     17     18     19       20    ONC INFUSION 1 HR (60 MIN)  11:00 AM   (30 min.)    ONC INFUSION NURSE   [facility] 21     22     23     24     25     26       27     28    VIDEO VISIT RETURN   9:15 AM   (30 min.)   Katarzyna Swann MD   [facility] 29 30 July 2021 Sunday Monday Tuesday Wednesday Thursday Friday Saturday                       1     2     3       4     5     6     7     8     9     10       11     12     13     14     15    ONC INFUSION 0.5 HR (30 MIN)   3:00 PM   (30 min.)   [facility] ONC INFUSION NURSE   [facility] 16     17       18     19     20     21     22     23     24       25     26     27     28     29     30     31                     Lab Results:  No results found for this or any previous visit (from the past 12 hour(s)).

## 2021-07-02 ENCOUNTER — MYC MEDICAL ADVICE (OUTPATIENT)
Dept: CARE COORDINATION | Facility: CLINIC | Age: 56
End: 2021-07-02

## 2021-07-09 NOTE — TELEPHONE ENCOUNTER
Attempted to reach patient by phone.   Phone number in chart is a non working phone number.   Message could not be left.   Will attempt again a later date.  Yasemin Riggs RN on 7/9/2021 at 9:34 AM

## 2021-07-13 DIAGNOSIS — C50.412 MALIGNANT NEOPLASM OF UPPER-OUTER QUADRANT OF LEFT BREAST IN FEMALE, ESTROGEN RECEPTOR POSITIVE (H): Primary | ICD-10-CM

## 2021-07-13 DIAGNOSIS — Z17.0 MALIGNANT NEOPLASM OF UPPER-OUTER QUADRANT OF LEFT BREAST IN FEMALE, ESTROGEN RECEPTOR POSITIVE (H): Primary | ICD-10-CM

## 2021-07-15 ENCOUNTER — INFUSION THERAPY VISIT (OUTPATIENT)
Dept: ONCOLOGY | Facility: CLINIC | Age: 56
End: 2021-07-15
Attending: INTERNAL MEDICINE
Payer: COMMERCIAL

## 2021-07-15 VITALS
TEMPERATURE: 98 F | HEART RATE: 60 BPM | DIASTOLIC BLOOD PRESSURE: 71 MMHG | RESPIRATION RATE: 16 BRPM | SYSTOLIC BLOOD PRESSURE: 160 MMHG | OXYGEN SATURATION: 96 %

## 2021-07-15 DIAGNOSIS — C50.412 MALIGNANT NEOPLASM OF UPPER-OUTER QUADRANT OF LEFT BREAST IN FEMALE, ESTROGEN RECEPTOR POSITIVE (H): Primary | ICD-10-CM

## 2021-07-15 DIAGNOSIS — Z17.0 MALIGNANT NEOPLASM OF UPPER-OUTER QUADRANT OF LEFT BREAST IN FEMALE, ESTROGEN RECEPTOR POSITIVE (H): Primary | ICD-10-CM

## 2021-07-15 PROCEDURE — 250N000011 HC RX IP 250 OP 636: Performed by: INTERNAL MEDICINE

## 2021-07-15 PROCEDURE — 96402 CHEMO HORMON ANTINEOPL SQ/IM: CPT

## 2021-07-15 RX ADMIN — GOSERELIN ACETATE 3.6 MG: 3.6 IMPLANT SUBCUTANEOUS at 15:34

## 2021-07-15 ASSESSMENT — PAIN SCALES - GENERAL: PAINLEVEL: NO PAIN (0)

## 2021-07-15 NOTE — PROGRESS NOTES
Infusion Nursing Note:  Leigh ROBYN Espinal presents today for zoladex injection.    Patient seen by provider today: No   present during visit today: Not Applicable.    Note: Pt arrives feeling well, no new concerns/complaints.    Treatment Conditions:  Not Applicable.      Post Infusion Assessment:  Patient tolerated injection without incident.       Discharge Plan:   Patient declined prescription refills.  Discharge instructions reviewed with: Patient.  Patient and/or family verbalized understanding of discharge instructions and all questions answered.  AVS to patient via [employer].  Patient will return 7/20 for provider visit, IB sent to schedule next zoladex in one month for next appointment.   Patient discharged in stable condition accompanied by: self.  Departure Mode: Ambulatory.      Debbie Sanchez RN

## 2021-07-15 NOTE — PATIENT INSTRUCTIONS
Contact Numbers:   [facility] [place]: [phone]    Call triage to speak with triage if you are experiencing chills and/or temperature greater than or equal to 100.5, uncontrolled nausea/vomiting, diarrhea, constipation, dizziness, shortness of breath, chest pain, bleeding, unexplained bruising, or any new/concerning symptoms, questions/concerns.     If you are having any concerning symptoms or wish to speak to a provider before your next infusion visit, please call your care coordinator or triage to notify them so we can adequately serve you.     If you need a refill on a medication or narcotic prescription, please call triage or your care coordinator before your infusion appointment.                 July 2021 Sunday Monday Tuesday Wednesday Thursday Friday Saturday                       1     2     3       4     5     6     7     8     9     10       11     12     13     14     15    ONC INFUSION 0.5 HR (30 MIN)   3:00 PM   (30 min.)    ONC INFUSION NURSE   [facility] 16     17       18     19     20    VIDEO VISIT RETURN   8:45 AM   (45 min.)   Makenna Abrams PA-C   [facility] 21     22     23     24       25     26     27     28     29     30     31                 August 2021 Sunday Monday Tuesday Wednesday Thursday Friday Saturday   1     2     3     4     5     6     7       8     9     10     11     12     13     14       15     16     17     18     19     20     21       22     23     24     25     26     27     28       29     30     31                                         Lab Results:  No results found for this or any previous visit (from the past 12 hour(s)).

## 2021-07-20 ENCOUNTER — VIRTUAL VISIT (OUTPATIENT)
Dept: ONCOLOGY | Facility: CLINIC | Age: 56
End: 2021-07-20
Attending: INTERNAL MEDICINE
Payer: COMMERCIAL

## 2021-07-20 DIAGNOSIS — R05.9 COUGH: Primary | ICD-10-CM

## 2021-07-20 DIAGNOSIS — Z17.0 MALIGNANT NEOPLASM OF UPPER-OUTER QUADRANT OF LEFT BREAST IN FEMALE, ESTROGEN RECEPTOR POSITIVE (H): ICD-10-CM

## 2021-07-20 DIAGNOSIS — C50.412 MALIGNANT NEOPLASM OF UPPER-OUTER QUADRANT OF LEFT BREAST IN FEMALE, ESTROGEN RECEPTOR POSITIVE (H): ICD-10-CM

## 2021-07-20 DIAGNOSIS — R05.9 COUGH: ICD-10-CM

## 2021-07-20 LAB — SARS-COV-2 RNA RESP QL NAA+PROBE: NEGATIVE

## 2021-07-20 PROCEDURE — 99214 OFFICE O/P EST MOD 30 MIN: CPT | Mod: 95 | Performed by: PHYSICIAN ASSISTANT

## 2021-07-20 PROCEDURE — U0005 INFEC AGEN DETEC AMPLI PROBE: HCPCS

## 2021-07-20 PROCEDURE — U0003 INFECTIOUS AGENT DETECTION BY NUCLEIC ACID (DNA OR RNA); SEVERE ACUTE RESPIRATORY SYNDROME CORONAVIRUS 2 (SARS-COV-2) (CORONAVIRUS DISEASE [COVID-19]), AMPLIFIED PROBE TECHNIQUE, MAKING USE OF HIGH THROUGHPUT TECHNOLOGIES AS DESCRIBED BY CMS-2020-01-R: HCPCS

## 2021-07-20 PROCEDURE — 999N001193 HC VIDEO/TELEPHONE VISIT; NO CHARGE

## 2021-07-20 RX ORDER — AMLODIPINE BESYLATE 5 MG/1
2.5 TABLET ORAL DAILY
COMMUNITY
Start: 2021-07-20 | End: 2021-09-02

## 2021-07-20 NOTE — LETTER
7/20/2021         RE: Leigh Espinal  [address] [place] [address]          Oncology/Hematology Visit Note  Jul 20, 2021       Reason for Visit: Follow up of bilateral breast cancer     History of Present Illness:   Ms. Espinal is a 55 year old female is here for follow-up of locally advanced breast cancer.     Ms. Espinal is a 56 year old female with bilateral breast cancers. Other past medical history is notable for HHT (follows with Dr. Real), pulmonary and liver AVMs, GERD and HTN. Bilateral screening mammograms on 10/23/19 showed architectural distortion and developing focal asymmetry in the right breast, and possible developing asymmetry in the left breast. Diagnostic mammograms showed a 2 x 1.4 cm mass at 1:00 right breast and a 9 mm mass at 2:00 left breast. An additional 1.4 cm satellite mass in the left breast (total diameter of both left breast masses, 2.1 cm) was noted on contrast mammogram on 11/21/19.  Biopsy of the right breast at 1:00 revealed invasive ductal carcinoma, grade 1, ER+ (98%), ID+ (99%), HER2 negative. Biopsy of the left breast 2:00 revealed invasive lobular carcinoma, grade 2, ER+ (95%), ID+ (75%), and HER2 negative.  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       She underwent bilateral nipple sparing mastectomies, tissue expander placement, and bilateral axillary sentinel lymph node biopsies on 1/29/20 under the care of Dr. Smith. Final pathology revealed grade 2 invasive mammary carcinoma in the right breast measuring 2.1 cm, a smaller focus of invasive mammary carcinoma (mixed ductal and cribriform) measuring 3.6 mm, admixed DCIS, and negative margins. The left breast showed grade 2 invasive lobular carcinoma, measuring 1.6 cm, DCIS and LCIS, and negative margins. The right axillary sentinel node was benign; the left axillary sentinel node showed micrometastatic carcinoma measuring 1.5  mm without extranodal extension.     OncotypeDx was ordered for right and left breast cancers.  Oncotype dx recurrence score of the left breast cancer was 10. Oncotype on the right breast cancer was 18.       Her surgery was complicated by skin necrosis requiring additional surgery.     She received adjuvant left chest wall and low axillary radiation: 5040 cGy in 28 fractions      Started Zoladex 8/2020.     Interval History:  Leigh is seen over video today.      She has a cold- productive cough and runny/stuffy nose.  She did complete her Pfizer vaccinations in May, but she is worried about having Covid, given she has small children around and her 80 year old mother in law in the house.  She wonders if she should be tested.     We discussed what happened with her prior letrozole.  She had terrible body aches, fatigue, was unable to get out of bed.  She prefers to not take another AI.        Current Outpatient Prescriptions          Current Outpatient Medications   Medication Sig Dispense Refill     amoxicillin (AMOXIL) 500 MG tablet Take 4 pills (2000 mg) 30-60 minutes before dental procedures, including teeth cleaning. (Patient not taking: Reported on 7/2/2020) 4 tablet 3     atenolol (TENORMIN) 50 MG tablet Take 1 tablet by mouth daily         LISINOPRIL PO Take 20 mg by mouth daily         order for DME Equipment being ordered: 6 compression bras and 2 sets of breast prostheses (bilateral) per year 3 each 1     order for DME Equipment being ordered: Prosthetic bra after bilateral mastectomy 2 each 0     pantoprazole (PROTONIX) 20 MG EC tablet daily as needed    3     SERTRALINE HCL PO Take 25 mg by mouth daily          TRAZodone (DESYREL) 25 MG TABS Take 50 mg by mouth At Bedtime                 Physical Examination:  There were no vitals taken for this visit.      Wt Readings from Last 10 Encounters:   08/13/20 80.6 kg (177 lb 11.2 oz)   07/30/20 79.4 kg (175 lb 1.6 oz)   05/27/20 78.9 kg (174 lb)   05/04/20  78.9 kg (174 lb)   04/27/20 77.6 kg (171 lb)   04/20/20 77.8 kg (171 lb 8 oz)   04/13/20 77.6 kg (171 lb)   04/06/20 79.8 kg (176 lb)   03/30/20 78.9 kg (174 lb)   03/09/20 79.6 kg (175 lb 6.4 oz)      Video physical exam  General: Patient appears well in no acute distress.   Skin: No visualized rash or lesions on visualized skin  Eyes: EOMI, no erythema, sclera icterus or discharge noted  Resp: Appears to be breathing comfortably without accessory muscle usage, speaking in full sentences, has an active deep cough  MSK: Appears to have normal range of motion based on visualized movements  Neurologic: No apparent tremors, facial movements symmetric  Psych: affect bright, alert and oriented    The rest of a comprehensive physical examination is deferred due to PHE (public health emergency) video restrictions         Laboratory Data:  None recently     Assessment and Plan:  1. Left breast cancer [id number] ER + TN + her2 negative, oncotype 10 s/p mastectomy and SNLB  2. Right breast cancer T2(2)N0 ER + TN + her2 negative, oncotype 18 s/p mastectomy and SNLB  - S/p bilateral mastectomy and left chest wall RT.  Planning on reconstruction - sees surgery/plastics for this, planning on this fall.    - Started Zoladex August 2020 and overall tolerating well with hot flashes.   - Trial of letrozole did not go well (bedridden with fatigue, body aches, etc not interested in another AI).  She is not interested in trying another AI, but would maybe consider trying Tamoxifen, will discus with Dr Swann     CV: taking metoprolol and norvasc for BP control, encouraged to check routinely at home     CHEK2 mutation.  She had a colonoscope March 2021, diverticulosis noted.      Lymphedema, following with PT.      HHT, follows with Dr. Real. No significant bleeding at this time. Hgb is stable.     Cough: likely viral, is s/p covid vaccinations, will test this week (amend: negative for covid)        Makenna Abrams PA-C

## 2021-07-20 NOTE — PROGRESS NOTES
"Leigh is a 56 year old who is being evaluated via a billable video visit.      How would you like to obtain your AVS? MyChart     If the video visit is dropped, the invitation should be resent by: Send to e-mail at: [email or web address]     Will anyone else be joining your video visit? No          Vitals - Patient Reported  Weight (Patient Reported): 78.5 kg (173 lb)  Height (Patient Reported): 162.6 cm (5' 4\")  BMI (Based on Pt Reported Ht/Wt): 29.7  Pain Score: No Pain (0)    Tree Miramontes  [facility]    Oncology/Hematology Visit Note  Jul 20, 2021       Reason for Visit: Follow up of bilateral breast cancer     History of Present Illness:   Ms. Espinal is a 55 year old female is here for follow-up of locally advanced breast cancer.     Ms. Espinal is a 56 year old female with bilateral breast cancers. Other past medical history is notable for HHT (follows with Dr. Real), pulmonary and liver AVMs, GERD and HTN. Bilateral screening mammograms on 10/23/19 showed architectural distortion and developing focal asymmetry in the right breast, and possible developing asymmetry in the left breast. Diagnostic mammograms showed a 2 x 1.4 cm mass at 1:00 right breast and a 9 mm mass at 2:00 left breast. An additional 1.4 cm satellite mass in the left breast (total diameter of both left breast masses, 2.1 cm) was noted on contrast mammogram on 11/21/19.  Biopsy of the right breast at 1:00 revealed invasive ductal carcinoma, grade 1, ER+ (98%), WI+ (99%), HER2 negative. Biopsy of the left breast 2:00 revealed invasive lobular carcinoma, grade 2, ER+ (95%), WI+ (75%), and HER2 negative.  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       She underwent bilateral nipple sparing mastectomies, tissue expander placement, and bilateral axillary sentinel lymph node biopsies on 1/29/20 under the care of Dr. Smith. Final pathology revealed grade 2 invasive mammary carcinoma in the " right breast measuring 2.1 cm, a smaller focus of invasive mammary carcinoma (mixed ductal and cribriform) measuring 3.6 mm, admixed DCIS, and negative margins. The left breast showed grade 2 invasive lobular carcinoma, measuring 1.6 cm, DCIS and LCIS, and negative margins. The right axillary sentinel node was benign; the left axillary sentinel node showed micrometastatic carcinoma measuring 1.5 mm without extranodal extension.     OncotypeDx was ordered for right and left breast cancers.  Oncotype dx recurrence score of the left breast cancer was 10. Oncotype on the right breast cancer was 18.       Her surgery was complicated by skin necrosis requiring additional surgery.     She received adjuvant left chest wall and low axillary radiation: 5040 cGy in 28 fractions      Started Zoladex 8/2020.     Interval History:  Leigh is seen over video today.      She has a cold- productive cough and runny/stuffy nose.  She did complete her Pfizer vaccinations in May, but she is worried about having Covid, given she has small children around and her 80 year old mother in law in the house.  She wonders if she should be tested.     We discussed what happened with her prior letrozole.  She had terrible body aches, fatigue, was unable to get out of bed.  She prefers to not take another AI.        Current Outpatient Prescriptions          Current Outpatient Medications   Medication Sig Dispense Refill     amoxicillin (AMOXIL) 500 MG tablet Take 4 pills (2000 mg) 30-60 minutes before dental procedures, including teeth cleaning. (Patient not taking: Reported on 7/2/2020) 4 tablet 3     atenolol (TENORMIN) 50 MG tablet Take 1 tablet by mouth daily         LISINOPRIL PO Take 20 mg by mouth daily         order for DME Equipment being ordered: 6 compression bras and 2 sets of breast prostheses (bilateral) per year 3 each 1     order for DME Equipment being ordered: Prosthetic bra after bilateral mastectomy 2 each 0     pantoprazole  (PROTONIX) 20 MG EC tablet daily as needed    3     SERTRALINE HCL PO Take 25 mg by mouth daily          TRAZodone (DESYREL) 25 MG TABS Take 50 mg by mouth At Bedtime                 Physical Examination:  There were no vitals taken for this visit.      Wt Readings from Last 10 Encounters:   08/13/20 80.6 kg (177 lb 11.2 oz)   07/30/20 79.4 kg (175 lb 1.6 oz)   05/27/20 78.9 kg (174 lb)   05/04/20 78.9 kg (174 lb)   04/27/20 77.6 kg (171 lb)   04/20/20 77.8 kg (171 lb 8 oz)   04/13/20 77.6 kg (171 lb)   04/06/20 79.8 kg (176 lb)   03/30/20 78.9 kg (174 lb)   03/09/20 79.6 kg (175 lb 6.4 oz)      Video physical exam  General: Patient appears well in no acute distress.   Skin: No visualized rash or lesions on visualized skin  Eyes: EOMI, no erythema, sclera icterus or discharge noted  Resp: Appears to be breathing comfortably without accessory muscle usage, speaking in full sentences, has an active deep cough  MSK: Appears to have normal range of motion based on visualized movements  Neurologic: No apparent tremors, facial movements symmetric  Psych: affect bright, alert and oriented    The rest of a comprehensive physical examination is deferred due to PHE (public health emergency) video restrictions         Laboratory Data:  None recently     Assessment and Plan:  1. Left breast cancer [id number] ER + LA + her2 negative, oncotype 10 s/p mastectomy and SNLB  2. Right breast cancer T2(2)N0 ER + LA + her2 negative, oncotype 18 s/p mastectomy and SNLB  - S/p bilateral mastectomy and left chest wall RT.  Planning on reconstruction - sees surgery/plastics for this, planning on this fall.    - Started Zoladex August 2020 and overall tolerating well with hot flashes.   - Trial of letrozole did not go well (bedridden with fatigue, body aches, etc not interested in another AI).  She is not interested in trying another AI, but would maybe consider trying Tamoxifen, will discus with Dr Swann     CV: taking metoprolol and norvasc  for BP control, encouraged to check routinely at home     CHEK2 mutation.  She had a colonoscope March 2021, diverticulosis noted.      Lymphedema, following with PT.      HHT, follows with Dr. Real. No significant bleeding at this time. Hgb is stable.     Cough: likely viral, is s/p covid vaccinations, will test this week (amend: negative for covid)

## 2021-07-27 NOTE — TELEPHONE ENCOUNTER
Attempted to contact patient.   Home phone number listed in chart is non-working.   Unable to leave message.  Send message to [place].  Yasemin Riggs RN on 7/27/2021 at 12:00 PM

## 2021-08-12 ENCOUNTER — INFUSION THERAPY VISIT (OUTPATIENT)
Dept: ONCOLOGY | Facility: CLINIC | Age: 56
End: 2021-08-12
Attending: INTERNAL MEDICINE
Payer: COMMERCIAL

## 2021-08-12 VITALS
RESPIRATION RATE: 16 BRPM | TEMPERATURE: 97.9 F | OXYGEN SATURATION: 100 % | DIASTOLIC BLOOD PRESSURE: 82 MMHG | SYSTOLIC BLOOD PRESSURE: 175 MMHG | HEART RATE: 46 BPM

## 2021-08-12 DIAGNOSIS — C50.412 MALIGNANT NEOPLASM OF UPPER-OUTER QUADRANT OF LEFT BREAST IN FEMALE, ESTROGEN RECEPTOR POSITIVE (H): Primary | ICD-10-CM

## 2021-08-12 DIAGNOSIS — Z17.0 MALIGNANT NEOPLASM OF UPPER-OUTER QUADRANT OF LEFT BREAST IN FEMALE, ESTROGEN RECEPTOR POSITIVE (H): Primary | ICD-10-CM

## 2021-08-12 PROCEDURE — 250N000011 HC RX IP 250 OP 636: Performed by: INTERNAL MEDICINE

## 2021-08-12 PROCEDURE — 96402 CHEMO HORMON ANTINEOPL SQ/IM: CPT

## 2021-08-12 RX ADMIN — GOSERELIN ACETATE 3.6 MG: 3.6 IMPLANT SUBCUTANEOUS at 08:21

## 2021-08-12 ASSESSMENT — PAIN SCALES - GENERAL: PAINLEVEL: NO PAIN (0)

## 2021-08-12 NOTE — PROGRESS NOTES
Infusion Nursing Note:  Leigh Espinal presents today for Cycle 14 Zoladex.    Patient seen by provider today: No    Note: Pt arrived feeling well with no concerns. Upon arrival it was noted that pt was significantly hypertensive at 199/76 and HR in the 40's. Recheck /82. Pt states this is normal for her HR and that she has a history of hypertension managed by her PCP. She was recently started on Amlodipine 2.5 mg daily.     Since pt asymptomatic and has close contact with her PCP, encouraged pt to monitor her BP at home and to call her PCP right away this morning. Reinforced she should be seen by her PCP and that her BP medications should most likely be adjusted. Also reinforced she needs to go to the ED if she experiences any new symptoms such as chest pain, SOB, changes in vision, dizziness, etc. Pt aware and agreeable to this plan.     Intravenous Access:  No Intravenous access/labs at this visit.    Post Infusion Assessment:  Patient tolerated injection to left abdomen without incident.     Discharge Plan:   Patient declined prescription refills.  AVS to patient via [employer].  Patient will return 9/9 for next appointment.   Patient discharged in stable condition accompanied by: self.  Departure Mode: Ambulatory.      Jaky Navarrete RN

## 2021-08-20 ENCOUNTER — TELEPHONE (OUTPATIENT)
Dept: PLASTIC SURGERY | Facility: CLINIC | Age: 56
End: 2021-08-20

## 2021-08-20 NOTE — TELEPHONE ENCOUNTER
LVM to notify pt that we have rescheduled her 10/5 appt with Ana Garcias to instead be on 10/6 with Dr. Chatterjee. Ana unavailable on original date due to maternity leave. Left call center number for pt to call back if rescheduled date and time does not work. Sent mychart and letter as update.

## 2021-09-02 RX ORDER — LISINOPRIL AND HYDROCHLOROTHIAZIDE 20; 25 MG/1; MG/1
1 TABLET ORAL EVERY EVENING
Status: ON HOLD | COMMUNITY
End: 2021-09-24

## 2021-09-02 RX ORDER — AMLODIPINE BESYLATE 2.5 MG/1
10 TABLET ORAL DAILY
Status: ON HOLD | COMMUNITY
End: 2021-09-24

## 2021-09-02 RX ORDER — TRAZODONE HYDROCHLORIDE 50 MG/1
50-100 TABLET, FILM COATED ORAL AT BEDTIME
COMMUNITY

## 2021-09-02 NOTE — PROGRESS NOTES
Preoperative Assessment Center Medication History Note    Medication history completed on September 2, 2021 by this writer. See Epic admission navigator for prior to admission medications. Operating room staff will still need to confirm medications and last dose information on day of surgery.     Medication history interview sources  Patient interview: Yes  Care Everywhere records: No  Surescripts pharmacy refill records: Yes  Other (if applicable):     Changes made to PTA medication list (reason)  Added: lisinopril/hctz, zoladex.   Deleted: lisinopril   Changed: amlodipine dose/sig/dosage form. protonix dose/sig, trazodone dose/dosage form.     Additional medication history information (including reliability of information, actions taken by pharmacist):    -- Just completed a course of kefflex because she cut her finger with a .    -- No recent (within 30 days) course of systemic steroids  -- Patient declines being on any other prescription or over-the-counter medications    Prior to Admission medications    Medication Sig Last Dose Taking? Auth Provider   amLODIPine (NORVASC) 2.5 MG tablet Take 10 mg by mouth daily Taking Yes Unknown, Entered By History   atenolol (TENORMIN) 50 MG tablet Take 50 mg by mouth every evening  Taking Yes Reported, Patient   Goserelin Acetate (ZOLADEX SC) Inject 3.6 mg Subcutaneous every 30 days  Taking Yes Unknown, Entered By History   lisinopril-hydrochlorothiazide (ZESTORETIC) 20-25 MG tablet Take 1 tablet by mouth every evening Taking Yes Unknown, Entered By History   pantoprazole (PROTONIX) 20 MG EC tablet Take 20 mg by mouth daily as needed  Taking Yes Reported, Patient   SERTRALINE HCL PO Take 50 mg by mouth every evening  Taking Yes Reported, Patient   traZODone (DESYREL) 50 MG tablet Take  mg by mouth At Bedtime Taking Yes Unknown, Entered By History        Medication history completed by: Efrem Ness [facility]

## 2021-09-07 ENCOUNTER — LAB (OUTPATIENT)
Dept: LAB | Facility: CLINIC | Age: 56
End: 2021-09-07
Payer: COMMERCIAL

## 2021-09-07 ENCOUNTER — OFFICE VISIT (OUTPATIENT)
Dept: SURGERY | Facility: CLINIC | Age: 56
End: 2021-09-07
Attending: PLASTIC SURGERY
Payer: COMMERCIAL

## 2021-09-07 ENCOUNTER — OFFICE VISIT (OUTPATIENT)
Dept: PLASTIC SURGERY | Facility: CLINIC | Age: 56
End: 2021-09-07
Attending: PLASTIC SURGERY
Payer: COMMERCIAL

## 2021-09-07 ENCOUNTER — ANESTHESIA EVENT (OUTPATIENT)
Dept: SURGERY | Facility: CLINIC | Age: 56
End: 2021-09-07

## 2021-09-07 ENCOUNTER — ANCILLARY PROCEDURE (OUTPATIENT)
Dept: CT IMAGING | Facility: CLINIC | Age: 56
End: 2021-09-07
Attending: PLASTIC SURGERY
Payer: COMMERCIAL

## 2021-09-07 ENCOUNTER — PRE VISIT (OUTPATIENT)
Dept: SURGERY | Facility: CLINIC | Age: 56
End: 2021-09-07

## 2021-09-07 VITALS
HEART RATE: 45 BPM | RESPIRATION RATE: 16 BRPM | WEIGHT: 173.4 LBS | HEIGHT: 65 IN | TEMPERATURE: 97.6 F | DIASTOLIC BLOOD PRESSURE: 73 MMHG | SYSTOLIC BLOOD PRESSURE: 184 MMHG | BODY MASS INDEX: 28.89 KG/M2 | OXYGEN SATURATION: 97 %

## 2021-09-07 VITALS
HEART RATE: 52 BPM | TEMPERATURE: 97.8 F | SYSTOLIC BLOOD PRESSURE: 162 MMHG | DIASTOLIC BLOOD PRESSURE: 84 MMHG | OXYGEN SATURATION: 99 % | WEIGHT: 173.4 LBS | BODY MASS INDEX: 29.03 KG/M2

## 2021-09-07 DIAGNOSIS — Z98.890 S/P BREAST RECONSTRUCTION, BILATERAL: Primary | ICD-10-CM

## 2021-09-07 DIAGNOSIS — Z98.890 S/P BREAST RECONSTRUCTION, BILATERAL: ICD-10-CM

## 2021-09-07 DIAGNOSIS — Z01.818 PREOP EXAMINATION: ICD-10-CM

## 2021-09-07 LAB
ANION GAP SERPL CALCULATED.3IONS-SCNC: 6 MMOL/L (ref 3–14)
BUN SERPL-MCNC: 8 MG/DL (ref 7–30)
CALCIUM SERPL-MCNC: 9.8 MG/DL (ref 8.5–10.1)
CHLORIDE BLD-SCNC: 106 MMOL/L (ref 94–109)
CO2 SERPL-SCNC: 31 MMOL/L (ref 20–32)
CREAT SERPL-MCNC: 0.77 MG/DL (ref 0.52–1.04)
DEPRECATED CALCIDIOL+CALCIFEROL SERPL-MC: 24 UG/L (ref 20–75)
ERYTHROCYTE [DISTWIDTH] IN BLOOD BY AUTOMATED COUNT: 14.4 % (ref 10–15)
GFR SERPL CREATININE-BSD FRML MDRD: 88 ML/MIN/1.73M2
GLUCOSE BLD-MCNC: 102 MG/DL (ref 70–99)
HCT VFR BLD AUTO: 45.7 % (ref 35–47)
HGB BLD-MCNC: 14.9 G/DL (ref 11.7–15.7)
MCH RBC QN AUTO: 29.7 PG (ref 26.5–33)
MCHC RBC AUTO-ENTMCNC: 32.6 G/DL (ref 31.5–36.5)
MCV RBC AUTO: 91 FL (ref 78–100)
PLATELET # BLD AUTO: 190 10E3/UL (ref 150–450)
POTASSIUM BLD-SCNC: 4 MMOL/L (ref 3.4–5.3)
RBC # BLD AUTO: 5.02 10E6/UL (ref 3.8–5.2)
SODIUM SERPL-SCNC: 143 MMOL/L (ref 133–144)
WBC # BLD AUTO: 5.8 10E3/UL (ref 4–11)

## 2021-09-07 PROCEDURE — 74174 CTA ABD&PLVS W/CONTRAST: CPT | Mod: GC | Performed by: RADIOLOGY

## 2021-09-07 PROCEDURE — 99213 OFFICE O/P EST LOW 20 MIN: CPT | Performed by: PLASTIC SURGERY

## 2021-09-07 PROCEDURE — G0463 HOSPITAL OUTPT CLINIC VISIT: HCPCS

## 2021-09-07 PROCEDURE — 99204 OFFICE O/P NEW MOD 45 MIN: CPT | Performed by: PHYSICIAN ASSISTANT

## 2021-09-07 PROCEDURE — 85027 COMPLETE CBC AUTOMATED: CPT | Performed by: PATHOLOGY

## 2021-09-07 PROCEDURE — 80048 BASIC METABOLIC PNL TOTAL CA: CPT | Performed by: PATHOLOGY

## 2021-09-07 PROCEDURE — 36415 COLL VENOUS BLD VENIPUNCTURE: CPT | Performed by: PATHOLOGY

## 2021-09-07 PROCEDURE — 82306 VITAMIN D 25 HYDROXY: CPT | Mod: 90 | Performed by: PATHOLOGY

## 2021-09-07 RX ORDER — IOPAMIDOL 755 MG/ML
100 INJECTION, SOLUTION INTRAVASCULAR ONCE
Status: COMPLETED | OUTPATIENT
Start: 2021-09-07 | End: 2021-09-07

## 2021-09-07 RX ADMIN — IOPAMIDOL 100 ML: 755 INJECTION, SOLUTION INTRAVASCULAR at 09:36

## 2021-09-07 ASSESSMENT — MIFFLIN-ST. JEOR: SCORE: 1384.24

## 2021-09-07 ASSESSMENT — PAIN SCALES - GENERAL
PAINLEVEL: NO PAIN (0)
PAINLEVEL: NO PAIN (0)

## 2021-09-07 ASSESSMENT — COPD QUESTIONNAIRES: COPD: 0

## 2021-09-07 ASSESSMENT — LIFESTYLE VARIABLES: TOBACCO_USE: 1

## 2021-09-07 NOTE — PATIENT INSTRUCTIONS
Preparing for Your Surgery      Name:  Leigh Espinal   MRN:  [id number]   :  1965   Today's Date:  2021       Arriving for surgery:  Surgery date:  21  Arrival time:  5:45 am    Restrictions due to COVID 19:       One visitor is allowed in the Pre Op area. When you go into surgery, one visitor is allowed to wait in the Surgery Waiting Room       (provided there is enough space to social distance).   After surgery- Two visitors are allowed at a time if you have a private room and one visitor is allowed for those in a semi-private room.   Every 4 days the visitor(s) can rotate. During the 4 day period, the visitor(s) must be consistent. No visitors under the age of 18 years old.   Visiting Hours: 8 am - 8:30 pm   No ill visitors.   All visitors must wear face mask.    [employer] parking is available for anyone with mobility limitations or disabilities.  ([place]  24 hours/ 7 days a week; [facility]  7 am- 3:30 pm, Mon- Fri)    Please come to:     [facility] [address]  [address] [place] [address]    - ? parking is available in front of the hospital      -    Please proceed to [address] on the 3rd floor. [phone]?     - ?If you are in need of directions, wheelchair or escort please stop at the Information Desk in the lobby.  Inform the information person that you are here for surgery; a wheelchair and escort to [address] will be provided.?     What can I eat or drink?  -  You may eat and drink normally for up to 8 hours before your surgery. (Until 11:45 pm)  -  You may have clear liquids until 2 hours before surgery. (Until 5:45 am)    Examples of clear liquids:  Water  Clear broth  Juices (apple, white grape, white cranberry  and cider) without pulp  Noncarbonated, powder based beverages  (lemonade and Grabiel-Aid)  Sodas (Sprite, 7-Up, ginger ale and seltzer)  Coffee or tea (without milk or cream)  Gatorade    -  No Alcohol  for at least 24 hours before surgery     Which medicines can I take?    Hold Aspirin for 7 days before surgery.   Hold Multivitamins for 7 days before surgery.  Hold Supplements for 7 days before surgery.  Hold Ibuprofen (Advil, Motrin) for 1 day before surgery--unless otherwise directed by surgeon.  Hold Naproxen (Aleve) for 4 days before surgery.    -  PLEASE TAKE these medications the day of surgery:  Amlodipine (Norvasc)  Pantoprazole (Protonix)    How do I prepare myself?  - Please take 2 showers before surgery using Scrubcare or Hibiclens soap.    Use this soap only from the neck to your toes.     Leave the soap on your skin for one minute--then rinse thoroughly.      You may use your own shampoo and conditioner; no other hair products.   - Please remove all jewelry and body piercings.  - No lotions, deodorants or fragrance.  - No makeup or fingernail polish.   - Bring your ID and insurance card.    -If you have a Deep Brain Stimulator, Spinal Cord Stimulator or any neuro stimulator device---you must bring the remote control to the hospital     - All patients are required to have a Covid-19 test within 4 days of surgery/procedure.      -Patients will be contacted by the [facility] scheduling team within 1 week of surgery to make an appointment.      - Patients may call the Scheduling team at [phone] if they have not been scheduled within 4 days of  surgery.      Questions or Concerns:    - For any questions regarding the day of surgery or your hospital stay, please contact the Pre Admission Nursing Office at [phone].       - If you have health changes between today and your surgery please call your surgeon.       For questions after surgery please call your surgeons office.

## 2021-09-07 NOTE — H&P
Pre-Operative H & P     CC:  Preoperative exam to assess for increased cardiopulmonary risk while undergoing surgery and anesthesia.    Date of Encounter: 9/7/2021  Primary Care Physician:  Darleen Ventura     Reason for visit:   Encounter Diagnoses   Name Primary?     Preop examination      S/P breast reconstruction, bilateral Yes       HPI  Leigh Espinal is a 56 year old female who presents for pre-operative H & P in preparation for Bilateral breast reconstruction with free abdominal flaps and SPY with Dr. Chatterjee on 90/20/21 at [facility].     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  Patient underwent bilateral mastectomies for bilateral breast cancer.  She had breast reconstruction but required removal of expanders due to poor skin flaps and then required radiation treatment.  She has been ready to proceed with the above procedure.    History is obtained from the patient and chart review      Hx of abnormal bleeding or anti-platelet use: denies    Menstrual history: No LMP recorded. Patient is premenopausal.:     Prior to Admission Medications  Current Outpatient Medications   Medication Sig Dispense Refill     amLODIPine (NORVASC) 2.5 MG tablet Take 10 mg by mouth daily       atenolol (TENORMIN) 50 MG tablet Take 50 mg by mouth every evening        Goserelin Acetate (ZOLADEX SC) Inject 3.6 mg Subcutaneous every 30 days        lisinopril-hydrochlorothiazide (ZESTORETIC) 20-25 MG tablet Take 1 tablet by mouth every evening       pantoprazole (PROTONIX) 20 MG EC tablet Take 20 mg by mouth daily as needed   3     SERTRALINE HCL PO Take 50 mg by mouth every evening        traZODone (DESYREL) 50 MG tablet Take  mg by mouth At Bedtime         Family  History  Family History   Problem Relation Age of Onset     C.A.D. Maternal Grandfather      Hypertension Mother      Hypertension Father      Cancer No family hx of        The complete review of systems is negative other than noted in the HPI or here.       Anesthesia Evaluation   Pt has had prior anesthetic.     No history of anesthetic complications       ROS/MED HX  ENT/Pulmonary: Comment: H/o RML AVM s/p emboliztion    (+) NEPTALI risk factors, snores loudly, hypertension, tobacco use, Past use,  (-) asthma and COPD   Neurologic:  - neg neurologic ROS     Cardiovascular:     (+) hypertension-----Previous cardiac testing   Echo: Date: 2015 Results:  Interpretation Summary  1. Positive bubble study with agitated saline. Large amount of bubbles are   seen on the left side within few beats after opacification of the right   atrium. Traditionally, this will be considered to be due to large PFO but   fast transit from a pulmonary AV fistula is also possible. Absence of right   sided chamber enlargement and negative color doppler goes against a large ASD   with significant shunt. Consider GUICHO for closer evaluation of interatrial   septum. MRI can also be obtained if there is high clinical suspiscion for   large pulmonary AV fistula. 2. Normal biventricular systolic function. LVEF   estimate 60-65%. 3. No significant valvular abnormalities. 4. Normal IVC   with preserved respiratory variability.  PatientHeight: 65 in  PatientWeight: 154 lbs  SystolicPressure: 127 mmHg  DiastolicPressure: 78 mmHg  BSA 1.8 m^2  Stress Test: Date: Results:    ECG Reviewed: Date: Results:    Cath: Date: Results:      METS/Exercise Tolerance: >4 METS    Hematologic: Comments: Hereditary hemorrhagic telangiectasia  --hx of pulmonary and liver AVM  --s/p emboliztion of RML AVM   (-) history of blood clots and history of blood transfusion   Musculoskeletal:  - neg musculoskeletal ROS     GI/Hepatic:     (+) GERD (protonix prn),  (-) liver disease  "  Renal/Genitourinary:  - neg Renal ROS     Endo:  - neg endo ROS     Psychiatric/Substance Use:     (+) psychiatric history anxiety and depression     Infectious Disease:  - neg infectious disease ROS     Malignancy: Comment: CHEK2 mutation  (+) Malignancy, History of Breast.Breast CA status post Surgery and Radiation.        Other:  - neg other ROS            BP (!) 184/73 (BP Location: Right arm, Patient Position: Chair, Cuff Size: Adult Regular)   Pulse (!) 45   Temp 97.6  F (36.4  C) (Oral)   Resp 16   Ht 1.646 m (5' 4.8\")   Wt 78.7 kg (173 lb 6.4 oz)   SpO2 97%   Breastfeeding No   BMI 29.03 kg/m         Physical Exam  Constitutional: Awake, alert, cooperative, no apparent distress, and appears stated age.  Eyes: Pupils equal, round and reactive to light, extra ocular muscles intact, sclera clear, conjunctiva normal.  HENT: Normocephalic, oral pharynx with moist mucus membranes, good dentition. No goiter appreciated.   Respiratory: Clear to auscultation bilaterally, no crackles or wheezing.  Cardiovascular: Bradycardic rate and regular rhythm, normal S1 and S2, and no murmur noted.  Carotids +2, no bruits. No edema. Palpable pulses to radial  DP and PT arteries.   GI: Normal bowel sounds, soft  Lymph/Hematologic: No cervical lymphadenopathy and no supraclavicular lymphadenopathy.  Genitourinary:  deferred  Skin: Warm and dry.    Musculoskeletal: Full ROM of neck. There is no redness, warmth, or swelling of the joints. Gross motor strength is normal.    Neurologic: Awake, alert, oriented to name, place and time. Cranial nerves II-XII are grossly intact. Gait is normal.   Neuropsychiatric: Calm, cooperative. Normal affect.     PRIOR LABS/DIAGNOSTIC STUDIES:   All labs and imaging personally reviewed       PFT Latest Ref Rng & Units 1/26/2015   FVC L 2.45   FEV1 L 1.60   FVC% % 68   FEV1% % 55       Echo 2015:  Interpretation Summary  1. Positive bubble study with agitated saline. Large amount of bubbles " are   seen on the left side within few beats after opacification of the right   atrium. Traditionally, this will be considered to be due to large PFO but   fast transit from a pulmonary AV fistula is also possible. Absence of right   sided chamber enlargement and negative color doppler goes against a large ASD   with significant shunt. Consider GUICHO for closer evaluation of interatrial   septum. MRI can also be obtained if there is high clinical suspiscion for   large pulmonary AV fistula. 2. Normal biventricular systolic function. LVEF   estimate 60-65%.  3. No significant valvular abnormalities.  4. Normal IVC   with preserved respiratory variability.  PatientHeight: 65 in  PatientWeight: 154 lbs  SystolicPressure: 127 mmHg  DiastolicPressure: 78 mmHg  BSA 1.8 m^2      LAB/DIAGNOSTIC STUDIES TODAY:  9/7/21  Component      Latest Ref Rng & Units 9/7/2021   WBC      4.0 - 11.0 10e3/uL 5.8   RBC Count      3.80 - 5.20 10e6/uL 5.02   Hemoglobin      11.7 - 15.7 g/dL 14.9   Hematocrit      35.0 - 47.0 % 45.7   MCV      78 - 100 fL 91   MCH      26.5 - 33.0 pg 29.7   MCHC      31.5 - 36.5 g/dL 32.6   RDW      10.0 - 15.0 % 14.4   Platelet Count      150 - 450 10e3/uL 190     Component      Latest Ref Rng & Units 9/7/2021   Sodium      133 - 144 mmol/L 143   Potassium      3.4 - 5.3 mmol/L 4.0   Chloride      94 - 109 mmol/L 106   Carbon Dioxide      20 - 32 mmol/L 31   Anion Gap      3 - 14 mmol/L 6   Urea Nitrogen      7 - 30 mg/dL 8   Creatinine      0.52 - 1.04 mg/dL 0.77   Calcium      8.5 - 10.1 mg/dL 9.8   Glucose      70 - 99 mg/dL 102 (H)   GFR Estimate      >60 mL/min/1.73m2 88         The patient's records and results personally reviewed by this provider.     Outside records reviewed from: care everywhere        ASSESSMENT and PLAN  Leigh Espinal is a 56 year old female scheduled for Bilateral breast reconstruction with free abdominal flaps and SPY on 9/20/21 by Dr. Chatterjee in treatment of s/p bilateral  breast reconstruction.  PAC referral for risk assessment and optimization for anesthesia:    Pre-operative considerations:  1.  Cardiac:  Functional status- METS >4. Pt goes walking, does gardening, takes care of her home/laundry, can climb 14 stairs without exertional symptoms. Intermediate risk surgery with 0.4% (RCRI #) risk of major adverse cardiac event.  --denies chest pain, SOB, EATNO, palpitations, orthopnea, edema  --no cardiac history  --cards visit with Dr. Yen 9/10/2020 for bradycardia, asymptomatic.  Recommendation was to continue atenolol for PVCs and HTN.  --echo 2015 showed positive bubble study but thought more likely due to pulmonary AVM rather than ASD  --will take atenolol and amlodipine DOS.  Will hold lisinopril-hydrochlorothiazide DOS.    2.  Pulm:  Airway feasible.  NEPTALI risk: Intermediate, 3/8 risk factors  --non smoker  --s/p RML AVM embolization    3.  GI:  Risk of PONV score = 3.  If > 2, anti-emetic intervention recommended.  --continue Protonix prn for GERD symptoms  --hx of liver hemangiomas    4.  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 Real though not seen since 2015  --hgb 13.6, 9/9/2020.  Recheck needed.    5.  Psych:  --anxiety and depression treated with Zoloft, continue as directed  --trazodone at HS    6.  Other:  --pt requested Vit D screening.  I ordered it and patient understands she will need to f/u with her PCP if abnormal.    VTE risk: 0.26%    Patient is optimized and is acceptable candidate for the proposed procedure.  No further diagnostic evaluation is needed.       Theresa Lawrence PA-C  Preoperative Assessment Center  [facility]  [facility] and [facility]  Phone: [phone]  Fax: [phone]

## 2021-09-07 NOTE — H&P (VIEW-ONLY)
Pre-Operative H & P     CC:  Preoperative exam to assess for increased cardiopulmonary risk while undergoing surgery and anesthesia.    Date of Encounter: 9/7/2021  Primary Care Physician:  Darleen Ventura     Reason for visit:   Encounter Diagnoses   Name Primary?     Preop examination      S/P breast reconstruction, bilateral Yes       HPI  Leigh Espinal is a 56 year old female who presents for pre-operative H & P in preparation for Bilateral breast reconstruction with free abdominal flaps and SPY with Dr. Chatterjee on 90/20/21 at [facility].     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  Patient underwent bilateral mastectomies for bilateral breast cancer.  She had breast reconstruction but required removal of expanders due to poor skin flaps and then required radiation treatment.  She has been ready to proceed with the above procedure.    History is obtained from the patient and chart review      Hx of abnormal bleeding or anti-platelet use: denies    Menstrual history: No LMP recorded. Patient is premenopausal.:     Prior to Admission Medications  Current Outpatient Medications   Medication Sig Dispense Refill     amLODIPine (NORVASC) 2.5 MG tablet Take 10 mg by mouth daily       atenolol (TENORMIN) 50 MG tablet Take 50 mg by mouth every evening        Goserelin Acetate (ZOLADEX SC) Inject 3.6 mg Subcutaneous every 30 days        lisinopril-hydrochlorothiazide (ZESTORETIC) 20-25 MG tablet Take 1 tablet by mouth every evening       pantoprazole (PROTONIX) 20 MG EC tablet Take 20 mg by mouth daily as needed   3     SERTRALINE HCL PO Take 50 mg by mouth every evening        traZODone (DESYREL) 50 MG tablet Take  mg by mouth At Bedtime         Family  History  Family History   Problem Relation Age of Onset     C.A.D. Maternal Grandfather      Hypertension Mother      Hypertension Father      Cancer No family hx of        The complete review of systems is negative other than noted in the HPI or here.       Anesthesia Evaluation   Pt has had prior anesthetic.     No history of anesthetic complications       ROS/MED HX  ENT/Pulmonary: Comment: H/o RML AVM s/p emboliztion    (+) NEPTALI risk factors, snores loudly, hypertension, tobacco use, Past use,  (-) asthma and COPD   Neurologic:  - neg neurologic ROS     Cardiovascular:     (+) hypertension-----Previous cardiac testing   Echo: Date: 2015 Results:  Interpretation Summary  1. Positive bubble study with agitated saline. Large amount of bubbles are   seen on the left side within few beats after opacification of the right   atrium. Traditionally, this will be considered to be due to large PFO but   fast transit from a pulmonary AV fistula is also possible. Absence of right   sided chamber enlargement and negative color doppler goes against a large ASD   with significant shunt. Consider GUICHO for closer evaluation of interatrial   septum. MRI can also be obtained if there is high clinical suspiscion for   large pulmonary AV fistula. 2. Normal biventricular systolic function. LVEF   estimate 60-65%. 3. No significant valvular abnormalities. 4. Normal IVC   with preserved respiratory variability.  PatientHeight: 65 in  PatientWeight: 154 lbs  SystolicPressure: 127 mmHg  DiastolicPressure: 78 mmHg  BSA 1.8 m^2  Stress Test: Date: Results:    ECG Reviewed: Date: Results:    Cath: Date: Results:      METS/Exercise Tolerance: >4 METS    Hematologic: Comments: Hereditary hemorrhagic telangiectasia  --hx of pulmonary and liver AVM  --s/p emboliztion of RML AVM   (-) history of blood clots and history of blood transfusion   Musculoskeletal:  - neg musculoskeletal ROS     GI/Hepatic:     (+) GERD (protonix prn),  (-) liver disease  "  Renal/Genitourinary:  - neg Renal ROS     Endo:  - neg endo ROS     Psychiatric/Substance Use:     (+) psychiatric history anxiety and depression     Infectious Disease:  - neg infectious disease ROS     Malignancy: Comment: CHEK2 mutation  (+) Malignancy, History of Breast.Breast CA status post Surgery and Radiation.        Other:  - neg other ROS            BP (!) 184/73 (BP Location: Right arm, Patient Position: Chair, Cuff Size: Adult Regular)   Pulse (!) 45   Temp 97.6  F (36.4  C) (Oral)   Resp 16   Ht 1.646 m (5' 4.8\")   Wt 78.7 kg (173 lb 6.4 oz)   SpO2 97%   Breastfeeding No   BMI 29.03 kg/m         Physical Exam  Constitutional: Awake, alert, cooperative, no apparent distress, and appears stated age.  Eyes: Pupils equal, round and reactive to light, extra ocular muscles intact, sclera clear, conjunctiva normal.  HENT: Normocephalic, oral pharynx with moist mucus membranes, good dentition. No goiter appreciated.   Respiratory: Clear to auscultation bilaterally, no crackles or wheezing.  Cardiovascular: Bradycardic rate and regular rhythm, normal S1 and S2, and no murmur noted.  Carotids +2, no bruits. No edema. Palpable pulses to radial  DP and PT arteries.   GI: Normal bowel sounds, soft  Lymph/Hematologic: No cervical lymphadenopathy and no supraclavicular lymphadenopathy.  Genitourinary:  deferred  Skin: Warm and dry.    Musculoskeletal: Full ROM of neck. There is no redness, warmth, or swelling of the joints. Gross motor strength is normal.    Neurologic: Awake, alert, oriented to name, place and time. Cranial nerves II-XII are grossly intact. Gait is normal.   Neuropsychiatric: Calm, cooperative. Normal affect.     PRIOR LABS/DIAGNOSTIC STUDIES:   All labs and imaging personally reviewed       PFT Latest Ref Rng & Units 1/26/2015   FVC L 2.45   FEV1 L 1.60   FVC% % 68   FEV1% % 55       Echo 2015:  Interpretation Summary  1. Positive bubble study with agitated saline. Large amount of bubbles " are   seen on the left side within few beats after opacification of the right   atrium. Traditionally, this will be considered to be due to large PFO but   fast transit from a pulmonary AV fistula is also possible. Absence of right   sided chamber enlargement and negative color doppler goes against a large ASD   with significant shunt. Consider GUICHO for closer evaluation of interatrial   septum. MRI can also be obtained if there is high clinical suspiscion for   large pulmonary AV fistula. 2. Normal biventricular systolic function. LVEF   estimate 60-65%.  3. No significant valvular abnormalities.  4. Normal IVC   with preserved respiratory variability.  PatientHeight: 65 in  PatientWeight: 154 lbs  SystolicPressure: 127 mmHg  DiastolicPressure: 78 mmHg  BSA 1.8 m^2      LAB/DIAGNOSTIC STUDIES TODAY:  9/7/21  Component      Latest Ref Rng & Units 9/7/2021   WBC      4.0 - 11.0 10e3/uL 5.8   RBC Count      3.80 - 5.20 10e6/uL 5.02   Hemoglobin      11.7 - 15.7 g/dL 14.9   Hematocrit      35.0 - 47.0 % 45.7   MCV      78 - 100 fL 91   MCH      26.5 - 33.0 pg 29.7   MCHC      31.5 - 36.5 g/dL 32.6   RDW      10.0 - 15.0 % 14.4   Platelet Count      150 - 450 10e3/uL 190     Component      Latest Ref Rng & Units 9/7/2021   Sodium      133 - 144 mmol/L 143   Potassium      3.4 - 5.3 mmol/L 4.0   Chloride      94 - 109 mmol/L 106   Carbon Dioxide      20 - 32 mmol/L 31   Anion Gap      3 - 14 mmol/L 6   Urea Nitrogen      7 - 30 mg/dL 8   Creatinine      0.52 - 1.04 mg/dL 0.77   Calcium      8.5 - 10.1 mg/dL 9.8   Glucose      70 - 99 mg/dL 102 (H)   GFR Estimate      >60 mL/min/1.73m2 88         The patient's records and results personally reviewed by this provider.     Outside records reviewed from: care everywhere        ASSESSMENT and PLAN  Leigh Espinal is a 56 year old female scheduled for Bilateral breast reconstruction with free abdominal flaps and SPY on 9/20/21 by Dr. Chatterjee in treatment of s/p bilateral  breast reconstruction.  PAC referral for risk assessment and optimization for anesthesia:    Pre-operative considerations:  1.  Cardiac:  Functional status- METS >4. Pt goes walking, does gardening, takes care of her home/laundry, can climb 14 stairs without exertional symptoms. Intermediate risk surgery with 0.4% (RCRI #) risk of major adverse cardiac event.  --denies chest pain, SOB, EATON, palpitations, orthopnea, edema  --no cardiac history  --cards visit with Dr. Yen 9/10/2020 for bradycardia, asymptomatic.  Recommendation was to continue atenolol for PVCs and HTN.  --echo 2015 showed positive bubble study but thought more likely due to pulmonary AVM rather than ASD  --will take atenolol and amlodipine DOS.  Will hold lisinopril-hydrochlorothiazide DOS.    2.  Pulm:  Airway feasible.  NEPTALI risk: Intermediate, 3/8 risk factors  --non smoker  --s/p RML AVM embolization    3.  GI:  Risk of PONV score = 3.  If > 2, anti-emetic intervention recommended.  --continue Protonix prn for GERD symptoms  --hx of liver hemangiomas    4.  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 Real though not seen since 2015  --hgb 13.6, 9/9/2020.  Recheck needed.    5.  Psych:  --anxiety and depression treated with Zoloft, continue as directed  --trazodone at HS    6.  Other:  --pt requested Vit D screening.  I ordered it and patient understands she will need to f/u with her PCP if abnormal.    VTE risk: 0.26%    Patient is optimized and is acceptable candidate for the proposed procedure.  No further diagnostic evaluation is needed.       Theresa Lawrence PA-C  Preoperative Assessment Center  [facility]  Clinic and Surgery Center  Phone: [phone]  Fax: [phone]

## 2021-09-07 NOTE — LETTER
9/7/2021         RE: Leigh Espinal  [address] [place] [address]        Dear Colleague,    Thank you for referring your patient, Leigh Espinal, to the [facility]. Please see a copy of my visit note below.    PRESENTING COMPLAINT:  Preoperative visit for upcoming bilateral breast reconstruction with free MIKAL flaps scheduled for 09/20/2021.    HISTORY OF PRESENTING COMPLAINT:  Ms. Espinal is 56 years old.  She underwent bilateral mastectomies for bilateral breast cancer and required removal of the expanders due to poor skin flaps and ultimately required radiation therapy as well.  She completed that last year.  She is now on for her delayed bilateral autologous reconstruction with MIKAL flaps.  She would like to be around a C to D cup.  No change otherwise in her history and physical exam.  She is here for preoperative visit.  She saw PAC Clinic today and got her CTA today.    PHYSICAL EXAMINATION:  Vital signs stable.  She is afebrile, in no obvious distress.  She is 5 feet 5 inches, 170 pounds, BMI 29 kg/m2.  Chest wall is healed with soft skin.  Left side has more radiation damage than the right.    ASSESSMENT AND PLAN:  Based on the above findings, a diagnosis of bilateral breast cancer, underwent bilateral mastectomy, now healed from radiation therapy, now here for delayed free MIKAL flap reconstructions preoperative visit.  I went over the planned procedure with the patient in detail.  All risks, benefits, and alternatives of the procedure including pain, infection, bleeding, scarring, asymmetry, seromas, hematomas, wound dehiscence, skin necrosis, fat necrosis, wound healing complications in the abdomen, loss of the flaps, requirement of further surgeries, delayed stage reconstruction, DVT, PE, MI, CVA, pneumonia, renal failure and death were explained.  She understands that she will not have an umbilicus after the first stage.  All questions  were answered.  All discussion and exam done in presence of a female chaperone.  I look forward to helping her out in the near future.    Total time spent with chart review, visit itself and post-visit paperwork was 20 minutes.          Again, thank you for allowing me to participate in the care of your patient.        Sincerely,        CEE Chatterjee MD

## 2021-09-07 NOTE — ANESTHESIA PREPROCEDURE EVALUATION
Anesthesia Pre-Procedure Evaluation    Patient: Leigh Espinal   MRN: [id number] : 1965        Preoperative Diagnosis: * No surgery found *   Procedure :      Past Medical History:   Diagnosis Date     Anxiety and depression      AVM (arteriovenous malformation)     Right Pulmonary     Breast cancer (H) 2019     GERD (gastroesophageal reflux disease)      HHT (hereditary hemorrhagic telangiectasia) (H) 2015    Possible- no ACVRL1, ENG or SMAD4 mutations found on sequencing 10/6/15      Hiatal hernia     nissen done in      HTN (hypertension)      Iron deficiency anemia 2012     Problem list name updated by automated process. Provider to review     Menorrhagia      Monoallelic mutation of CHEK2 gene in female patient 2020    CHEK2 [id number] [facility] 2019      Past Surgical History:   Procedure Laterality Date     BIOPSY NODE SENTINEL Bilateral 2020    Procedure: BILATERAL Axillary [place] Lymph Node Biopsy;  Surgeon: Paz Smith MD;  Location: [facility] OR     COLONOSCOPY  2012    Procedure: COLONOSCOPY;;  Surgeon: Radha Hector MD;  Location: [facility] GI     COLONOSCOPY N/A 2021    Procedure: COLONOSCOPY;  Surgeon: Wally Padilla DO;  [place] [facility] [place]     INSERT TISSUE EXPANDER BREAST BILATERAL Bilateral 2020    Procedure: bilateral breast expander removal and washout;  Surgeon: CEE Chatterjee MD;  Location: [facility] OR     LAPAROSCOPIC NISSEN FUNDOPLICATION       MASTECTOMY SIMPLE Bilateral 2020    Procedure: BILATERAL Skin-Sparing Mastectomy;  Surgeon: Paz Smith MD;  Location: [facility] OR     RECONSTRUCT BREAST Bilateral 2020    Procedure: Bilateral breast reconstruction with expanders and SPY;  Surgeon: CEE Chatterjee MD;  Location: [facility] OR     TUBAL LIGATION        Allergies   Allergen Reactions     Sulfa Drugs      Got really sick, headache, facial swelling, felt like she was dying.    Delayed reaction happened ~3-4 days after she started taking it.       Social History     Tobacco Use     Smoking status: Former Smoker     Packs/day: 1.00     Years: 20.00     Pack years: 20.00     Types: Cigarettes     Start date: 1980     Quit date: 2011     Years since quittin.9     Smokeless tobacco: Never Used   Substance Use Topics     Alcohol use: Yes     Alcohol/week: 3.3 standard drinks     Types: 4 Standard drinks or equivalent per week     Comment: occ      Wt Readings from Last 1 Encounters:   21 79.4 kg (175 lb)        Anesthesia Evaluation   Pt has had prior anesthetic.     No history of anesthetic complications       ROS/MED HX  ENT/Pulmonary: Comment: H/o RML AVM s/p emboliztion    (+) NEPTALI risk factors, snores loudly, hypertension, tobacco use, Past use,  (-) asthma and COPD   Neurologic:  - neg neurologic ROS     Cardiovascular:     (+) hypertension-----Previous cardiac testing   Echo: Date:  Results:  Interpretation Summary  1. Positive bubble study with agitated saline. Large amount of bubbles are   seen on the left side within few beats after opacification of the right   atrium. Traditionally, this will be considered to be due to large PFO but   fast transit from a pulmonary AV fistula is also possible. Absence of right   sided chamber enlargement and negative color doppler goes against a large ASD   with significant shunt. Consider GUICHO for closer evaluation of interatrial   septum. MRI can also be obtained if there is high clinical suspiscion for   large pulmonary AV fistula. 2. Normal biventricular systolic function. LVEF   estimate 60-65%. 3. No significant valvular abnormalities. 4. Normal IVC   with preserved respiratory variability.  PatientHeight: 65 in  PatientWeight: 154 lbs  SystolicPressure: 127 mmHg  DiastolicPressure: 78 mmHg  BSA 1.8 m^2  Stress Test: Date: Results:    ECG Reviewed: Date: Results:    Cath: Date: Results:      METS/Exercise Tolerance: >4  METS    Hematologic: Comments: Hereditary hemorrhagic telangiectasia  --hx of pulmonary and liver AVM  --s/p emboliztion of RML AVM   (-) history of blood clots and history of blood transfusion   Musculoskeletal:  - neg musculoskeletal ROS     GI/Hepatic:     (+) GERD (protonix prn),  (-) liver disease   Renal/Genitourinary:  - neg Renal ROS     Endo:  - neg endo ROS     Psychiatric/Substance Use:     (+) psychiatric history anxiety and depression     Infectious Disease:  - neg infectious disease ROS     Malignancy: Comment: CHEK2 mutation  (+) Malignancy, History of Breast.Breast CA status post Surgery and Radiation.        Other:  - neg other ROS          Physical Exam    Airway        Mallampati: II       Respiratory Devices and Support         Dental  no notable dental history         Cardiovascular          Rhythm and rate: regular and normal     Pulmonary           breath sounds clear to auscultation           OUTSIDE LABS:  CBC:   Lab Results   Component Value Date    WBC 6.2 09/09/2020    WBC 5.7 07/01/2020    HGB 13.6 09/09/2020    HGB 13.9 07/01/2020    HCT 42.1 09/09/2020    HCT 43.3 07/01/2020     09/09/2020     07/01/2020     BMP:   Lab Results   Component Value Date     09/09/2020     07/01/2020    POTASSIUM 3.6 09/09/2020    POTASSIUM 3.8 07/01/2020    CHLORIDE 109 09/09/2020    CHLORIDE 107 07/01/2020    CO2 22 09/09/2020    CO2 26 07/01/2020    BUN 15 09/09/2020    BUN 10 07/01/2020    CR 0.89 09/09/2020    CR 0.81 07/01/2020     (H) 09/09/2020    GLC 89 07/01/2020     COAGS:   Lab Results   Component Value Date    INR 1.11 03/04/2015     POC:   Lab Results   Component Value Date     (H) 01/29/2020    HCG Negative 02/02/2021    HCGS Negative 03/04/2015     HEPATIC:   Lab Results   Component Value Date    ALBUMIN 3.3 (L) 09/09/2020    PROTTOTAL 6.8 09/09/2020    ALT 30 09/09/2020    AST 22 09/09/2020    ALKPHOS 116 09/09/2020    BILITOTAL 0.3 09/09/2020      OTHER:   Lab Results   Component Value Date    ILIANA 9.3 09/09/2020    MAG 2.2 09/09/2020    TSH 2.54 09/09/2020    CRP <5.0 04/18/2012             PAC Discussion and Assessment    ASA Classification: 3  Case is suitable for: [place]  Anesthetic techniques and relevant risks discussed: GA  Invasive monitoring and risk discussed: No    Possibility and Risk of blood transfusion discussed: Yes            PAC Resident/NP Anesthesia Assessment: Liegh Espinal is a 56 year old female scheduled for Bilateral breast reconstruction with free abdominal flaps and SPY on 9/20/21 by Dr. Chatterjee in treatment of s/p bilateral breast reconstruction.  PAC referral for risk assessment and optimization for anesthesia:    Pre-operative considerations:  1.  Cardiac:  Functional status- METS >4. Pt goes walking, does gardening, takes care of her home/laundry, can climb 14 stairs without exertional symptoms. Intermediate risk surgery with 0.4% (RCRI #) risk of major adverse cardiac event.  --denies chest pain, SOB, EATON, palpitations, orthopnea, edema  --no cardiac history  --cards visit with Dr. Yen 9/10/2020 for bradycardia, asymptomatic.  Recommendation was to continue atenolol for PVCs and HTN.  --echo 2015 showed positive bubble study but thought more likely due to pulmonary AVM rather than ASD  --will take atenolol and amlodipine DOS.  Will hold lisinopril-hydrochlorothiazide DOS.    2.  Pulm:  Airway feasible.  NEPTALI risk: Intermediate, 3/8 risk factors  --non smoker  --s/p RML AVM embolization    3.  GI:  Risk of PONV score = 3.  If > 2, anti-emetic intervention recommended.  --continue Protonix prn for GERD symptoms  --hx of liver hemangiomas    4.  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 Real though not seen since 2015  --hgb 13.6, 9/9/2020.  Recheck needed.    5.  Psych:  --anxiety and  depression treated with Zoloft, continue as directed  --trazodone at HS    VTE risk: 0.26%    Patient is optimized and is acceptable candidate for the proposed procedure.  No further diagnostic evaluation is needed.         **For further details of assessment, testing, and physical exam please see H and P completed on same date.          Theresa Lawrence PA-C, [facility]    Reviewed and Signed by PAC Mid-Level Provider/Resident  Mid-Level Provider/Resident: Theresa Lawrence  Date: 9/7/21                                 Theresa Lawrence PA-C

## 2021-09-07 NOTE — NURSING NOTE
"Oncology Rooming Note    September 7, 2021 9:50 AM   Leigh Espinal is a 56 year old female who presents for:    Chief Complaint   Patient presents with     Oncology Clinic Visit     Bilateral breast reconstruction with free abdominal flaps and SPY     Initial Vitals: BP (!) 162/84   Pulse 52   Temp 97.8  F (36.6  C)   Wt 78.7 kg (173 lb 6.4 oz)   SpO2 99%   BMI 29.03 kg/m   Estimated body mass index is 29.03 kg/m  as calculated from the following:    Height as of 2/2/21: 1.646 m (5' 4.8\").    Weight as of this encounter: 78.7 kg (173 lb 6.4 oz). Body surface area is 1.9 meters squared.  No Pain (0) Comment: Data Unavailable   No LMP recorded.  Allergies reviewed: Yes  Medications reviewed: Yes    Medications: Medication refills not needed today.  Pharmacy name entered into EPIC:    [facility] - [place], [place] - [address] [facility] - [place], [place] - [address] [facility] - [place], [place] - [address] [facility] [phone]    Clinical concerns: none       Rachelle Chopra CMA            "

## 2021-09-07 NOTE — PROGRESS NOTES
PRESENTING COMPLAINT:  Preoperative visit for upcoming bilateral breast reconstruction with free MIKAL flaps scheduled for 09/20/2021.    HISTORY OF PRESENTING COMPLAINT:  Ms. Espinal is 56 years old.  She underwent bilateral mastectomies for bilateral breast cancer and required removal of the expanders due to poor skin flaps and ultimately required radiation therapy as well.  She completed that last year.  She is now on for her delayed bilateral autologous reconstruction with MIKAL flaps.  She would like to be around a C to D cup.  No change otherwise in her history and physical exam.  She is here for preoperative visit.  She saw PAC Clinic today and got her CTA today.    PHYSICAL EXAMINATION:  Vital signs stable.  She is afebrile, in no obvious distress.  She is 5 feet 5 inches, 170 pounds, BMI 29 kg/m2.  Chest wall is healed with soft skin.  Left side has more radiation damage than the right.    ASSESSMENT AND PLAN:  Based on the above findings, a diagnosis of bilateral breast cancer, underwent bilateral mastectomy, now healed from radiation therapy, now here for delayed free MIKAL flap reconstructions preoperative visit.  I went over the planned procedure with the patient in detail.  All risks, benefits, and alternatives of the procedure including pain, infection, bleeding, scarring, asymmetry, seromas, hematomas, wound dehiscence, skin necrosis, fat necrosis, wound healing complications in the abdomen, loss of the flaps, requirement of further surgeries, delayed stage reconstruction, DVT, PE, MI, CVA, pneumonia, renal failure and death were explained.  She understands that she will not have an umbilicus after the first stage.  All questions were answered.  All discussion and exam done in presence of a female chaperone.  I look forward to helping her out in the near future.    Total time spent with chart review, visit itself and post-visit paperwork was 20 minutes.

## 2021-09-09 ENCOUNTER — INFUSION THERAPY VISIT (OUTPATIENT)
Dept: ONCOLOGY | Facility: CLINIC | Age: 56
End: 2021-09-09
Attending: INTERNAL MEDICINE
Payer: COMMERCIAL

## 2021-09-09 VITALS
RESPIRATION RATE: 12 BRPM | TEMPERATURE: 98.4 F | DIASTOLIC BLOOD PRESSURE: 68 MMHG | OXYGEN SATURATION: 98 % | HEART RATE: 42 BPM | SYSTOLIC BLOOD PRESSURE: 156 MMHG

## 2021-09-09 DIAGNOSIS — C50.412 MALIGNANT NEOPLASM OF UPPER-OUTER QUADRANT OF LEFT BREAST IN FEMALE, ESTROGEN RECEPTOR POSITIVE (H): Primary | ICD-10-CM

## 2021-09-09 DIAGNOSIS — Z17.0 MALIGNANT NEOPLASM OF UPPER-OUTER QUADRANT OF LEFT BREAST IN FEMALE, ESTROGEN RECEPTOR POSITIVE (H): Primary | ICD-10-CM

## 2021-09-09 PROCEDURE — 250N000011 HC RX IP 250 OP 636: Performed by: INTERNAL MEDICINE

## 2021-09-09 PROCEDURE — 96402 CHEMO HORMON ANTINEOPL SQ/IM: CPT

## 2021-09-09 RX ADMIN — GOSERELIN ACETATE 3.6 MG: 3.6 IMPLANT SUBCUTANEOUS at 15:10

## 2021-09-09 ASSESSMENT — PAIN SCALES - GENERAL: PAINLEVEL: NO PAIN (0)

## 2021-09-09 NOTE — PROGRESS NOTES
Infusion Nursing Note:  Leigh ROBYN Espinal presents today for Zoladex.    Patient seen by provider today: No   present during visit today: Not Applicable.    Note: Patient denies the following: fevers, body aches, chills, headaches, vision changes, dizziness, chest pain, shortness of breath, nausea, vomiting, constipation, abdominal pain, rashes, bruising/bleeding, mouth sores, swelling or pain in the legs. HTN and bradycardic, asymptomatic.  PCP is adjusting BP medications, pt monitors at home and will contact PCP with any new changes.  Ok for treatment.     Post Infusion Assessment:  Patient tolerated injection into LUQ of abdomen without incident.   Ice applied to site prior to injection.      Discharge Plan:   Patient declined prescription refills.  Discharge instructions reviewed with: Patient.  Patient and/or family verbalized understanding of discharge instructions and all questions answered.  AVS to patient via [employer].  Patient will return 10/7 for next appointment.   Patient discharged in stable condition accompanied by: self.  Departure Mode: Ambulatory.    Bebe Stokes RN

## 2021-09-15 ENCOUNTER — DOCUMENTATION ONLY (OUTPATIENT)
Dept: PLASTIC SURGERY | Facility: CLINIC | Age: 56
End: 2021-09-15

## 2021-09-15 RX ORDER — CEFAZOLIN SODIUM 2 G/50ML
2 SOLUTION INTRAVENOUS
Status: CANCELLED | OUTPATIENT
Start: 2021-09-15

## 2021-09-15 RX ORDER — CEFAZOLIN SODIUM 2 G/50ML
2 SOLUTION INTRAVENOUS SEE ADMIN INSTRUCTIONS
Status: CANCELLED | OUTPATIENT
Start: 2021-09-15

## 2021-09-15 NOTE — PROGRESS NOTES
FMLA & STD Paper Work    - FMLA paperwork received 9/7/21      - Surgery Date: 9/20/21    - First Post-Op Date: 10/6/21     - Filled Out: 9/14/21    - Signed by Provider: 9/15/21     - Faxed to: (Fax Number [employer] Company)   FMLA emailed to patient -- 9/14/21   STD paperwork faxed to unim: 9/15/21    - original in file folder in Clinic 9/15/21

## 2021-09-17 ENCOUNTER — ANESTHESIA EVENT (OUTPATIENT)
Dept: SURGERY | Facility: CLINIC | Age: 56
DRG: 584 | End: 2021-09-17
Payer: COMMERCIAL

## 2021-09-18 ENCOUNTER — LAB (OUTPATIENT)
Dept: FAMILY MEDICINE | Facility: CLINIC | Age: 56
End: 2021-09-18
Attending: PLASTIC SURGERY
Payer: COMMERCIAL

## 2021-09-18 DIAGNOSIS — Z11.59 ENCOUNTER FOR SCREENING FOR OTHER VIRAL DISEASES: ICD-10-CM

## 2021-09-18 PROCEDURE — U0005 INFEC AGEN DETEC AMPLI PROBE: HCPCS

## 2021-09-18 PROCEDURE — U0003 INFECTIOUS AGENT DETECTION BY NUCLEIC ACID (DNA OR RNA); SEVERE ACUTE RESPIRATORY SYNDROME CORONAVIRUS 2 (SARS-COV-2) (CORONAVIRUS DISEASE [COVID-19]), AMPLIFIED PROBE TECHNIQUE, MAKING USE OF HIGH THROUGHPUT TECHNOLOGIES AS DESCRIBED BY CMS-2020-01-R: HCPCS

## 2021-09-19 LAB — SARS-COV-2 RNA RESP QL NAA+PROBE: NEGATIVE

## 2021-09-19 NOTE — H&P
"Plastic Surgery    HPI: 56F s/p BL mastectomies s/p TE explant d/t skin flap issues, XRT p/f BL delayed breast reconstruction c abdominally-based free tissue. She completed XRT last year. She would like to be a C or D cup. CTA done.     ROS: Negative, see HPI  PMH: Anxiety/depression, GERD, HHT, hiatal hernia, HTN, iron deficiency anemia  PSH: BL simple mastectomies (1/29/2020), BL TE removal (2/14/2020), lap Nissen fundoplication (2007)  Medications: Amlodipine 2.5 mg, Atenolol 50 mg, Lisinopril-hydrochlorothiazide 20-25 mg, Protonix 20 mg, Setraline, Trazodone 50 mg  Allergies: Sulfa  SH: Former smoker, quit 2011  FH: No bleeding or clotting issues, or problems with anesthesia    Examination:  BP (!) 174/74 (BP Location: Left arm)   Pulse (!) 47   Temp 97.9  F (36.6  C) (Oral)   Resp 16   Ht 1.626 m (5' 4\")   Wt 76.9 kg (169 lb 8.5 oz)   SpO2 97%   BMI 29.10 kg/m    Bilateral mastectomy scars, minimal radiation skin changes  Abdominal lipodystrophy    CTA 9/7/2021:   Mid-umbilicus image 465  Umbilical stalk height 3.2 cm    Right shorty-abdominal flap:  *Image 500: Moderate-sized medial perf, 1.2 cm lateral, 3.5 cm caudal to mid-umbilicus, courses intramuscular for ~5 cm  Image 470-480: Small-sized lateral perf(s), ~6.5 cm lateral, just caudal or at lower edge of the umbilicus     Left shorty-abdominal flap:  Image 430: Moderate-sized intermediate perf, ~5 cm lateral, upper edge of the umbilicus  *Image 495: Large-sized lateral perf, ~5 cm lateral, 3.5 cm caudal to mid-umbilicus, courses intramuscular for ~2 cm    A/P: 56F s/p BL mastectomies, XRT, p/f delayed BL abdominally-based free flap reconstruction    - Discussed risks, benefits and alternatives for delayed bilateral breast reconstruction including: no reconstruction, latissimus with implant, and total autologous reconstruction with use of  flaps taken from the abdominal donor site. She elected to undergo bilateral free abdominal  " flap breast reconstruction with free tissue from the abdomen after discussing all of these options. We discussed at-length risks of this surgery, which includes (but is not limited to): infection, bleeding/hematoma, scarring, pain, seroma, flap compromise necessitating re-exploration, total or partial flap loss, fat necrosis, wound healing problems, prolonged or additional drain placement, blood clots in the legs or lungs, or even death. Of note, we discussed removing the umbilicus after flap harvest to mitigate the risk of wound healing complications with the plan to reconstruct it at a future revision surgery - to which she is agreeable. This rationale is based in part on the study Huy MJ, Dorys SS, Gideon NT, Plast Reconstr Surg. 140(1): 11-18 (2017), which revealed increased risk of wound complications at the umbilicus following abdominally-based flap breast reconstruction and whose risk increased in proportion to the umbilical stalk height. Patients with umbilical wound complications following abdominally-based flap breast reconstruction had statistically higher umbilical stalk height (29.3 mm) compared with those patients without umbilical wound complications (18.7 mm). Additional analysis showed a likelihood ratio of 6.4 for development of umbilical wound complications at an umbilical stalk height of 30 mm.     - Calculated Caprini score is 4. Given the Caprini score of 4, she may be at increased risk of VTE (~0.5%). She understands this risk.     - We discussed the need to do everything possible to minimize her risk of deep venous thrombosis, including use of sequential compression devices and chemoprophylaxis 8-12 hours post-operatively. Prolonged DVT chemoprophylaxis is based in part on a large recent meta-analysis involving 13 studies, >14,000 patients and which indicated that patients with higher Caprini scores had a significant VTE risk reduction after surgery with matilda-operative chemoprophylaxis  [Krystal SERRA, et al. Fatimah Surg. 265(6): 8206-1629 (2017)]. Also, it was reiterated to the patient that we will plan to help her get out of bed for ambulation as soon as able. She understands the risk of VTE, is agreeable to comply with our plan to mitigate this risk, and would like to continue to proceed with the reconstruction.    - OR today for BILATERAL breast reconstruction with abdominally-based free flaps    Mihir Funes MD, PhD

## 2021-09-20 ENCOUNTER — ANESTHESIA (OUTPATIENT)
Dept: SURGERY | Facility: CLINIC | Age: 56
DRG: 584 | End: 2021-09-20
Payer: COMMERCIAL

## 2021-09-20 ENCOUNTER — HOSPITAL ENCOUNTER (INPATIENT)
Facility: CLINIC | Age: 56
LOS: 4 days | Discharge: HOME | DRG: 584 | End: 2021-09-24
Attending: PLASTIC SURGERY | Admitting: RADIOLOGY
Payer: COMMERCIAL

## 2021-09-20 ENCOUNTER — ANCILLARY PROCEDURE (OUTPATIENT)
Dept: ULTRASOUND IMAGING | Facility: CLINIC | Age: 56
End: 2021-09-20
Payer: COMMERCIAL

## 2021-09-20 DIAGNOSIS — Z98.890 S/P BREAST RECONSTRUCTION, BILATERAL: ICD-10-CM

## 2021-09-20 LAB
ABO/RH(D): NORMAL
ANTIBODY SCREEN: NEGATIVE
ERYTHROCYTE [DISTWIDTH] IN BLOOD BY AUTOMATED COUNT: 14.6 % (ref 10–15)
GLUCOSE BLDC GLUCOMTR-MCNC: 100 MG/DL (ref 70–99)
HCT VFR BLD AUTO: 38 % (ref 35–47)
HGB BLD-MCNC: 12.3 G/DL (ref 11.7–15.7)
MCH RBC QN AUTO: 29.7 PG (ref 26.5–33)
MCHC RBC AUTO-ENTMCNC: 32.4 G/DL (ref 31.5–36.5)
MCV RBC AUTO: 92 FL (ref 78–100)
PLATELET # BLD AUTO: 199 10E3/UL (ref 150–450)
RBC # BLD AUTO: 4.14 10E6/UL (ref 3.8–5.2)
SPECIMEN EXPIRATION DATE: NORMAL
WBC # BLD AUTO: 13.6 10E3/UL (ref 4–11)

## 2021-09-20 PROCEDURE — 35256 REPAIR BLVSL VN GRF LXTR: CPT | Mod: LT | Performed by: STUDENT IN AN ORGANIZED HEALTH CARE EDUCATION/TRAINING PROGRAM

## 2021-09-20 PROCEDURE — 370N000017 HC ANESTHESIA TECHNICAL FEE, PER MIN: Performed by: PLASTIC SURGERY

## 2021-09-20 PROCEDURE — 01U807Z SUPPLEMENT THORACIC NERVE WITH AUTOLOGOUS TISSUE SUBSTITUTE, OPEN APPROACH: ICD-10-PCS | Performed by: PLASTIC SURGERY

## 2021-09-20 PROCEDURE — 258N000003 HC RX IP 258 OP 636: Performed by: STUDENT IN AN ORGANIZED HEALTH CARE EDUCATION/TRAINING PROGRAM

## 2021-09-20 PROCEDURE — 0HRV076 REPLACEMENT OF BILATERAL BREAST USING TRANSVERSE RECTUS ABDOMINIS MYOCUTANEOUS FLAP, OPEN APPROACH: ICD-10-PCS | Performed by: PLASTIC SURGERY

## 2021-09-20 PROCEDURE — 4A1GXSH MONITORING OF SKIN AND BREAST VASCULAR PERFUSION USING INDOCYANINE GREEN DYE, EXTERNAL APPROACH: ICD-10-PCS | Performed by: PLASTIC SURGERY

## 2021-09-20 PROCEDURE — 250N000009 HC RX 250: Performed by: NURSE ANESTHETIST, CERTIFIED REGISTERED

## 2021-09-20 PROCEDURE — C9290 INJ, BUPIVACAINE LIPOSOME: HCPCS | Performed by: ANESTHESIOLOGY

## 2021-09-20 PROCEDURE — 36415 COLL VENOUS BLD VENIPUNCTURE: CPT | Performed by: ANESTHESIOLOGY

## 2021-09-20 PROCEDURE — 250N000011 HC RX IP 250 OP 636: Performed by: PLASTIC SURGERY

## 2021-09-20 PROCEDURE — 250N000025 HC SEVOFLURANE, PER MIN: Performed by: PLASTIC SURGERY

## 2021-09-20 PROCEDURE — 250N000009 HC RX 250: Performed by: PLASTIC SURGERY

## 2021-09-20 PROCEDURE — 250N000011 HC RX IP 250 OP 636: Performed by: ANESTHESIOLOGY

## 2021-09-20 PROCEDURE — 250N000011 HC RX IP 250 OP 636: Performed by: NURSE ANESTHETIST, CERTIFIED REGISTERED

## 2021-09-20 PROCEDURE — 19364 BRST RCNSTJ FREE FLAP: CPT | Mod: 22 | Performed by: STUDENT IN AN ORGANIZED HEALTH CARE EDUCATION/TRAINING PROGRAM

## 2021-09-20 PROCEDURE — 15777 ACELLULAR DERM MATRIX IMPLT: CPT | Mod: RT | Performed by: PLASTIC SURGERY

## 2021-09-20 PROCEDURE — 88305 TISSUE EXAM BY PATHOLOGIST: CPT | Mod: 26 | Performed by: PATHOLOGY

## 2021-09-20 PROCEDURE — 85014 HEMATOCRIT: CPT | Performed by: PLASTIC SURGERY

## 2021-09-20 PROCEDURE — 258N000003 HC RX IP 258 OP 636: Performed by: PLASTIC SURGERY

## 2021-09-20 PROCEDURE — 0WUF0KZ SUPPLEMENT ABDOMINAL WALL WITH NONAUTOLOGOUS TISSUE SUBSTITUTE, OPEN APPROACH: ICD-10-PCS | Performed by: PLASTIC SURGERY

## 2021-09-20 PROCEDURE — 278N000051 HC OR IMPLANT GENERAL: Performed by: PLASTIC SURGERY

## 2021-09-20 PROCEDURE — 88305 TISSUE EXAM BY PATHOLOGIST: CPT | Mod: TC | Performed by: PLASTIC SURGERY

## 2021-09-20 PROCEDURE — 250N000011 HC RX IP 250 OP 636: Performed by: STUDENT IN AN ORGANIZED HEALTH CARE EDUCATION/TRAINING PROGRAM

## 2021-09-20 PROCEDURE — 999N000141 HC STATISTIC PRE-PROCEDURE NURSING ASSESSMENT: Performed by: PLASTIC SURGERY

## 2021-09-20 PROCEDURE — 36415 COLL VENOUS BLD VENIPUNCTURE: CPT | Performed by: PLASTIC SURGERY

## 2021-09-20 PROCEDURE — 710N000011 HC RECOVERY PHASE 1, LEVEL 3, PER MIN: Performed by: PLASTIC SURGERY

## 2021-09-20 PROCEDURE — 272N000001 HC OR GENERAL SUPPLY STERILE: Performed by: PLASTIC SURGERY

## 2021-09-20 PROCEDURE — 360N000078 HC SURGERY LEVEL 5, PER MIN: Performed by: PLASTIC SURGERY

## 2021-09-20 PROCEDURE — 258N000003 HC RX IP 258 OP 636: Performed by: NURSE ANESTHETIST, CERTIFIED REGISTERED

## 2021-09-20 PROCEDURE — 06BQ3ZZ EXCISION OF LEFT SAPHENOUS VEIN, PERCUTANEOUS APPROACH: ICD-10-PCS | Performed by: PLASTIC SURGERY

## 2021-09-20 PROCEDURE — 250N000012 HC RX MED GY IP 250 OP 636 PS 637: Performed by: PLASTIC SURGERY

## 2021-09-20 PROCEDURE — 86901 BLOOD TYPING SEROLOGIC RH(D): CPT | Performed by: ANESTHESIOLOGY

## 2021-09-20 PROCEDURE — 19364 BRST RCNSTJ FREE FLAP: CPT | Mod: 22 | Performed by: PLASTIC SURGERY

## 2021-09-20 PROCEDURE — 120N000002 HC R&B MED SURG/OB UMMC

## 2021-09-20 DEVICE — IMP DEVICE ANASTOMOTIC 3.0MM COUPLER GOLD GEM2754: Type: IMPLANTABLE DEVICE | Site: CHEST | Status: FUNCTIONAL

## 2021-09-20 DEVICE — IMP DEVICE ANASTOMOTIC 2.5MM COUPLER RED GEM2753: Type: IMPLANTABLE DEVICE | Site: CHEST | Status: FUNCTIONAL

## 2021-09-20 DEVICE — GRAFT STRATTICE 10X16CM FIRM 1016002 CHG PER SQ CM=160 UNITS: Type: IMPLANTABLE DEVICE | Site: ABDOMEN | Status: FUNCTIONAL

## 2021-09-20 RX ORDER — BUPIVACAINE HYDROCHLORIDE 2.5 MG/ML
INJECTION, SOLUTION EPIDURAL; INFILTRATION; INTRACAUDAL
Status: COMPLETED | OUTPATIENT
Start: 2021-09-20 | End: 2021-09-20

## 2021-09-20 RX ORDER — SODIUM CHLORIDE, SODIUM LACTATE, POTASSIUM CHLORIDE, CALCIUM CHLORIDE 600; 310; 30; 20 MG/100ML; MG/100ML; MG/100ML; MG/100ML
INJECTION, SOLUTION INTRAVENOUS CONTINUOUS
Status: DISCONTINUED | OUTPATIENT
Start: 2021-09-20 | End: 2021-09-20 | Stop reason: HOSPADM

## 2021-09-20 RX ORDER — DEXAMETHASONE SODIUM PHOSPHATE 4 MG/ML
INJECTION, SOLUTION INTRA-ARTICULAR; INTRALESIONAL; INTRAMUSCULAR; INTRAVENOUS; SOFT TISSUE PRN
Status: DISCONTINUED | OUTPATIENT
Start: 2021-09-20 | End: 2021-09-20

## 2021-09-20 RX ORDER — OXYCODONE HYDROCHLORIDE 5 MG/1
5 TABLET ORAL EVERY 4 HOURS PRN
Status: DISCONTINUED | OUTPATIENT
Start: 2021-09-20 | End: 2021-09-24 | Stop reason: HOSPADM

## 2021-09-20 RX ORDER — ACETAMINOPHEN 325 MG/1
975 TABLET ORAL ONCE
Status: DISCONTINUED | OUTPATIENT
Start: 2021-09-20 | End: 2021-09-20 | Stop reason: HOSPADM

## 2021-09-20 RX ORDER — PANTOPRAZOLE SODIUM 20 MG/1
20 TABLET, DELAYED RELEASE ORAL DAILY PRN
Status: DISCONTINUED | OUTPATIENT
Start: 2021-09-20 | End: 2021-09-24 | Stop reason: HOSPADM

## 2021-09-20 RX ORDER — ONDANSETRON 2 MG/ML
4 INJECTION INTRAMUSCULAR; INTRAVENOUS EVERY 6 HOURS PRN
Status: DISCONTINUED | OUTPATIENT
Start: 2021-09-20 | End: 2021-09-24 | Stop reason: HOSPADM

## 2021-09-20 RX ORDER — OXYCODONE HYDROCHLORIDE 10 MG/1
10 TABLET ORAL EVERY 4 HOURS PRN
Status: DISCONTINUED | OUTPATIENT
Start: 2021-09-20 | End: 2021-09-24 | Stop reason: HOSPADM

## 2021-09-20 RX ORDER — AMLODIPINE BESYLATE 10 MG/1
10 TABLET ORAL DAILY
Status: DISCONTINUED | OUTPATIENT
Start: 2021-09-21 | End: 2021-09-24 | Stop reason: HOSPADM

## 2021-09-20 RX ORDER — CEFAZOLIN SODIUM 2 G/100ML
2 INJECTION, SOLUTION INTRAVENOUS
Status: COMPLETED | OUTPATIENT
Start: 2021-09-20 | End: 2021-09-20

## 2021-09-20 RX ORDER — ATENOLOL 25 MG/1
50 TABLET ORAL EVERY EVENING
Status: DISCONTINUED | OUTPATIENT
Start: 2021-09-20 | End: 2021-09-24 | Stop reason: HOSPADM

## 2021-09-20 RX ORDER — BISACODYL 10 MG
10 SUPPOSITORY, RECTAL RECTAL DAILY PRN
Status: DISCONTINUED | OUTPATIENT
Start: 2021-09-20 | End: 2021-09-24 | Stop reason: HOSPADM

## 2021-09-20 RX ORDER — OXYCODONE HYDROCHLORIDE 5 MG/1
5 TABLET ORAL EVERY 4 HOURS PRN
Status: DISCONTINUED | OUTPATIENT
Start: 2021-09-20 | End: 2021-09-20 | Stop reason: HOSPADM

## 2021-09-20 RX ORDER — AMOXICILLIN 250 MG
1 CAPSULE ORAL 2 TIMES DAILY
Status: DISCONTINUED | OUTPATIENT
Start: 2021-09-20 | End: 2021-09-24

## 2021-09-20 RX ORDER — LIDOCAINE 40 MG/G
CREAM TOPICAL
Status: DISCONTINUED | OUTPATIENT
Start: 2021-09-20 | End: 2021-09-24 | Stop reason: HOSPADM

## 2021-09-20 RX ORDER — FLUMAZENIL 0.1 MG/ML
0.2 INJECTION, SOLUTION INTRAVENOUS
Status: DISCONTINUED | OUTPATIENT
Start: 2021-09-20 | End: 2021-09-20 | Stop reason: HOSPADM

## 2021-09-20 RX ORDER — NALOXONE HYDROCHLORIDE 0.4 MG/ML
0.4 INJECTION, SOLUTION INTRAMUSCULAR; INTRAVENOUS; SUBCUTANEOUS
Status: DISCONTINUED | OUTPATIENT
Start: 2021-09-20 | End: 2021-09-24 | Stop reason: HOSPADM

## 2021-09-20 RX ORDER — ONDANSETRON 2 MG/ML
4 INJECTION INTRAMUSCULAR; INTRAVENOUS EVERY 30 MIN PRN
Status: DISCONTINUED | OUTPATIENT
Start: 2021-09-20 | End: 2021-09-20 | Stop reason: HOSPADM

## 2021-09-20 RX ORDER — EPHEDRINE SULFATE 50 MG/ML
INJECTION, SOLUTION INTRAMUSCULAR; INTRAVENOUS; SUBCUTANEOUS PRN
Status: DISCONTINUED | OUTPATIENT
Start: 2021-09-20 | End: 2021-09-20

## 2021-09-20 RX ORDER — CEFAZOLIN SODIUM 2 G/100ML
2 INJECTION, SOLUTION INTRAVENOUS SEE ADMIN INSTRUCTIONS
Status: DISCONTINUED | OUTPATIENT
Start: 2021-09-20 | End: 2021-09-20 | Stop reason: HOSPADM

## 2021-09-20 RX ORDER — ONDANSETRON 2 MG/ML
INJECTION INTRAMUSCULAR; INTRAVENOUS PRN
Status: DISCONTINUED | OUTPATIENT
Start: 2021-09-20 | End: 2021-09-20

## 2021-09-20 RX ORDER — HEPARIN SODIUM 10000 [USP'U]/100ML
0-5000 INJECTION, SOLUTION INTRAVENOUS CONTINUOUS
Status: DISCONTINUED | OUTPATIENT
Start: 2021-09-20 | End: 2021-09-22

## 2021-09-20 RX ORDER — SODIUM CHLORIDE, SODIUM GLUCONATE, SODIUM ACETATE, POTASSIUM CHLORIDE AND MAGNESIUM CHLORIDE 526; 502; 368; 37; 30 MG/100ML; MG/100ML; MG/100ML; MG/100ML; MG/100ML
INJECTION, SOLUTION INTRAVENOUS CONTINUOUS PRN
Status: DISCONTINUED | OUTPATIENT
Start: 2021-09-20 | End: 2021-09-20

## 2021-09-20 RX ORDER — ASPIRIN 81 MG/1
81 TABLET ORAL DAILY
Status: DISCONTINUED | OUTPATIENT
Start: 2021-09-21 | End: 2021-09-24 | Stop reason: HOSPADM

## 2021-09-20 RX ORDER — ACETAMINOPHEN 325 MG/1
650 TABLET ORAL EVERY 4 HOURS PRN
Status: DISCONTINUED | OUTPATIENT
Start: 2021-09-23 | End: 2021-09-24 | Stop reason: HOSPADM

## 2021-09-20 RX ORDER — HYDRALAZINE HYDROCHLORIDE 20 MG/ML
INJECTION INTRAMUSCULAR; INTRAVENOUS PRN
Status: DISCONTINUED | OUTPATIENT
Start: 2021-09-20 | End: 2021-09-20

## 2021-09-20 RX ORDER — FENTANYL CITRATE 50 UG/ML
25 INJECTION, SOLUTION INTRAMUSCULAR; INTRAVENOUS
Status: DISCONTINUED | OUTPATIENT
Start: 2021-09-20 | End: 2021-09-20 | Stop reason: HOSPADM

## 2021-09-20 RX ORDER — HYDROMORPHONE HCL IN WATER/PF 6 MG/30 ML
0.4 PATIENT CONTROLLED ANALGESIA SYRINGE INTRAVENOUS
Status: DISCONTINUED | OUTPATIENT
Start: 2021-09-20 | End: 2021-09-24 | Stop reason: HOSPADM

## 2021-09-20 RX ORDER — ACETAMINOPHEN 325 MG/1
975 TABLET ORAL EVERY 8 HOURS
Status: DISPENSED | OUTPATIENT
Start: 2021-09-20 | End: 2021-09-23

## 2021-09-20 RX ORDER — LIDOCAINE HYDROCHLORIDE 20 MG/ML
INJECTION, SOLUTION INFILTRATION; PERINEURAL PRN
Status: DISCONTINUED | OUTPATIENT
Start: 2021-09-20 | End: 2021-09-20

## 2021-09-20 RX ORDER — SODIUM CHLORIDE, SODIUM LACTATE, POTASSIUM CHLORIDE, CALCIUM CHLORIDE 600; 310; 30; 20 MG/100ML; MG/100ML; MG/100ML; MG/100ML
INJECTION, SOLUTION INTRAVENOUS CONTINUOUS PRN
Status: DISCONTINUED | OUTPATIENT
Start: 2021-09-20 | End: 2021-09-20

## 2021-09-20 RX ORDER — INDOCYANINE GREEN AND WATER 25 MG
KIT INJECTION PRN
Status: DISCONTINUED | OUTPATIENT
Start: 2021-09-20 | End: 2021-09-20

## 2021-09-20 RX ORDER — MEPERIDINE HYDROCHLORIDE 25 MG/ML
12.5 INJECTION INTRAMUSCULAR; INTRAVENOUS; SUBCUTANEOUS
Status: DISCONTINUED | OUTPATIENT
Start: 2021-09-20 | End: 2021-09-20 | Stop reason: HOSPADM

## 2021-09-20 RX ORDER — DEXTROSE MONOHYDRATE, SODIUM CHLORIDE, AND POTASSIUM CHLORIDE 50; 1.49; 4.5 G/1000ML; G/1000ML; G/1000ML
INJECTION, SOLUTION INTRAVENOUS CONTINUOUS
Status: DISCONTINUED | OUTPATIENT
Start: 2021-09-20 | End: 2021-09-24 | Stop reason: HOSPADM

## 2021-09-20 RX ORDER — HEPARIN SODIUM 1000 [USP'U]/ML
INJECTION, SOLUTION INTRAVENOUS; SUBCUTANEOUS CONTINUOUS PRN
Status: DISCONTINUED | OUTPATIENT
Start: 2021-09-20 | End: 2021-09-20

## 2021-09-20 RX ORDER — PROCHLORPERAZINE MALEATE 5 MG
10 TABLET ORAL EVERY 6 HOURS PRN
Status: DISCONTINUED | OUTPATIENT
Start: 2021-09-20 | End: 2021-09-24 | Stop reason: HOSPADM

## 2021-09-20 RX ORDER — TRAZODONE HYDROCHLORIDE 50 MG/1
50-100 TABLET, FILM COATED ORAL AT BEDTIME
Status: DISCONTINUED | OUTPATIENT
Start: 2021-09-20 | End: 2021-09-24 | Stop reason: HOSPADM

## 2021-09-20 RX ORDER — HYDROMORPHONE HCL IN WATER/PF 6 MG/30 ML
0.2 PATIENT CONTROLLED ANALGESIA SYRINGE INTRAVENOUS
Status: DISCONTINUED | OUTPATIENT
Start: 2021-09-20 | End: 2021-09-24 | Stop reason: HOSPADM

## 2021-09-20 RX ORDER — ONDANSETRON 4 MG/1
4 TABLET, ORALLY DISINTEGRATING ORAL EVERY 30 MIN PRN
Status: DISCONTINUED | OUTPATIENT
Start: 2021-09-20 | End: 2021-09-20 | Stop reason: HOSPADM

## 2021-09-20 RX ORDER — HEPARIN SODIUM 1000 [USP'U]/ML
INJECTION, SOLUTION INTRAVENOUS; SUBCUTANEOUS PRN
Status: DISCONTINUED | OUTPATIENT
Start: 2021-09-20 | End: 2021-09-20

## 2021-09-20 RX ORDER — NALOXONE HYDROCHLORIDE 0.4 MG/ML
0.2 INJECTION, SOLUTION INTRAMUSCULAR; INTRAVENOUS; SUBCUTANEOUS
Status: DISCONTINUED | OUTPATIENT
Start: 2021-09-20 | End: 2021-09-24 | Stop reason: HOSPADM

## 2021-09-20 RX ORDER — ONDANSETRON 4 MG/1
4 TABLET, ORALLY DISINTEGRATING ORAL EVERY 6 HOURS PRN
Status: DISCONTINUED | OUTPATIENT
Start: 2021-09-20 | End: 2021-09-24 | Stop reason: HOSPADM

## 2021-09-20 RX ORDER — FENTANYL CITRATE 50 UG/ML
25-50 INJECTION, SOLUTION INTRAMUSCULAR; INTRAVENOUS
Status: DISCONTINUED | OUTPATIENT
Start: 2021-09-20 | End: 2021-09-20 | Stop reason: HOSPADM

## 2021-09-20 RX ORDER — HEPARIN SODIUM (PORCINE) LOCK FLUSH IV SOLN 100 UNIT/ML 100 UNIT/ML
SOLUTION INTRAVENOUS CONTINUOUS PRN
Status: DISCONTINUED | OUTPATIENT
Start: 2021-09-20 | End: 2021-09-20

## 2021-09-20 RX ORDER — PROPOFOL 10 MG/ML
INJECTION, EMULSION INTRAVENOUS PRN
Status: DISCONTINUED | OUTPATIENT
Start: 2021-09-20 | End: 2021-09-20

## 2021-09-20 RX ORDER — POLYETHYLENE GLYCOL 3350 17 G/17G
17 POWDER, FOR SOLUTION ORAL DAILY
Status: DISCONTINUED | OUTPATIENT
Start: 2021-09-21 | End: 2021-09-24 | Stop reason: HOSPADM

## 2021-09-20 RX ORDER — FENTANYL CITRATE 50 UG/ML
INJECTION, SOLUTION INTRAMUSCULAR; INTRAVENOUS PRN
Status: DISCONTINUED | OUTPATIENT
Start: 2021-09-20 | End: 2021-09-20

## 2021-09-20 RX ORDER — LIDOCAINE 40 MG/G
CREAM TOPICAL
Status: DISCONTINUED | OUTPATIENT
Start: 2021-09-20 | End: 2021-09-20 | Stop reason: HOSPADM

## 2021-09-20 RX ADMIN — ENOXAPARIN SODIUM 40 MG: 40 INJECTION, SOLUTION INTRAVENOUS; SUBCUTANEOUS at 07:45

## 2021-09-20 RX ADMIN — FENTANYL CITRATE 50 MCG: 50 INJECTION, SOLUTION INTRAMUSCULAR; INTRAVENOUS at 09:06

## 2021-09-20 RX ADMIN — FENTANYL CITRATE 50 MCG: 50 INJECTION, SOLUTION INTRAMUSCULAR; INTRAVENOUS at 08:55

## 2021-09-20 RX ADMIN — Medication 2 G: at 08:45

## 2021-09-20 RX ADMIN — Medication 2 G: at 12:17

## 2021-09-20 RX ADMIN — MIDAZOLAM 1 MG: 1 INJECTION INTRAMUSCULAR; INTRAVENOUS at 07:42

## 2021-09-20 RX ADMIN — INDOCYANINE GREEN AND WATER 12.5 MG: KIT at 10:07

## 2021-09-20 RX ADMIN — SODIUM CHLORIDE, SODIUM LACTATE, POTASSIUM CHLORIDE, CALCIUM CHLORIDE: 600; 310; 30; 20 INJECTION, SOLUTION INTRAVENOUS at 14:37

## 2021-09-20 RX ADMIN — SUGAMMADEX 200 MG: 100 INJECTION, SOLUTION INTRAVENOUS at 18:20

## 2021-09-20 RX ADMIN — SODIUM CHLORIDE, PRESERVATIVE FREE 800 UNITS/HR: 5 INJECTION INTRAVENOUS at 16:35

## 2021-09-20 RX ADMIN — SODIUM CHLORIDE, POTASSIUM CHLORIDE, SODIUM LACTATE AND CALCIUM CHLORIDE: 600; 310; 30; 20 INJECTION, SOLUTION INTRAVENOUS at 11:05

## 2021-09-20 RX ADMIN — BUPIVACAINE 10 ML: 13.3 INJECTION, SUSPENSION, LIPOSOMAL INFILTRATION at 07:43

## 2021-09-20 RX ADMIN — FENTANYL CITRATE 50 MCG: 50 INJECTION, SOLUTION INTRAMUSCULAR; INTRAVENOUS at 09:03

## 2021-09-20 RX ADMIN — DEXAMETHASONE SODIUM PHOSPHATE 8 MG: 4 INJECTION, SOLUTION INTRA-ARTICULAR; INTRALESIONAL; INTRAMUSCULAR; INTRAVENOUS; SOFT TISSUE at 08:23

## 2021-09-20 RX ADMIN — BUPIVACAINE HYDROCHLORIDE 20 ML: 2.5 INJECTION, SOLUTION EPIDURAL; INFILTRATION; INTRACAUDAL; PERINEURAL at 07:43

## 2021-09-20 RX ADMIN — Medication 10 MG: at 08:34

## 2021-09-20 RX ADMIN — HYDROMORPHONE HYDROCHLORIDE 0.5 MG: 1 INJECTION, SOLUTION INTRAMUSCULAR; INTRAVENOUS; SUBCUTANEOUS at 16:18

## 2021-09-20 RX ADMIN — Medication 10 MG: at 13:26

## 2021-09-20 RX ADMIN — Medication 10 MG: at 14:27

## 2021-09-20 RX ADMIN — SODIUM CHLORIDE, SODIUM LACTATE, POTASSIUM CHLORIDE, CALCIUM CHLORIDE: 600; 310; 30; 20 INJECTION, SOLUTION INTRAVENOUS at 11:05

## 2021-09-20 RX ADMIN — ONDANSETRON 4 MG: 2 INJECTION INTRAMUSCULAR; INTRAVENOUS at 19:27

## 2021-09-20 RX ADMIN — ROCURONIUM BROMIDE 50 MG: 10 INJECTION INTRAVENOUS at 08:23

## 2021-09-20 RX ADMIN — PROCHLORPERAZINE EDISYLATE 10 MG: 5 INJECTION INTRAMUSCULAR; INTRAVENOUS at 20:12

## 2021-09-20 RX ADMIN — HEPARIN SODIUM 5000 UNITS: 1000 INJECTION INTRAVENOUS; SUBCUTANEOUS at 16:35

## 2021-09-20 RX ADMIN — HYDRALAZINE HYDROCHLORIDE 2 MG: 20 INJECTION INTRAMUSCULAR; INTRAVENOUS at 09:13

## 2021-09-20 RX ADMIN — FENTANYL CITRATE 50 MCG: 50 INJECTION, SOLUTION INTRAMUSCULAR; INTRAVENOUS at 08:23

## 2021-09-20 RX ADMIN — Medication 10 MG: at 10:41

## 2021-09-20 RX ADMIN — MIDAZOLAM 2 MG: 1 INJECTION INTRAMUSCULAR; INTRAVENOUS at 08:08

## 2021-09-20 RX ADMIN — Medication 2 G: at 16:06

## 2021-09-20 RX ADMIN — FENTANYL CITRATE 50 MCG: 50 INJECTION, SOLUTION INTRAMUSCULAR; INTRAVENOUS at 07:41

## 2021-09-20 RX ADMIN — ROCURONIUM BROMIDE 20 MG: 10 INJECTION INTRAVENOUS at 15:01

## 2021-09-20 RX ADMIN — PROPOFOL 140 MG: 10 INJECTION, EMULSION INTRAVENOUS at 08:23

## 2021-09-20 RX ADMIN — ROCURONIUM BROMIDE 10 MG: 10 INJECTION INTRAVENOUS at 11:34

## 2021-09-20 RX ADMIN — HYDROMORPHONE HYDROCHLORIDE 0.5 MG: 1 INJECTION, SOLUTION INTRAMUSCULAR; INTRAVENOUS; SUBCUTANEOUS at 18:40

## 2021-09-20 RX ADMIN — BUPIVACAINE HYDROCHLORIDE 20 ML: 2.5 INJECTION, SOLUTION EPIDURAL; INFILTRATION; INTRACAUDAL at 07:44

## 2021-09-20 RX ADMIN — ROCURONIUM BROMIDE 20 MG: 10 INJECTION INTRAVENOUS at 16:03

## 2021-09-20 RX ADMIN — ROCURONIUM BROMIDE 20 MG: 10 INJECTION INTRAVENOUS at 10:38

## 2021-09-20 RX ADMIN — LIDOCAINE HYDROCHLORIDE 60 MG: 20 INJECTION, SOLUTION INFILTRATION; PERINEURAL at 08:23

## 2021-09-20 RX ADMIN — HYDROMORPHONE HYDROCHLORIDE 0.5 MG: 1 INJECTION, SOLUTION INTRAMUSCULAR; INTRAVENOUS; SUBCUTANEOUS at 13:32

## 2021-09-20 RX ADMIN — Medication 10 MG: at 11:57

## 2021-09-20 RX ADMIN — ROCURONIUM BROMIDE 40 MG: 10 INJECTION INTRAVENOUS at 13:19

## 2021-09-20 RX ADMIN — POTASSIUM CHLORIDE, DEXTROSE MONOHYDRATE AND SODIUM CHLORIDE: 150; 5; 450 INJECTION, SOLUTION INTRAVENOUS at 22:26

## 2021-09-20 RX ADMIN — ONDANSETRON 4 MG: 2 INJECTION INTRAMUSCULAR; INTRAVENOUS at 16:01

## 2021-09-20 RX ADMIN — ROCURONIUM BROMIDE 30 MG: 10 INJECTION INTRAVENOUS at 09:12

## 2021-09-20 RX ADMIN — ROCURONIUM BROMIDE 20 MG: 10 INJECTION INTRAVENOUS at 12:40

## 2021-09-20 RX ADMIN — ROCURONIUM BROMIDE 20 MG: 10 INJECTION INTRAVENOUS at 16:57

## 2021-09-20 RX ADMIN — FENTANYL CITRATE 25 MCG: 50 INJECTION, SOLUTION INTRAMUSCULAR; INTRAVENOUS at 19:27

## 2021-09-20 RX ADMIN — INDOCYANINE GREEN AND WATER 12.5 MG: KIT at 18:23

## 2021-09-20 RX ADMIN — SODIUM CHLORIDE, POTASSIUM CHLORIDE, SODIUM LACTATE AND CALCIUM CHLORIDE: 600; 310; 30; 20 INJECTION, SOLUTION INTRAVENOUS at 07:31

## 2021-09-20 RX ADMIN — FENTANYL CITRATE 50 MCG: 50 INJECTION, SOLUTION INTRAMUSCULAR; INTRAVENOUS at 09:14

## 2021-09-20 RX ADMIN — SODIUM CHLORIDE, SODIUM GLUCONATE, SODIUM ACETATE, POTASSIUM CHLORIDE AND MAGNESIUM CHLORIDE: 526; 502; 368; 37; 30 INJECTION, SOLUTION INTRAVENOUS at 08:34

## 2021-09-20 RX ADMIN — ROCURONIUM BROMIDE 20 MG: 10 INJECTION INTRAVENOUS at 12:13

## 2021-09-20 ASSESSMENT — COPD QUESTIONNAIRES: COPD: 0

## 2021-09-20 ASSESSMENT — MIFFLIN-ST. JEOR: SCORE: 1354

## 2021-09-20 ASSESSMENT — LIFESTYLE VARIABLES: TOBACCO_USE: 1

## 2021-09-20 NOTE — PROGRESS NOTES
Block procedure complete.  Patient alert and responding appropriately.  Monitors will remain attached to patient until transferred to OR.

## 2021-09-20 NOTE — ANESTHESIA PROCEDURE NOTES
TAP Procedure Note  Pre-Procedure   Staff -        Anesthesiologist:  Alice Daniels MD       Resident/Fellow: Matthias Hong MD       Performed By: resident       Location: pre-op       Procedure Start/Stop Times: 9/20/2021 7:35 AM and 9/20/2021 7:45 AM       Pre-Anesthestic Checklist: patient identified, IV checked, site marked, risks and benefits discussed, informed consent, monitors and equipment checked, pre-op evaluation, at physician/surgeon's request and post-op pain management  Timeout:       Correct Patient: Yes        Correct Procedure: Yes        Correct Site: Yes        Correct Position: Yes        Correct Laterality: Yes        Site Marked: Yes  Procedure Documentation  Procedure: TAP       Diagnosis: POST OPERATIVE PAIN       Laterality: bilateral       Patient Position: supine       Patient Prep/Sterile Barriers: sterile gloves, mask       Skin prep: Chloraprep       Needle Type: short bevel       Needle Gauge: 21.        Needle Length (millimeters): 110        Ultrasound guided       1. Ultrasound was used to identify targeted nerve, plexus, vascular marker, or fascial plane and place a needle adjacent to it in real-time.       2. Ultrasound was used to visualize the spread of anesthetic in close proximity to the above referenced structure.       3. A permanent image is entered into the patient's record.    Assessment/Narrative         The placement was negative for: blood aspirated, painful injection and site bleeding       Paresthesias: No.     Bolus given via needle..        Secured via.        Insertion/Infusion Method: Single Shot       Complications: none       Injection made incrementally with aspirations every 5 mL.    Medication(s) Administered   Medication Administration Time: 9/20/2021 7:43 AM    Comments:  Bilateral classic TAP block performed without complications.    Matthias Hong DO CA-2  Department of Anesthesiology  [phone]

## 2021-09-20 NOTE — PROGRESS NOTES
SPIRITUAL HEALTH SERVICES  [facility] 3C   PRE-SURGERY VISIT    Had pre-surgery visit with pt and her spouse Archie.  Provided spiritual support, prayer.     Plan: No follow up currently planned. [facility] is available if needed upon request.     Speedy Orona  Chaplain Resident  Please page the on call  through [facility] or place an Inpatient Spiritual Health consult as needed for support. STAT or ASAP inpatient consult orders will roll to the on call pager. Thank you!

## 2021-09-20 NOTE — ANESTHESIA PROCEDURE NOTES
Pectoralis Procedure Note  Pre-Procedure   Staff -        Anesthesiologist:  Alice Daniels MD       Resident/Fellow: Matthias Hong MD       Performed By: resident       Location: pre-op       Procedure Start/Stop Times: 9/20/2021 7:35 AM and 9/20/2021 7:45 AM       Pre-Anesthestic Checklist: patient identified, IV checked, site marked, risks and benefits discussed, informed consent, monitors and equipment checked, pre-op evaluation, at physician/surgeon's request and post-op pain management  Timeout:       Correct Patient: Yes        Correct Procedure: Yes        Correct Site: Yes        Correct Position: Yes        Correct Laterality: Yes        Site Marked: Yes  Procedure Documentation  Procedure: Pectoralis       Diagnosis: POST OPERATIVE PAIN       Laterality: bilateral       Patient Position: supine       Skin prep: Chloraprep       Needle Type: short bevel       Needle Gauge: 21.        Needle Length (millimeters): 100        Ultrasound guided       1. Ultrasound was used to identify targeted nerve, plexus, vascular marker, or fascial plane and place a needle adjacent to it in real-time.       2. Ultrasound was used to visualize the spread of anesthetic in close proximity to the above referenced structure.       3. A permanent image is entered into the patient's record.    Assessment/Narrative         The placement was negative for: blood aspirated, painful injection and site bleeding       Paresthesias: No.     Bolus given via needle..        Secured via.        Insertion/Infusion Method: Single Shot    Medication(s) Administered   Medication Administration Time: 9/20/2021 7:44 AM    Comments:  Bilateral PECS 1 and 2 block performed without complications    Matthias Hong DO CA-2  Department of Anesthesiology  [phone]

## 2021-09-20 NOTE — ANESTHESIA PROCEDURE NOTES
Airway       Patient location during procedure: OR       Procedure Start/Stop Times: 9/20/2021 8:26 AM  Staff -        CRNA: Lamont Ballard APRN CRNA       Performed By: CRNAIndications and Patient Condition       Indications for airway management: matilda-procedural       Induction type:intravenous       Mask difficulty assessment: 2 - vent by mask + OA or adjuvant +/- NMBA    Final Airway Details       Final airway type: endotracheal airway       Successful airway: ETT - single  Endotracheal Airway Details        ETT size (mm): 7.0       Cuffed: yes       Cuff volume (mL): 7       Successful intubation technique: direct laryngoscopy       DL Blade Type: Russell 2       Grade View of Cords: 2       Adjucts: stylet       Position: Left       Measured from: lips       Secured at (cm): 21       Bite block used: None    Post intubation assessment        Placement verified by: capnometry, equal breath sounds and chest rise        Number of attempts at approach: 1       Number of other approaches attempted: 0       Secured with: pink tape       Ease of procedure: easy       Dentition: Intact and Unchanged

## 2021-09-20 NOTE — ANESTHESIA CARE TRANSFER NOTE
Patient: Leigh Espinal    Procedure(s):  Bilateral breast reconstruction with free trans rectus abdominus muscle flap, additional superficial system revascularization bilaterally with left breast vein graft, strattice abdominal wall reconstruction and SPY    Diagnosis: S/P breast reconstruction, bilateral [Z98.890]  Diagnosis Additional Information: No value filed.    Anesthesia Type:   General     Note:    Oropharynx: oropharynx clear of all foreign objects and spontaneously breathing  Level of Consciousness: awake  Oxygen Supplementation: face mask  Level of Supplemental Oxygen (L/min / FiO2): 6  Independent Airway: airway patency satisfactory and stable  Dentition: dentition unchanged  Vital Signs Stable: post-procedure vital signs reviewed and stable  Report to RN Given: handoff report given  Patient transferred to: PACU    Handoff Report: Identifed the Patient, Identified the Reponsible Provider, Reviewed the pertinent medical history, Discussed the surgical course, Reviewed Intra-OP anesthesia mangement and issues during anesthesia, Set expectations for post-procedure period and Allowed opportunity for questions and acknowledgement of understanding      Vitals:  Vitals Value Taken Time   /81 09/20/21 1853   Temp     Pulse 93 09/20/21 1853   Resp     SpO2 100 % 09/20/21 1855   Vitals shown include unvalidated device data.    Electronically Signed By: MATTHEW Lama CRNA  September 20, 2021  6:56 PM

## 2021-09-20 NOTE — PHARMACY-ADMISSION MEDICATION HISTORY
Admission Medication History Completed by Pharmacy    See [facility] Admission Navigator for allergy information, preferred outpatient pharmacy, prior to admission medications and immunization status.     Medication History Sources:     Pre-op pharmacist med hx, pre-op RN assessment.       Prior to Admission medications    Medication Sig Last Dose Taking? Auth Provider   amLODIPine (NORVASC) 2.5 MG tablet Take 10 mg by mouth daily 9/19/2021 at 0430 Yes Unknown, Entered By History   atenolol (TENORMIN) 50 MG tablet Take 50 mg by mouth every evening  9/19/2021 at 2200 Yes Reported, Patient   Goserelin Acetate (ZOLADEX SC) Inject 3.6 mg Subcutaneous every 30 days  Past Week at Unknown time Yes Unknown, Entered By History   lisinopril-hydrochlorothiazide (ZESTORETIC) 20-25 MG tablet Take 1 tablet by mouth every evening 9/19/2021 at 2200 Yes Unknown, Entered By History   pantoprazole (PROTONIX) 20 MG EC tablet Take 20 mg by mouth daily as needed  9/20/2021 at 0430 Yes Reported, Patient   SERTRALINE HCL PO Take 50 mg by mouth every evening  9/19/2021 at 2200 Yes Reported, Patient   traZODone (DESYREL) 50 MG tablet Take  mg by mouth At Bedtime 9/19/2021 at 2200 Yes Unknown, Entered By History       Date completed: 09/20/21    Medication history completed by: Cleo Crespo [facility]

## 2021-09-20 NOTE — OR NURSING
Left abdomen to right breast: 1100g  Ischemia time 8336-8404    Right abdomen to left breast: 1074g  Ischemia time 0675-7825

## 2021-09-20 NOTE — ANESTHESIA PREPROCEDURE EVALUATION
Anesthesia Pre-Procedure Evaluation    Patient: Leigh Espinal   MRN: [id number] : 1965        Preoperative Diagnosis: S/P breast reconstruction, bilateral [Z98.890]   Procedure : Procedure(s):  Bilateral breast reconstruction with free abdominal flaps and SPY     Past Medical History:   Diagnosis Date     Anxiety and depression      AVM (arteriovenous malformation)     Right Pulmonary     Breast cancer (H)      GERD (gastroesophageal reflux disease)      HHT (hereditary hemorrhagic telangiectasia) (H) 2015    Possible- no ACVRL1, ENG or SMAD4 mutations found on sequencing 10/6/15      Hiatal hernia     nissen done in      HTN (hypertension)      Iron deficiency anemia 2012     Problem list name updated by automated process. Provider to review     Menorrhagia      Monoallelic mutation of CHEK2 gene in female patient 2020    CHEK2 c.1100delC [facility] 2019      Past Surgical History:   Procedure Laterality Date     BIOPSY NODE SENTINEL Bilateral 2020    Procedure: BILATERAL Axillary [place] Lymph Node Biopsy;  Surgeon: Paz Smith MD;  Location: [facility] OR     COLONOSCOPY  2012    Procedure: COLONOSCOPY;;  Surgeon: Radha Hector MD;  [facility]     COLONOSCOPY N/A 2021    Procedure: COLONOSCOPY;  Surgeon: Wally Padilla DO;  Location: [facility] OR     INSERT TISSUE EXPANDER BREAST BILATERAL Bilateral 2020    Procedure: bilateral breast expander removal and washout;  Surgeon: CEE Chatterjee MD;  Location: [facility] OR     LAPAROSCOPIC NISSEN FUNDOPLICATION       MASTECTOMY SIMPLE Bilateral 2020    Procedure: BILATERAL Skin-Sparing Mastectomy;  Surgeon: Paz Smith MD;  Location: [facility] OR     RECONSTRUCT BREAST Bilateral 2020    Procedure: Bilateral breast reconstruction with expanders and SPY;  Surgeon: CEE Chatterjee MD;  Location: [facility] OR     TUBAL LIGATION        Allergies   Allergen  Reactions     Sulfa Drugs      Got really sick, headache, facial swelling, felt like she was dying.   Delayed reaction happened ~3-4 days after she started taking it.       Social History     Tobacco Use     Smoking status: Former Smoker     Packs/day: 1.00     Years: 20.00     Pack years: 20.00     Types: Cigarettes     Start date: 1/1/1980     Quit date: 9/13/2011     Years since quitting: 10.0     Smokeless tobacco: Never Used   Substance Use Topics     Alcohol use: Yes     Alcohol/week: 3.3 standard drinks     Types: 4 Standard drinks or equivalent per week     Comment: occ      Wt Readings from Last 1 Encounters:   09/20/21 76.9 kg (169 lb 8.5 oz)        Anesthesia Evaluation   Pt has had prior anesthetic.     No history of anesthetic complications       ROS/MED HX  ENT/Pulmonary: Comment: H/o RML AVM s/p emboliztion    (+) NEPTALI risk factors, snores loudly, hypertension, tobacco use, Past use,  (-) asthma and COPD   Neurologic:  - neg neurologic ROS     Cardiovascular:     (+) hypertension-----Previous cardiac testing   Echo: Date: 2015 Results:  Interpretation Summary  1. Positive bubble study with agitated saline. Large amount of bubbles are   seen on the left side within few beats after opacification of the right   atrium. Traditionally, this will be considered to be due to large PFO but   fast transit from a pulmonary AV fistula is also possible. Absence of right   sided chamber enlargement and negative color doppler goes against a large ASD   with significant shunt. Consider GUICHO for closer evaluation of interatrial   septum. MRI can also be obtained if there is high clinical suspiscion for   large pulmonary AV fistula. 2. Normal biventricular systolic function. LVEF   estimate 60-65%. 3. No significant valvular abnormalities. 4. Normal IVC   with preserved respiratory variability.  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        Other:  - neg other ROS          Physical Exam    Airway  airway exam normal           Respiratory Devices and Support         Dental  no notable dental history         Cardiovascular   cardiovascular exam normal          Pulmonary   pulmonary exam normal                OUTSIDE LABS:  CBC:   Lab Results   Component Value Date    WBC 5.8 09/07/2021    WBC 6.2 09/09/2020    HGB 14.9 09/07/2021    HGB 13.6 09/09/2020    HCT 45.7 09/07/2021    HCT 42.1 09/09/2020     09/07/2021     09/09/2020     BMP:   Lab Results   Component Value Date     09/07/2021     09/09/2020    POTASSIUM 4.0 09/07/2021    POTASSIUM 3.6 09/09/2020    CHLORIDE 106 09/07/2021    CHLORIDE 109 09/09/2020    CO2 31 09/07/2021    CO2 22 09/09/2020    BUN 8 09/07/2021    BUN 15 09/09/2020    CR 0.77 09/07/2021    CR 0.89 09/09/2020     (H) 09/07/2021     (H) 09/09/2020     COAGS:   Lab Results   Component Value Date    INR 1.11 03/04/2015     POC:   Lab Results   Component Value Date     (H) 01/29/2020    HCG Negative 02/02/2021    HCGS Negative 03/04/2015     HEPATIC:   Lab Results   Component Value Date    ALBUMIN 3.3 (L) 09/09/2020    PROTTOTAL 6.8 09/09/2020    ALT 30 09/09/2020     AST 22 09/09/2020    ALKPHOS 116 09/09/2020    BILITOTAL 0.3 09/09/2020     OTHER:   Lab Results   Component Value Date    ILIANA 9.8 09/07/2021    MAG 2.2 09/09/2020    TSH 2.54 09/09/2020    CRP <5.0 04/18/2012       Anesthesia Plan    ASA Status:  3   NPO Status:  NPO Appropriate    Anesthesia Type: General.     - Airway: ETT   Induction: Intravenous.   Maintenance: Balanced.   Techniques and Equipment:     - Lines/Monitors: 2nd IV     Consents    Anesthesia Plan(s) and associated risks, benefits, and realistic alternatives discussed. Questions answered and patient/representative(s) expressed understanding.     - Discussed with:  Patient      - Extended Intubation/Ventilatory Support Discussed: No.      - Patient is DNR/DNI Status: No    Use of blood products discussed: No .     Postoperative Care    Pain management: IV analgesics, Peripheral nerve block (Single Shot), Multi-modal analgesia.   PONV prophylaxis: Ondansetron (or other 5HT-3), Dexamethasone or Solumedrol     Comments:                Mehul Pemberton MD

## 2021-09-21 LAB
ANION GAP SERPL CALCULATED.3IONS-SCNC: 5 MMOL/L (ref 3–14)
BUN SERPL-MCNC: 18 MG/DL (ref 7–30)
CALCIUM SERPL-MCNC: 8.1 MG/DL (ref 8.5–10.1)
CHLORIDE BLD-SCNC: 108 MMOL/L (ref 94–109)
CO2 SERPL-SCNC: 26 MMOL/L (ref 20–32)
CREAT SERPL-MCNC: 0.78 MG/DL (ref 0.52–1.04)
ERYTHROCYTE [DISTWIDTH] IN BLOOD BY AUTOMATED COUNT: 14.8 % (ref 10–15)
GFR SERPL CREATININE-BSD FRML MDRD: 86 ML/MIN/1.73M2
GLUCOSE BLD-MCNC: 188 MG/DL (ref 70–99)
HCT VFR BLD AUTO: 31.1 % (ref 35–47)
HGB BLD-MCNC: 9.9 G/DL (ref 11.7–15.7)
MAGNESIUM SERPL-MCNC: 1.8 MG/DL (ref 1.6–2.3)
MCH RBC QN AUTO: 29.7 PG (ref 26.5–33)
MCHC RBC AUTO-ENTMCNC: 31.8 G/DL (ref 31.5–36.5)
MCV RBC AUTO: 93 FL (ref 78–100)
PLATELET # BLD AUTO: 156 10E3/UL (ref 150–450)
POTASSIUM BLD-SCNC: 3.8 MMOL/L (ref 3.4–5.3)
RBC # BLD AUTO: 3.33 10E6/UL (ref 3.8–5.2)
SODIUM SERPL-SCNC: 139 MMOL/L (ref 133–144)
UFH PPP CHRO-ACNC: 0.12 IU/ML
UFH PPP CHRO-ACNC: 0.13 IU/ML
UFH PPP CHRO-ACNC: >1.1 IU/ML
WBC # BLD AUTO: 9 10E3/UL (ref 4–11)

## 2021-09-21 PROCEDURE — 250N000013 HC RX MED GY IP 250 OP 250 PS 637: Performed by: STUDENT IN AN ORGANIZED HEALTH CARE EDUCATION/TRAINING PROGRAM

## 2021-09-21 PROCEDURE — 36415 COLL VENOUS BLD VENIPUNCTURE: CPT | Performed by: STUDENT IN AN ORGANIZED HEALTH CARE EDUCATION/TRAINING PROGRAM

## 2021-09-21 PROCEDURE — 85520 HEPARIN ASSAY: CPT | Performed by: PLASTIC SURGERY

## 2021-09-21 PROCEDURE — 36415 COLL VENOUS BLD VENIPUNCTURE: CPT | Performed by: PLASTIC SURGERY

## 2021-09-21 PROCEDURE — 250N000011 HC RX IP 250 OP 636: Performed by: STUDENT IN AN ORGANIZED HEALTH CARE EDUCATION/TRAINING PROGRAM

## 2021-09-21 PROCEDURE — 80048 BASIC METABOLIC PNL TOTAL CA: CPT | Performed by: STUDENT IN AN ORGANIZED HEALTH CARE EDUCATION/TRAINING PROGRAM

## 2021-09-21 PROCEDURE — 120N000002 HC R&B MED SURG/OB UMMC

## 2021-09-21 PROCEDURE — 258N000003 HC RX IP 258 OP 636: Performed by: STUDENT IN AN ORGANIZED HEALTH CARE EDUCATION/TRAINING PROGRAM

## 2021-09-21 PROCEDURE — 83735 ASSAY OF MAGNESIUM: CPT | Performed by: STUDENT IN AN ORGANIZED HEALTH CARE EDUCATION/TRAINING PROGRAM

## 2021-09-21 PROCEDURE — 85027 COMPLETE CBC AUTOMATED: CPT | Performed by: STUDENT IN AN ORGANIZED HEALTH CARE EDUCATION/TRAINING PROGRAM

## 2021-09-21 RX ADMIN — TRAZODONE HYDROCHLORIDE 50 MG: 50 TABLET ORAL at 21:12

## 2021-09-21 RX ADMIN — HYDROMORPHONE HYDROCHLORIDE 0.2 MG: 0.2 INJECTION, SOLUTION INTRAMUSCULAR; INTRAVENOUS; SUBCUTANEOUS at 00:12

## 2021-09-21 RX ADMIN — OXYCODONE HYDROCHLORIDE 10 MG: 10 TABLET ORAL at 17:11

## 2021-09-21 RX ADMIN — POTASSIUM CHLORIDE, DEXTROSE MONOHYDRATE AND SODIUM CHLORIDE: 150; 5; 450 INJECTION, SOLUTION INTRAVENOUS at 16:11

## 2021-09-21 RX ADMIN — POLYETHYLENE GLYCOL 3350 17 G: 17 POWDER, FOR SOLUTION ORAL at 07:50

## 2021-09-21 RX ADMIN — ACETAMINOPHEN 975 MG: 325 TABLET, FILM COATED ORAL at 13:53

## 2021-09-21 RX ADMIN — OXYCODONE HYDROCHLORIDE 10 MG: 10 TABLET ORAL at 13:09

## 2021-09-21 RX ADMIN — HYDROMORPHONE HYDROCHLORIDE 0.2 MG: 0.2 INJECTION, SOLUTION INTRAMUSCULAR; INTRAVENOUS; SUBCUTANEOUS at 16:14

## 2021-09-21 RX ADMIN — OXYCODONE HYDROCHLORIDE 10 MG: 10 TABLET ORAL at 21:11

## 2021-09-21 RX ADMIN — ACETAMINOPHEN 975 MG: 325 TABLET, FILM COATED ORAL at 05:57

## 2021-09-21 RX ADMIN — ONDANSETRON 4 MG: 2 INJECTION INTRAMUSCULAR; INTRAVENOUS at 09:24

## 2021-09-21 RX ADMIN — ASPIRIN 81 MG: 81 TABLET ORAL at 07:50

## 2021-09-21 RX ADMIN — HYDROMORPHONE HYDROCHLORIDE 0.2 MG: 0.2 INJECTION, SOLUTION INTRAMUSCULAR; INTRAVENOUS; SUBCUTANEOUS at 04:00

## 2021-09-21 RX ADMIN — ONDANSETRON 4 MG: 2 INJECTION INTRAMUSCULAR; INTRAVENOUS at 23:26

## 2021-09-21 RX ADMIN — POTASSIUM CHLORIDE, DEXTROSE MONOHYDRATE AND SODIUM CHLORIDE: 150; 5; 450 INJECTION, SOLUTION INTRAVENOUS at 05:57

## 2021-09-21 RX ADMIN — DOCUSATE SODIUM 50 MG AND SENNOSIDES 8.6 MG 1 TABLET: 8.6; 5 TABLET, FILM COATED ORAL at 07:50

## 2021-09-21 RX ADMIN — OXYCODONE HYDROCHLORIDE 10 MG: 10 TABLET ORAL at 07:50

## 2021-09-21 RX ADMIN — OXYCODONE HYDROCHLORIDE 5 MG: 5 TABLET ORAL at 03:04

## 2021-09-21 RX ADMIN — ACETAMINOPHEN 975 MG: 325 TABLET, FILM COATED ORAL at 21:11

## 2021-09-21 RX ADMIN — SERTRALINE HYDROCHLORIDE 50 MG: 50 TABLET ORAL at 21:11

## 2021-09-21 RX ADMIN — DOCUSATE SODIUM 50 MG AND SENNOSIDES 8.6 MG 1 TABLET: 8.6; 5 TABLET, FILM COATED ORAL at 21:11

## 2021-09-21 RX ADMIN — AMLODIPINE BESYLATE 10 MG: 10 TABLET ORAL at 07:50

## 2021-09-21 RX ADMIN — HYDROMORPHONE HYDROCHLORIDE 0.2 MG: 0.2 INJECTION, SOLUTION INTRAMUSCULAR; INTRAVENOUS; SUBCUTANEOUS at 12:04

## 2021-09-21 ASSESSMENT — ACTIVITIES OF DAILY LIVING (ADL)
ADLS_ACUITY_SCORE: 4
ADLS_ACUITY_SCORE: 4
ADLS_ACUITY_SCORE: 10
ADLS_ACUITY_SCORE: 10
ADLS_ACUITY_SCORE: 4
ADLS_ACUITY_SCORE: 10

## 2021-09-21 NOTE — ANESTHESIA POSTPROCEDURE EVALUATION
Patient: Leigh Espinal    Procedure(s):  Bilateral breast reconstruction with free trans rectus abdominus muscle flap, additional superficial system revascularization bilaterally with left breast vein graft, strattice abdominal wall reconstruction and SPY    Diagnosis:S/P breast reconstruction, bilateral [Z98.890]  Diagnosis Additional Information: No value filed.    Anesthesia Type:  General    Note:  Disposition: Inpatient   Postop Pain Control: Uneventful            Sign Out: Well controlled pain   PONV:    Neuro/Psych: Uneventful            Sign Out: Acceptable/Baseline neuro status   Airway/Respiratory: Uneventful            Sign Out: Acceptable/Baseline resp. status   CV/Hemodynamics: Uneventful            Sign Out: Acceptable CV status; No obvious hypovolemia; No obvious fluid overload   Other NRE: NONE   DID A NON-ROUTINE EVENT OCCUR?            Last vitals:  Vitals Value Taken Time   /74 09/20/21 2150   Temp 36.7  C (98.1  F) 09/20/21 2100   Pulse 75 09/20/21 2157   Resp 18 09/20/21 2157   SpO2 98 % 09/20/21 2157   Vitals shown include unvalidated device data.    Electronically Signed By: Adriano Cummings MD  September 20, 2021  9:58 PM

## 2021-09-21 NOTE — BRIEF OP NOTE
[facility] Brief Operative Note    Pre-operative diagnosis: Acquired bilateral absence of breasts   Post-operative diagnosis Same as preop diagnosis   Procedure: Procedure(s):  Bilateral breast reconstruction with free trans rectus abdominus muscle flap, additional superficial system revascularization bilaterally with left breast vein graft, strattice abdominal wall reconstruction and SPY   Surgeon(s): Surgeon(s) and Role:     * CEE Chatterjee MD - Primary     * Mihir Funes MD - Assisting     * Roberth Coppola MD - Resident - Assisting   Estimated blood loss: 500 mL    Specimens: ID Type Source Tests Collected by Time Destination   1 : right breast skin Tissue Breast, Right SURGICAL PATHOLOGY EXAM CEE Chatterjee MD 9/20/2021  3:55 PM    2 : left breast skin Tissue Breast, Left SURGICAL PATHOLOGY EXAM CEE Chatterjee MD 9/20/2021  5:29 PM       Findings: BL breast flaps viable at the end of the case. Both shorty-abdominal flaps had significant in/outflow from superficial system and drainage augmented with inter-flap vein anastomosis on the left shorty-flap to right chest and a vein graft lengthening of SIEV to caudal medial left IMV. Also, left artery appeared to have more proximal intimal flap/injury and IDEA/V-cranial ANTHONY/V anastomoses were redone. ASA. Heparin IV bolus given and low dose drip started.

## 2021-09-21 NOTE — PLAN OF CARE
POD 1. AVSS on 2L. Capno in place. Pain managed with Oxycodone, IV DIlaudid and Tylenol. Nausea x1 when trying to dangle managed with Zofran. On a regular diet, has only tried ice cream. PIV x2 with heparin gtt and MIVF. Heparin gtt at 450 U/hr, next hep10A check at 3pm. YOLY x3 with bright bloody output, stripped q4h. Abebe removed at 11:15, due to void by 5:15. Abdominal incision MARCELLO with ABD's and binder in place. Flap q1h, warm, pale pink and +doppler. Pt up with 1A.  Continue POC

## 2021-09-21 NOTE — PROGRESS NOTES
"RN RECOVERY NOTE   Assigned to precept while Sanaz MATTA orienting to PACU  Sanaz MATTA RN assigned as primary RN while pt in PACU recovering post procedure   Pt ARRIVE to PACU around 1900  Abdominal binder in place  Warming device utilized   Bare hugger placed on arrival to PACU and remains in place while in PACU   @ 1905 Anesthesia called for PACU orders including Zofran due to pt being nauseas on arrival and receiving medication in OR  @ 1910 Anesthesia quick paged the following:  \"Leigh Kylie in PACU 15 from OR 8 needs PACU orders placed. Pt nauseas on arrival and would like to administer Zofran. Michael KENDALL [id number]\"  @ 1910 x2 physicians from Surgical Team @ pt bedside while pt in PACU   -pt nauseas at this time. Once resolved will have pt take PO ASA (order placed but not yet released)  -Heparin infusing @ 800 units/hr on arrival and will remain infusing at rate. Lab check due @ 2300 tonight 9/20/21  -mobility limited in PACU - MAUREEN/UTV backside   Left breast slightly asymmetric in comparison to right side- swelling noted and marked with physicians at this time.  @ 1915 Lab called and notified of need to obtain blood for stat labs.   Labs obtained. Results pending.   Dr. Cummings @ pt bedside in PACU aware of need for PACU orders.   Sanaz MATTA RN provides report to Jaky Steele RN @ pt bedside in PACU @ 1955  "

## 2021-09-21 NOTE — PROGRESS NOTES
Brief Plastic Surgery Progress Note    Patient doing, has gotten out of bed, Abebe has been removed. Heparin Xa level came back subtherapeutic, gtt and bolus given per protocol.    Breast flaps are WWP with 2-3 sec cap refill, drains remain bloody, Vioptix in the high 60s, low 70s  Abdomen has some ecchymosis and ischemic changes near the midline otherwise c/d/i, bloody output from drain    Plan is to continue the heparin gtt until discharge at which point the patient will be placed on Lovenox 40 mg BID.    Roberth Coppola MD  Plastic Surgery

## 2021-09-21 NOTE — OP NOTE
PLASTIC SURGERY OPERATIVE REPORT     Procedure Date: 09/20/2021     PREOPERATIVE DIAGNOSES:    1. History of bilateral breast cancer with bilateral chest wall radiation  2. Bilateral absence of breasts     POSTOPERATIVE DIAGNOSES:  Same as preoperative diagnoses     PROCEDURES PERFORMED:    1.  Bilateral breast reconstruction with free TRAM flap breast reconstruction.  2.  Right breast superficial venous outflow codominance requiring additional anastomosis of superficial venous system (superficial inferior epigastric vein) to the deep venous system (a side-branch of the deep inferior epigastric vein on the flap pedicle) using a 2.0 mm venous .  3.  Left breast superficial venous outflow codominance requiring additional anastomosis of the superficial system (superficial inferior epigastric vein) to the caudal medial internal mammary vein with a 2.0 mm venous  as well as use of a vein graft from the left superficial circumflex iliac vein to lengthen then working length of the additional venous outflow  4.  Intraoperative indocyanine green angiography using the SPY Elite system.   5.  Bilateral abdominal wall reinforcement with Strattice acellular dermal matrix sized 16.5 cm on each side, 100% used of the 16 x 10 cm piece.     SURGEON:  Mihir Funes MD     CO SURGEON:  Leeann Chatterjee MD (co-surgeon was required for this case due to the technical complexity and long duration of the free tissue transfer breast reconstruction in the setting of inconsistent availability of appropriate skilled resident assistants). Dr. Chatterjee was the main surgeon of the right breast reconstruction and abdominal wall reconstruction, whereas Dr. Funes was the main surgeon of the left breast reconstruction, open vein graft harvest, and intraoperative indocyanine green angiography.     ASSISTANT:  Roberth Coppola MD     ANESTHESIA:  General anesthesia with endotracheal intubation.     ESTIMATED BLOOD LOSS:  500  Yulia.     FINDINGS:  1.  Bilateral free flap breast reconstructions required added time taken for the added anastomosis of superficial to deep venous systems and vein graft harvest. This added at least 2 hours to the case.  2.  End-to-end anastomosis of the left deep inferior epigastric artery to the right internal mammary artery with 8-0 nylon suture in interrupted fashion and left deep inferior epigastric vein to the right internal mammary vein with a 3.5 mm venous .  The right side measured about 950 grams in weight and the ischemia time was approximately 37 minutes.  3.  End-to-end anastomosis of the right deep inferior epigastric artery to the left internal mammary artery using 8-0 nylon suture in interrupted fashion and right deep inferior epigastric vein to left internal mammary vein (lateral) using a 2.5 mm venous .  Left breast flap weight was approximately 1074 grams and left breast ischemia time was about 45 minutes.     SPECIMENS:  Bilateral breast skin.     COMPLICATIONS:  Intraoperative left internal mammary artery thrombosis due to severe intimal radiation damage and we visualized a more proximal intimal flap requiring 1 anastomosis takedown, cut back of the vessels and reanastomosis and initiation of heparin drip.  We noticed the left flap was pale while we were de-epithelializing the flap and there was no active bleeding, which triggered us to reevaluate the anastomosis finding the intraoperative thrombosis.  Took down the anastomosis, flushed the flap with no resistance and excellent venous outflow and then redid the anastomosis appropriately and it was successful.     DRAINS:  15-Telugu drain per breast and abdomen, total of 3 drains.     IMPLANTS:  None.     DESCRIPTION OF PROCEDURE:  After informed consent was taken from the patient, the proper site and procedure was ascertained with her and she was appropriately marked and taken to the operating room.  In the preoperative area,  preoperative antibiotics were given appropriately and preoperative Lovenox was given subcutaneously.  Strict attention was given to keeping the patient warmed throughout the case.  She was taken to the operating room and placed in supine position with all pressure points appropriately padded, Pneumoboots placed and running prior to induction of anesthesia and her knees flexed appropriately with a pillow underneath them.  General anesthesia was administered without any complications.  She was appropriately padded.  Under body Jaya Hugger placed.  Top body Jaya Hugger placed, room kept warm.  She was prepped and draped in standard surgical fashion.  We began by first turning attention to the chest wall.  Appropriate markings were made.  The original mastectomy incision was cut and opened, mastectomy skin flaps were raised superiorly.  The inferior portion of the flaps were left attached as these would be excised.  Attention was placed to finding the fourth rib cartilage on each side.  The pectoralis major muscle was cut, the cartilage was identified, the perichondrium was opened, the cartilage was excised appropriately using standard careful technique.  Under loupe magnification and bipolar cautery the posterior perichondrium was taken off and the deep inferior epigastric vessels were found on each side.  The left side was more stuck due to more damage from the radiation.  Once this was completed and we made sure that the vessels were present we turned our attention to the abdomen.  The CTA did not show large perforators in the abdomen, we did find one on each side that were relatively large and we tried to Doppler them, but we could not Doppler any signals.  The CTA also showed large inferior epigastric veins.  We went in with the knowledge that this may be an superficially based/codominant flap and also went in with the knowledge that we may have to do either a muscle-sparing or full TRAM flap rather than a MIKAL  flap if we could not find adequate perforators.  Appropriate upper markings were made.  Incision was then made.  Dissection carried out down to the deep fascia and the flap elevated all the way to the chest wall. We flexed the patient to about 45 degrees and then ensured that the inferior incisions could close primarily, which it could.  We flattened the patient and then made our inferior incision.  We dissected out the superficial inferior epigastric veins on each side.  These were much larger than usual.  They were dissected as long as possible, doubly clip ligated and cut.  The rest of the flap was dissected all the way down to the fascia from lateral to medial.  We then dissected finding the lateral rows of perforators. It became quite evident that the lateral row perforators were very small on each side.  We then bisected the flap, dissected from medial to lateral and similarly found the medial perforators, which were similarly extremely small.  At this point, we made the decision to do a free TRAM flap based reconstruction and not a MIKAL flap because the perforators were extremely small and we wanted multiple small perforators in the flap to keep as much of the skin viable as possible.  At this point, once the flaps were completely raised and on the small perforators both medial and lateral on each side, we carried out the first of the intraoperative indocyanine green angiography using the SPY Elite system.  It showed that there was good flow throughout the flaps on each side.  This was noted on both sides and confirmed that there was no inflow issue.  Our biggest concern was whether the outflow from the superficial system was codominant.  It was clear that once we had isolated the flaps that the superficial system venous systems were engorged and tight. To confirm that the superficial venous system was congested, the SPY angiogram was redone approximately 15-20 minutes after initial intravenous injection of  indocyanine green.  At this time point, both flaps were still illuminated by the dye. Specifically, the medial portions on each side were cleared of dye, but the lateral portions, especially on the left shorty-abdomen, were clearly and still well-lit.  Therefore, we decided to incorporate the superficial venous outflow of each flap into the harvest and transfer. We then went ahead and isolated the flaps on each side by cutting the anterior fascia around the island of the perforators and then isolating the muscle cutting it at its distal end on each side, elevating the flap all the way down towards the deep inferior epigastric vessels on each side.  Once this was completed, the vessels were isolated and now the flap was fully isolated. While isolating each shorty-abdominal flap, care was taken to make not of any large either side branch of the deep inferior epigastric vein or the caliber of the deep inferior epigastric vein cephalad continuation. It was noted that there was a diminutive continuation to the deep inferior epigastric vessels cephalad on both sides.     On the left shorty-abdominal flap, we found a large and distal-enough side branch target for anastomosis of the SIEV. However, on the right shorty-abdominal flap, there was none. We brought the microscope in and then went ahead and dissected the superficial system to give it more length out of the flap on each side. On the left shorty-abdominal flap, we went ahead and carried out the anastomosis of the superficial epigastric vein to the larger side branch of the deep inferior epigastric vein using a 2.0 mm venous . On the right shorty-abdominal flap, we planned for a vein graft lengthening of the functional length of the SIEV with plans to anastomose to the second internal mammary vein, either cranially or caudally. I harvested the superficial circumflex iliac vein of the left groin as long as possible, which was about 10 cm long.  This vein was then flushed  out with heparinized saline, marked for reference to prevent twisting and then then reversed to be anastomosed with the SIEV of the right shorty-abdomen using a 2.0 mm venous . Once these flaps were fully prepared for transfer, the final internal mammary vessel preparation was done under the microscope.  The vessels were appropriately cleared on each side.  In the right chest the medial vein in the right and the artery were available and could be used.  On the left side on the other hand, the medial vein was much smaller.  The lateral vein was much larger.  Therefore, we decided to use a lateral internal mammary vein for the deep inferior epigastric vein anastomosis.  Just prior to flap transfer, the vessels were appropriately cleared, double-clipped distally, micro clamped proximally, cut and the arterial flow or spurt was checked, as was the internal mammary venous drainage by flushing with heparinized saline. The arterial spurt and venous flush was appropriate prior to flap transfer on both sides. On the left chest, we decided to use the caudal medial internal mammary vein as it was the most appropriate caliber for the secondary veno-venous anastomosis.  First, we transferred the left shorty-abdominal flap to the right chest, secured it to the chest wall with lap pads and skin staples, and carried out the end-to-end anastomoses with 8-0 nylon suture for the artery and a 3.5 mm venous  for the vein. We let the clamps down and there was excellent flow throughout the flap. The flap pinked up nicely, had good bleeding, and the flap was provisionally inset and stapled. We again could not find any dopplerable signal on the flap given the small nature of the pedicles therefore went ahead and used the Vioptix to monitor transcutaneous oximetry, which was in the 70s. At this point, we turned our attention to harvesting the right shorty-abdominal flap, which was transferred to the left chest. In a similar fashion,  the end-to-end anastomoses were done with 8-0 nylon suture in interrupted fashion for the artery and 3.5 mm venous  for the primary deep inferior epigastric vein. After this, the secondary venous anastomosis (SIEV lengthened by the vein graft to the caudal medial internal mammary vein) was done with a 2.0 mm venous .  Similarly, the flap pinked up nicely, had good ViOptix signal in the 60s and turned our attention to the insetting of the flaps, which was done by sitting the patient up and tailor-tacking and shaping the breast appropriately.  At the same time the abdomen was also closed, the umbilicus was transected as discussed to and consented preoperatively, and closed with a 0 PDS suture in an interrupted figure-of-eight fashion. The remaining fascial defects were closed with a piece of 16 x 10 cm thick Strattice that was cut longitudinally into 2 pieces, with 100% use and each half devoted per side. These pieces of Strattice were inset with 0 PDS suture as an underlay.  Where fascia could be closed primarily, this was done.  A 15-Kazakh Fredi drain was placed, and the skin was closed using 0 PDS suture in the SFS and then 2-0 Monocryl suture in a deep dermal layer and 3-0 Stratafix suture in the skin.  While this was going on, we also deepithelialized the buried portion of the flaps on each side.  On the right side, there was active nice bleeding and the flap color and turgor was good.  The flap was inset and had excellent ViOptix signal.  On the left side while we were in de-epithelializing we noticed that there was no blood flow in the deepithelialized portion.  This made us worry about an arterial problem. Under the microscope, we evaluated the flaps pedicle and it was clear that there was no flow through the artery. Therefore, we took down the artery and there was white clot in it, that was all removed carefully.  The flap itself was flushed and it flushed easily and into the vein, through the  venous system and then when we looked at the arterial end it was clear that the intima was quite damaged and quite proximally as well.  We cut it back quite a bit until there was better intima.  Once we were happy with the intima, we carried out an end-to-end arterial anastomosis again with no issues.  Since there was a significant discrepancy in the lengths of the pedicle artery and vein, we also carried out a revision of the primary venous anastomosis with a 2.5 mm venous .  We then let the artery down.  Just prior to this, we gave the 5000 units of intravenous heparin and then put her on a 800 units an hour drip titrated to anti-Xa levels. We let the micro-clamps down at approximately 2 minutes after the heparin bolus was given and there was excellent flow through the flap. The deepithelialized portion of the flap regained normal bleeding and we then proceeded with flap inset again. Prior to skin closure, we placed drains in both breasts with care to keep away from the pedicles. Near the end of the case, the ViOptix signals on the left side where in the 60s and on the right side in the 70s, with excellent signal fidelity. The flaps were then sewn in using 2-0 Monocryl suture in a deep dermal layer followed by 3-0 nylon suture in interrupted horizontal mattress fashion.  At the end of the case, both breasts were soft and exhibited healthy color.  There were excellent ViOptix signals throughout.  Appropriate dressings were placed. The patient was placed in a binder.  The patient was extubated and sent to recovery room in stable condition.    Mihir Funes MD, PhD

## 2021-09-21 NOTE — PROGRESS NOTES
2000 Bedside report obtained from [facility] PAC[facility] RNs. Heparin gtt verified to be infusing at 800 units per hour. Care assumed.  2130 Dr Cummings paged for signout  2150 Dr Cummings at bedside ok for signout

## 2021-09-21 NOTE — PROGRESS NOTES
"PRS    No complaints. Rested some. No changes. Pain is controlled.    /65 (BP Location: Left arm)   Pulse 99   Temp 97.6  F (36.4  C) (Oral)   Resp 22   Ht 1.626 m (5' 4\")   Wt 76.9 kg (169 lb 8.5 oz)   SpO2 96%   BMI 29.10 kg/m    NAD  RRR  Nonlabored breathing  Flaps viable, pink, warm and unchanged  No chest/breast hematoma  Vioptix signal quality >90%, R breast sat 80s, L breast sat 70s and stable  Abdominal incision intact, no hematoma    Drains   L abdomen 110  L chest 40  R chest 80  Hgb 12.3 1930      A/P: 56F hx BL breast cancer s/p XRT (worse on L), and now POD #1 BL abdominally-based free flap reconstruction    -Will continue cares per protocol  -Continue flap checks Q1H and Vioptix, ASA 81, heparin low dose drip protocol titrated to anti-X levels  -Start clear liquids this morning  -Bedrest this morning, but will get OOB to ambulate with nursing assistance later today  -Continue drain care  -Once ambulating, will plan to discontinue Abebe  -Abdominal binder at all times and especially when starting to ambulate  -Pain and nausea control    Mihir Funes MD, PhD    "

## 2021-09-21 NOTE — PROGRESS NOTES
Assumed cares of pt from [id number]. Pt is AOVSS on 2L NC. Pain managed with Tylenol, Oxy and Dilaudid. Flap checks q1 hour and WNL. YOLY x 3, all with bloody drainage. Abdominal incision covered with ABD pads then abdominal binder. Abebe in place, patent and pt has adequate UOP. Continues on bedrest and NPO status. Heparin gtt continues. Anti-Xa from 0100 was >1.1, heparin was held for one hour and restarted @ 300u/h. Next anti-Xa is @ 0815. Continue POC.

## 2021-09-21 NOTE — PROGRESS NOTES
"Admitted/transferred from:   2 RN full   skin assessment completed by Kelley Brown, RN and Ana Laura Mary RN.  Skin assessment finding: other left breast, right breast, lower abdomen incisioins; right knee/shin scar   Interventions/actions: other q1h flap checks      Will continue to monitor.      Heparin drip at 800U/h, recheck anti-Xa at 01:00 on 9/21    Spoke to plastic surgery resident at 10:30 pm when patient was settled to [phone]     Left voicemail for  at patient request with room phone number and nursing station phone number    SCD's in place and patient tolerating    Abebe patent, draining to gravity    YOLY to bulb suction x 3    Flap checks WNL. RN able to find pulses on bilateral flap sites, and they were marked with X for doppler assessments.     Serial vitals set up on monitor, capnography in place and patient vitals WNL on 2L NC. /57 (BP Location: Left arm)   Pulse 78   Temp 97.4  F (36.3  C) (Oral)   Resp 19   Ht 1.626 m (5' 4\")   Wt 76.9 kg (169 lb 8.5 oz)   SpO2 96%   BMI 29.10 kg/m  .   "

## 2021-09-21 NOTE — PROGRESS NOTES
"Plastic Surgery Progress Note    S: Heparin gtt held due to Xa level and restarted at a lower rate. Otherwise no acute events.    /65 (BP Location: Left arm)   Pulse 99   Temp 97.6  F (36.4  C) (Oral)   Resp 22   Ht 1.626 m (5' 4\")   Wt 76.9 kg (169 lb 8.5 oz)   SpO2 96%   BMI 29.10 kg/m    Gen: NAD,   Chest: Non-labored on RA  Wound:   Breast flaps are WWP with ~ 3 sec cap refill, Vioptix reading 79% on the right and 73% on the left, drains are bloody  Abdominal incision is c/d/i with some ecchymosis just to the right of the midline, drains are bloody    A/P  Leigh Espinal is a 56 year old  who is now POD#1 s/p MIKAL flap requiring 1 redo of the left arterial anastomosis  - Q2H flap checks after 24 hours  - CLD this morning, likely advance to regular this evening as long as she does well today  - Please use the abdominal binder as frequently as possible  - Continue heparin gtt per protocol  - OK for up to chair today  - On discharge anticoagulant plan is as follows, discontinue the low intensity heparin gtt on day of discharge and start BID 40mg lovenox for a total course of 14 days  Dispo: Home in 2-3 days    Roberth Coppola MD  Plastic Surgery     "

## 2021-09-21 NOTE — OP NOTE
Procedure Date: 09/20/2021    PREOPERATIVE DIAGNOSES:  Status post bilateral breast cancer, underwent bilateral nipple non-sparing mastectomy and expander-based reconstruction followed by expander removal due to poor skin flap viability, ultimately ended up with radiation therapy bilaterally, now ready for delayed bilateral breast reconstruction.    POSTOPERATIVE DIAGNOSES:  Status post bilateral breast cancer, underwent bilateral nipple non-sparing mastectomy and expander-based reconstruction followed by expander removal due to poor skin flap viability, ultimately ended up with radiation therapy bilaterally, now ready for delayed bilateral breast reconstruction.    PLASTIC SURGERY PROCEDURES:    1.  Bilateral breast reconstruction with free TRAM flap breast reconstruction.  2.  Right breast superficial venous outflow codominance requiring an added anastomosis of superficial venous system (superficial inferior epigastric vein) to the deep venous system (a tributary off of the MIKAL inferior epigastric vein pedicle) using a 2.0 mm venous .  3.  Left breast superficial venous system codominant requiring anastomosis of the superficial system (superficial inferior epigastric vein) to the deep venous system (distal end of the medial internal mammary vein) with a 2.0 mm venous  as well as a vein graft harvest from the left superficial circumflex iliac vein as a conduit between the superficial and deep systems due to lack of length.  4.  Intraoperative chemical angiography using the SPY Elite system.   5.  Bilateral abdominal wall reinforcement with Strattice acellular dermal matrix size 16.5 cm on each side, 100% used of the 16 x 10 cm piece.    SURGEON:  Leeann Chatterjee MD    CO SURGEON:  Mihir Funes MD (co surgeon was required for this case due to the technical complexity and long duration of the free tissue transfer breast reconstruction in the setting of inconsistent availability of appropriate  skilled resident assistants.  Dr. Chatterjee was the main surgeon of the right breast reconstruction and abdominal wall reconstruction, whereas Dr. Funes was the main surgeon of the left breast reconstruction, vein graft harvest, and intraoperative chemical angiography).    ASSISTANT:  Roberth Coppola MD    ANESTHESIA:  General anesthesia with endotracheal intubation.    ESTIMATED BLOOD LOSS:  500 mL.    FINDINGS:  1.  Bilateral free flap breast reconstructions required added time taken for the added anastomosis of superficial to deep venous systems and vein graft harvest. This added at least 2 hours to the case.  2.  End-to-end anastomosis of the left MIKAL inferior epigastric artery to the right internal mammary artery with 8-0 nylon suture in interrupted fashion and left MIKAL inferior epigastric flap vein to the right internal mammary vein with a 3.5 mm venous .  The right side measured about 950 grams in weight and the ischemia time was approximately 37 minutes.  3.  End-to-end anastomosis of the right MIKAL inferior epigastric flap artery to the left internal mammary artery using 8-0 nylon suture in interrupted fashion and right MIKAL inferior epigastric flap vein to left internal mammary vein (lateral) using a 3.5 mm venous .  Left breast flap weight was approximately 1074 grams and left breast ischemia time was about 45 minutes.    SPECIMENS:  Bilateral breast skin.    COMPLICATIONS:  Intraoperative left internal mammary artery thrombosis due to severe intimal radiation damage requiring 1 takedown, cut back of the vessels and reanastomosis and initiation of heparin drip.  We noticed the left flap was pale while we were de-epithelializing the flap and there was no active bleeding, which triggered us to reevaluate the anastomosis finding the intraoperative thrombosis.  Took down the anastomosis, flushed the flap and then redid the anastomosis appropriately and it was successful.    DRAINS:  15-Malawian  drain per breast and abdomen, total of 3 drains.    IMPLANTS:  None.    DESCRIPTION OF PROCEDURE:  After informed consent was taken from the patient, the proper site and procedure was ascertained with her and she was appropriately marked and taken to the operating room.  In the preoperative area, preoperative antibiotics were given appropriately and preoperative Lovenox was given subcutaneously.  Strict attention was given to keeping the patient warmed throughout the case.  She was taken to the operating room and placed in supine position with all pressure points appropriately padded, Pneumoboots placed and running prior to induction of anesthesia and her knees flexed appropriately with a pillow underneath them.  General anesthesia was administered without any complications.  She was appropriately padded.  Under body Jaya Hugger placed.  Top body Jaya Hugger placed, room kept warm.  She was prepped and draped in standard surgical fashion.  We began by first turning attention to the chest wall.  Appropriate markings were made.  The original mastectomy incision was cut and opened, mastectomy skin flaps were raised superiorly.  The inferior portion of the flaps were left attached as these would be excised.  Attention was placed to finding the fourth rib cartilage on each side.  The pectoralis major muscle was cut, the cartilage was identified, the perichondrium was opened, the cartilage was excised appropriately using standard technique.  Under loupe magnification and bipolar cautery the posterior perichondrium was taken off and the deep inferior epigastric vessels were found on each side.  The left side was more stuck due to more damage from the radiation.  Once this was completed and we made sure that the vessels were present we turned our attention to the abdomen.  The CTA did not show large perforators in the abdomen, we did find one on each side that were relatively large and we tried to Doppler them, but we could  not Doppler any signals.  The CTA also showed large inferior epigastric veins.  I went in with the knowledge that this may be an superficially based/codominant flap and I also went in with the knowledge that I may have to do either a muscle-sparing or full TRAM flap rather than a MIKAL flap if I could not find adequate perforators.  Appropriate upper markings were made.  Incision made.  Dissection carried out down to the deep fascia and the flap elevated all the way to the chest wall.  I flexed the patient to about 45 degrees and then ensured that the inferior incisions could close primarily, which it could.  I flattened the patient and then made my inferior incision.  I dissected out the superficial inferior epigastric veins on each side.  These were much larger than usual.  They were dissected as long as possible, doubly clipped and cut.  The rest of the flap was dissected all the way down to the fascia from lateral to medial.  I then dissected finding the lateral rows of perforators.  It became quite evident that the lateral row perforators were very small on each side.  I then bisected the flap, dissected from medial to lateral and similarly found the medial perforators, which were again extremely small.  At this point, I made my decision that I would do a free TRAM flap based reconstruction and not a MIKAL flap because the perforators were extremely small and I wanted multiple small perforators in the flap to keep as much of the skin viable as possible.  At this point, once the flaps were completely raised and on the small perforators both medial and lateral on each side I then carried out a first of the intraoperative chemical angiography using the SPY Elite system.  It showed that there was good flow throughout the flaps on each side.  However, this was noted on both sides therefore, an inflow was not an issue.  My biggest concern was whether the outflow from the superficial system was codominant.  It was clear  that once I had isolated the flaps that the superficial system venous systems were engorged and tight.  This also led me to believe that there was a superficial codominance I therefore waited 15 minutes and then carried out the SPY angiogram again.  This time without any injection of ICG dye just looked at the venous outflow.  The central portion on each side had flown out, but the peripheral portions, especially on the left shorty-abdomen was clearly not emptied.  Both sides in fact had this picture.  Therefore, I decided that I would have to anastomose superficial to the MIKAL system to help augment venous outflow and prevent venous issues.  I then went ahead and harvested the flaps on each side by cutting the anterior fascia around the island of the perforators and then isolating the muscle cutting it at its distal end on each side, elevating the flap all the way down towards the MIKAL inferior epigastric vessels on each side.  Once this was completed, the vessels were isolated and now the flap was fully raised.  At this point during this dissection I tried to any large tributaries coming off of the deep system as proximal as possible away from the potential anastomosis site of the MIKAL system, so that I could hook in the superficial system into it.  On the left shorty-abdomen, I was able to find one, but on the right shorty-abdomen, I was not able to find any large perforators even the most distal end of the deep system where I had cut the muscle the vessel was extremely small.  We brought the microscope in and then went ahead and dissected the superficial system to give it more length out of the flap on each side.  On the left shorty-abdomen, I went ahead and carried out the anastomosis of the superficial epigastric vein to the tributary going into the MIKAL pedicle using a 2.0 mm venous .  On the right side, there was, as I had no recipient vessel I therefore decided that we would have to use one of the IMV in the  chest as our egress and therefore needed length.  I therefore harvested the superficial circumflex iliac vein of the left groin as long as possible, probably about 10 cm long.  This vein was then flushed out, marked and then the appropriate side was anastomosed to the superficial system of the right shorty-abdomen using a 2.0 mm venous .  Once these flaps, now ready to take up to the chest we turned the attention to the chest wall dissection.  This was done with the microscope on each side.  The vessels were appropriately cleared on each side.  In the right chest the medial vein in the right and the artery were available and could be used.  On the left side on the other hand, the medial vein was much smaller.  The lateral vein was much larger.  Therefore, we decided to use a lateral vein for the deep anastomosis.  These were cleared and appropriately double clipped distally and clamped proximally cut, just like the other side and then we checked the flow of the arterial blood, which was excellent on each side and then checked for venous egress with heparinized saline, which was good on both sides. On the left side given we had to anastomose the superficial system to the IMV we decided to use the medial vein for that purpose as it was smaller and we noticed that proximally, the vein started to move over to join the lateral vein.  Therefore, we decided to go in a retrograde fashion, dissected it and also clipped it proximally, clamped it distally and then cut it and then I double checked that it flowed well, which it did.  At this point, we started on the right chest, harvested the flap, brought it up appropriately, secured it and carried out the anastomosis with 8-0 nylon suture for the artery and a 3.5 mm venous  for the vein.  We let the clamps down and there was excellent flow throughout the flap.  The flap pinked up nicely, had good bleeding in the flap was temporarily inset and stapled.  We again  could not find any dopplerable signal on the flap given the small nature of the pedicles therefore went ahead and used the Bioptics machine to see the transcutaneous oximetry, which was in the 70s.  At this point, we turned our attention to the left breast, harvested the flap, carried out the anastomosis of the deep system.  Similarly with 8-0 nylon suture in interrupted fashion for the artery and 3.5 mm venous  for the vein and then carried out the venous anastomosis of the superficial system to the IMV with a 2.0 mm venous .  Again, the flap pinked up nicely, had good ViOptix signal in the 60s and turned our attention to the insetting of the flaps, which was done by sitting the patient up and tailor tacking appropriately and shaping the breast appropriately.  At the same time the abdomen was also closed, the umbilicus was cut and closed with a 0 PDS suture and then the fascial defects were closed with a piece of 16 x 10 cm thick Strattice that was cut longitudinally into 2 pieces, 100% use ,1/2 or one side and 1/2 on the other using 0 PDS suture to bridge the fascial gap.  Distally the fascia was closed primarily.  A 15-[place] Fredi drain was placed, and the skin was closed using 0 PDS suture in the SFS and then 2-0 Monocryl suture in a deep dermal layer and 3-0 Stratafix suture in the skin.  While this was going on, we also deepithelialized the buried portion of the flaps on each side.  On the right side, there was active nice bleeding and the flap color and turgor was good.  The flap was inset and had excellent ViOptix signal.  On the left side while we were in de-epithelializing we noticed that there was no blood flow in the deepithelialized portion.  This made us worry about an arterial problem.  We, under the microscope, evaluated the flaps pedicle and it was clear that there was no flow through the artery, therefore, we took down the artery and there was white clot in it, that was all removed  carefully.  The flap itself was flushed and it flushed easily the vein, through the venous system and then when we looked at the arterial end it was clear that the intima was quite damaged.  We cut it back quite a bit until there was better intima.  Once we were happy with the intima, we then carried out and we cut the end of   the flap artery as well.  We carried out an end-to-end anastomosis again and at this point also carried out the  venous system made it shorter and then carried out a 3.5 mm venous  anastomosis of the vein.  We then let the artery down.  Prior to that, I gave the patient 5000 units of bolus of heparin and then put her on an 800 units an hour drip, let the clamps down at 2 minutes after the bolus was given and there was excellent flow through the flap.  The deepithelialized portion of the flap shows bleeding red blood and then we inset the flap after de-epithelializing it appropriately, placed drains on each side.  At this point, the ViOptix signals on the left side where in the 60s and on the right side in the 70s.  The flaps were then sewn in using 2-0 Monocryl suture in a deep dermal layer followed by 3-0 nylon suture in interrupted horizontal mattress fashion.  At the end of the case, both breasts was soft, had good color.  There was excellent ViOptix signals.  Appropriate dressings were placed.  The patient was placed in a binder.  The patient was extubated and sent to recovery room in stable condition.    CEE Chatterjee MD        D: 2021   T: 2021   MT: [id number]    Name:     AZRA HERNADEZ  MRN:      [id number]        Account:        [id number]   :      1965           Procedure Date: 2021     Document: [id number]

## 2021-09-22 ENCOUNTER — ANESTHESIA (OUTPATIENT)
Dept: SURGERY | Facility: CLINIC | Age: 56
DRG: 584 | End: 2021-09-22
Payer: COMMERCIAL

## 2021-09-22 ENCOUNTER — ANESTHESIA EVENT (OUTPATIENT)
Dept: SURGERY | Facility: CLINIC | Age: 56
DRG: 584 | End: 2021-09-22
Payer: COMMERCIAL

## 2021-09-22 LAB
ANION GAP SERPL CALCULATED.3IONS-SCNC: 4 MMOL/L (ref 3–14)
BASE EXCESS BLDV CALC-SCNC: -6.3 MMOL/L (ref -8.1–1.9)
BLD PROD TYP BPU: NORMAL
BLOOD COMPONENT TYPE: NORMAL
BUN SERPL-MCNC: 31 MG/DL (ref 7–30)
CA-I BLD-MCNC: 4.2 MG/DL (ref 4.4–5.2)
CALCIUM SERPL-MCNC: 8.2 MG/DL (ref 8.5–10.1)
CHLORIDE BLD-SCNC: 108 MMOL/L (ref 94–109)
CO2 SERPL-SCNC: 24 MMOL/L (ref 20–32)
CODING SYSTEM: NORMAL
CREAT SERPL-MCNC: 1.74 MG/DL (ref 0.52–1.04)
CROSSMATCH: NORMAL
ERYTHROCYTE [DISTWIDTH] IN BLOOD BY AUTOMATED COUNT: 14.7 % (ref 10–15)
GFR SERPL CREATININE-BSD FRML MDRD: 33 ML/MIN/1.73M2
GLUCOSE BLD-MCNC: 125 MG/DL (ref 70–99)
GLUCOSE BLD-MCNC: 157 MG/DL (ref 70–99)
HCO3 BLDV-SCNC: 20 MMOL/L (ref 21–28)
HCT VFR BLD AUTO: 14.7 % (ref 35–47)
HGB BLD-MCNC: 10.3 G/DL (ref 11.7–15.7)
HGB BLD-MCNC: 10.7 G/DL (ref 11.7–15.7)
HGB BLD-MCNC: 4.7 G/DL (ref 11.7–15.7)
HGB BLD-MCNC: 7.6 G/DL (ref 11.7–15.7)
HOLD SPECIMEN: NORMAL
HOLD SPECIMEN: NORMAL
ISSUE DATE AND TIME: NORMAL
LACTATE BLD-SCNC: 5.5 MMOL/L
LACTATE SERPL-SCNC: 1.3 MMOL/L (ref 0.7–2)
MCH RBC QN AUTO: 29.7 PG (ref 26.5–33)
MCHC RBC AUTO-ENTMCNC: 32 G/DL (ref 31.5–36.5)
MCV RBC AUTO: 93 FL (ref 78–100)
O2/TOTAL GAS SETTING VFR VENT: 100 %
OXYHGB MFR BLDV: 87 % (ref 92–100)
PCO2 BLDV: 42 MM HG (ref 40–50)
PH BLDV: 7.28 [PH] (ref 7.32–7.43)
PLATELET # BLD AUTO: 126 10E3/UL (ref 150–450)
PO2 BLDV: 61 MM HG (ref 25–47)
POTASSIUM BLD-SCNC: 4.1 MMOL/L (ref 3.5–5)
POTASSIUM BLD-SCNC: 4.4 MMOL/L (ref 3.4–5.3)
RBC # BLD AUTO: 1.58 10E6/UL (ref 3.8–5.2)
SODIUM BLD-SCNC: 130 MMOL/L (ref 133–144)
SODIUM SERPL-SCNC: 136 MMOL/L (ref 133–144)
UFH PPP CHRO-ACNC: 0.18 IU/ML
UFH PPP CHRO-ACNC: 0.26 IU/ML
UNIT ABO/RH: NORMAL
UNIT NUMBER: NORMAL
UNIT STATUS: NORMAL
UNIT TYPE ISBT: 5100
WBC # BLD AUTO: 10.4 10E3/UL (ref 4–11)

## 2021-09-22 PROCEDURE — 250N000011 HC RX IP 250 OP 636: Performed by: STUDENT IN AN ORGANIZED HEALTH CARE EDUCATION/TRAINING PROGRAM

## 2021-09-22 PROCEDURE — 36415 COLL VENOUS BLD VENIPUNCTURE: CPT | Performed by: PLASTIC SURGERY

## 2021-09-22 PROCEDURE — 83605 ASSAY OF LACTIC ACID: CPT

## 2021-09-22 PROCEDURE — 80048 BASIC METABOLIC PNL TOTAL CA: CPT

## 2021-09-22 PROCEDURE — 84295 ASSAY OF SERUM SODIUM: CPT

## 2021-09-22 PROCEDURE — 36415 COLL VENOUS BLD VENIPUNCTURE: CPT | Performed by: STUDENT IN AN ORGANIZED HEALTH CARE EDUCATION/TRAINING PROGRAM

## 2021-09-22 PROCEDURE — 250N000009 HC RX 250: Performed by: NURSE ANESTHETIST, CERTIFIED REGISTERED

## 2021-09-22 PROCEDURE — P9016 RBC LEUKOCYTES REDUCED: HCPCS | Performed by: STUDENT IN AN ORGANIZED HEALTH CARE EDUCATION/TRAINING PROGRAM

## 2021-09-22 PROCEDURE — 36415 COLL VENOUS BLD VENIPUNCTURE: CPT | Performed by: ANESTHESIOLOGY

## 2021-09-22 PROCEDURE — 0HCT0ZZ EXTIRPATION OF MATTER FROM RIGHT BREAST, OPEN APPROACH: ICD-10-PCS | Performed by: PLASTIC SURGERY

## 2021-09-22 PROCEDURE — 250N000013 HC RX MED GY IP 250 OP 250 PS 637: Performed by: STUDENT IN AN ORGANIZED HEALTH CARE EDUCATION/TRAINING PROGRAM

## 2021-09-22 PROCEDURE — 370N000017 HC ANESTHESIA TECHNICAL FEE, PER MIN: Performed by: PLASTIC SURGERY

## 2021-09-22 PROCEDURE — 258N000003 HC RX IP 258 OP 636: Performed by: STUDENT IN AN ORGANIZED HEALTH CARE EDUCATION/TRAINING PROGRAM

## 2021-09-22 PROCEDURE — 85018 HEMOGLOBIN: CPT | Performed by: ANESTHESIOLOGY

## 2021-09-22 PROCEDURE — 120N000002 HC R&B MED SURG/OB UMMC

## 2021-09-22 PROCEDURE — 360N000075 HC SURGERY LEVEL 2, PER MIN: Performed by: PLASTIC SURGERY

## 2021-09-22 PROCEDURE — 85018 HEMOGLOBIN: CPT | Performed by: STUDENT IN AN ORGANIZED HEALTH CARE EDUCATION/TRAINING PROGRAM

## 2021-09-22 PROCEDURE — 83605 ASSAY OF LACTIC ACID: CPT | Performed by: STUDENT IN AN ORGANIZED HEALTH CARE EDUCATION/TRAINING PROGRAM

## 2021-09-22 PROCEDURE — 85018 HEMOGLOBIN: CPT

## 2021-09-22 PROCEDURE — 710N000010 HC RECOVERY PHASE 1, LEVEL 2, PER MIN: Performed by: PLASTIC SURGERY

## 2021-09-22 PROCEDURE — 86923 COMPATIBILITY TEST ELECTRIC: CPT | Performed by: STUDENT IN AN ORGANIZED HEALTH CARE EDUCATION/TRAINING PROGRAM

## 2021-09-22 PROCEDURE — 258N000003 HC RX IP 258 OP 636: Performed by: NURSE ANESTHETIST, CERTIFIED REGISTERED

## 2021-09-22 PROCEDURE — 272N000001 HC OR GENERAL SUPPLY STERILE: Performed by: PLASTIC SURGERY

## 2021-09-22 PROCEDURE — 84295 ASSAY OF SERUM SODIUM: CPT | Performed by: STUDENT IN AN ORGANIZED HEALTH CARE EDUCATION/TRAINING PROGRAM

## 2021-09-22 PROCEDURE — 999N000128 HC STATISTIC PERIPHERAL IV START W/O US GUIDANCE

## 2021-09-22 PROCEDURE — 19020 MASTOTOMY EXPL DRG ABSC DP: CPT | Mod: 78 | Performed by: PLASTIC SURGERY

## 2021-09-22 PROCEDURE — 82330 ASSAY OF CALCIUM: CPT

## 2021-09-22 PROCEDURE — 85520 HEPARIN ASSAY: CPT | Performed by: PLASTIC SURGERY

## 2021-09-22 PROCEDURE — 85041 AUTOMATED RBC COUNT: CPT | Performed by: STUDENT IN AN ORGANIZED HEALTH CARE EDUCATION/TRAINING PROGRAM

## 2021-09-22 PROCEDURE — 250N000011 HC RX IP 250 OP 636: Performed by: NURSE ANESTHETIST, CERTIFIED REGISTERED

## 2021-09-22 PROCEDURE — 250N000025 HC SEVOFLURANE, PER MIN: Performed by: PLASTIC SURGERY

## 2021-09-22 PROCEDURE — 82805 BLOOD GASES W/O2 SATURATION: CPT

## 2021-09-22 RX ORDER — ONDANSETRON 2 MG/ML
INJECTION INTRAMUSCULAR; INTRAVENOUS PRN
Status: DISCONTINUED | OUTPATIENT
Start: 2021-09-22 | End: 2021-09-22

## 2021-09-22 RX ORDER — OXYCODONE HYDROCHLORIDE 5 MG/1
5 TABLET ORAL EVERY 4 HOURS PRN
Status: DISCONTINUED | OUTPATIENT
Start: 2021-09-22 | End: 2021-09-22 | Stop reason: HOSPADM

## 2021-09-22 RX ORDER — CEFAZOLIN SODIUM 1 G/3ML
INJECTION, POWDER, FOR SOLUTION INTRAMUSCULAR; INTRAVENOUS PRN
Status: DISCONTINUED | OUTPATIENT
Start: 2021-09-22 | End: 2021-09-22

## 2021-09-22 RX ORDER — HALOPERIDOL 5 MG/ML
1 INJECTION INTRAMUSCULAR
Status: DISCONTINUED | OUTPATIENT
Start: 2021-09-22 | End: 2021-09-22 | Stop reason: HOSPADM

## 2021-09-22 RX ORDER — CALCIUM CHLORIDE 100 MG/ML
INJECTION INTRAVENOUS; INTRAVENTRICULAR PRN
Status: DISCONTINUED | OUTPATIENT
Start: 2021-09-22 | End: 2021-09-22

## 2021-09-22 RX ORDER — SODIUM CHLORIDE, SODIUM GLUCONATE, SODIUM ACETATE, POTASSIUM CHLORIDE AND MAGNESIUM CHLORIDE 526; 502; 368; 37; 30 MG/100ML; MG/100ML; MG/100ML; MG/100ML; MG/100ML
INJECTION, SOLUTION INTRAVENOUS CONTINUOUS PRN
Status: DISCONTINUED | OUTPATIENT
Start: 2021-09-22 | End: 2021-09-22

## 2021-09-22 RX ORDER — LIDOCAINE HYDROCHLORIDE 20 MG/ML
INJECTION, SOLUTION INFILTRATION; PERINEURAL PRN
Status: DISCONTINUED | OUTPATIENT
Start: 2021-09-22 | End: 2021-09-22

## 2021-09-22 RX ORDER — FENTANYL CITRATE 50 UG/ML
INJECTION, SOLUTION INTRAMUSCULAR; INTRAVENOUS PRN
Status: DISCONTINUED | OUTPATIENT
Start: 2021-09-22 | End: 2021-09-22

## 2021-09-22 RX ORDER — LABETALOL HYDROCHLORIDE 5 MG/ML
10 INJECTION, SOLUTION INTRAVENOUS
Status: DISCONTINUED | OUTPATIENT
Start: 2021-09-22 | End: 2021-09-22 | Stop reason: HOSPADM

## 2021-09-22 RX ORDER — ONDANSETRON 2 MG/ML
4 INJECTION INTRAMUSCULAR; INTRAVENOUS EVERY 30 MIN PRN
Status: DISCONTINUED | OUTPATIENT
Start: 2021-09-22 | End: 2021-09-22 | Stop reason: HOSPADM

## 2021-09-22 RX ORDER — HYDROMORPHONE HCL IN WATER/PF 6 MG/30 ML
0.2 PATIENT CONTROLLED ANALGESIA SYRINGE INTRAVENOUS EVERY 5 MIN PRN
Status: DISCONTINUED | OUTPATIENT
Start: 2021-09-22 | End: 2021-09-22 | Stop reason: HOSPADM

## 2021-09-22 RX ORDER — SODIUM CHLORIDE, SODIUM LACTATE, POTASSIUM CHLORIDE, CALCIUM CHLORIDE 600; 310; 30; 20 MG/100ML; MG/100ML; MG/100ML; MG/100ML
INJECTION, SOLUTION INTRAVENOUS CONTINUOUS
Status: DISCONTINUED | OUTPATIENT
Start: 2021-09-22 | End: 2021-09-22 | Stop reason: HOSPADM

## 2021-09-22 RX ORDER — PROPOFOL 10 MG/ML
INJECTION, EMULSION INTRAVENOUS PRN
Status: DISCONTINUED | OUTPATIENT
Start: 2021-09-22 | End: 2021-09-22

## 2021-09-22 RX ORDER — ACETAMINOPHEN 325 MG/1
975 TABLET ORAL EVERY 6 HOURS PRN
Status: DISCONTINUED | OUTPATIENT
Start: 2021-09-22 | End: 2021-09-22 | Stop reason: HOSPADM

## 2021-09-22 RX ORDER — DIPHENHYDRAMINE HYDROCHLORIDE 50 MG/ML
25 INJECTION INTRAMUSCULAR; INTRAVENOUS EVERY 6 HOURS PRN
Status: DISCONTINUED | OUTPATIENT
Start: 2021-09-22 | End: 2021-09-22 | Stop reason: HOSPADM

## 2021-09-22 RX ORDER — DEXAMETHASONE SODIUM PHOSPHATE 4 MG/ML
INJECTION, SOLUTION INTRA-ARTICULAR; INTRALESIONAL; INTRAMUSCULAR; INTRAVENOUS; SOFT TISSUE PRN
Status: DISCONTINUED | OUTPATIENT
Start: 2021-09-22 | End: 2021-09-22

## 2021-09-22 RX ORDER — FENTANYL CITRATE 50 UG/ML
25 INJECTION, SOLUTION INTRAMUSCULAR; INTRAVENOUS EVERY 5 MIN PRN
Status: DISCONTINUED | OUTPATIENT
Start: 2021-09-22 | End: 2021-09-22 | Stop reason: HOSPADM

## 2021-09-22 RX ORDER — DIPHENHYDRAMINE HCL 25 MG
25 CAPSULE ORAL EVERY 6 HOURS PRN
Status: DISCONTINUED | OUTPATIENT
Start: 2021-09-22 | End: 2021-09-22 | Stop reason: HOSPADM

## 2021-09-22 RX ORDER — ONDANSETRON 4 MG/1
4 TABLET, ORALLY DISINTEGRATING ORAL EVERY 30 MIN PRN
Status: DISCONTINUED | OUTPATIENT
Start: 2021-09-22 | End: 2021-09-22 | Stop reason: HOSPADM

## 2021-09-22 RX ORDER — ALBUTEROL SULFATE 0.83 MG/ML
2.5 SOLUTION RESPIRATORY (INHALATION) EVERY 4 HOURS PRN
Status: DISCONTINUED | OUTPATIENT
Start: 2021-09-22 | End: 2021-09-22 | Stop reason: HOSPADM

## 2021-09-22 RX ORDER — HYDRALAZINE HYDROCHLORIDE 20 MG/ML
2.5-5 INJECTION INTRAMUSCULAR; INTRAVENOUS EVERY 10 MIN PRN
Status: DISCONTINUED | OUTPATIENT
Start: 2021-09-22 | End: 2021-09-22 | Stop reason: HOSPADM

## 2021-09-22 RX ORDER — DIMENHYDRINATE 50 MG/ML
25 INJECTION, SOLUTION INTRAMUSCULAR; INTRAVENOUS
Status: DISCONTINUED | OUTPATIENT
Start: 2021-09-22 | End: 2021-09-22 | Stop reason: HOSPADM

## 2021-09-22 RX ORDER — SODIUM CHLORIDE 9 MG/ML
INJECTION, SOLUTION INTRAVENOUS CONTINUOUS
Status: DISCONTINUED | OUTPATIENT
Start: 2021-09-22 | End: 2021-09-23

## 2021-09-22 RX ADMIN — POTASSIUM CHLORIDE, DEXTROSE MONOHYDRATE AND SODIUM CHLORIDE: 150; 5; 450 INJECTION, SOLUTION INTRAVENOUS at 20:51

## 2021-09-22 RX ADMIN — ROCURONIUM BROMIDE 50 MG: 10 INJECTION INTRAVENOUS at 08:30

## 2021-09-22 RX ADMIN — PHENYLEPHRINE HYDROCHLORIDE 200 MCG: 10 INJECTION INTRAVENOUS at 08:33

## 2021-09-22 RX ADMIN — TRAZODONE HYDROCHLORIDE 50 MG: 50 TABLET ORAL at 20:54

## 2021-09-22 RX ADMIN — DEXAMETHASONE SODIUM PHOSPHATE 6 MG: 4 INJECTION, SOLUTION INTRA-ARTICULAR; INTRALESIONAL; INTRAMUSCULAR; INTRAVENOUS; SOFT TISSUE at 09:08

## 2021-09-22 RX ADMIN — FENTANYL CITRATE 150 MCG: 50 INJECTION, SOLUTION INTRAMUSCULAR; INTRAVENOUS at 08:30

## 2021-09-22 RX ADMIN — SODIUM CHLORIDE, SODIUM GLUCONATE, SODIUM ACETATE, POTASSIUM CHLORIDE AND MAGNESIUM CHLORIDE: 526; 502; 368; 37; 30 INJECTION, SOLUTION INTRAVENOUS at 08:18

## 2021-09-22 RX ADMIN — ONDANSETRON 4 MG: 2 INJECTION INTRAMUSCULAR; INTRAVENOUS at 09:03

## 2021-09-22 RX ADMIN — ASPIRIN 81 MG: 81 TABLET ORAL at 14:16

## 2021-09-22 RX ADMIN — MIDAZOLAM 1 MG: 1 INJECTION INTRAMUSCULAR; INTRAVENOUS at 08:28

## 2021-09-22 RX ADMIN — PROPOFOL 180 MG: 10 INJECTION, EMULSION INTRAVENOUS at 08:30

## 2021-09-22 RX ADMIN — ACETAMINOPHEN 975 MG: 325 TABLET, FILM COATED ORAL at 20:53

## 2021-09-22 RX ADMIN — LIDOCAINE HYDROCHLORIDE 100 MG: 20 INJECTION, SOLUTION INFILTRATION; PERINEURAL at 08:30

## 2021-09-22 RX ADMIN — SODIUM CHLORIDE, POTASSIUM CHLORIDE, SODIUM LACTATE AND CALCIUM CHLORIDE 1000 ML: 600; 310; 30; 20 INJECTION, SOLUTION INTRAVENOUS at 00:20

## 2021-09-22 RX ADMIN — ACETAMINOPHEN 975 MG: 325 TABLET, FILM COATED ORAL at 14:16

## 2021-09-22 RX ADMIN — HYDROMORPHONE HYDROCHLORIDE 0.4 MG: 0.2 INJECTION, SOLUTION INTRAMUSCULAR; INTRAVENOUS; SUBCUTANEOUS at 18:49

## 2021-09-22 RX ADMIN — DOCUSATE SODIUM 50 MG AND SENNOSIDES 8.6 MG 1 TABLET: 8.6; 5 TABLET, FILM COATED ORAL at 20:54

## 2021-09-22 RX ADMIN — SERTRALINE HYDROCHLORIDE 50 MG: 50 TABLET ORAL at 20:54

## 2021-09-22 RX ADMIN — PHENYLEPHRINE HYDROCHLORIDE 200 MCG: 10 INJECTION INTRAVENOUS at 09:15

## 2021-09-22 RX ADMIN — ENOXAPARIN SODIUM 40 MG: 40 INJECTION SUBCUTANEOUS at 09:27

## 2021-09-22 RX ADMIN — PHENYLEPHRINE HYDROCHLORIDE 200 MCG: 10 INJECTION INTRAVENOUS at 08:30

## 2021-09-22 RX ADMIN — CALCIUM CHLORIDE 500 MG: 100 INJECTION INTRAVENOUS; INTRAVENTRICULAR at 09:13

## 2021-09-22 RX ADMIN — PHENYLEPHRINE HYDROCHLORIDE 200 MCG: 10 INJECTION INTRAVENOUS at 08:44

## 2021-09-22 RX ADMIN — CEFAZOLIN 2 G: 1 INJECTION, POWDER, FOR SOLUTION INTRAMUSCULAR; INTRAVENOUS at 08:48

## 2021-09-22 RX ADMIN — ACETAMINOPHEN 975 MG: 325 TABLET, FILM COATED ORAL at 06:14

## 2021-09-22 RX ADMIN — OXYCODONE HYDROCHLORIDE 10 MG: 10 TABLET ORAL at 01:47

## 2021-09-22 RX ADMIN — PHENYLEPHRINE HYDROCHLORIDE 200 MCG: 10 INJECTION INTRAVENOUS at 08:52

## 2021-09-22 RX ADMIN — SUGAMMADEX 200 MG: 100 INJECTION, SOLUTION INTRAVENOUS at 09:23

## 2021-09-22 RX ADMIN — ATENOLOL 50 MG: 25 TABLET ORAL at 20:54

## 2021-09-22 RX ADMIN — PHENYLEPHRINE HYDROCHLORIDE 200 MCG: 10 INJECTION INTRAVENOUS at 08:56

## 2021-09-22 RX ADMIN — HYDROMORPHONE HYDROCHLORIDE 0.2 MG: 0.2 INJECTION, SOLUTION INTRAMUSCULAR; INTRAVENOUS; SUBCUTANEOUS at 12:42

## 2021-09-22 RX ADMIN — PHENYLEPHRINE HYDROCHLORIDE 200 MCG: 10 INJECTION INTRAVENOUS at 08:40

## 2021-09-22 RX ADMIN — CALCIUM CHLORIDE 500 MG: 100 INJECTION INTRAVENOUS; INTRAVENTRICULAR at 09:06

## 2021-09-22 RX ADMIN — MIDAZOLAM 1 MG: 1 INJECTION INTRAMUSCULAR; INTRAVENOUS at 08:37

## 2021-09-22 RX ADMIN — OXYCODONE HYDROCHLORIDE 10 MG: 10 TABLET ORAL at 06:14

## 2021-09-22 RX ADMIN — HYDROMORPHONE HYDROCHLORIDE 0.2 MG: 0.2 INJECTION, SOLUTION INTRAMUSCULAR; INTRAVENOUS; SUBCUTANEOUS at 12:08

## 2021-09-22 RX ADMIN — POTASSIUM CHLORIDE, DEXTROSE MONOHYDRATE AND SODIUM CHLORIDE: 150; 5; 450 INJECTION, SOLUTION INTRAVENOUS at 01:48

## 2021-09-22 RX ADMIN — ENOXAPARIN SODIUM 40 MG: 40 INJECTION SUBCUTANEOUS at 20:55

## 2021-09-22 RX ADMIN — OXYCODONE HYDROCHLORIDE 5 MG: 5 TABLET ORAL at 14:25

## 2021-09-22 RX ADMIN — PHENYLEPHRINE HYDROCHLORIDE 300 MCG: 10 INJECTION INTRAVENOUS at 09:02

## 2021-09-22 RX ADMIN — HYDROMORPHONE HYDROCHLORIDE 0.2 MG: 0.2 INJECTION, SOLUTION INTRAMUSCULAR; INTRAVENOUS; SUBCUTANEOUS at 00:19

## 2021-09-22 RX ADMIN — PHENYLEPHRINE HYDROCHLORIDE 200 MCG: 10 INJECTION INTRAVENOUS at 09:22

## 2021-09-22 ASSESSMENT — COPD QUESTIONNAIRES: COPD: 0

## 2021-09-22 ASSESSMENT — ACTIVITIES OF DAILY LIVING (ADL)
ADLS_ACUITY_SCORE: 8
ADLS_ACUITY_SCORE: 10

## 2021-09-22 ASSESSMENT — LIFESTYLE VARIABLES: TOBACCO_USE: 1

## 2021-09-22 NOTE — PROGRESS NOTES
"PRS    Just spoke with and examined patient  Done with transfusions  No issues  Pain dramatically improved  She states that she had pain starting in the early morning like 3-4 am when she felt sweaty  Otherwise doing well    BP (P) 95/48 (BP Location: Left arm)   Pulse 73   Temp 97.4  F (36.3  C) (Temporal)   Resp 13   Ht 1.626 m (5' 4\")   Wt 76.9 kg (169 lb 8.5 oz)   SpO2 (P) 93%   BMI 29.10 kg/m    NAD  RRR  Non-labored breathing  Bilateral reconstructed breasts with expectant swelling, no hematoma  Flaps are pink, warm, soft  Vioptix readings with high fidelity, oxygen saturations of 71-72% and stable  Abdominal incision intact, some bruising near the midline, no hematoma    Drains since OR, all bloody but slightly thinner  55 abdomen   35 R chest  12 L chest     Hgb 10.7 after 3 units at around 1100  Recheck 10.2 at 1700    A/P: 56F POD #2 s/p BL abdominally-based free flap breast recon c/b R breast hematoma, now s/p evacuation and stable    - Continue cares  - Can advance diet    Mihir Funes MD, PhD    "

## 2021-09-22 NOTE — PROGRESS NOTES
PRS    Seen at around 3p  No issues  Pain is well-controlled  Has ambulated several times within the room with nursing assistance  Heparin drip running    /68, HR 84  NAD  RRR  Nonlabored breathing  Bilateral breast flaps viable, warm, soft, normal color and cap refill, expectant swelling, no breast hematoma  Vioptix readings stable, 75% sat on the R, 68% on the L  Abdominal incision intact, no hematoma     recorded since midnight  Drains 120 mL abdomen, 40 mL L chest, 80 mL R chest - bloody but possibly thinning    A/P: 56F POD #1 s/p BL abdominally-based free flap breast recon    - Continue flap checks including Vioptix, ASA 81, Heparin drip titrated to anti-Xa levels, pain and nausea control  - Clear liquids, may advance if tolerated  - Continue to encourage ambulation with assistance  - Continue abdominal binder particularly when upright     Mihir Funes MD, PhD

## 2021-09-22 NOTE — PROGRESS NOTES
Pt found to have large amount of blood coming from right drain site and top of right breast flap. Pulses positive, color unchanged and tissue soft. Plastic surgery on call paged. Continue to monitor for changes. Gown and bedding changed. Pt given bed bath.

## 2021-09-22 NOTE — PROVIDER NOTIFICATION
09/22/21 0700   Call Information   Date of Call 09/22/21   Time of Call 0657   Name of person requesting the team Rebecca Balderas   Title of person requesting team RN   RRT Arrival time 0700   Time RRT ended 0810   Reason for call   Type of RRT Adult   Primary reason for call Sudden or unanticipated change in patient condition   Cardiovascular Other (describe)  (Hgb 4.4, recheck 4.7 with /39 (no MAP recorded))   Was patient transferred from the ED, ICU, or PACU within last 24 hours prior to RRT call? No   SBAR   Situation Initial Hgb 4.4 which was overwritten by Lab with recheck of 4.7. Diastolic BP 39 without MAP recorded.   Background Hx significant for hypertension, hereditary hemorrhagic telangiectasia, right middle lobe AVM status post embolization, liver hemangiomas, GERD, anxiety, depression, and breast cancer.  Patient underwent bilateral mastectomies for bilateral breast cancer.  She had breast reconstruction but required removal of expanders due to poor skin flaps and then required radiation treatment. Now POD #2 s/p BL abdominally-based free flap breast reconstruction   Notable History/Conditions Cancer;Hypertension;Neurological;Recent surgery   Assessment Drowsy, wakes to voice. Orientedx4. Describes minimal pain at chest/incisional sites, states pain is under control. VSS with HR to 125, sinus rhythm. Temp 96.4 oral, RR 32 at rest. Chest/incisioinal sites appear swollen.   Interventions Portable monitor;Other (describe)  (3U RBC and plans to return to OR.)   Patient Outcome   Patient Outcome Transferred to  (OR)   RRT Team   Attending/Primary/Covering Physician Plastic Surgery   Date Attending Physician notified 09/22/21   Time Attending Physician notified 0657   Physician(s) Megha Wilcox, DENIS   Lead RN Alecia Balderas (night shift) and Sanaz Peters (day shift)   RT NA   Post RRT Intervention Assessment   Post RRT Assessment Brought to Surgery

## 2021-09-22 NOTE — ANESTHESIA POSTPROCEDURE EVALUATION
Patient: Leigh Espinal    Procedure(s):  Right BREAST hematoma evacuation    Diagnosis:* No pre-op diagnosis entered *  Diagnosis Additional Information: No value filed.    Anesthesia Type:  General    Note:  Disposition: Admission   Postop Pain Control: Uneventful            Sign Out: Well controlled pain   PONV: No   Neuro/Psych: Uneventful            Sign Out: Acceptable/Baseline neuro status   Airway/Respiratory: Uneventful            Sign Out: Acceptable/Baseline resp. status   CV/Hemodynamics: Uneventful            Sign Out: Acceptable CV status   Other NRE: NONE   DID A NON-ROUTINE EVENT OCCUR? No           Last vitals:  Vitals Value Taken Time   /61 09/22/21 1115   Temp 37.3  C (99.2  F) 09/22/21 1045   Pulse 79 09/22/21 1123   Resp 13 09/22/21 1123   SpO2 99 % 09/22/21 1125   Vitals shown include unvalidated device data.    Electronically Signed By: Christopher J. Behrens, MD  September 22, 2021  11:41 AM

## 2021-09-22 NOTE — BRIEF OP NOTE
[facility]    Brief Operative Note    Pre-operative diagnosis: * No pre-op diagnosis entered *  Post-operative diagnosis Same as pre-operative diagnosis    Procedure: Procedure(s):  Right BREAST hematoma evacuation  Surgeon: Surgeon(s) and Role:     * CEE Chatterjee MD - Primary  Anesthesia: General   Estimated blood loss: 5ml, hematoma evac 250ml  Drains: Kahlil-Pitt  Specimens: * No specimens in log *  Findings:   250ml hematoma evacuated, no active bleeders.  Complications: None.  Implants: * No implants in log *

## 2021-09-22 NOTE — ANESTHESIA CARE TRANSFER NOTE
Patient: Leigh Espinal    Procedure(s):  Right BREAST hematoma evacuation    Diagnosis: * No pre-op diagnosis entered *  Diagnosis Additional Information: No value filed.    Anesthesia Type:   General     Note:    Oropharynx: oropharynx clear of all foreign objects and spontaneously breathing  Level of Consciousness: drowsy  Oxygen Supplementation: nasal cannula  Level of Supplemental Oxygen (L/min / FiO2): 4  Independent Airway: airway patency satisfactory and stable  Dentition: dentition unchanged  Vital Signs Stable: post-procedure vital signs reviewed and stable  Report to RN Given: handoff report given  Patient transferred to: PACU  Comments: PRBC infusing in pacu   Handoff Report: Identifed the Patient, Identified the Reponsible Provider, Reviewed the pertinent medical history, Discussed the surgical course, Reviewed Intra-OP anesthesia mangement and issues during anesthesia, Set expectations for post-procedure period and Allowed opportunity for questions and acknowledgement of understanding      Vitals:  Vitals Value Taken Time   BP 94/51 09/22/21 0946   Temp     Pulse 75 09/22/21 0948   Resp 14 09/22/21 0948   SpO2 94 % 09/22/21 0948   Vitals shown include unvalidated device data.    Electronically Signed By: MATTHEW Chavez CRNA  September 22, 2021  9:48 AM

## 2021-09-22 NOTE — PROGRESS NOTES
Rapid Response Team Note    Assessment   In assessment a rapid response was called on Leigh Espinal due to tachycardia and hypotension. This presentation is likely due to breast flap surgery. Overnight patients pulse has been increasing with lower blood pressures. Hgb resulted at 4.7. Primary team has been involved and blood is ordered and started. Blood ml/hr increased after the 15 minutes of the start of infusion. Discussed with primary team and they are planing on urgently evaluating patient.   Dr. Chatterjee arrived at bedside and planned to emergently take pt to OR.      Plan   -  Continue blood products  - Emergent OR intervention    -  The plastic surgery primary team was able to be reached and they are in agreement with the above plan.  -  Disposition: The patient will be transferred to OR.  -  Reassessment and plan follow-up will be performed by the primary team      MATTHEW Allison CNP  [facility] Job Code Contact #[phone]    Hospital Course   Brief Summary of events leading to rapid response:   Tachycardia and hypotension in the setting of lo hgb    Admission Diagnosis:   S/P breast reconstruction, bilateral [Z98.890]     Physical Exam   Temp: 96.8  F (36  C) Temp  Min: 96.4  F (35.8  C)  Max: 98.9  F (37.2  C)  Resp: (!) 32 Resp  Min: 16  Max: 32  SpO2: 95 % SpO2  Min: 93 %  Max: 98 %  Pulse: (!) 124 Pulse  Min: 110  Max: 139    No data recorded  BP: 98/48 Systolic (24hrs), Av , Min:98 , Max:112   Diastolic (24hrs), Av, Min:39, Max:60     I/Os: I/O last 3 completed shifts:  In: 3000 [P.O.:600; I.V.:2400]  Out: 1160 [Urine:470; Drains:690]     Exam:   General: in no acute distress  Mental Status: AAOx4.  Breast: right breast firm with bruising noted on right upper arm.    Significant Results and Procedures   Lactic Acid: No results for input(s): LACT, LACTS in the last 32350 hours.  CBC:   Recent Labs   Lab Test 21  0621 21  0819 21  1930   WBC 10.4  9.0 13.6*   HGB 4.7* 9.9* 12.3   HCT 14.7* 31.1* 38.0   * 156 199

## 2021-09-22 NOTE — ANESTHESIA PREPROCEDURE EVALUATION
Anesthesia Pre-Procedure Evaluation    Patient: Leigh Espinal   MRN: [id number] : 1965        Preoperative Diagnosis: S/P breast reconstruction, bilateral [Z98.890]   Procedure : Procedure(s):  Bilateral breast reconstruction with free abdominal flaps and SPY     Past Medical History:   Diagnosis Date     Anxiety and depression      AVM (arteriovenous malformation)     Right Pulmonary     Breast cancer (H)      GERD (gastroesophageal reflux disease)      HHT (hereditary hemorrhagic telangiectasia) (H) 2015    Possible- no ACVRL1, ENG or SMAD4 mutations found on sequencing 10/6/15      Hiatal hernia     nissen done in      HTN (hypertension)      Iron deficiency anemia 2012     Problem list name updated by automated process. Provider to review     Menorrhagia      Monoallelic mutation of CHEK2 gene in female patient 2020    CHEK2 c.[id number] [facility] 2019      Past Surgical History:   Procedure Laterality Date     BIOPSY NODE SENTINEL Bilateral 2020    Procedure: BILATERAL Axillary [place] Lymph Node Biopsy;  Surgeon: Paz Smith MD;  Location: [facility] OR     COLONOSCOPY  2012    Procedure: COLONOSCOPY;;  Surgeon: Radha Hector MD;  Location: [facility] GI     COLONOSCOPY N/A 2021    Procedure: COLONOSCOPY;  Surgeon: Wally Padilla DO;  Location: [facility] OR     GRAFT FREE VASCULARIZED TRANSVERSE RECTUS ABDOMINIS MYOCUTANEOUS Bilateral 2021    Procedure: Bilateral breast reconstruction with free trans rectus abdominus muscle flap, additional superficial system revascularization bilaterally with left breast vein graft, strattice abdominal wall reconstruction and SPY;  Surgeon: CEE hCatterjee MD;  Location: [facility] OR     INSERT TISSUE EXPANDER BREAST BILATERAL Bilateral 2020    Procedure: bilateral breast expander removal and washout;  Surgeon: CEE Chatterjee MD;  Location: [facility] OR     LAPAROSCOPIC NISSEN  FUNDOPLICATION  2007     MASTECTOMY SIMPLE Bilateral 1/29/2020    Procedure: BILATERAL Skin-Sparing Mastectomy;  Surgeon: Paz Smith MD;  Location: [facility] OR     RECONSTRUCT BREAST Bilateral 1/29/2020    Procedure: Bilateral breast reconstruction with expanders and SPY;  Surgeon: CEE Chatterjee MD;  Location: [facility] OR     TUBAL LIGATION  1990      Allergies   Allergen Reactions     Sulfa Drugs      Got really sick, headache, facial swelling, felt like she was dying.   Delayed reaction happened ~3-4 days after she started taking it.       Social History     Tobacco Use     Smoking status: Former Smoker     Packs/day: 1.00     Years: 20.00     Pack years: 20.00     Types: Cigarettes     Start date: 1/1/1980     Quit date: 9/13/2011     Years since quitting: 10.0     Smokeless tobacco: Never Used   Substance Use Topics     Alcohol use: Yes     Alcohol/week: 3.3 standard drinks     Types: 4 Standard drinks or equivalent per week     Comment: occ      Wt Readings from Last 1 Encounters:   09/20/21 76.9 kg (169 lb 8.5 oz)        Anesthesia Evaluation   Pt has had prior anesthetic.     No history of anesthetic complications       ROS/MED HX  ENT/Pulmonary: Comment: H/o RML AVM s/p emboliztion    (+) NEPTALI risk factors, snores loudly, hypertension, tobacco use, Past use,  (-) asthma and COPD   Neurologic:  - neg neurologic ROS     Cardiovascular:     (+) hypertension-----Previous cardiac testing   Echo: Date: 2015 Results:  Interpretation Summary  1. Positive bubble study with agitated saline. Large amount of bubbles are   seen on the left side within few beats after opacification of the right   atrium. Traditionally, this will be considered to be due to large PFO but   fast transit from a pulmonary AV fistula is also possible. Absence of right   sided chamber enlargement and negative color doppler goes against a large ASD   with significant shunt. Consider GUICHO for closer evaluation of interatrial   septum. MRI  can also be obtained if there is high clinical suspiscion for   large pulmonary AV fistula. 2. Normal biventricular systolic function. LVEF   estimate 60-65%. 3. No significant valvular abnormalities. 4. Normal IVC   with preserved respiratory variability.  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        Other:  - neg other ROS          Physical Exam    Airway  airway exam normal           Respiratory Devices and Support         Dental  no notable dental history         Cardiovascular   cardiovascular exam normal          Pulmonary   pulmonary exam normal                OUTSIDE LABS:  CBC:   Lab Results   Component Value Date    WBC 10.4 09/22/2021    WBC 9.0 09/21/2021    HGB 4.7 (LL) 09/22/2021    HGB 9.9 (L) 09/21/2021    HCT 14.7 (L) 09/22/2021    HCT 31.1 (L) 09/21/2021     (L) 09/22/2021     09/21/2021     BMP:   Lab Results   Component Value Date     09/21/2021     09/07/2021    POTASSIUM 3.8 09/21/2021    POTASSIUM 4.0 09/07/2021    CHLORIDE 108 09/21/2021    CHLORIDE 106 09/07/2021    CO2 26 09/21/2021    CO2 31 09/07/2021    BUN 18 09/21/2021    BUN 8 09/07/2021    CR 0.78  09/21/2021    CR 0.77 09/07/2021     (H) 09/21/2021     (H) 09/20/2021     COAGS:   Lab Results   Component Value Date    INR 1.11 03/04/2015     POC:   Lab Results   Component Value Date     (H) 01/29/2020    HCG Negative 02/02/2021    HCGS Negative 03/04/2015     HEPATIC:   Lab Results   Component Value Date    ALBUMIN 3.3 (L) 09/09/2020    PROTTOTAL 6.8 09/09/2020    ALT 30 09/09/2020    AST 22 09/09/2020    ALKPHOS 116 09/09/2020    BILITOTAL 0.3 09/09/2020     OTHER:   Lab Results   Component Value Date    ILIANA 8.1 (L) 09/21/2021    MAG 1.8 09/21/2021    TSH 2.54 09/09/2020    CRP <5.0 04/18/2012       Anesthesia Plan    ASA Status:  3   NPO Status:  NPO Appropriate    Anesthesia Type: General.     - Airway: ETT   Induction: Intravenous.   Maintenance: Balanced.        Consents    Anesthesia Plan(s) and associated risks, benefits, and realistic alternatives discussed. Questions answered and patient/representative(s) expressed understanding.     - Discussed with:  Patient      - Extended Intubation/Ventilatory Support Discussed: No.      - Patient is DNR/DNI Status: No    Use of blood products discussed: No .     Postoperative Care    Pain management: IV analgesics, Peripheral nerve block (Single Shot), Multi-modal analgesia.   PONV prophylaxis: Ondansetron (or other 5HT-3), Dexamethasone or Solumedrol     Comments:                    Christopher J. Behrens, MD

## 2021-09-22 NOTE — PROGRESS NOTES
POD 2. Capno on. Tachycardic this shift, md aware. Bolus given. Pain managed w PRN oxycodone and PRN IV dilaudid. Zofran given for nausea. Tolerating regular diet. Void spont, no gas or bm this shift. Abdominal incision gregory w binder on in place. JPX3, Copisous dried drainage at the site, not pinned on gown, MD aware. Flap Q1 hr checks, left is warm, right is cool to touch, MD aware. PIVx2 w D51/2NS+K infusing at 100 ml/hr, other with heparin gtt. RN managed heparin drip at 600 units/hr, next check at 0645. Up w 1 assist to bedside commode. Will continue to monitor.      Heparin drip shut off. Hgb 4.7, Md aware, 3 units of RBC to be given

## 2021-09-22 NOTE — PLAN OF CARE
Patient back from OR  at 1150, right breast/flap site post evacuation of hematoma.Right flap site pink, positive doppler, warm to touch on upper side of breast, cooler to touch on lower side of flap, Jaya hugger on.Left flap site pink, warm to touch, positive doppler. YOLY's x3 with bloody output, voided on commode, NPO except ice chips till 1700 Hgb drawn, if stable may have regular diet.Dilaudid iv with fair pain relief, Tylenol and Oxycodone given for right flap incisional pain.

## 2021-09-22 NOTE — PROGRESS NOTES
PRS    Recovering in PACU after R breast hematoma evacuation by my partner Dr Chatterjee. Comfortable. Getting a third unit of PRBCs.     HR 70  BP 94/51  Saturating 96% on a few liters NC  Bilateral breast flaps viable, pink, warm, appropriately swollen, no hematoma  Vioptix 73% om R, 71% on L, >90% signal quality  Abdominal incision intact, no hematoma, binder in place    Drains since midnight: Abdomen 100 mL, R chest 60 mL, L chest 55 mL     A/P: 56F POD #2 s/p BL abdominally-based free flap breast recon c/b R breast hematoma, now evacuated and patient stable    -Receiving total of 4 units PRBCs and then Hgb recheck  -Continue flap cares including Vioptix  -Continue abdominal binder and drain care  -Continue Abebe for I/O monitoring  -ASA 81, stopped heparin drip for now  -OK with return to floor, but defer to Dr Sen Funes MD, PhD

## 2021-09-22 NOTE — PROGRESS NOTES
Admitted/transferred from:   2 RN   skin assessment completed by Sanaz Peters, ENOCH and Jaida Brown  RN  Skin assessment finding: Lower abdominal incision with bruising, right breast site with bruising, axilla with bruising. Left breast flap site incision dry/intact.3 YOLY's intact.   Interventions/actions: Continue to monitor.     Will continue to monitor.

## 2021-09-22 NOTE — OR NURSING
Dr Behrens called at 1035 for sign out, reports he will put in. Verbal order obtained for NS @ 100 ml/hr.

## 2021-09-22 NOTE — ANESTHESIA PROCEDURE NOTES
Airway       Patient location during procedure: OR       Procedure Start/Stop Times: 9/22/2021 8:33 AM  Staff -        CRNA: Wally Garcia APRN CRNA       Performed By: CRNA  Consent for Airway        Urgency: elective  Indications and Patient Condition       Indications for airway management: matilda-procedural       Induction type:intravenous       Mask difficulty assessment: 1 - vent by mask    Final Airway Details       Final airway type: endotracheal airway       Successful airway: ETT - single and Oral  Endotracheal Airway Details        ETT size (mm): 7.0       Cuffed: yes       Successful intubation technique: direct laryngoscopy       DL Blade Type: Russell 2       Grade View of Cords: 1 (open and clear )       Adjucts: stylet       Position: Right       Measured from: gums/teeth       Secured at (cm): 21       Bite block used: None    Post intubation assessment        Placement verified by: capnometry, equal breath sounds and chest rise        Number of attempts at approach: 1       Secured with: cloth tape       Ease of procedure: easy       Dentition: Intact and Unchanged

## 2021-09-22 NOTE — PLAN OF CARE
POD 1. AVSS on 2L. Capno in place. Pain managed with PRN Oxycodone, IV DIlaudid and scheduled Tylenol. On a regular diet, minimal appetite. PIV x2 with heparin gtt and MIVF. Heparin gtt at 600 units/hr, next check at 2330. YOLY x3 with bright bloody output, stripped q4h. Abebe removed void x1. Abdominal incision MARCELLO with ABD's and binder in place. Flap q1h, warm, pale pink and +doppler. Pt up with 1 assist to commode.  Continue POC

## 2021-09-23 ENCOUNTER — APPOINTMENT (OUTPATIENT)
Dept: OCCUPATIONAL THERAPY | Facility: CLINIC | Age: 56
DRG: 584 | End: 2021-09-23
Attending: PLASTIC SURGERY
Payer: COMMERCIAL

## 2021-09-23 LAB
ANION GAP SERPL CALCULATED.3IONS-SCNC: 6 MMOL/L (ref 3–14)
BUN SERPL-MCNC: 31 MG/DL (ref 7–30)
CALCIUM SERPL-MCNC: 8.1 MG/DL (ref 8.5–10.1)
CHLORIDE BLD-SCNC: 110 MMOL/L (ref 94–109)
CO2 SERPL-SCNC: 23 MMOL/L (ref 20–32)
CREAT SERPL-MCNC: 0.96 MG/DL (ref 0.52–1.04)
CREAT SERPL-MCNC: 1.43 MG/DL (ref 0.52–1.04)
ERYTHROCYTE [DISTWIDTH] IN BLOOD BY AUTOMATED COUNT: 15.3 % (ref 10–15)
GFR SERPL CREATININE-BSD FRML MDRD: 42 ML/MIN/1.73M2
GFR SERPL CREATININE-BSD FRML MDRD: 67 ML/MIN/1.73M2
GLUCOSE BLD-MCNC: 116 MG/DL (ref 70–99)
HCT VFR BLD AUTO: 26.1 % (ref 35–47)
HGB BLD-MCNC: 8.9 G/DL (ref 11.7–15.7)
HOLD SPECIMEN: NORMAL
MAGNESIUM SERPL-MCNC: 2.1 MG/DL (ref 1.6–2.3)
MCH RBC QN AUTO: 30.5 PG (ref 26.5–33)
MCHC RBC AUTO-ENTMCNC: 34.1 G/DL (ref 31.5–36.5)
MCV RBC AUTO: 89 FL (ref 78–100)
PATH REPORT.COMMENTS IMP SPEC: NORMAL
PATH REPORT.COMMENTS IMP SPEC: NORMAL
PATH REPORT.FINAL DX SPEC: NORMAL
PATH REPORT.GROSS SPEC: NORMAL
PATH REPORT.MICROSCOPIC SPEC OTHER STN: NORMAL
PATH REPORT.RELEVANT HX SPEC: NORMAL
PHOSPHATE SERPL-MCNC: 2.2 MG/DL (ref 2.5–4.5)
PHOTO IMAGE: NORMAL
PLATELET # BLD AUTO: 81 10E3/UL (ref 150–450)
POTASSIUM BLD-SCNC: 4.4 MMOL/L (ref 3.4–5.3)
RBC # BLD AUTO: 2.92 10E6/UL (ref 3.8–5.2)
SODIUM SERPL-SCNC: 139 MMOL/L (ref 133–144)
WBC # BLD AUTO: 8.1 10E3/UL (ref 4–11)

## 2021-09-23 PROCEDURE — 80048 BASIC METABOLIC PNL TOTAL CA: CPT | Performed by: STUDENT IN AN ORGANIZED HEALTH CARE EDUCATION/TRAINING PROGRAM

## 2021-09-23 PROCEDURE — 85027 COMPLETE CBC AUTOMATED: CPT | Performed by: STUDENT IN AN ORGANIZED HEALTH CARE EDUCATION/TRAINING PROGRAM

## 2021-09-23 PROCEDURE — 250N000011 HC RX IP 250 OP 636: Performed by: STUDENT IN AN ORGANIZED HEALTH CARE EDUCATION/TRAINING PROGRAM

## 2021-09-23 PROCEDURE — 82565 ASSAY OF CREATININE: CPT | Performed by: PLASTIC SURGERY

## 2021-09-23 PROCEDURE — 97535 SELF CARE MNGMENT TRAINING: CPT | Mod: GO

## 2021-09-23 PROCEDURE — 97530 THERAPEUTIC ACTIVITIES: CPT | Mod: GO

## 2021-09-23 PROCEDURE — 36415 COLL VENOUS BLD VENIPUNCTURE: CPT | Performed by: PLASTIC SURGERY

## 2021-09-23 PROCEDURE — 250N000013 HC RX MED GY IP 250 OP 250 PS 637: Performed by: STUDENT IN AN ORGANIZED HEALTH CARE EDUCATION/TRAINING PROGRAM

## 2021-09-23 PROCEDURE — 120N000002 HC R&B MED SURG/OB UMMC

## 2021-09-23 PROCEDURE — 258N000003 HC RX IP 258 OP 636: Performed by: STUDENT IN AN ORGANIZED HEALTH CARE EDUCATION/TRAINING PROGRAM

## 2021-09-23 PROCEDURE — 97165 OT EVAL LOW COMPLEX 30 MIN: CPT | Mod: GO

## 2021-09-23 PROCEDURE — 84100 ASSAY OF PHOSPHORUS: CPT | Performed by: STUDENT IN AN ORGANIZED HEALTH CARE EDUCATION/TRAINING PROGRAM

## 2021-09-23 PROCEDURE — 83735 ASSAY OF MAGNESIUM: CPT | Performed by: STUDENT IN AN ORGANIZED HEALTH CARE EDUCATION/TRAINING PROGRAM

## 2021-09-23 PROCEDURE — 36415 COLL VENOUS BLD VENIPUNCTURE: CPT | Performed by: STUDENT IN AN ORGANIZED HEALTH CARE EDUCATION/TRAINING PROGRAM

## 2021-09-23 RX ADMIN — ASPIRIN 81 MG: 81 TABLET ORAL at 07:51

## 2021-09-23 RX ADMIN — ACETAMINOPHEN 975 MG: 325 TABLET, FILM COATED ORAL at 05:42

## 2021-09-23 RX ADMIN — POTASSIUM CHLORIDE, DEXTROSE MONOHYDRATE AND SODIUM CHLORIDE: 150; 5; 450 INJECTION, SOLUTION INTRAVENOUS at 15:19

## 2021-09-23 RX ADMIN — ACETAMINOPHEN 975 MG: 325 TABLET, FILM COATED ORAL at 14:03

## 2021-09-23 RX ADMIN — DOCUSATE SODIUM 50 MG AND SENNOSIDES 8.6 MG 1 TABLET: 8.6; 5 TABLET, FILM COATED ORAL at 07:51

## 2021-09-23 RX ADMIN — OXYCODONE HYDROCHLORIDE 5 MG: 5 TABLET ORAL at 12:17

## 2021-09-23 RX ADMIN — OXYCODONE HYDROCHLORIDE 10 MG: 10 TABLET ORAL at 19:52

## 2021-09-23 RX ADMIN — DOCUSATE SODIUM 50 MG AND SENNOSIDES 8.6 MG 1 TABLET: 8.6; 5 TABLET, FILM COATED ORAL at 19:52

## 2021-09-23 RX ADMIN — OXYCODONE HYDROCHLORIDE 5 MG: 5 TABLET ORAL at 04:53

## 2021-09-23 RX ADMIN — POTASSIUM CHLORIDE, DEXTROSE MONOHYDRATE AND SODIUM CHLORIDE: 150; 5; 450 INJECTION, SOLUTION INTRAVENOUS at 05:15

## 2021-09-23 RX ADMIN — ATENOLOL 50 MG: 25 TABLET ORAL at 19:52

## 2021-09-23 RX ADMIN — HYDROMORPHONE HYDROCHLORIDE 0.2 MG: 0.2 INJECTION, SOLUTION INTRAMUSCULAR; INTRAVENOUS; SUBCUTANEOUS at 07:55

## 2021-09-23 RX ADMIN — HYDROMORPHONE HYDROCHLORIDE 0.4 MG: 0.2 INJECTION, SOLUTION INTRAMUSCULAR; INTRAVENOUS; SUBCUTANEOUS at 00:24

## 2021-09-23 RX ADMIN — SERTRALINE HYDROCHLORIDE 50 MG: 50 TABLET ORAL at 19:52

## 2021-09-23 RX ADMIN — POLYETHYLENE GLYCOL 3350 17 G: 17 POWDER, FOR SOLUTION ORAL at 07:51

## 2021-09-23 RX ADMIN — ENOXAPARIN SODIUM 40 MG: 40 INJECTION SUBCUTANEOUS at 08:46

## 2021-09-23 RX ADMIN — HYDROMORPHONE HYDROCHLORIDE 0.2 MG: 0.2 INJECTION, SOLUTION INTRAMUSCULAR; INTRAVENOUS; SUBCUTANEOUS at 23:45

## 2021-09-23 RX ADMIN — ENOXAPARIN SODIUM 40 MG: 40 INJECTION SUBCUTANEOUS at 21:31

## 2021-09-23 RX ADMIN — TRAZODONE HYDROCHLORIDE 100 MG: 50 TABLET ORAL at 21:31

## 2021-09-23 ASSESSMENT — ACTIVITIES OF DAILY LIVING (ADL)
ADLS_ACUITY_SCORE: 8
ADLS_ACUITY_SCORE: 10
PREVIOUS_RESPONSIBILITIES: MEAL PREP;HOUSEKEEPING;LAUNDRY;SHOPPING;YARDWORK;MEDICATION MANAGEMENT;FINANCES;DRIVING;WORK

## 2021-09-23 NOTE — PROGRESS NOTES
"   09/23/21 1336   Quick Adds   Type of Visit Initial Occupational Therapy Evaluation   Living Environment   People in home spouse;other relative(s)  (MIL)   Current Living Arrangements house   Home Accessibility stairs within home   Number of Stairs, Within Home, Primary greater than 10 stairs  (Flight to main floor)   Stair Railings, Within Home, Primary railings on both sides of stairs   Transportation Anticipated car, drives self;family or friend will provide   Living Environment Comments Pt reporting living in a split level home with  and MIL. Pt  works from home. Pt has 14 stairs to get to main floor, all needs met on main floor. Pt with tub shower w/grab bars.    Self-Care   Usual Activity Tolerance good   Current Activity Tolerance moderate   Equipment Currently Used at Home none   Activity/Exercise/Self-Care Comment Pt with grab bars in shower, IND w/ADLs and IADLs at baseline.    Instrumental Activities of Daily Living (IADL)   Previous Responsibilities meal prep;housekeeping;laundry;shopping;yardwork;medication management;finances;driving;work   IADL Comments Pt reporting  and MIL able to assist while she recovers.    Disability/Function   Hearing Difficulty or Deaf no   Wear Glasses or Blind yes   Vision Management glasses   Concentrating, Remembering or Making Decisions Difficulty no   Difficulty Communicating no   Difficulty Eating/Swallowing no   Walking or Climbing Stairs Difficulty no   Dressing/Bathing Difficulty no   Toileting issues no   Doing Errands Independently Difficulty (such as shopping) no   Fall history within last six months no   Change in Functional Status Since Onset of Current Illness/Injury yes   General Information   Onset of Illness/Injury or Date of Surgery 09/20/21   Referring Physician Nikki Pearl MD   Patient/Family Therapy Goal Statement (OT) To return home   Additional Occupational Profile Info/Pertinent History of Current Problem Per chart \"56F hx " "BL breast cancer s/p XRT (worse on L), and now POD #1 BL abdominally-based free flap reconstruction\"   Existing Precautions/Restrictions abdominal;fall   Left Upper Extremity (Weight-bearing Status) partial weight-bearing (PWB)   Right Upper Extremity (Weight-bearing Status) partial weight-bearing (PWB)   Cognitive Status Examination   Orientation Status orientation to person, place and time   Affect/Mental Status (Cognitive) WFL   Visual Perception   Visual Impairment/Limitations corrective lenses full-time   Sensory   Sensory Quick Adds No deficits were identified   Posture   Posture not impaired   Range of Motion Comprehensive   Comment, General Range of Motion Not formally tested due to post-surgical precautions, per observation, WFL   Strength Comprehensive (MMT)   Comment, General Manual Muscle Testing (MMT) Assessment Not formally tested, per observation WFL   Coordination   Upper Extremity Coordination No deficits were identified   Bed Mobility   Bed Mobility rolling right;supine-sit   Rolling Right [place] (Bed Mobility) supervision   Supine-Sit [place] (Bed Mobility) supervision;verbal cues   Transfers   Transfers sit-stand transfer   Sit-Stand Transfer   Sit-Stand [place] (Transfers) supervision;verbal cues   Activities of Daily Living   BADL Assessment lower body dressing   Lower Body Dressing Assessment   [place] Level (Lower Body Dressing) supervision;verbal cues   Clinical Impression   Criteria for Skilled Therapeutic Interventions Met (OT) yes;meets criteria;skilled treatment is necessary   OT Diagnosis Pt is below baseline for ADLs/IADLs   OT Problem List-Impairments impacting ADL activity tolerance impaired;pain;post-surgical precautions   Assessment of Occupational Performance 3-5 Performance Deficits   Identified Performance Deficits dressing, bathing, home management    Planned Therapy Interventions (OT) ADL retraining;IADL retraining;home program guidelines;progressive " activity/exercise;transfer training;bed mobility training   Clinical Decision Making Complexity (OT) low complexity   Therapy Frequency (OT) 5x/week   Predicted Duration of Therapy 4 days   Anticipated Equipment Needs Upon Discharge (OT) shower chair   Risk & Benefits of therapy have been explained evaluation/treatment results reviewed;care plan/treatment goals reviewed;risks/benefits reviewed;current/potential barriers reviewed;patient;participants voiced agreement with care plan   OT Discharge Planning    OT Discharge Recommendation (DC Rec) Home with assist   OT Rationale for DC Rec Pt is below functional baseline, limited by post-surgical precautions and activity tolerance, anticipating Pt will progress well with therapies and be safe to return home with A from family.    OT Brief overview of current status  SBA for all mobility, Pt requiring increased O2 with OOB activity this date.

## 2021-09-23 NOTE — PLAN OF CARE
"Reports good pain control with iv dilaudid times one. No complaints of nausea. Abdominal and bilateral breast incisions intact with scant bleeding. YOLY x 3 intact to bulb suction and stripped every 4 hours. Flap checks completed hourly with positive pulses and adequate STO2. Hgb stable at 10.3. Diet advanced to regular with poor intake. Lactic acid 1.3. Bedresting and once up to commode with standby assist. BP low on left arm, but normal on left thigh. Monitoring. /47 (BP Location: Left leg)   Pulse 72   Temp 98.2  F (36.8  C) (Temporal)   Resp 14   Ht 1.626 m (5' 4\")   Wt 76.9 kg (169 lb 8.5 oz)   SpO2 90%   BMI 29.10 kg/m      "

## 2021-09-23 NOTE — PLAN OF CARE
VSS, lower BPs. O2 on 3L NC. Tolerating regular diet. Voids spontaneously in bedside commode, no BM. Pain managed with scheduled tylenol and prn IV dilaudid x1, prn oxycodone x1. Flap checks q1h, have had  present pulses and good STO2. Scant drainage on right breast incision. Bilateral breast incisions, sutures, open to air. Abdominal incision approximated, no drainage, open to air. YOLY x3 with moderate output, bright red/bloody, stripped q4h. Up SBA to bedside commode. Jaya doshi on. Continue plan of care.

## 2021-09-23 NOTE — PROGRESS NOTES
"No acute events overnight. Denies n/v/f/c. No n/v/. No s/s of anemia as she was previously experiencing. Feels good overall. Still on o2.    BP (!) 144/47   Pulse 60   Temp 98.5  F (36.9  C) (Temporal)   Resp 14   Ht 1.626 m (5' 4\")   Wt 76.9 kg (169 lb 8.5 oz)   SpO2 96%   BMI 29.10 kg/m      NAD  RRR  nlb on 3l nc  Bilateral breasts soft, pink, healthy appearing, some bruising in bilat axillae, some minor incisional oozing on right, c/d/i on left. Drains ss. vioptix ~70 bilaterally.   Abdomen soft, appropriately tender, some bruising along central incision. Incision cdi.   WWP    D1 right 215 // 70  D2 left 235 // 70   D3 belly 117 // 30    Cr 1.78  Hgb 8.9    56F w/ POD#3 s/p MIKAL flap requiring 1 redo of the left arterial anastomosis, POD#1 s/p R hematoma evacuation   - Cont lovenox  - use IS to help get off o2  - cont q2h flap checks  - reg diet  - cont ivf given pat  - encourage PO hydration    D/w dr. livan Bradford MD, [facility]  Plastic Surgery PGY-3  Pager: [phone]    "

## 2021-09-23 NOTE — PLAN OF CARE
VSS except low DBPs, norvasc held-MD aware.  A+Ox4.  LS clear, dim at the bases, IS encouraged.  +BS, passing flatus, no BM yet even with laxative/stoool softener.  Voiding spontaneously, good amt.  Tolerating regular diet.  Up to commode with SBA. PT/OT ordered.  Pain in bilateral chest  and lower abd incisions controlled with scheduled tylenol and prn oxycodone.  Left breast incision with intact steri-strips.  Rt breast incision with scant serous drainage, sutures intact, steri-strips partially coming off-MD aware.Flaps checked q1hr, pale pink, soft, warm, with immediate blood return.  STO2s within normal limits.  Abd incision is red and blistered, MARCELLO-MD aware.  JPsx 3 (Bilateral breasts and abd) with serosang drainage, abd YOLY with large output.  Rt PIV infusing.  Creatinine is improving, phos is low-MD notified, no change in orders.  Continue to monitor Flaps/incisions,drains and  gen status and continue with POC.

## 2021-09-23 NOTE — PROGRESS NOTES
"SPIRITUAL HEALTH SERVICES  [facility] ([place]) 7C  REFERRAL SOURCE: Follow-up after surgery     Introduced spiritual health services. Pt stated \"I'm doing ok\" and asked for a prayer. Pt is [employer].     PLAN: Will follow-up once a week as remains on the unit.     Rev. Gwendolyn Sotelo MDiv, [facility]  Staff    Pager [phone]  * [facility] remains available 24/7 for emergent requests/referrals, either by having the switchboard page the on-call  or by entering an ASAP/STAT consult in Epic (this will also page the on-call ).*   "

## 2021-09-23 NOTE — OP NOTE
Procedure Date: 09/22/2021    PREOPERATIVE DIAGNOSIS:  Status post bilateral breast reconstruction with free TRAM flaps with right-sided breast hematoma.    POSTOPERATIVE DIAGNOSIS:  Status post bilateral breast reconstruction with free TRAM flaps with right-sided breast hematoma.    PROCEDURE:  Right breast hematoma evacuation and closure.    SURGEON:  Leeann Chatterjee MD    RESIDENT:  Nikki Pearl MD, PGY-5    ANESTHESIA:  General anesthesia endotracheal intubation.    COMPLICATIONS:  Nil.    DRAINS:  Already present 15 [place] Fredi drain.    SPECIMENS:  Nil.    BLOOD LOSS:  Active blood loss 5 mL.  Collected blood and clot total of about 250 mL    DESCRIPTION OF PROCEDURE:  After informed consent was taken from the patient, the proper site and procedure was sent with her and she was appropriately marked and taken to the operating room.  She was placed in supine position with the knees comfortably flexed, pillows underneath and pneumoboots placed and running prior to induction of anesthesia.  Preoperative antibiotics given in the OR.  All pressure points were appropriately padded.  General anesthesia was administered without any complications.  She was prepped and draped in standard surgical fashion.     I began by first opening up the incision in the inferior, lateral and superior aspects.  The flap itself and pink, ViOptix signals were in the 40s.  I went ahead and as soon as I opened the pocket, a copious amount of fluid came out.  This was dark fluid and then there was a significant amount of dark clot within the pocket.  This was carefully removed and I thoroughly washed out the area.  No active bleeding was seen.  I ensured that there was no active bleeding, ensured that the flap was viable, soft and had good color. ViOptix signals went up into the 60s range.     I went ahead and left the original drain in place, placed some hemostasis through the pocket and then closed the incision using 2-0 Monocryl sutures  in the deep dermal layer and 3-0 nylon suture in interrupted horizontal mattress fashion.  The patient tolerated the procedure well.  All counts were correct at the end of the case.  The patient had soft blood pressures during the case and was requiring some Evan-Synephrine boluses as well as fluid as well as blood, but overall remained stable, was extubated and sent to recovery room.      CEE Chatterjee MD        D: 2021   T: 2021   MT: [email or web address]    Name:     LAURAAZRA RAJAN GAMA  MRN:      [id number]        Account:        [id number]   :      1965           Procedure Date: 2021     Document: [id number]

## 2021-09-23 NOTE — PROGRESS NOTES
"PRS    Doing well overall  No pain, well-controlled  Had soft diastolic BPs, held Amlodipine   Not symptomatic with lightheadedness  Abebe out, urinating in commode and picking up  Heparin stopped yesterday, started Lovenox BID  She has been OOBTC today    BP (!) 144/47   Pulse 60   Temp 98.5  F (36.9  C) (Temporal)   Resp 14   Ht 1.626 m (5' 4\")   Wt 76.9 kg (169 lb 8.5 oz)   SpO2 96%   BMI 29.10 kg/m    NAD  RRR  Non-labored breathing  Breast flaps viable, soft, warm, good color, no chest hematoma  Vioptix stable at 70-71% bilaterally, >95% signal fidelity  Abdominal incision intact, some bruising and superficial matilda-incisional epidermolysis but stable and no abdominal hematoma     mL yesterday 24-hour, 750 mL in last 7 hours    Drains - all drains thinning  Abdomen: 235 yesterday, 70 mL since midnight - more serosanguinous  Left chest: 117 yesterday, 30 mL since midnight - serosanguinous  Right chest: 215 yesterday, 70 mL since midnight - serosanguinous    SCr  9/21 0.78   9/22 1842 1.74    Lactic acid 1.3    Hgb   9/22 1037 10.7  9/22 1652 10.3  9/23 0634 8.9    A/P: 56F POD #3 BL abdominally-based free flap breast recon c/b R breast hematoma, s/p evacuation, now stable    -Discussed risks and benefits of stopping heparin drip in setting of long vein graft on left flap and recent right breast hematoma with both co-surgeon Dr Chatterjee and patient. This is a delicate balance between anticoagulation and bleeding. Given that she had some active oozing during the take-back and drain output has been bloody - we elected to discontinue heparin drip and transition to DVT chemoprophylaxis but twice daily. We explained the risks of a possible flap problem with the patient, particularly on the left with the long vein graft. Patient understands risks and is agreeable to the plan.   -Continue ambulation and abdominal binder  -Continue drain care  -Continue flap checks and Vioptix  -Continue ASA 81 and Lovenox " BID  -Will recheck serum electrolytes given STEF and trend Hgb  -Will d/w primary co-surgeon Dr Sen Funes MD, PhD

## 2021-09-24 VITALS
HEIGHT: 64 IN | DIASTOLIC BLOOD PRESSURE: 56 MMHG | OXYGEN SATURATION: 93 % | BODY MASS INDEX: 28.94 KG/M2 | HEART RATE: 79 BPM | SYSTOLIC BLOOD PRESSURE: 137 MMHG | TEMPERATURE: 99.6 F | RESPIRATION RATE: 16 BRPM | WEIGHT: 169.53 LBS

## 2021-09-24 LAB
ANION GAP SERPL CALCULATED.3IONS-SCNC: 7 MMOL/L (ref 3–14)
BUN SERPL-MCNC: 11 MG/DL (ref 7–30)
CALCIUM SERPL-MCNC: 8.2 MG/DL (ref 8.5–10.1)
CHLORIDE BLD-SCNC: 111 MMOL/L (ref 94–109)
CO2 SERPL-SCNC: 22 MMOL/L (ref 20–32)
CREAT SERPL-MCNC: 0.65 MG/DL (ref 0.52–1.04)
ERYTHROCYTE [DISTWIDTH] IN BLOOD BY AUTOMATED COUNT: 15.6 % (ref 10–15)
GFR SERPL CREATININE-BSD FRML MDRD: >90 ML/MIN/1.73M2
GLUCOSE BLD-MCNC: 104 MG/DL (ref 70–99)
HCT VFR BLD AUTO: 26.3 % (ref 35–47)
HGB BLD-MCNC: 8.7 G/DL (ref 11.7–15.7)
MAGNESIUM SERPL-MCNC: 2.2 MG/DL (ref 1.6–2.3)
MCH RBC QN AUTO: 30.2 PG (ref 26.5–33)
MCHC RBC AUTO-ENTMCNC: 33.1 G/DL (ref 31.5–36.5)
MCV RBC AUTO: 91 FL (ref 78–100)
PHOSPHATE SERPL-MCNC: 1.2 MG/DL (ref 2.5–4.5)
PLATELET # BLD AUTO: 110 10E3/UL (ref 150–450)
POTASSIUM BLD-SCNC: 4.1 MMOL/L (ref 3.4–5.3)
RBC # BLD AUTO: 2.88 10E6/UL (ref 3.8–5.2)
SODIUM SERPL-SCNC: 140 MMOL/L (ref 133–144)
WBC # BLD AUTO: 11.3 10E3/UL (ref 4–11)

## 2021-09-24 PROCEDURE — 250N000013 HC RX MED GY IP 250 OP 250 PS 637: Performed by: STUDENT IN AN ORGANIZED HEALTH CARE EDUCATION/TRAINING PROGRAM

## 2021-09-24 PROCEDURE — 250N000011 HC RX IP 250 OP 636: Performed by: STUDENT IN AN ORGANIZED HEALTH CARE EDUCATION/TRAINING PROGRAM

## 2021-09-24 PROCEDURE — 83735 ASSAY OF MAGNESIUM: CPT | Performed by: STUDENT IN AN ORGANIZED HEALTH CARE EDUCATION/TRAINING PROGRAM

## 2021-09-24 PROCEDURE — 258N000003 HC RX IP 258 OP 636: Performed by: STUDENT IN AN ORGANIZED HEALTH CARE EDUCATION/TRAINING PROGRAM

## 2021-09-24 PROCEDURE — 80048 BASIC METABOLIC PNL TOTAL CA: CPT | Performed by: STUDENT IN AN ORGANIZED HEALTH CARE EDUCATION/TRAINING PROGRAM

## 2021-09-24 PROCEDURE — 85027 COMPLETE CBC AUTOMATED: CPT | Performed by: STUDENT IN AN ORGANIZED HEALTH CARE EDUCATION/TRAINING PROGRAM

## 2021-09-24 PROCEDURE — 84100 ASSAY OF PHOSPHORUS: CPT | Performed by: STUDENT IN AN ORGANIZED HEALTH CARE EDUCATION/TRAINING PROGRAM

## 2021-09-24 PROCEDURE — 36415 COLL VENOUS BLD VENIPUNCTURE: CPT | Performed by: STUDENT IN AN ORGANIZED HEALTH CARE EDUCATION/TRAINING PROGRAM

## 2021-09-24 RX ORDER — POLYETHYLENE GLYCOL 3350 17 G/17G
17 POWDER, FOR SOLUTION ORAL DAILY
Qty: 510 G | Refills: 0 | Status: SHIPPED | OUTPATIENT
Start: 2021-09-24 | End: 2023-03-08

## 2021-09-24 RX ORDER — ACETAMINOPHEN 500 MG
1000 TABLET ORAL 3 TIMES DAILY
Qty: 120 TABLET | Refills: 0 | Status: SHIPPED | OUTPATIENT
Start: 2021-09-24

## 2021-09-24 RX ORDER — OXYCODONE HYDROCHLORIDE 5 MG/1
5 TABLET ORAL EVERY 6 HOURS PRN
Qty: 25 TABLET | Refills: 0 | Status: SHIPPED | OUTPATIENT
Start: 2021-09-24 | End: 2021-10-01

## 2021-09-24 RX ORDER — BISACODYL 10 MG
10 SUPPOSITORY, RECTAL RECTAL DAILY PRN
Qty: 10 SUPPOSITORY | Refills: 0 | Status: SHIPPED | OUTPATIENT
Start: 2021-09-24 | End: 2023-03-08

## 2021-09-24 RX ORDER — AMOXICILLIN 250 MG
2 CAPSULE ORAL 2 TIMES DAILY
Qty: 120 TABLET | Refills: 0 | Status: SHIPPED | OUTPATIENT
Start: 2021-09-24 | End: 2023-03-08

## 2021-09-24 RX ORDER — AMOXICILLIN 250 MG
2 CAPSULE ORAL 2 TIMES DAILY
Status: DISCONTINUED | OUTPATIENT
Start: 2021-09-24 | End: 2021-09-24 | Stop reason: HOSPADM

## 2021-09-24 RX ADMIN — ASPIRIN 81 MG: 81 TABLET ORAL at 08:58

## 2021-09-24 RX ADMIN — OXYCODONE HYDROCHLORIDE 10 MG: 10 TABLET ORAL at 04:39

## 2021-09-24 RX ADMIN — OXYCODONE HYDROCHLORIDE 10 MG: 10 TABLET ORAL at 16:47

## 2021-09-24 RX ADMIN — AMLODIPINE BESYLATE 10 MG: 10 TABLET ORAL at 08:58

## 2021-09-24 RX ADMIN — OXYCODONE HYDROCHLORIDE 10 MG: 10 TABLET ORAL at 08:57

## 2021-09-24 RX ADMIN — ENOXAPARIN SODIUM 40 MG: 40 INJECTION SUBCUTANEOUS at 11:33

## 2021-09-24 RX ADMIN — POTASSIUM CHLORIDE, DEXTROSE MONOHYDRATE AND SODIUM CHLORIDE: 150; 5; 450 INJECTION, SOLUTION INTRAVENOUS at 01:43

## 2021-09-24 RX ADMIN — DOCUSATE SODIUM 50 MG AND SENNOSIDES 8.6 MG 2 TABLET: 8.6; 5 TABLET, FILM COATED ORAL at 08:58

## 2021-09-24 RX ADMIN — POLYETHYLENE GLYCOL 3350 17 G: 17 POWDER, FOR SOLUTION ORAL at 08:58

## 2021-09-24 RX ADMIN — MAGNESIUM HYDROXIDE 30 ML: 400 SUSPENSION ORAL at 13:42

## 2021-09-24 ASSESSMENT — ACTIVITIES OF DAILY LIVING (ADL)
ADLS_ACUITY_SCORE: 10

## 2021-09-24 NOTE — PROGRESS NOTES
"PRS    No issues, except has not had a bowel movement in a few days  Eating well  No nausea or emesis  Urinating with no issues  Ready to go home    /55 (BP Location: Left arm)   Pulse 85   Temp 97.5  F (36.4  C) (Temporal)   Resp 16   Ht 1.626 m (5' 4\")   Wt 76.9 kg (169 lb 8.5 oz)   SpO2 90%   BMI 29.10 kg/m    NAD  RRR  Non-labored breathing  Breast flaps viable and unchanged, warm, soft, good color  Vioptix readout stable at 71-72% bilaterally, high signal fidelity >95%  Abdominal incision intact, some stable bruising and epidermolysis  No breast or abdominal hematoma    UOP: 2950 yesterday  Drains since midnight:  Abdomen 30 mL thinner  L chest 70 mL thinner  R chest 30 mL thinner    SCr    9/22 1842 1.74  9/23 0634 1.43  9/23 1537 0.96    Hgb 8.9    A/P: 56F POD #4 BL abdominally-based free flap breast recon c/b R breast hematoma, s/p evacuation, doing well    -Will adjust bowel regimen  -Discussed weaning narcotics, which she is doing  -Encourage ambulation  -Continue drains and abdominal binder  -Continue ASA 81 and Lovenox BID  -Discontinue Vioptix and anticipate discharge today    Mihir Funes MD, PhD  "

## 2021-09-24 NOTE — PLAN OF CARE
Vitally stable on room air. Up ad gian. Regular diet, tolerating well. Denies nausea. Pain managed with PRN oxycodone.Bilateral breast flaps warm, pale ;ink with +doppler pulses. Vioptix within normal limits. Flap checks q4hr, per new MD orders. Transverse abdominal incision with some bruising that is unchanged form previous shifts. Abdominal binder in place. YOLY x3, all dressings changed with serosanguinous output. Education to patient and  about YOLY, flaps and lovenox injections. Voiding spontaneously. +Loose smear bowel movement. Pt sent home with all belongings.  bringing pt home and will help with cares at home. Sent down to discharge pharmacy/lobby.

## 2021-09-24 NOTE — DISCHARGE SUMMARY
Plastic Surgery Discharge Summary    Leigh CARLSON Sonnykaren MRN# [id number]   YOB: 1965 Age: 56 year old     Date of Admission:  9/20/2021  Date of Discharge::  9/24/2021  Admitting Physician:  CEE Chatterjee MD  Discharge Physician:  [unfilled]  Primary Care Physician:        Darleen Ventura          Admission Diagnoses:   S/P breast reconstruction, bilateral [Z98.890]          Discharge Diagnosis:   Same  acute post op anemia due to blood loss           Procedures:   Bilateral free TRAM breast reconstruction by Dr. Sen LOUIS breast hematoma evacuation by Dr. Chatterjee        Non-operative procedures:   None performed          Consultations:   PHARMACY IP CONSULT  PHARMACY IP CONSULT  VASCULAR ACCESS CARE ADULT IP CONSULT  PHYSICAL THERAPY ADULT IP CONSULT  OCCUPATIONAL THERAPY ADULT IP CONSULT         Medications Prior to Admission:     Medications Prior to Admission   Medication Sig Dispense Refill Last Dose     atenolol (TENORMIN) 50 MG tablet Take 50 mg by mouth every evening    9/19/2021 at 2200     Goserelin Acetate (ZOLADEX SC) Inject 3.6 mg Subcutaneous every 30 days    Past Week at Unknown time     pantoprazole (PROTONIX) 20 MG EC tablet Take 20 mg by mouth daily as needed   3 9/20/2021 at 0430     SERTRALINE HCL PO Take 50 mg by mouth every evening    9/19/2021 at 2200     traZODone (DESYREL) 50 MG tablet Take  mg by mouth At Bedtime   9/19/2021 at 2200     [DISCONTINUED] amLODIPine (NORVASC) 2.5 MG tablet Take 10 mg by mouth daily   9/19/2021 at 0430     [DISCONTINUED] lisinopril-hydrochlorothiazide (ZESTORETIC) 20-25 MG tablet Take 1 tablet by mouth every evening   9/19/2021 at 2200            Discharge Medications:      Leigh Espinal   Home Medication Instructions GEMMA:[id number]    Printed on:09/24/21 1256   Medication Information                      acetaminophen (TYLENOL) 500 MG tablet  Take 2 tablets (1,000 mg) by mouth 3 times daily             aspirin (ASA) 81  "MG EC tablet  Take 1 tablet (81 mg) by mouth daily             atenolol (TENORMIN) 50 MG tablet  Take 50 mg by mouth every evening              bisacodyl (DULCOLAX) 10 MG suppository  Place 1 suppository (10 mg) rectally daily as needed for constipation (Use if Magnesium hydroxide (MILK of MAGNESIA) not effective after 24 hours. May discontinue if patient having bowel movement.)             enoxaparin ANTICOAGULANT (LOVENOX) 40 MG/0.4ML syringe  Inject 0.4 mLs (40 mg) Subcutaneous every 12 hours for 6 days             Goserelin Acetate (ZOLADEX SC)  Inject 3.6 mg Subcutaneous every 30 days              pantoprazole (PROTONIX) 20 MG EC tablet  Take 20 mg by mouth daily as needed              polyethylene glycol (MIRALAX) 17 GM/Dose powder  Take 17 g by mouth daily             senna-docusate (SENOKOT-S/PERICOLACE) 8.6-50 MG tablet  Take 2 tablets by mouth 2 times daily             SERTRALINE HCL PO  Take 50 mg by mouth every evening              traZODone (DESYREL) 50 MG tablet  Take  mg by mouth At Bedtime                       Day of Discharge Exam   /55 (BP Location: Left arm)   Pulse 85   Temp 97.5  F (36.4  C) (Temporal)   Resp 16   Ht 1.626 m (5' 4\")   Wt 76.9 kg (169 lb 8.5 oz)   SpO2 90%   BMI 29.10 kg/m      General:  A&Ox3, NAD  Cardio:   RRR  Chest:   Non labored breathing on RA  Abd:   Soft, non-distended, appropriately TTP, incision c/d/i  Ext:   WWP          Brief History of Illness:   Leigh Espinal is a 55yo woman with a history of breast cancer s/p bilateral mastectomies, radiation therapy. She had previously had tissue expanders but had to have these removed d/t poor skin flaps and radiation therapy. She now presents for delayed autologous tissue reconstruction.            Hospital Course:   The patient was admitted and underwent the above procedure. Intraoperatively, it was noted that the bilateral breast flap had superficial venous outflow codominance requiring two " "anastomoses. On the left it also required a vein graft. Intraoperatively it was found that there was a thrombosis of the left internal mammary artery requiring anastomosis takedown, cutback, and reanastomosis. She was placed on a heparin drip for this. On POD2 it was noted that the right breast drain was clotted and the right breast was significantly alfaro. She had a hemoglobin of 4.7 which required blood transfusion x 4u pRBC. She was taken back to the OR for hematoma evacuation. The heparin drip was stopped and she was transitioned to lovenox. The patient's diet was slowly advanced as bowel function returned. She had some delayed bowel function likely secondary to narcotics, which was treated with laxatives and stool softeners. Pain was controlled with oral pain medication and the patient was able to ambulate and void without difficulty. The patient received appropriate education post operatively. On POD 4 the patient was discharged to home with appropriate instructions and follow up. The patient acknowledged understanding and were in agreement with the plan.         Antibiotics Prescribed at Discharge:   None prescribed         Imaging Studies:   No studies require specific follow-up23  Results for orders placed or performed during the hospital encounter of 09/20/21   POC US Guidance Needle Placement    Narrative    Bilateral Pectoralis 1 and 2 blocks and transversus abdominis plane block            Final Pathology Result:   A(1). Breast, Right, Right breast skin:  The specimen is received in formalin with proper patient identification, labeled \"right breast skin\".  The specimen consists of a 107.9 g, 10.1 x 9.2 x 3.5 cm aggregate of yellow-tan fibroadipose tissue and skin.  One piece of skin features a 3.5 cm linear well-healed scar.  The attached adipose tissue is composed of 95% adipose to 5% fibrous tissue.  Representative sections are submitted in cassettes A1-A2.    The specimen is collected and placed in " "formalin at 1850 on 9/20/21.             B(2). Breast, Left, Left breast skin:  The specimen is received in formalin with proper patient identification, labeled \"left breast skin\".  The specimen consists of a 71.9 g, 13.1 x 8.0 x 1.5 cm aggregate of yellow-tan fibroadipose tissue and skin (13.1 cm in greatest dimension).  The skin appears unremarkable.  The attached tissue is composed of 95% adipose to 5% fibrous tissue with a minimal (2.0 cm piece of attached capsule material.  Representative sections are submitted in cassettes B1-B2.         Discharge Instructions:     - No lifting greater than 10 pounds for 8 weeks after surgery.   - You may shower and get incisions wet starting 48 hrs after surgery but do not scrub incisions or submerge wounds (aka, bath, pool, hot tub, ect.) for 2 weeks. Remove outer dressing (if placed) after 48 hrs from surgery. If dermabond was used, avoid applying any lotions or ointments. If steri-strips were used, they will fall off on their own. You may leave open to air or cover with dry gauze if needed for comfort.  - If a drain was left in place, monitor and record output. Strip the tubing frequently to maintain patency. Call if your drain output abruptly stops as this may indicate the drain is no longer working.   - No driving while taking narcotic pain medications.   - If you develop constipation, take stool softeners such as colace or miralax but stop if you develop diarrhea. Wean yourself off of narcotics.   - Call your primary provider to touch base with them regarding your recent admission.   - If you develop any fever/chills, worsening pain, redness, swelling, or drainage from your wound please call our clinic.         Follow-Up:     - Follow up with Dr. Chatterjee as scheduled.          Home Health Care:   Not needed           Discharge Disposition:   Discharged to home      Condition at discharge: Stable            "

## 2021-09-24 NOTE — PLAN OF CARE
VSS. Voids spontaneously in bedside commode, no BM. Tolerating regular diet. Pain managed with prn oxycodone x2, iv dilaudid x1. Flap checks q1h, have been pale pink, present pulses, with good STO2. Bilateral breast incisions, sutures, open to air. Scant serosanguinous drainage from R breast incision. Abdominal incision approximated, open to air. Abdominal binder on, Jaya hugger in place. YOLY x3 with moderate amount of serosanguinous output. Up SBA to bedside commode. Continue plan of care.

## 2021-09-24 NOTE — DISCHARGE INSTRUCTIONS
TRAM FLAP BREAST RECONSTRUCTION POST-OPERATIVE INSTRUCTIONS    Instructions  What are my post-operative instructions?     Have someone drive you home after surgery and help you at home for 1-2      days.     Get plenty of rest.     Follow balanced diet.     Decreased activity may promote constipation, so you may want to add      more raw fruit to your diet, and be sure to increase fluid intake.    Take pain medication as prescribed. Do not take aspirin or any products     containing aspirin for one week after surgery other than those we prescribe     Do not drink alcohol when taking pain medications.     Even when not taking pain medications, no alcohol for 3 weeks as it      causes fluid retention.     If you are taking vitamins with iron, resume these as tolerated.     Do not smoke. Smoking delays healing and increases the risk of      complications.    Activities     Start walking as soon as possible, this helps to reduce swelling and     lowers the chance of blood clots.     You may use your arms for activities of daily living for the first three     weeks, but do not push over your head until 3 weeks post-op.     Do not drive until you are no longer taking any pain medications     (narcotics).     Unless stated on this form, discuss your time off work with your surgeon.     No driving for 4 weeks. When abdominal area will allow for sudden      braking, you may resume driving.     No heavy lifting for 6 to 8 weeks.    Incision Care     You may shower 72 hours after surgery with drains in place.     If you have implants, no showering until drains are removed.     No tub soaking, bathing,or swimming for 6 to 8 weeks.     Avoid exposing scars to sun for at least 12 months.     Always use a strong sunblock, if sun exposure is unavoidable (SPF 50 or     greater).     Keep surgical tape on until it peels off.     Keep incisions clean and inspect daily for signs of infection.     Do not wear a bra.     Sleep with  pillows under knee for 2 weeks (some women choose to sleep      in a recliner or lounge chair).    What to Expect     Maximum discomfort will occur the first few days following surgery; you      may experience incision discomfort and generalized discomfort in your      breasts and abdomen.     Oozing can be expected.     Appearance     A new breast(s) mound(s) will be constructed with sutures around the     outer edges.     The abdomen will be tight and much flatter in appearance.     The majority of swelling will subside in 3-4 weeks, but some swelling may      persist for up to 3 months.     You will walk bent over and will slowly rise over the first 1-2 weeks.    Follow-Up Care     Your 1st post-operative visit will be scheduled for 7 to 10 days after       surgery. Some drains may be taken out during this visit.     Your 2nd post-operative visit will be for removal of remaining drains.     Your 3rd post-operative visit will be scheduled somewhere between 4-6      weeks from the initial surgery date.    Please note my office will call you 1-2 business days after your procedure to check up on how you're doing and to schedule your post-operative appointment.     When to Call:     If you have increased swelling or bruising.     If swelling and redness persist after a few days.     If you have increased redness along the incision.     If you have severe or increased pain not relieved by medication.     If you have any side effects to medications; such as, rash, nausea,      headache, vomiting.     If you have an oral temperature over 100.4 degrees.     If you have any yellowish or greenish drainage from the incisions or      notice a foul odor.     If you have bleeding from the incisions that is difficult to control with      light pressure.     If you have loss of feeling or motion.    For Medical Questions, Please Call:     [phone], Monday - Friday, 8 a.m. - 4:30 p.m.     After hours and on weekends, call  Hospital Paging at [phone], and ask for the       Plastic Surgeon on call.

## 2021-09-24 NOTE — PROGRESS NOTES
"No acute events overnight. Denies n/v/f/c. No n/v/. Off o2. Hasn't had bm since or.     /55 (BP Location: Left arm)   Pulse 85   Temp 97.5  F (36.4  C) (Temporal)   Resp 16   Ht 1.626 m (5' 4\")   Wt 76.9 kg (169 lb 8.5 oz)   SpO2 90%   BMI 29.10 kg/m      NAD  RRR  nlb on ra  Bilateral breasts soft, pink, healthy appearing, some bruising in bilat axillae, some minor incisional oozing on right, c/d/i on left. Drains ss. vioptix ~70 bilaterally.   Abdomen soft, appropriately tender, some bruising along central incision. Incision cdi.   WWP    D1 right 305 // 30  D2 left 112 // 70  D3 belly 115 // 30    Cr 0.96    56F w/ POD#4 s/p MIKAL flap requiring 1 redo of the left arterial anastomosis, POD#2 s/p R hematoma evacuation. Had acute post op anemia due to blood loss, now resolving.     - Cont lovenox .  - increase senna, fleet enema  - will likely dc home today after bm    D/w dr. livan Bradford MD, [facility]  Plastic Surgery PGY-3  Pager: [phone]    "

## 2021-09-25 NOTE — PLAN OF CARE
Occupational Therapy Discharge Summary    Reason for therapy discharge:    Discharged to home.    Progress towards therapy goal(s). See goals on Care Plan in [facility] electronic health record for goal details.  Goals partially met.  Barriers to achieving goals:   discharge from facility.    Therapy recommendation(s):    No further therapy is recommended.

## 2021-10-01 ENCOUNTER — PATIENT OUTREACH (OUTPATIENT)
Dept: PLASTIC SURGERY | Facility: CLINIC | Age: 56
End: 2021-10-01

## 2021-10-01 NOTE — PATIENT INSTRUCTIONS
Spoke with pt today regarding the photo that was sent to Dr Chatterjee. Pt reports that she is not having any symptoms of infection such as fevers, chills, unusual pain or pus drainage. She just wanted us to be aware this was happening to her. Dr Chatterjee was able to review the photo and recommended Bacitracin twice a day and have pt come into clinic on Tuesday 10/05. Pt given these instructions and is agreeable. She will come into clinic on 10/05 at 8:15 am.  Morales FORTE RN

## 2021-10-05 ENCOUNTER — OFFICE VISIT (OUTPATIENT)
Dept: PLASTIC SURGERY | Facility: CLINIC | Age: 56
End: 2021-10-05
Attending: PLASTIC SURGERY
Payer: COMMERCIAL

## 2021-10-05 VITALS
DIASTOLIC BLOOD PRESSURE: 67 MMHG | WEIGHT: 164.1 LBS | OXYGEN SATURATION: 99 % | TEMPERATURE: 99.2 F | BODY MASS INDEX: 28.01 KG/M2 | HEART RATE: 75 BPM | RESPIRATION RATE: 18 BRPM | HEIGHT: 64 IN | SYSTOLIC BLOOD PRESSURE: 100 MMHG

## 2021-10-05 DIAGNOSIS — Z98.890 S/P BREAST RECONSTRUCTION, BILATERAL: Primary | ICD-10-CM

## 2021-10-05 PROCEDURE — 99024 POSTOP FOLLOW-UP VISIT: CPT | Performed by: PLASTIC SURGERY

## 2021-10-05 PROCEDURE — G0463 HOSPITAL OUTPT CLINIC VISIT: HCPCS

## 2021-10-05 ASSESSMENT — PAIN SCALES - GENERAL: PAINLEVEL: MODERATE PAIN (4)

## 2021-10-05 ASSESSMENT — MIFFLIN-ST. JEOR: SCORE: 1329.6

## 2021-10-05 NOTE — PROGRESS NOTES
POSTOPERATIVE VISIT    PRESENTING COMPLAINT:  Postoperative visit status post bilateral breast reconstruction with free MIKAL flap on 09/20/2021.    HISTORY OF PRESENTING COMPLAINT:  Ms. Espinal is 56 years old.  She underwent a bilateral free MIKAL flap reconstruction on 09/20/2021.  She has done well.  YOLY drainage in her abdomen is still over 60 cc a day.  She has noticed some scabbing over the mid portion of her abdominal wound.  No fevers.  There is minimal drainage.    PHYSICAL EXAMINATION:  Vital signs stable.  She is afebrile, in no obvious distress.  Her breasts are healing and inframammary fold area on both sides is quite macerated, some small open areas that are draining.  No evidence of infection.  Her abdominal incision in the mid portion has a significant area of ischemia, probably about 10 x 5 cm with some scabbing, but no infection.    ASSESSMENT AND PLAN:  Based on the above findings, a diagnosis of bilateral breast reconstruction for breast cancer with abdominal wall healing complications was made.    PLAN:  Right now is to have her see my wound nurse.  She will continue to do dry dressings over the wounds, let it clear further.  I think in time, the scab needs to be debrided and VAC therapy instituted as appropriate.  I reassured her.  We will see her on a weekly basis.

## 2021-10-05 NOTE — NURSING NOTE
"Oncology Rooming Note    October 5, 2021 8:11 AM   Leigh Espinal is a 56 year old female who presents for:    Chief Complaint   Patient presents with     Oncology Clinic Visit     [facility] POST OP - BREAST CANCER     Initial Vitals: /67 (BP Location: Right arm, Patient Position: Chair, Cuff Size: Adult Regular)   Pulse 75   Temp 99.2  F (37.3  C) (Oral)   Resp 18   Ht 1.626 m (5' 4.02\")   Wt 74.4 kg (164 lb 1.6 oz)   SpO2 99%   BMI 28.15 kg/m   Estimated body mass index is 28.15 kg/m  as calculated from the following:    Height as of this encounter: 1.626 m (5' 4.02\").    Weight as of this encounter: 74.4 kg (164 lb 1.6 oz). Body surface area is 1.83 meters squared.  Moderate Pain (4) Comment: Data Unavailable   No LMP recorded. Patient is premenopausal.  Allergies reviewed: Yes  Medications reviewed: Yes    Medications: Medication refills not needed today.  Pharmacy name entered into EPIC:    [facility] - [place], [place] - [address] [facility] - [place], [place] - [address] [place] [address] PHARMACY [facility] - [place], [place] - [phone]    Clinical concerns: No new concerns. Sen was notified.      Tree Miramontes LPN            "

## 2021-10-05 NOTE — LETTER
10/5/2021         RE: Leigh Espinal  [address] [place] [address]        Dear Colleague,    Thank you for referring your patient, Leigh Espinal, to the [facility]. Please see a copy of my visit note below.    POSTOPERATIVE VISIT    PRESENTING COMPLAINT:  Postoperative visit status post bilateral breast reconstruction with free MIKAL flap on 09/20/2021.    HISTORY OF PRESENTING COMPLAINT:  Ms. Espinal is 56 years old.  She underwent a bilateral free MIKAL flap reconstruction on 09/20/2021.  She has done well.  YOLY drainage in her abdomen is still over 60 cc a day.  She has noticed some scabbing over the mid portion of her abdominal wound.  No fevers.  There is minimal drainage.    PHYSICAL EXAMINATION:  Vital signs stable.  She is afebrile, in no obvious distress.  Her breasts are healing and inframammary fold area on both sides is quite macerated, some small open areas that are draining.  No evidence of infection.  Her abdominal incision in the mid portion has a significant area of ischemia, probably about 10 x 5 cm with some scabbing, but no infection.    ASSESSMENT AND PLAN:  Based on the above findings, a diagnosis of bilateral breast reconstruction for breast cancer with abdominal wall healing complications was made.    PLAN:  Right now is to have her see my wound nurse.  She will continue to do dry dressings over the wounds, let it clear further.  I think in time, the scab needs to be debrided and VAC therapy instituted as appropriate.  I reassured her.  We will see her on a weekly basis.          Again, thank you for allowing me to participate in the care of your patient.        Sincerely,        CEE Chatterjee MD

## 2021-10-06 ENCOUNTER — OFFICE VISIT (OUTPATIENT)
Dept: PLASTIC SURGERY | Facility: CLINIC | Age: 56
End: 2021-10-06
Payer: COMMERCIAL

## 2021-10-06 ENCOUNTER — MYC MEDICAL ADVICE (OUTPATIENT)
Dept: PLASTIC SURGERY | Facility: CLINIC | Age: 56
End: 2021-10-06

## 2021-10-06 DIAGNOSIS — Z98.890 S/P BREAST RECONSTRUCTION, BILATERAL: Primary | ICD-10-CM

## 2021-10-06 PROCEDURE — 99207 PR NO CHARGE NURSE ONLY: CPT

## 2021-10-06 NOTE — PROGRESS NOTES
Pt. comes into clinic today at the request of Dr. Leeann Chatterjee.     This service provided today was under the supervising provider of the day Dr. Chatterjee, who was available if needed.     Reason for visit: Troubleshoot drain with possible removal     Pt sent [employer] message that she was having trouble with her drain not holding suction. Her  is assisting her with troubleshooting it but it continues to lose suction. I spoke with Dr Chatterjee and he requested that we remove drain and provide pt with additional dressings to reinforce drainage that will come from incisional wound. Pt in agreement with the plan. Left abdominal YOLY drain removed. Extra dressings supplies to patient.  Dr Chatterjee would like her back in clinic next week as joint visit with wound care RN.      Morales Luna RN

## 2021-10-06 NOTE — TELEPHONE ENCOUNTER
Leigh calls in to state that he YOLY drain is not working Call placed to Plastic surgery and  Morales Luna will call patient.

## 2021-10-06 NOTE — LETTER
10/6/2021       RE: Leigh Espinal  [address] [place] [address]     Dear Colleague,    Thank you for referring your patient, Leigh Espinal, to the [facility] PLASTIC AND RECONSTRUCTIVE SURGERY CLINIC [place] at [facility]. Please see a copy of my visit note below.    See previous encounter. Double charted in error. KF      Again, thank you for allowing me to participate in the care of your patient.      Sincerely,    Morales Luna RN

## 2021-10-07 ENCOUNTER — VIRTUAL VISIT (OUTPATIENT)
Dept: ONCOLOGY | Facility: CLINIC | Age: 56
End: 2021-10-07
Attending: INTERNAL MEDICINE
Payer: COMMERCIAL

## 2021-10-07 ENCOUNTER — PATIENT OUTREACH (OUTPATIENT)
Dept: PLASTIC SURGERY | Facility: CLINIC | Age: 56
End: 2021-10-07

## 2021-10-07 ENCOUNTER — OFFICE VISIT (OUTPATIENT)
Dept: PLASTIC SURGERY | Facility: CLINIC | Age: 56
End: 2021-10-07
Payer: COMMERCIAL

## 2021-10-07 ENCOUNTER — LAB (OUTPATIENT)
Dept: LAB | Facility: CLINIC | Age: 56
End: 2021-10-07
Payer: COMMERCIAL

## 2021-10-07 VITALS
TEMPERATURE: 101.2 F | HEART RATE: 81 BPM | DIASTOLIC BLOOD PRESSURE: 63 MMHG | OXYGEN SATURATION: 98 % | SYSTOLIC BLOOD PRESSURE: 116 MMHG

## 2021-10-07 VITALS — TEMPERATURE: 102.4 F

## 2021-10-07 DIAGNOSIS — Z15.89 MONOALLELIC MUTATION OF CHEK2 GENE IN FEMALE PATIENT: ICD-10-CM

## 2021-10-07 DIAGNOSIS — Z98.890 S/P BREAST RECONSTRUCTION, BILATERAL: ICD-10-CM

## 2021-10-07 DIAGNOSIS — Z98.890 S/P BREAST RECONSTRUCTION, BILATERAL: Primary | ICD-10-CM

## 2021-10-07 DIAGNOSIS — Z15.01 MONOALLELIC MUTATION OF CHEK2 GENE IN FEMALE PATIENT: ICD-10-CM

## 2021-10-07 DIAGNOSIS — I78.0 HHT (HEREDITARY HEMORRHAGIC TELANGIECTASIA) (H): ICD-10-CM

## 2021-10-07 DIAGNOSIS — C50.412 MALIGNANT NEOPLASM OF UPPER-OUTER QUADRANT OF LEFT BREAST IN FEMALE, ESTROGEN RECEPTOR POSITIVE (H): Primary | ICD-10-CM

## 2021-10-07 DIAGNOSIS — Z17.0 MALIGNANT NEOPLASM OF UPPER-OUTER QUADRANT OF LEFT BREAST IN FEMALE, ESTROGEN RECEPTOR POSITIVE (H): Primary | ICD-10-CM

## 2021-10-07 DIAGNOSIS — Z15.02 MONOALLELIC MUTATION OF CHEK2 GENE IN FEMALE PATIENT: ICD-10-CM

## 2021-10-07 DIAGNOSIS — Z15.09 MONOALLELIC MUTATION OF CHEK2 GENE IN FEMALE PATIENT: ICD-10-CM

## 2021-10-07 LAB
ANION GAP SERPL CALCULATED.3IONS-SCNC: 9 MMOL/L (ref 3–14)
BASOPHILS # BLD AUTO: 0 10E3/UL (ref 0–0.2)
BASOPHILS NFR BLD AUTO: 0 %
BUN SERPL-MCNC: 14 MG/DL (ref 7–30)
CALCIUM SERPL-MCNC: 9.4 MG/DL (ref 8.5–10.1)
CHLORIDE BLD-SCNC: 100 MMOL/L (ref 94–109)
CO2 SERPL-SCNC: 28 MMOL/L (ref 20–32)
CREAT SERPL-MCNC: 0.73 MG/DL (ref 0.52–1.04)
EOSINOPHIL # BLD AUTO: 0 10E3/UL (ref 0–0.7)
EOSINOPHIL NFR BLD AUTO: 0 %
ERYTHROCYTE [DISTWIDTH] IN BLOOD BY AUTOMATED COUNT: 15.1 % (ref 10–15)
GFR SERPL CREATININE-BSD FRML MDRD: >90 ML/MIN/1.73M2
GLUCOSE BLD-MCNC: 131 MG/DL (ref 70–99)
HCT VFR BLD AUTO: 29.1 % (ref 35–47)
HGB BLD-MCNC: 9.1 G/DL (ref 11.7–15.7)
IMM GRANULOCYTES # BLD: 0 10E3/UL
IMM GRANULOCYTES NFR BLD: 0 %
LYMPHOCYTES # BLD AUTO: 1.2 10E3/UL (ref 0.8–5.3)
LYMPHOCYTES NFR BLD AUTO: 15 %
MCH RBC QN AUTO: 28.8 PG (ref 26.5–33)
MCHC RBC AUTO-ENTMCNC: 31.3 G/DL (ref 31.5–36.5)
MCV RBC AUTO: 92 FL (ref 78–100)
MONOCYTES # BLD AUTO: 0.8 10E3/UL (ref 0–1.3)
MONOCYTES NFR BLD AUTO: 10 %
NEUTROPHILS # BLD AUTO: 5.8 10E3/UL (ref 1.6–8.3)
NEUTROPHILS NFR BLD AUTO: 75 %
NRBC # BLD AUTO: 0 10E3/UL
NRBC BLD AUTO-RTO: 0 /100
PLATELET # BLD AUTO: 523 10E3/UL (ref 150–450)
POTASSIUM BLD-SCNC: 3.6 MMOL/L (ref 3.4–5.3)
RBC # BLD AUTO: 3.16 10E6/UL (ref 3.8–5.2)
SODIUM SERPL-SCNC: 137 MMOL/L (ref 133–144)
WBC # BLD AUTO: 7.8 10E3/UL (ref 4–11)

## 2021-10-07 PROCEDURE — 99024 POSTOP FOLLOW-UP VISIT: CPT | Performed by: PLASTIC SURGERY

## 2021-10-07 PROCEDURE — 36415 COLL VENOUS BLD VENIPUNCTURE: CPT | Performed by: PATHOLOGY

## 2021-10-07 PROCEDURE — 99214 OFFICE O/P EST MOD 30 MIN: CPT | Mod: 95 | Performed by: INTERNAL MEDICINE

## 2021-10-07 PROCEDURE — 80048 BASIC METABOLIC PNL TOTAL CA: CPT | Performed by: PATHOLOGY

## 2021-10-07 PROCEDURE — 999N001193 HC VIDEO/TELEPHONE VISIT; NO CHARGE

## 2021-10-07 PROCEDURE — 85025 COMPLETE CBC W/AUTO DIFF WBC: CPT | Performed by: PATHOLOGY

## 2021-10-07 RX ORDER — CLINDAMYCIN HCL 150 MG
150 CAPSULE ORAL 3 TIMES DAILY
Qty: 30 CAPSULE | Refills: 0 | Status: SHIPPED | OUTPATIENT
Start: 2021-10-07 | End: 2021-10-17

## 2021-10-07 ASSESSMENT — PAIN SCALES - GENERAL: PAINLEVEL: MODERATE PAIN (5)

## 2021-10-07 NOTE — PROGRESS NOTES
Leigh is a 56 year old who is being evaluated via a billable video visit.      How would you like to obtain your AVS? Abakushart  If the video visit is dropped, the invitation should be resent by: Text to cell phone: [phone]  Will anyone else be joining your video visit? No      Video Time:  20 min    Video-Visit Details    Type of service:  Video Visit     Originating Location (pt. Location): Home    Distant Location (provider location):  [facility] CANCER [facility]     Platform used for Video Visit: Misoca        Reason for Visit: Follow up of bilateral breast cancer    Oncology HPI:   Ms. Espinal is a 56 year old female is here for follow-up of locally advanced breast cancer.     Ms. Espinal is a 56 year old female with bilateral breast cancers. Other past medical history is notable for HHT (follows with Dr. Real), pulmonary and liver AVMs, GERD and HTN. Bilateral screening mammograms on 10/23/19 showed architectural distortion and developing focal asymmetry in the right breast, and possible developing asymmetry in the left breast. Diagnostic mammograms showed a 2 x 1.4 cm mass at 1:00 right breast and a 9 mm mass at 2:00 left breast. An additional 1.4 cm satellite mass in the left breast (total diameter of both left breast masses, 2.1 cm) was noted on contrast mammogram on 11/21/19.  Biopsy of the right breast at 1:00 revealed invasive ductal carcinoma, grade 1, ER+ (98%), OH+ (99%), HER2 negative. Biopsy of the left breast 2:00 revealed invasive lobular carcinoma, grade 2, ER+ (95%), OH+ (75%), and HER2 negative.  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       She underwent bilateral nipple sparing mastectomies, tissue expander placement, and bilateral axillary sentinel lymph node biopsies on 1/29/20 under the care of Dr. Smith. Final pathology revealed grade 2 invasive mammary carcinoma in the right breast measuring 2.1 cm, a  smaller focus of invasive mammary carcinoma (mixed ductal and cribriform) measuring 3.6 mm, admixed DCIS, and negative margins. The left breast showed grade 2 invasive lobular carcinoma, measuring 1.6 cm, DCIS and LCIS, and negative margins. The right axillary sentinel node was benign; the left axillary sentinel node showed micrometastatic carcinoma measuring 1.5 mm without extranodal extension.     OncotypeDx was ordered for right and left breast cancers.  Oncotype dx recurrence score of the left breast cancer was 10. Oncotype on the right breast cancer was 18.       Her surgery was complicated by skin necrosis requiring additional surgery.     She received adjuvant left chest wall and low axillary radiation: 5040 cGy in 28 fractions      Started Zoladex 8/2020    Interval history:  She had her reconstruction surgery Sept 20.  She underwent a bilateral free MIKAL flap reconstruction on 09/20/2021.  She has done well.  YOLY drainage in her abdomen is still over 60 cc a day.  She has noticed some scabbing over the mid portion of her abdominal wound.  She has still had drainage and weeping since then.      She has been on zoladex.  No hot flashes.  No chest pain or shortness of breath.  No n/v/d/c.    Her hot flashes are usually in the morning.      She has had significant fatigue since her surgery.    She is just trying to get better.     ROS: 10 point ROS neg other than the symptoms noted above in the HPI.      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    CHEK2 [id number].[id number] [facility] 12/19/2019        Current Outpatient Medications   Medication Sig Dispense Refill     acetaminophen (TYLENOL) 500 MG tablet Take 2 tablets (1,000 mg) by mouth 3 times daily 120 tablet 0     aspirin (ASA) 81 MG EC tablet Take 1 tablet (81 mg) by mouth daily 30 tablet 0     atenolol (TENORMIN) 50 MG tablet Take 50 mg by mouth every evening        bisacodyl (DULCOLAX) 10 MG suppository Place 1 suppository (10 mg) rectally daily as needed for constipation (Use if Magnesium hydroxide (MILK of MAGNESIA) not effective after 24 hours. May discontinue if patient having bowel movement.) 10 suppository 0     Goserelin Acetate (ZOLADEX SC) Inject 3.6 mg Subcutaneous every 30 days        pantoprazole (PROTONIX) 20 MG EC tablet Take 20 mg by mouth daily as needed   3     polyethylene glycol (MIRALAX) 17 GM/Dose powder Take 17 g by mouth daily 510 g 0     senna-docusate (SENOKOT-S/PERICOLACE) 8.6-50 MG tablet Take 2 tablets by mouth 2 times daily 120 tablet 0     SERTRALINE HCL PO Take 50 mg by mouth every evening        traZODone (DESYREL) 50 MG tablet Take  mg by mouth At Bedtime            Allergies   Allergen Reactions     Sulfa Drugs      Got really sick, headache, facial swelling, felt like she was dying.   Delayed reaction happened ~3-4 days after she started taking it.        Exam:  not currently breastfeeding.  Wt Readings from Last 4 Encounters:   10/05/21 74.4 kg (164 lb 1.6 oz)   09/20/21 76.9 kg (169 lb 8.5 oz)   09/07/21 78.7 kg (173 lb 6.4 oz)   09/07/21 78.7 kg (173 lb 6.4 oz)     There were no vitals taken for this visit.   General: alert and no distress   Psych: Alert and oriented times; coherent speech, normal rate and volume   Pulm: Speaking in full sentences, unlabored, no audible wheezes or cough.   The rest of a comprehensive physical examination is deferred due to PHE  "(public health emergency) video restrictions\"     No labs or imaging to review    Assessment/plan:   1. Bilateral Breast Cancer (Left breast cancer [id number] ER + [place] + her2 negative, oncotype 10 s/p mastectomy and SNLB and Right breast cancer T2(2)N0 ER + [place] + her2 negative, oncotype 18 s/p mastectomy and SNLB)  - S/p bilateral mastectomy and left chest wall RT.   - Started Zoladex and she will continue on zoladex.  - she has had her second reconstruction, and has had complications.  She is following with plastic surgery.  Advised she follow up with plastic surgery.      2. CHEK2 mutation: She has had a colonoscopy in 2012, which was normal. This was repeated in march 2021.  Due for repeat in 3 years given presence of polyps.      3. Lymphedema: Following with PT. Overall this has not been problem.      4. HHT: follows with Dr. Real. No significant bleeding at this time.     5. She is coping overall pretty well.      Katarzyna Swann MD         "

## 2021-10-07 NOTE — PATIENT INSTRUCTIONS
Returned pt's [employer] message today to address request for additional pain meds. Once pain evaluation completed, pt shared that she was having fevers of 101. I have updated Dr licona and he has requested pt come in to clinic today to be assessed by him. Pt is in agreement with this plan and I have added her onto the 1:45 pm clinic time and updated the team in clinic of the plan.  Morales FORTE RN

## 2021-10-07 NOTE — LETTER
10/7/2021       RE: Leigh Espinal  [address] [place] [address]     Dear Colleague,    Thank you for referring your patient, Leigh Espinal, to the [facility] PLASTIC AND RECONSTRUCTIVE SURGERY CLINIC [place] at [facility]. Please see a copy of my visit note below.    Service Date: 10/07/2021    FOLLOWUP VISIT NOTE    PRESENTING COMPLAINT:  Postoperative visit, status post bilateral breast reconstruction with free MIKAL flaps done 09/20/2021.    HISTORY OF PRESENTING COMPLAINT:  Ms. Espinal is 56 years old.  I got a phone call that she was having drainage from her abdominal wound and was concerned, so we had her come into our clinic to take a look.  She had a low-grade fever of about 100 degrees at home, but otherwise felt well.  Pain was okay.    PHYSICAL EXAMINATION:  Vital signs are stable.  She is afebrile, in no obvious distress.  I took down the dressings on her abdomen. She has a small, 1 cm, superficial opening, no obvious cellulitis, but some turbid fluid coming from that area.  Her breast incisions are healing in well.      ASSESSMENT AND PLAN:  Based upon the above findings, a diagnosis of bilateral breast reconstruction with abdominal wall wound-healing issues was made.  Under sterile conditions, I opened up the abdominal wall area and packed it. Just to be on the safe side, I got a CBC as well as a BMP, and both were normal.  Given her issues that she was having, I placed her on a 10 day course of clindamycin.  We will see her back next week in clinic.  All questions were answered.  She was happy with the visit.    CEE Chatterjee MD

## 2021-10-07 NOTE — LETTER
10/7/2021         RE: Leigh Espinal  [address] [place] [address]        Dear Colleague,    Thank you for referring your patient, Leigh Espinal, to the [facility] CANC[facility]IC. Please see a copy of my visit note below.    Video Time:  20 min       Reason for Visit: Follow up of bilateral breast cancer    Oncology HPI:   Ms. Espinal is a 56 year old female is here for follow-up of locally advanced breast cancer.     Ms. Espinal is a 56 year old female with bilateral breast cancers. Other past medical history is notable for HHT (follows with Dr. Real), pulmonary and liver AVMs, GERD and HTN. Bilateral screening mammograms on 10/23/19 showed architectural distortion and developing focal asymmetry in the right breast, and possible developing asymmetry in the left breast. Diagnostic mammograms showed a 2 x 1.4 cm mass at 1:00 right breast and a 9 mm mass at 2:00 left breast. An additional 1.4 cm satellite mass in the left breast (total diameter of both left breast masses, 2.1 cm) was noted on contrast mammogram on 11/21/19.  Biopsy of the right breast at 1:00 revealed invasive ductal carcinoma, grade 1, ER+ (98%), DE+ (99%), HER2 negative. Biopsy of the left breast 2:00 revealed invasive lobular carcinoma, grade 2, ER+ (95%), DE+ (75%), and HER2 negative.  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       She underwent bilateral nipple sparing mastectomies, tissue expander placement, and bilateral axillary sentinel lymph node biopsies on 1/29/20 under the care of Dr. Smith. Final pathology revealed grade 2 invasive mammary carcinoma in the right breast measuring 2.1 cm, a smaller focus of invasive mammary carcinoma (mixed ductal and cribriform) measuring 3.6 mm, admixed DCIS, and negative margins. The left breast showed grade 2 invasive lobular carcinoma, measuring 1.6 cm, DCIS and LCIS, and negative margins. The  right axillary sentinel node was benign; the left axillary sentinel node showed micrometastatic carcinoma measuring 1.5 mm without extranodal extension.     OncotypeDx was ordered for right and left breast cancers.  Oncotype dx recurrence score of the left breast cancer was 10. Oncotype on the right breast cancer was 18.       Her surgery was complicated by skin necrosis requiring additional surgery.     She received adjuvant left chest wall and low axillary radiation: 5040 cGy in 28 fractions      Started Zoladex 8/2020    Interval history:  She had her reconstruction surgery Sept 20.  She underwent a bilateral free MIKAL flap reconstruction on 09/20/2021.  She has done well.  YOLY drainage in her abdomen is still over 60 cc a day.  She has noticed some scabbing over the mid portion of her abdominal wound.  She has still had drainage and weeping since then.      She has been on zoladex.  No hot flashes.  No chest pain or shortness of breath.  No n/v/d/c.    Her hot flashes are usually in the morning.      She has had significant fatigue since her surgery.    She is just trying to get better.     ROS: 10 point ROS neg other than the symptoms noted above in the HPI.      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    CHEK2 c.1100delC [facility] 12/19/2019        Current Outpatient Medications   Medication Sig Dispense Refill     acetaminophen (TYLENOL) 500 MG tablet  "Take 2 tablets (1,000 mg) by mouth 3 times daily 120 tablet 0     aspirin (ASA) 81 MG EC tablet Take 1 tablet (81 mg) by mouth daily 30 tablet 0     atenolol (TENORMIN) 50 MG tablet Take 50 mg by mouth every evening        bisacodyl (DULCOLAX) 10 MG suppository Place 1 suppository (10 mg) rectally daily as needed for constipation (Use if Magnesium hydroxide (MILK of MAGNESIA) not effective after 24 hours. May discontinue if patient having bowel movement.) 10 suppository 0     Goserelin Acetate (ZOLADEX SC) Inject 3.6 mg Subcutaneous every 30 days        pantoprazole (PROTONIX) 20 MG EC tablet Take 20 mg by mouth daily as needed   3     polyethylene glycol (MIRALAX) 17 GM/Dose powder Take 17 g by mouth daily 510 g 0     senna-docusate (SENOKOT-S/PERICOLACE) 8.6-50 MG tablet Take 2 tablets by mouth 2 times daily 120 tablet 0     SERTRALINE HCL PO Take 50 mg by mouth every evening        traZODone (DESYREL) 50 MG tablet Take  mg by mouth At Bedtime            Allergies   Allergen Reactions     Sulfa Drugs      Got really sick, headache, facial swelling, felt like she was dying.   Delayed reaction happened ~3-4 days after she started taking it.        Exam:  not currently breastfeeding.  Wt Readings from Last 4 Encounters:   10/05/21 74.4 kg (164 lb 1.6 oz)   09/20/21 76.9 kg (169 lb 8.5 oz)   09/07/21 78.7 kg (173 lb 6.4 oz)   09/07/21 78.7 kg (173 lb 6.4 oz)     There were no vitals taken for this visit.   General: alert and no distress   Psych: Alert and oriented times; coherent speech, normal rate and volume   Pulm: Speaking in full sentences, unlabored, no audible wheezes or cough.   The rest of a comprehensive physical examination is deferred due to PHE (public health emergency) video restrictions\"     No labs or imaging to review    Assessment/plan:   1. Bilateral Breast Cancer (Left breast cancer [id number] ER + [place] + her2 negative, oncotype 10 s/p mastectomy and SNLB and Right breast cancer T2(2)N0 ER + [place] + " her2 negative, oncotype 18 s/p mastectomy and SNLB)  - S/p bilateral mastectomy and left chest wall RT.   - Started Zoladex and she will continue on zoladex.  - she has had her second reconstruction, and has had complications.  She is following with plastic surgery.  Advised she follow up with plastic surgery.      2. CHEK2 mutation: She has had a colonoscopy in 2012, which was normal. This was repeated in march 2021.  Due for repeat in 3 years given presence of polyps.      3. Lymphedema: Following with PT. Overall this has not been problem.      4. HHT: follows with Dr. Real. No significant bleeding at this time.     5. She is coping overall pretty well.      Katarzyna Swann MD

## 2021-10-07 NOTE — NURSING NOTE
Chief Complaint   Patient presents with     Surgical Followup     post-op check up       Vitals:    10/07/21 1350   BP: 116/63   Pulse: 81   Temp: (!) 101.2  F (38.4  C)   TempSrc: Oral   SpO2: 98%       There is no height or weight on file to calculate BMI.          ANI GRIGGS EMT

## 2021-10-07 NOTE — PROGRESS NOTES
Infusion Nursing Note:  Leigh Espinal presents today for C16D1 Zoladex-Not given.    Patient seen by provider today: Yes: Dr. Swann   present during visit today: Not Applicable.    Note: Patient arrived at infusion with Temp 102.4F tympanic. Patient states that she was seen by Dr. Chatterjee, noted the temperature and might have infection at the surgery site. Patient would like to reschedule appointment as she is not feeling well. Instructed patient if symptoms worsen/persists to call triage. Verbalized understanding.     Writer updated Dr. Swann. IB sent to .       Intravenous Access:  No Intravenous access/labs at this visit.    Treatment Conditions:  Not Applicable.      Discharge Plan:   Patient declined prescription refills.  Discharge instructions reviewed with: Patient.  Patient and/or family verbalized understanding of discharge instructions and all questions answered.  AVS to patient via MissingamesT.  Patient will return  for next appointment. See notes above.  Patient discharged in stable condition accompanied by: self.  Departure Mode: Wheelchair.      STEPH KOTHARI RN

## 2021-10-07 NOTE — PATIENT INSTRUCTIONS
Contact Numbers  [facility]: [phone]    After Hours:  [phone]  Triage: [phone]    Please call the [facility] Triage line if you experience a temperature greater than or equal to 100.5, shaking chills, have uncontrolled nausea, vomiting and/or diarrhea, dizziness, shortness of breath, chest pain, bleeding, unexplained bruising, or if you have any other new/concerning symptoms, questions or concerns.     If it is after hours, weekends, or holidays, please call the main hospital  at  [phone] and ask to speak to the Oncology doctor on call.     If you are having any concerning symptoms or wish to speak to a provider before your next infusion visit, please call your care coordinator or triage to notify them so we can adequately serve you.     If you need a refill on a narcotic prescription or other medication, please call triage before your infusion appointment.         October 2021 Sunday Monday Tuesday Wednesday Thursday Friday Saturday                            1     2       3     4     5    POST OP   8:00 AM   (15 min.)   CEE Chatterjee MD   [facility] 6    [facility] POST-OP  11:15 AM   (30 min.)   Morales Luna RN   [facility] Plastic [facility] Reconstructive [facility] 7    VIDEO VISIT RETURN  11:45 AM   (30 min.)   Katarznya Swann MD   [facility] POST-OP   1:30 PM   (15 min.)   CEE Chatterjee MD   [facility] Plastic and Reconstructive Surgery [facility]    LAB   2:45 PM   (15 min.)   [facility]   [facility] Lab [place]    ONC INFUSION 0.5 HR (30 MIN)   3:00 PM   (30 min.)    ONC INFUSION NURSE   [facility] 8     9       10     11     12     13    [facility] NEW WOUND NURSE   9:30 AM   (60 min.)   Marisol Rgealado, RN   [facility] Wound [facility] [place] 14     15     16       17     18     19     20     21      22     23       24     25     26     27     28     29     30       31 November 2021 Sunday Monday Tuesday Wednesday Thursday Friday Saturday        1     2     3     4     5     6       7    ONC INFUSION 0.5 HR (30 MIN)  11:00 AM   (30 min.)    ONC INFUSION NURSE   [facility] 8     9     10     11     12     13       14     15     16     17     18     19     20       21     22     23     24     25     26     27       28     29     30                                         Lab Results:  Recent Results (from the past 12 hour(s))   Basic metabolic panel    Collection Time: 10/07/21  2:45 PM   Result Value Ref Range    Sodium 137 133 - 144 mmol/L    Potassium 3.6 3.4 - 5.3 mmol/L    Chloride 100 94 - 109 mmol/L    Carbon Dioxide (CO2) 28 20 - 32 mmol/L    Anion Gap 9 3 - 14 mmol/L    Urea Nitrogen 14 7 - 30 mg/dL    Creatinine 0.73 0.52 - 1.04 mg/dL    Calcium 9.4 8.5 - 10.1 mg/dL    Glucose 131 (H) 70 - 99 mg/dL    GFR Estimate >90 >60 mL/min/1.73m2   CBC with platelets and differential    Collection Time: 10/07/21  2:45 PM   Result Value Ref Range    WBC Count 7.8 4.0 - 11.0 10e3/uL    RBC Count 3.16 (L) 3.80 - 5.20 10e6/uL    Hemoglobin 9.1 (L) 11.7 - 15.7 g/dL    Hematocrit 29.1 (L) 35.0 - 47.0 %    MCV 92 78 - 100 fL    MCH 28.8 26.5 - 33.0 pg    MCHC 31.3 (L) 31.5 - 36.5 g/dL    RDW 15.1 (H) 10.0 - 15.0 %    Platelet Count 523 (H) 150 - 450 10e3/uL    % Neutrophils 75 %    % Lymphocytes 15 %    % Monocytes 10 %    % Eosinophils 0 %    % Basophils 0 %    % Immature Granulocytes 0 %    NRBCs per 100 WBC 0 <1 /100    Absolute Neutrophils 5.8 1.6 - 8.3 10e3/uL    Absolute Lymphocytes 1.2 0.8 - 5.3 10e3/uL    Absolute Monocytes 0.8 0.0 - 1.3 10e3/uL    Absolute Eosinophils 0.0 0.0 - 0.7 10e3/uL    Absolute Basophils 0.0 0.0 - 0.2 10e3/uL    Absolute Immature Granulocytes 0.0 <=0.0 10e3/uL    Absolute NRBCs 0.0 10e3/uL

## 2021-10-08 ENCOUNTER — PATIENT OUTREACH (OUTPATIENT)
Dept: PLASTIC SURGERY | Facility: CLINIC | Age: 56
End: 2021-10-08

## 2021-10-08 NOTE — PATIENT INSTRUCTIONS
Called and spoke with pt today to provide her with the update that her labs come back normal. Pt is relieved to hear this news. She is feeling better and she has just taken a shower. Pain is controlled and she does not currently have a fever. She is taking antibiotics as prescribed and is aware of appointment with wound care RN on Wednesday at 9:45 am.  Morales FORTE RN

## 2021-10-11 NOTE — PROGRESS NOTES
Service Date: 10/07/2021    FOLLOWUP VISIT NOTE    PRESENTING COMPLAINT:  Postoperative visit, status post bilateral breast reconstruction with free MIKAL flaps done 2021.    HISTORY OF PRESENTING COMPLAINT:  Ms. Hernadez is 56 years old.  I got a phone call that she was having drainage from her abdominal wound and was concerned, so we had her come into our clinic to take a look.  She had a low-grade fever of about 100 degrees at home, but otherwise felt well.  Pain was okay.    PHYSICAL EXAMINATION:  Vital signs are stable.  She is afebrile, in no obvious distress.  I took down the dressings on her abdomen. She has a small, 1 cm, superficial opening, no obvious cellulitis, but some turbid fluid coming from that area.  Her breast incisions are healing in well.      ASSESSMENT AND PLAN:  Based upon the above findings, a diagnosis of bilateral breast reconstruction with abdominal wall wound-healing issues was made.  Under sterile conditions, I opened up the abdominal wall area and packed it. Just to be on the safe side, I got a CBC as well as a BMP, and both were normal.  Given her issues that she was having, I placed her on a 10 day course of clindamycin.  We will see her back next week in clinic.  All questions were answered.  She was happy with the visit.    CEE Chatterjee MD        D: 10/11/2021   T: 10/11/2021   MT: darío    Name:     AZRA HERNADEZ  MRN:      [id number]        Account:      [id number]   :      1965           Service Date: 10/07/2021       Document: [id number]

## 2021-10-12 ENCOUNTER — HOSPITAL ENCOUNTER (EMERGENCY)
Facility: CLINIC | Age: 56
Discharge: HOME | End: 2021-10-12
Attending: EMERGENCY MEDICINE | Admitting: EMERGENCY MEDICINE
Payer: COMMERCIAL

## 2021-10-12 ENCOUNTER — NURSE TRIAGE (OUTPATIENT)
Dept: NURSING | Facility: CLINIC | Age: 56
End: 2021-10-12

## 2021-10-12 VITALS
DIASTOLIC BLOOD PRESSURE: 81 MMHG | HEART RATE: 80 BPM | OXYGEN SATURATION: 98 % | SYSTOLIC BLOOD PRESSURE: 119 MMHG | RESPIRATION RATE: 16 BRPM

## 2021-10-12 DIAGNOSIS — T81.31XA POSTOPERATIVE WOUND DEHISCENCE, INITIAL ENCOUNTER: ICD-10-CM

## 2021-10-12 LAB
ALBUMIN SERPL-MCNC: 1.5 G/DL (ref 3.4–5)
ALP SERPL-CCNC: 152 U/L (ref 40–150)
ALT SERPL W P-5'-P-CCNC: 153 U/L (ref 0–50)
ANION GAP SERPL CALCULATED.3IONS-SCNC: 6 MMOL/L (ref 3–14)
AST SERPL W P-5'-P-CCNC: 167 U/L (ref 0–45)
BASOPHILS # BLD MANUAL: 0 10E3/UL (ref 0–0.2)
BASOPHILS NFR BLD MANUAL: 0 %
BILIRUB SERPL-MCNC: 0.2 MG/DL (ref 0.2–1.3)
BUN SERPL-MCNC: 13 MG/DL (ref 7–30)
CALCIUM SERPL-MCNC: 9.2 MG/DL (ref 8.5–10.1)
CHLORIDE BLD-SCNC: 100 MMOL/L (ref 94–109)
CO2 SERPL-SCNC: 29 MMOL/L (ref 20–32)
CREAT BLD-MCNC: 0.7 MG/DL (ref 0.5–1)
CREAT BLD-MCNC: 0.7 MG/DL (ref 0.5–1.2)
CREAT SERPL-MCNC: 0.75 MG/DL (ref 0.52–1.04)
EOSINOPHIL # BLD MANUAL: 0 10E3/UL (ref 0–0.7)
EOSINOPHIL NFR BLD MANUAL: 0 %
ERYTHROCYTE [DISTWIDTH] IN BLOOD BY AUTOMATED COUNT: 15.9 % (ref 10–15)
GFR SERPL CREATININE-BSD FRML MDRD: >60 ML/MIN/1.73M2
GFR SERPL CREATININE-BSD FRML MDRD: >90 ML/MIN/1.73M2
GLUCOSE BLD-MCNC: 137 MG/DL (ref 70–99)
HCT VFR BLD AUTO: 28.2 % (ref 35–47)
HGB BLD-MCNC: 9 G/DL (ref 11.7–15.7)
INR PPP: 1.2 (ref 0.85–1.15)
LACTATE SERPL-SCNC: 1.4 MMOL/L (ref 0.7–2)
LYMPHOCYTES # BLD MANUAL: 2.2 10E3/UL (ref 0.8–5.3)
LYMPHOCYTES NFR BLD MANUAL: 21 %
MCH RBC QN AUTO: 28.7 PG (ref 26.5–33)
MCHC RBC AUTO-ENTMCNC: 31.9 G/DL (ref 31.5–36.5)
MCV RBC AUTO: 90 FL (ref 78–100)
MONOCYTES # BLD MANUAL: 0.8 10E3/UL (ref 0–1.3)
MONOCYTES NFR BLD MANUAL: 8 %
MYELOCYTES # BLD MANUAL: 0.3 10E3/UL
MYELOCYTES NFR BLD MANUAL: 3 %
NEUTROPHILS # BLD MANUAL: 7.2 10E3/UL (ref 1.6–8.3)
NEUTROPHILS NFR BLD MANUAL: 68 %
PLAT MORPH BLD: ABNORMAL
PLATELET # BLD AUTO: 596 10E3/UL (ref 150–450)
POTASSIUM BLD-SCNC: 3.2 MMOL/L (ref 3.4–5.3)
PROT SERPL-MCNC: 6.7 G/DL (ref 6.8–8.8)
RBC # BLD AUTO: 3.14 10E6/UL (ref 3.8–5.2)
RBC MORPH BLD: ABNORMAL
SODIUM SERPL-SCNC: 135 MMOL/L (ref 133–144)
WBC # BLD AUTO: 10.6 10E3/UL (ref 4–11)

## 2021-10-12 PROCEDURE — 96361 HYDRATE IV INFUSION ADD-ON: CPT | Performed by: EMERGENCY MEDICINE

## 2021-10-12 PROCEDURE — 99284 EMERGENCY DEPT VISIT MOD MDM: CPT | Performed by: EMERGENCY MEDICINE

## 2021-10-12 PROCEDURE — 96375 TX/PRO/DX INJ NEW DRUG ADDON: CPT | Performed by: EMERGENCY MEDICINE

## 2021-10-12 PROCEDURE — 258N000003 HC RX IP 258 OP 636: Performed by: EMERGENCY MEDICINE

## 2021-10-12 PROCEDURE — 85014 HEMATOCRIT: CPT | Performed by: EMERGENCY MEDICINE

## 2021-10-12 PROCEDURE — 83605 ASSAY OF LACTIC ACID: CPT | Performed by: EMERGENCY MEDICINE

## 2021-10-12 PROCEDURE — 80053 COMPREHEN METABOLIC PANEL: CPT | Performed by: EMERGENCY MEDICINE

## 2021-10-12 PROCEDURE — 99285 EMERGENCY DEPT VISIT HI MDM: CPT | Mod: 25 | Performed by: EMERGENCY MEDICINE

## 2021-10-12 PROCEDURE — 82565 ASSAY OF CREATININE: CPT | Mod: 91

## 2021-10-12 PROCEDURE — 250N000011 HC RX IP 250 OP 636: Performed by: EMERGENCY MEDICINE

## 2021-10-12 PROCEDURE — 36415 COLL VENOUS BLD VENIPUNCTURE: CPT | Performed by: EMERGENCY MEDICINE

## 2021-10-12 PROCEDURE — 250N000013 HC RX MED GY IP 250 OP 250 PS 637: Performed by: EMERGENCY MEDICINE

## 2021-10-12 PROCEDURE — 85610 PROTHROMBIN TIME: CPT | Performed by: EMERGENCY MEDICINE

## 2021-10-12 PROCEDURE — 96374 THER/PROPH/DIAG INJ IV PUSH: CPT | Performed by: EMERGENCY MEDICINE

## 2021-10-12 PROCEDURE — 87040 BLOOD CULTURE FOR BACTERIA: CPT | Performed by: EMERGENCY MEDICINE

## 2021-10-12 RX ORDER — SODIUM CHLORIDE 9 MG/ML
INJECTION, SOLUTION INTRAVENOUS CONTINUOUS
Status: DISCONTINUED | OUTPATIENT
Start: 2021-10-12 | End: 2021-10-13 | Stop reason: HOSPADM

## 2021-10-12 RX ORDER — ONDANSETRON 2 MG/ML
4 INJECTION INTRAMUSCULAR; INTRAVENOUS EVERY 30 MIN PRN
Status: DISCONTINUED | OUTPATIENT
Start: 2021-10-12 | End: 2021-10-13 | Stop reason: HOSPADM

## 2021-10-12 RX ORDER — HYDROMORPHONE HYDROCHLORIDE 1 MG/ML
0.5 INJECTION, SOLUTION INTRAMUSCULAR; INTRAVENOUS; SUBCUTANEOUS
Status: DISCONTINUED | OUTPATIENT
Start: 2021-10-12 | End: 2021-10-13 | Stop reason: HOSPADM

## 2021-10-12 RX ORDER — POTASSIUM CHLORIDE 750 MG/1
40 TABLET, EXTENDED RELEASE ORAL ONCE
Status: COMPLETED | OUTPATIENT
Start: 2021-10-12 | End: 2021-10-12

## 2021-10-12 RX ADMIN — HYDROMORPHONE HYDROCHLORIDE 0.5 MG: 1 INJECTION, SOLUTION INTRAMUSCULAR; INTRAVENOUS; SUBCUTANEOUS at 21:41

## 2021-10-12 RX ADMIN — POTASSIUM CHLORIDE 40 MEQ: 750 TABLET, EXTENDED RELEASE ORAL at 23:36

## 2021-10-12 RX ADMIN — ONDANSETRON 4 MG: 2 INJECTION INTRAMUSCULAR; INTRAVENOUS at 21:41

## 2021-10-12 RX ADMIN — SODIUM CHLORIDE 1000 ML: 9 INJECTION, SOLUTION INTRAVENOUS at 21:36

## 2021-10-12 ASSESSMENT — ENCOUNTER SYMPTOMS: WOUND: 1

## 2021-10-12 NOTE — PROGRESS NOTES
Service Date: 10/07/2021    POSTOPERATIVE VISIT    PRESENTING COMPLAINT:  Postoperative visit status post bilateral breast reconstruction with free MIKAL flaps done 2021 with abdominal wall healing issues.    HISTORY OF PRESENTING COMPLAINT:  Ms. Hernadez is 56 years old.  I got a phone call that she was having drainage and some low-grade fevers and wanted to be seen. Had her come in to see us in clinic.  She feels well but has noticed a temperature between 100-101.5.  She has noticed more drainage from her abdominal wound.    PHYSICAL EXAMINATION:  Vital signs stable.  She is afebrile, in no obvious distress.  Her breasts are healing in well.  The abdomen has an approximately 1 cm area that is open that is draining turbid fluid.  No evidence of cellulitis.  Her right aspect of the abdomen has a scab that is still intact.  No evidence of cellulitis.    ASSESSMENT AND PLAN:  Based on the above findings, a diagnosis of status post bilateral breast reconstruction with abdominal wall healing issues was made.  Under sterile conditions and local anesthesia, I went ahead and opened up that area further, so we could pack it.  We also started her on a 10-day course of clindamycin.  I am going to check her labs today, electrolytes and CBC.  .  She is already on the books to see us next week in our Wound Clinic.  All questions were answered.  She was happy with the visit.    CEE Chatterjee MD        D: 10/07/2021   T: 10/07/2021   MT: Charo    Name:     AZRA HERNADEZ  MRN:      [id number]        Account:      [id number]   :      1965           Service Date: 10/07/2021       Document: [id number]

## 2021-10-13 ENCOUNTER — ANESTHESIA EVENT (OUTPATIENT)
Dept: SURGERY | Facility: AMBULATORY SURGERY CENTER | Age: 56
End: 2021-10-13
Payer: COMMERCIAL

## 2021-10-13 ENCOUNTER — LAB (OUTPATIENT)
Dept: LAB | Facility: CLINIC | Age: 56
End: 2021-10-13
Payer: COMMERCIAL

## 2021-10-13 ENCOUNTER — OFFICE VISIT (OUTPATIENT)
Dept: WOUND CARE | Facility: CLINIC | Age: 56
End: 2021-10-13
Payer: COMMERCIAL

## 2021-10-13 ENCOUNTER — TELEPHONE (OUTPATIENT)
Dept: SURGERY | Facility: CLINIC | Age: 56
End: 2021-10-13

## 2021-10-13 DIAGNOSIS — Z98.890 S/P BREAST RECONSTRUCTION, BILATERAL: Primary | ICD-10-CM

## 2021-10-13 DIAGNOSIS — C50.919 BREAST CANCER (H): Primary | ICD-10-CM

## 2021-10-13 DIAGNOSIS — T81.89XD NON-HEALING SURGICAL WOUND, SUBSEQUENT ENCOUNTER: ICD-10-CM

## 2021-10-13 DIAGNOSIS — T81.89XD NON-HEALING SURGICAL WOUND, SUBSEQUENT ENCOUNTER: Primary | ICD-10-CM

## 2021-10-13 DIAGNOSIS — T81.31XA POSTOPERATIVE WOUND DEHISCENCE: ICD-10-CM

## 2021-10-13 DIAGNOSIS — Z98.890 S/P BREAST RECONSTRUCTION, BILATERAL: ICD-10-CM

## 2021-10-13 LAB — SARS-COV-2 RNA RESP QL NAA+PROBE: NEGATIVE

## 2021-10-13 PROCEDURE — U0003 INFECTIOUS AGENT DETECTION BY NUCLEIC ACID (DNA OR RNA); SEVERE ACUTE RESPIRATORY SYNDROME CORONAVIRUS 2 (SARS-COV-2) (CORONAVIRUS DISEASE [COVID-19]), AMPLIFIED PROBE TECHNIQUE, MAKING USE OF HIGH THROUGHPUT TECHNOLOGIES AS DESCRIBED BY CMS-2020-01-R: HCPCS | Mod: 90 | Performed by: PATHOLOGY

## 2021-10-13 PROCEDURE — 99024 POSTOP FOLLOW-UP VISIT: CPT

## 2021-10-13 PROCEDURE — U0005 INFEC AGEN DETEC AMPLI PROBE: HCPCS | Mod: 90 | Performed by: PATHOLOGY

## 2021-10-13 RX ORDER — CEFAZOLIN SODIUM 2 G/50ML
2 SOLUTION INTRAVENOUS
Status: CANCELLED | OUTPATIENT
Start: 2021-10-13

## 2021-10-13 RX ORDER — CEFAZOLIN SODIUM 2 G/50ML
2 SOLUTION INTRAVENOUS SEE ADMIN INSTRUCTIONS
Status: CANCELLED | OUTPATIENT
Start: 2021-10-13

## 2021-10-13 NOTE — TELEPHONE ENCOUNTER
I contacted the patient via phone and left a voicemail to confirm the scheduled dates and provide the following information:     Surgeon/surgery date/location:  Dr. Chatterjee on 10/14 at [facility].  Arrival:   1:30 PM   COVID-19 test:   10/13  Post-op:   10/18.    The surgery packet was provided via [employer]

## 2021-10-13 NOTE — ED PROVIDER NOTES
ED Provider Note  [facility]      History     Chief Complaint   Patient presents with     Post-op Problem     The history is provided by the patient.     Leigh Espinal is a 56 year old female who presents ED  with worsening wound dehiscence from her lower abdominal wound.  Patient underwent bilateral breast reconstruction and abdominal flap reconstruction in September through plastic surgery and had wound complications afterwards with some wound packing done by her  at home.  Patient states over the last 24 hours the wound on her abdomen has become more dehisced and has pulled apart completely.  She presents here to the ER for evaluation.    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    CHEK2 c.1100delC [facility] 12/19/2019     Past Surgical History:   Procedure Laterality Date     BIOPSY NODE SENTINEL Bilateral 1/29/2020    Procedure: BILATERAL Axillary Abbot Lymph Node Biopsy;  Surgeon: Paz Smith MD;  Location: [facility] OR     COLONOSCOPY  6/21/2012    Procedure: COLONOSCOPY;;  Surgeon: Radha Hector MD;  Location: [facility] GI     COLONOSCOPY N/A 2/2/2021    Procedure: COLONOSCOPY;  Surgeon: Wally Padilla DO;  Location: [facility] OR     GRAFT FREE VASCULARIZED TRANSVERSE RECTUS ABDOMINIS MYOCUTANEOUS Bilateral 9/20/2021    Procedure: Bilateral breast reconstruction with free trans  rectus abdominus muscle flap, additional superficial system revascularization bilaterally with left breast vein graft, strattice abdominal wall reconstruction and SPY;  Surgeon: CEE Chatterjee MD;  Location: [facility] OR     INCISION AND DRAINAGE BREAST Right 9/22/2021    Procedure: Right BREAST hematoma evacuation;  Surgeon: CEE Chatterjee MD;  Location: [facility] OR     INSERT TISSUE EXPANDER BREAST BILATERAL Bilateral 2/14/2020    Procedure: bilateral breast expander removal and washout;  Surgeon: CEE Chatterjee MD;  Location: [facility] OR     LAPAROSCOPIC NISSEN FUNDOPLICATION  2007     MASTECTOMY SIMPLE Bilateral 1/29/2020    Procedure: BILATERAL Skin-Sparing Mastectomy;  Surgeon: Paz Smith MD;  Location: [facility] OR     RECONSTRUCT BREAST Bilateral 1/29/2020    Procedure: Bilateral breast reconstruction with expanders and SPY;  Surgeon: CEE Chatterjee MD;  Location: [facility] OR     TUBAL LIGATION  1990     acetaminophen (TYLENOL) 500 MG tablet  aspirin (ASA) 81 MG EC tablet  atenolol (TENORMIN) 50 MG tablet  bisacodyl (DULCOLAX) 10 MG suppository  clindamycin (CLEOCIN) 150 MG capsule  Goserelin Acetate (ZOLADEX SC)  pantoprazole (PROTONIX) 20 MG EC tablet  polyethylene glycol (MIRALAX) 17 GM/Dose powder  senna-docusate (SENOKOT-S/PERICOLACE) 8.6-50 MG tablet  SERTRALINE HCL PO  traZODone (DESYREL) 50 MG tablet      Allergies   Allergen Reactions     Sulfa Drugs      Got really sick, headache, facial swelling, felt like she was dying.   Delayed reaction happened ~3-4 days after she started taking it.      Family History  Family History   Problem Relation Age of Onset     C.A.D. Maternal Grandfather      Hypertension Mother      Hypertension Father      Cancer No family hx of      Social History   Social History     Tobacco Use     Smoking status: Former Smoker     Packs/day: 1.00     Years: 20.00     Pack years: 20.00     Types: Cigarettes     Start date: 1/1/1980     Quit date: 9/13/2011     Years  since quitting: 10.0     Smokeless tobacco: Never Used   Substance Use Topics     Alcohol use: Yes     Alcohol/week: 3.3 standard drinks     Types: 4 Standard drinks or equivalent per week     Comment: occ     Drug use: No      Past medical history, past surgical history, medications, allergies, family history, and social history were reviewed with the patient. No additional pertinent items.       Review of Systems   Skin: Positive for wound (dehiscence of lower abdominal wound).   All other systems reviewed and are negative.    A complete review of systems was performed with pertinent positives and negatives noted in the HPI, and all other systems negative.    Physical Exam   BP: 133/74  Pulse: 85  SpO2: 96 %  Physical Exam  Vitals and nursing note reviewed.   HENT:      Head: Atraumatic.   Eyes:      Extraocular Movements: Extraocular movements intact.      Pupils: Pupils are equal, round, and reactive to light.   Pulmonary:      Effort: No respiratory distress.   Abdominal:      Palpations: Abdomen is soft.   Skin:     Comments: Dehiscence, please see picture below   Neurological:      General: No focal deficit present.      Mental Status: She is alert and oriented to person, place, and time.   Psychiatric:         Mood and Affect: Mood normal.               ED Course     10:30 PM  The patient was seen and examined by Johny Noble MD in Room ED11.     Procedures            Results for orders placed or performed during the hospital encounter of 10/12/21   INR     Status: Abnormal   Result Value Ref Range    INR 1.20 (H) 0.85 - 1.15   Comprehensive metabolic panel     Status: Abnormal   Result Value Ref Range    Sodium 135 133 - 144 mmol/L    Potassium 3.2 (L) 3.4 - 5.3 mmol/L    Chloride 100 94 - 109 mmol/L    Carbon Dioxide (CO2) 29 20 - 32 mmol/L    Anion Gap 6 3 - 14 mmol/L    Urea Nitrogen 13 7 - 30 mg/dL    Creatinine 0.75 0.52 - 1.04 mg/dL    Calcium 9.2 8.5 - 10.1 mg/dL    Glucose 137 (H) 70 -  "99 mg/dL    Alkaline Phosphatase 152 (H) 40 - 150 U/L     (H) 0 - 45 U/L     (H) 0 - 50 U/L    Protein Total 6.7 (L) 6.8 - 8.8 g/dL    Albumin 1.5 (L) 3.4 - 5.0 g/dL    Bilirubin Total 0.2 0.2 - 1.3 mg/dL    GFR Estimate >90 >60 mL/min/1.73m2   Lactic acid whole blood     Status: Normal   Result Value Ref Range    Lactic Acid 1.4 0.7 - 2.0 mmol/L   CBC with platelets and differential     Status: Abnormal   Result Value Ref Range    WBC Count 10.6 4.0 - 11.0 10e3/uL    RBC Count 3.14 (L) 3.80 - 5.20 10e6/uL    Hemoglobin 9.0 (L) 11.7 - 15.7 g/dL    Hematocrit 28.2 (L) 35.0 - 47.0 %    MCV 90 78 - 100 fL    MCH 28.7 26.5 - 33.0 pg    MCHC 31.9 31.5 - 36.5 g/dL    RDW 15.9 (H) 10.0 - 15.0 %    Platelet Count 596 (H) 150 - 450 10e3/uL    Narrative    Previously reported component [ NRBCs ] is no longer reported.\"  Previously reported component [ NRBCs Absolute ] is no longer reported.\"   Creatinine POCT     Status: Normal   Result Value Ref Range    Creatinine POCT 0.7 0.5 - 1.0 mg/dL    GFR, ESTIMATED POCT >60 >60 mL/min/1.73m2   Manual Differential     Status: Abnormal   Result Value Ref Range    % Neutrophils 68 %    % Lymphocytes 21 %    % Monocytes 8 %    % Eosinophils 0 %    % Basophils 0 %    % Myelocytes 3 %    Absolute Neutrophils 7.2 1.6 - 8.3 10e3/uL    Absolute Lymphocytes 2.2 0.8 - 5.3 10e3/uL    Absolute Monocytes 0.8 0.0 - 1.3 10e3/uL    Absolute Eosinophils 0.0 0.0 - 0.7 10e3/uL    Absolute Basophils 0.0 0.0 - 0.2 10e3/uL    Absolute Myelocytes 0.3 (H) <=0.0 10e3/uL    RBC Morphology Confirmed RBC Indices     Platelet Assessment  Automated Count Confirmed. Platelet morphology is normal.     Automated Count Confirmed. Platelet morphology is normal.   Creatinine POCT     Status: Normal   Result Value Ref Range    Creatinine 0.7 0.5 - 1.2 mg/dl   CBC with platelets differential     Status: Abnormal    Narrative    The following orders were created for panel order CBC with platelets " differential.  Procedure                               Abnormality         Status                     ---------                               -----------         ------                     CBC with platelets and d...[[id number]]  Abnormal            Final result               Manual Differential[[id number]]          Abnormal            Final result                 Please view results for these tests on the individual orders.     Medications   HYDROmorphone (PF) (DILAUDID) injection 0.5 mg (0.5 mg Intravenous Given 10/12/21 2141)   ondansetron (ZOFRAN) injection 4 mg (4 mg Intravenous Given 10/12/21 2141)   sodium chloride 0.9% infusion (has no administration in time range)   potassium chloride ER (KLOR-CON M) CR tablet 40 mEq (has no administration in time range)   0.9% sodium chloride BOLUS (1,000 mLs Intravenous New Bag 10/12/21 2136)        Assessments & Plan (with Medical Decision Making)     I have reviewed the nursing notes.    LASIK surgery was consulted and I discussed the case with them as well.  They saw the patient in the ER and packed the wound and applied binders.     I have reviewed the findings, diagnosis, plan and need for follow up with the patient.        Final diagnoses:   Postoperative wound dehiscence, initial encounter     Continue your oral antibiotic as directed    Wound packing and binding per plastics.    Please follow up with plastic surgery clinic tomorrow for further evaluation and surgical scheduling.    Johny Noble MD, MD    ISulaiman, am serving as a trained medical scribe to document services personally performed by Johny Noble MD, based on the provider's statements to me.      I, Johny Noble MD, was physically present and have reviewed and verified the accuracy of this note documented by Sulaiman Churchill.  --  Johny Noble Md  [facility] EMERGENCY DEPARTMENT  10/12/2021     Johny Noble MD  10/12/21 2304

## 2021-10-13 NOTE — CONSULTS
PLASTIC SURGERY CONSULTATION  10/12/2021       Date of Admission: 10/12/2021  Reason for Consultation: Abdominal wound  Attending: Dr. Chatterjee      Assessment: Leigh Espinal is a 56 year old female presenting with superficial abdominal wound dehiscence s/p BL MIKAL breast reconstruction on 9/20/21. No overt signs of infection, abdominal wall intact.    Plan:  - Patient will require formal washout and likely wound VAC application in OR, tentatively planned for Thursday 10/14  - Wound cleaned and packed with betadine soaked kerilex (one roll), covered with ABD and paper tape.  - Plans for follow up in outpatient clinic tomorrow morning for dressing change.  - Continue home meds including course of antibiotics (10 days clindamycin)      The patient was evaluated and discussed with MD Miquel Altman MD PGY7  Plastic and Reconstructive Surgery Fellow      ----      HPI: Leigh Espinal is a 56 year old female presenting with abdominal wall wound s/p BL MIKAL breast reconstruction on 9/20. Patient was evaluated in clinic on 10/7 due to a small area of drainage, which was opened and allowed to drain. Her  has helped her with daily packings. Since this time, patient states wound has completely opened, mostly tonight.  She denies any fevers, chills, or nausea. She has been placed on a 10d course of clindamycin and has been compliant with medication.    ROS:   The Review of Systems is negative other than noted in the HPI.    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    CHEK2 c.1100delC [facility] 12/19/2019       Past Surgical History:   Past Surgical History:   Procedure Laterality Date     BIOPSY NODE SENTINEL Bilateral 1/29/2020    Procedure: BILATERAL Axillary [place] Lymph Node Biopsy;  Surgeon: Paz Smith MD;  [facility]     COLONOSCOPY  6/21/2012    Procedure: COLONOSCOPY;;  Surgeon: Radha Hector MD;  [facility]     COLONOSCOPY N/A 2/2/2021    Procedure: COLONOSCOPY;  Surgeon: Wally Padilla DO;  Location: [facility] OR     GRAFT FREE VASCULARIZED TRANSVERSE RECTUS ABDOMINIS MYOCUTANEOUS Bilateral 9/20/2021    Procedure: Bilateral breast reconstruction with free trans rectus abdominus muscle flap, additional superficial system revascularization bilaterally with left breast vein graft, strattice abdominal wall reconstruction and SPY;  Surgeon: CEE Chatterjee MD;  [facility]     INCISION AND DRAINAGE BREAST Right 9/22/2021    Procedure: Right BREAST hematoma evacuation;  Surgeon: CEE Chatterjee MD;  [facility]     INSERT TISSUE EXPANDER BREAST BILATERAL Bilateral 2/14/2020    Procedure: bilateral breast expander removal and washout;  Surgeon: CEE Chatterjee MD;  [facility]     LAPAROSCOPIC NISSEN FUNDOPLICATION  2007     MASTECTOMY SIMPLE Bilateral 1/29/2020    Procedure: BILATERAL Skin-Sparing Mastectomy;  Surgeon: Paz Smith MD;  [facility]     RECONSTRUCT BREAST Bilateral 1/29/2020    Procedure: Bilateral breast reconstruction with expanders and SPY;  Surgeon: CEE Chatterjee MD;  [facility]     TUBAL LIGATION  1990       Medications:   Home:  No current facility-administered medications on file prior to encounter.  acetaminophen (TYLENOL) 500 MG tablet, Take 2 tablets (1,000 mg) by mouth 3 times daily  aspirin (ASA) 81  MG EC tablet, Take 1 tablet (81 mg) by mouth daily  atenolol (TENORMIN) 50 MG tablet, Take 50 mg by mouth every evening   bisacodyl (DULCOLAX) 10 MG suppository, Place 1 suppository (10 mg) rectally daily as needed for constipation (Use if Magnesium hydroxide (MILK of MAGNESIA) not effective after 24 hours. May discontinue if patient having bowel movement.)  clindamycin (CLEOCIN) 150 MG capsule, Take 1 capsule (150 mg) by mouth 3 times daily for 10 days  Goserelin Acetate (ZOLADEX SC), Inject 3.6 mg Subcutaneous every 30 days   pantoprazole (PROTONIX) 20 MG EC tablet, Take 20 mg by mouth daily as needed   polyethylene glycol (MIRALAX) 17 GM/Dose powder, Take 17 g by mouth daily  senna-docusate (SENOKOT-S/PERICOLACE) 8.6-50 MG tablet, Take 2 tablets by mouth 2 times daily  SERTRALINE HCL PO, Take 50 mg by mouth every evening   traZODone (DESYREL) 50 MG tablet, Take  mg by mouth At Bedtime      Inpatient:  Current Facility-Administered Medications   Medication     HYDROmorphone (PF) (DILAUDID) injection 0.5 mg     ondansetron (ZOFRAN) injection 4 mg     potassium chloride ER (KLOR-CON M) CR tablet 40 mEq     sodium chloride 0.9% infusion     Current Outpatient Medications   Medication Sig     acetaminophen (TYLENOL) 500 MG tablet Take 2 tablets (1,000 mg) by mouth 3 times daily     aspirin (ASA) 81 MG EC tablet Take 1 tablet (81 mg) by mouth daily     atenolol (TENORMIN) 50 MG tablet Take 50 mg by mouth every evening      bisacodyl (DULCOLAX) 10 MG suppository Place 1 suppository (10 mg) rectally daily as needed for constipation (Use if Magnesium hydroxide (MILK of MAGNESIA) not effective after 24 hours. May discontinue if patient having bowel movement.)     clindamycin (CLEOCIN) 150 MG capsule Take 1 capsule (150 mg) by mouth 3 times daily for 10 days     Goserelin Acetate (ZOLADEX SC) Inject 3.6 mg Subcutaneous every 30 days      pantoprazole (PROTONIX) 20 MG EC tablet Take 20 mg by mouth daily as needed       polyethylene glycol (MIRALAX) 17 GM/Dose powder Take 17 g by mouth daily     senna-docusate (SENOKOT-S/PERICOLACE) 8.6-50 MG tablet Take 2 tablets by mouth 2 times daily     SERTRALINE HCL PO Take 50 mg by mouth every evening      traZODone (DESYREL) 50 MG tablet Take  mg by mouth At Bedtime       Allergies:   Allergies   Allergen Reactions     Sulfa Drugs      Got really sick, headache, facial swelling, felt like she was dying.   Delayed reaction happened ~3-4 days after she started taking it.        Social History:   Social History     Socioeconomic History     Marital status:      Spouse name: Not on file     Number of children: 2     Years of education: Not on file     Highest education level: Not on file   Occupational History     Not on file   Tobacco Use     Smoking status: Former Smoker     Packs/day: 1.00     Years: 20.00     Pack years: 20.00     Types: Cigarettes     Start date: 1/1/1980     Quit date: 9/13/2011     Years since quitting: 10.0     Smokeless tobacco: Never Used   Substance and Sexual Activity     Alcohol use: Yes     Alcohol/week: 3.3 standard drinks     Types: 4 Standard drinks or equivalent per week     Comment: occ     Drug use: No     Sexual activity: Yes     Partners: Male     Birth control/protection: Female Surgical, Male Surgical   Other Topics Concern     Parent/sibling w/ CABG, MI or angioplasty before 65F 55M? Not Asked   Social History Narrative     Not on file     Social Determinants of Health     Financial Resource Strain:      Difficulty of Paying Living Expenses:    Food Insecurity:      Worried About Running Out of Food in the Last Year:      Ran Out of Food in the Last Year:    Transportation Needs:      Lack of Transportation (Medical):      Lack of Transportation (Non-Medical):    Physical Activity:      Days of Exercise per Week:      Minutes of Exercise per Session:    Stress:      Feeling of Stress :    Social Connections:      Frequency of Communication  with Friends and Family:      Frequency of Social Gatherings with Friends and Family:      Attends [employer] Services:      Active Member of Clubs or Organizations:      Attends Club or Organization Meetings:      Marital Status:    Intimate Partner Violence:      Fear of Current or Ex-Partner:      Emotionally Abused:      Physically Abused:      Sexually Abused:        Family History:   Family History   Problem Relation Age of Onset     C.A.D. Maternal Grandfather      Hypertension Mother      Hypertension Father      Cancer No family hx of        Physical Exam:  /60   Pulse 79   SpO2 97%   General: NAD, following commands  HEENT: pupils equal and reactive  CV: RRR  Pulm: Non labored breathing, CTAB  Abd: Nearly complete dehiscence of abdominal incision with central flap necrosis. Draining fluid and exudate present. No pus or surrounding cellulitis. Abdominal wall probed and intact       Labs: Reviewed in full.  BMP  Recent Labs   Lab 10/12/21  2137 10/12/21  2131 10/07/21  1445   NA  --  135 137   POTASSIUM  --  3.2* 3.6   CHLORIDE  --  100 100   CO2  --  29 28   BUN  --  13 14   CR 0.7 0.75 0.73   GLC  --  137* 131*     CBC  Recent Labs   Lab 10/12/21  2131 10/07/21  1445   WBC 10.6 7.8   HGB 9.0* 9.1*   * 523*     LFT  Recent Labs   Lab 10/12/21  2131   *   *   ALKPHOS 152*   BILITOTAL 0.2   ALBUMIN 1.5*   INR 1.20*     Recent Labs   Lab 10/12/21  2131 10/07/21  1445   * 131*       Imaging: Reviewed.   No results found for this or any previous visit (from the past 24 hour(s)).

## 2021-10-13 NOTE — PROGRESS NOTES
Patient comes to wound clinic for wound assessment dressing change  per request of Dr Chatterjee  She has history of a Open surgical wound   Bilateral breast reconstruction with free TRAM flap breast reconstruction.    Pt assessed for discomfort which is 8/10  which is same worse compared to previous day    Dressing removed, wound washed with Microklenz, and measured.     Wound evaluation:  Size:  40 cm length x 10 cm width x 5 cm depth.    There is undermining:  Yes  15 cm all around the dehiscence        Assessment: Complete dehiscence. Pt will go back to OR tomorrow for I+D and wound vac placement  Dressing change:  Treatment today Moist Dressing change:  Cleanse wound with:  Microklenz.  Primary Dressing: Vashe moist gauze  Secondary Dressing: abd pads   Secure with: Binder     PLAN: Leave dressing in place until tomorrow. Her  has been trying to help but due to the odor and large size of the wound he is not able to do this.  She will be homebound due to pain and get home care after surgery for dressing changes.     Pt has our number should other issues arise. All questions answered for now.   Patient needs to be seen prn.    Dr. Chatterjee was available for supervision of care if needed or if questions should arise and regarding plan of care.

## 2021-10-13 NOTE — TELEPHONE ENCOUNTER
Patient calling reporting she has an abdominal wound that has opened up. States her internal organs are visible to her. Advised patient to call 911. Caller verbalized understanding. Denies further questions.      Aldair Rodriguez RN  [facility]     COVID 19 Nurse Triage Plan/Patient Instructions    Please be aware that novel coronavirus (COVID-19) may be circulating in the community. If you develop symptoms such as fever, cough, or SOB or if you have concerns about the presence of another infection including coronavirus (COVID-19), please contact your health care provider or visit [email or web address].     Disposition/Instructions    Call to EMS/911 recommended. Follow protocol based instructions.     Bring Your Own Device:  Please also bring your smart device(s) (smart phones, tablets, laptops) and their charging cables for your personal use and to communicate with your care team during your visit.    Thank you for taking steps to prevent the spread of this virus.  o Limit your contact with others.  o Wear a simple mask to cover your cough.  o Wash your hands well and often.    Resources    [facility]: About COVID-19: [email or web address]    CDC: What to Do If You're Sick: www.cdc.gov/coronavirus/2019-ncov/about/steps-when-sick.html    CDC: Ending Home Isolation: www.cdc.gov/coronavirus/2019-ncov/hcp/disposition-in-home-patients.html     CDC: Caring for Someone: www.cdc.gov/coronavirus/2019-ncov/if-you-are-sick/care-for-someone.html     [facility]: Interim Guidance for Hospital Discharge to Home: www.health.[facility].mn.us/diseases/coronavirus/hcp/hospdischarge.pdf    [facility] clinical trials (COVID-19 research studies): [email or web address]/um-clinical-trials     Below are the COVID-19 hotlines at the [facility] ([facility]). Interpreters are available.   o For health questions: Call 129-142-8537 or 1-716.143.1380 (7 a.m. to 7 p.m.)  o For questions  about schools and childcare: Call 490-577-1925 or 1-924.671.3198 (7 a.m. to 7 p.m.)                       Reason for Disposition    [1] Major abdominal surgical incision AND [2] wound gaping open AND [3] visible internal organs    Protocols used: POST-OP INCISION SYMPTOMS AND [id number]

## 2021-10-13 NOTE — DISCHARGE INSTRUCTIONS
Continue your oral antibiotic as directed    Wound packing and binding per plastics.    Please follow up with plastic surgery clinic tomorrow for further evaluation and surgical scheduling.

## 2021-10-13 NOTE — ED TRIAGE NOTES
Pt arrives via [facility] from home. Pt had abdominal surgery 1 month ago to remove a cancerous mass. Today, the incision broke open. Pt also has an incision from a drain on her thigh that is being packed with andrea. BP 99/60.

## 2021-10-14 ENCOUNTER — PATIENT OUTREACH (OUTPATIENT)
Dept: PLASTIC SURGERY | Facility: CLINIC | Age: 56
End: 2021-10-14

## 2021-10-14 ENCOUNTER — ANESTHESIA (OUTPATIENT)
Dept: SURGERY | Facility: AMBULATORY SURGERY CENTER | Age: 56
End: 2021-10-14
Payer: COMMERCIAL

## 2021-10-14 ENCOUNTER — HOSPITAL ENCOUNTER (OUTPATIENT)
Facility: AMBULATORY SURGERY CENTER | Age: 56
End: 2021-10-14
Attending: PLASTIC SURGERY
Payer: COMMERCIAL

## 2021-10-14 ENCOUNTER — TELEPHONE (OUTPATIENT)
Dept: SURGERY | Facility: CLINIC | Age: 56
End: 2021-10-14

## 2021-10-14 VITALS
TEMPERATURE: 99.7 F | HEART RATE: 116 BPM | WEIGHT: 164.02 LBS | HEIGHT: 64 IN | OXYGEN SATURATION: 98 % | DIASTOLIC BLOOD PRESSURE: 84 MMHG | BODY MASS INDEX: 28 KG/M2 | RESPIRATION RATE: 16 BRPM | SYSTOLIC BLOOD PRESSURE: 133 MMHG

## 2021-10-14 DIAGNOSIS — T81.89XD NON-HEALING SURGICAL WOUND, SUBSEQUENT ENCOUNTER: ICD-10-CM

## 2021-10-14 DIAGNOSIS — T81.89XD NON-HEALING SURGICAL WOUND, SUBSEQUENT ENCOUNTER: Primary | ICD-10-CM

## 2021-10-14 PROBLEM — T81.31XA POSTOPERATIVE WOUND DEHISCENCE: Status: ACTIVE | Noted: 2021-10-14

## 2021-10-14 PROCEDURE — 11042 DBRDMT SUBQ TIS 1ST 20SQCM/<: CPT | Mod: 78

## 2021-10-14 PROCEDURE — 11042 DBRDMT SUBQ TIS 1ST 20SQCM/<: CPT | Mod: 78 | Performed by: PLASTIC SURGERY

## 2021-10-14 RX ORDER — GLYCOPYRROLATE 0.2 MG/ML
INJECTION, SOLUTION INTRAMUSCULAR; INTRAVENOUS PRN
Status: DISCONTINUED | OUTPATIENT
Start: 2021-10-14 | End: 2021-10-14

## 2021-10-14 RX ORDER — KETAMINE HYDROCHLORIDE 10 MG/ML
INJECTION INTRAMUSCULAR; INTRAVENOUS PRN
Status: DISCONTINUED | OUTPATIENT
Start: 2021-10-14 | End: 2021-10-14

## 2021-10-14 RX ORDER — GABAPENTIN 300 MG/1
300 CAPSULE ORAL
Status: DISCONTINUED | OUTPATIENT
Start: 2021-10-14 | End: 2021-10-15 | Stop reason: HOSPADM

## 2021-10-14 RX ORDER — ONDANSETRON 2 MG/ML
INJECTION INTRAMUSCULAR; INTRAVENOUS PRN
Status: DISCONTINUED | OUTPATIENT
Start: 2021-10-14 | End: 2021-10-14

## 2021-10-14 RX ORDER — LIDOCAINE HYDROCHLORIDE 20 MG/ML
INJECTION, SOLUTION INFILTRATION; PERINEURAL PRN
Status: DISCONTINUED | OUTPATIENT
Start: 2021-10-14 | End: 2021-10-14

## 2021-10-14 RX ORDER — LABETALOL HYDROCHLORIDE 5 MG/ML
10 INJECTION, SOLUTION INTRAVENOUS
Status: DISCONTINUED | OUTPATIENT
Start: 2021-10-14 | End: 2021-10-15 | Stop reason: HOSPADM

## 2021-10-14 RX ORDER — SODIUM CHLORIDE, SODIUM LACTATE, POTASSIUM CHLORIDE, CALCIUM CHLORIDE 600; 310; 30; 20 MG/100ML; MG/100ML; MG/100ML; MG/100ML
INJECTION, SOLUTION INTRAVENOUS CONTINUOUS PRN
Status: DISCONTINUED | OUTPATIENT
Start: 2021-10-14 | End: 2021-10-14

## 2021-10-14 RX ORDER — CEFAZOLIN SODIUM 2 G/50ML
2 SOLUTION INTRAVENOUS
Status: COMPLETED | OUTPATIENT
Start: 2021-10-14 | End: 2021-10-14

## 2021-10-14 RX ORDER — BUPIVACAINE HYDROCHLORIDE 2.5 MG/ML
INJECTION, SOLUTION INFILTRATION; PERINEURAL PRN
Status: DISCONTINUED | OUTPATIENT
Start: 2021-10-14 | End: 2021-10-14 | Stop reason: HOSPADM

## 2021-10-14 RX ORDER — SODIUM CHLORIDE, SODIUM LACTATE, POTASSIUM CHLORIDE, CALCIUM CHLORIDE 600; 310; 30; 20 MG/100ML; MG/100ML; MG/100ML; MG/100ML
INJECTION, SOLUTION INTRAVENOUS CONTINUOUS
Status: DISCONTINUED | OUTPATIENT
Start: 2021-10-14 | End: 2021-10-15 | Stop reason: HOSPADM

## 2021-10-14 RX ORDER — CEFAZOLIN SODIUM 2 G/50ML
2 SOLUTION INTRAVENOUS SEE ADMIN INSTRUCTIONS
Status: DISCONTINUED | OUTPATIENT
Start: 2021-10-14 | End: 2021-10-15 | Stop reason: HOSPADM

## 2021-10-14 RX ORDER — PROPOFOL 10 MG/ML
INJECTION, EMULSION INTRAVENOUS PRN
Status: DISCONTINUED | OUTPATIENT
Start: 2021-10-14 | End: 2021-10-14

## 2021-10-14 RX ORDER — LIDOCAINE 40 MG/G
CREAM TOPICAL
Status: DISCONTINUED | OUTPATIENT
Start: 2021-10-14 | End: 2021-10-15 | Stop reason: HOSPADM

## 2021-10-14 RX ORDER — OXYCODONE HYDROCHLORIDE 5 MG/1
5 TABLET ORAL EVERY 6 HOURS PRN
Qty: 12 TABLET | Refills: 0 | Status: SHIPPED | OUTPATIENT
Start: 2021-10-14 | End: 2021-10-17

## 2021-10-14 RX ORDER — HYDROMORPHONE HYDROCHLORIDE 1 MG/ML
0.4 INJECTION, SOLUTION INTRAMUSCULAR; INTRAVENOUS; SUBCUTANEOUS EVERY 5 MIN PRN
Status: DISCONTINUED | OUTPATIENT
Start: 2021-10-14 | End: 2021-10-15 | Stop reason: HOSPADM

## 2021-10-14 RX ORDER — OXYCODONE HYDROCHLORIDE 5 MG/1
5 TABLET ORAL EVERY 4 HOURS PRN
Status: DISCONTINUED | OUTPATIENT
Start: 2021-10-14 | End: 2021-10-15 | Stop reason: HOSPADM

## 2021-10-14 RX ORDER — PROPOFOL 10 MG/ML
INJECTION, EMULSION INTRAVENOUS CONTINUOUS PRN
Status: DISCONTINUED | OUTPATIENT
Start: 2021-10-14 | End: 2021-10-14

## 2021-10-14 RX ORDER — FENTANYL CITRATE 50 UG/ML
50 INJECTION, SOLUTION INTRAMUSCULAR; INTRAVENOUS EVERY 5 MIN PRN
Status: DISCONTINUED | OUTPATIENT
Start: 2021-10-14 | End: 2021-10-15 | Stop reason: HOSPADM

## 2021-10-14 RX ORDER — ONDANSETRON 2 MG/ML
4 INJECTION INTRAMUSCULAR; INTRAVENOUS EVERY 30 MIN PRN
Status: DISCONTINUED | OUTPATIENT
Start: 2021-10-14 | End: 2021-10-15 | Stop reason: HOSPADM

## 2021-10-14 RX ORDER — ONDANSETRON 4 MG/1
4 TABLET, ORALLY DISINTEGRATING ORAL EVERY 30 MIN PRN
Status: DISCONTINUED | OUTPATIENT
Start: 2021-10-14 | End: 2021-10-15 | Stop reason: HOSPADM

## 2021-10-14 RX ORDER — FENTANYL CITRATE 50 UG/ML
INJECTION, SOLUTION INTRAMUSCULAR; INTRAVENOUS PRN
Status: DISCONTINUED | OUTPATIENT
Start: 2021-10-14 | End: 2021-10-14

## 2021-10-14 RX ORDER — ACETAMINOPHEN 325 MG/1
975 TABLET ORAL ONCE
Status: DISCONTINUED | OUTPATIENT
Start: 2021-10-14 | End: 2021-10-15 | Stop reason: HOSPADM

## 2021-10-14 RX ADMIN — ONDANSETRON 4 MG: 2 INJECTION INTRAMUSCULAR; INTRAVENOUS at 15:10

## 2021-10-14 RX ADMIN — PROPOFOL 100 MCG/KG/MIN: 10 INJECTION, EMULSION INTRAVENOUS at 14:57

## 2021-10-14 RX ADMIN — PROPOFOL 150 MG: 10 INJECTION, EMULSION INTRAVENOUS at 14:57

## 2021-10-14 RX ADMIN — FENTANYL CITRATE 100 MCG: 50 INJECTION, SOLUTION INTRAMUSCULAR; INTRAVENOUS at 14:57

## 2021-10-14 RX ADMIN — Medication 200 MCG: at 15:13

## 2021-10-14 RX ADMIN — KETAMINE HYDROCHLORIDE 20 MG: 10 INJECTION INTRAMUSCULAR; INTRAVENOUS at 14:57

## 2021-10-14 RX ADMIN — OXYCODONE HYDROCHLORIDE 5 MG: 5 TABLET ORAL at 16:19

## 2021-10-14 RX ADMIN — LIDOCAINE HYDROCHLORIDE 80 MG: 20 INJECTION, SOLUTION INFILTRATION; PERINEURAL at 14:57

## 2021-10-14 RX ADMIN — CEFAZOLIN SODIUM 2 G: 2 SOLUTION INTRAVENOUS at 14:50

## 2021-10-14 RX ADMIN — GLYCOPYRROLATE 0.2 MG: 0.2 INJECTION, SOLUTION INTRAMUSCULAR; INTRAVENOUS at 15:11

## 2021-10-14 RX ADMIN — Medication 0.5 MG: at 15:29

## 2021-10-14 RX ADMIN — KETAMINE HYDROCHLORIDE 10 MG: 10 INJECTION INTRAMUSCULAR; INTRAVENOUS at 15:10

## 2021-10-14 RX ADMIN — SODIUM CHLORIDE, SODIUM LACTATE, POTASSIUM CHLORIDE, CALCIUM CHLORIDE: 600; 310; 30; 20 INJECTION, SOLUTION INTRAVENOUS at 14:50

## 2021-10-14 RX ADMIN — Medication 200 MCG: at 15:04

## 2021-10-14 ASSESSMENT — LIFESTYLE VARIABLES: TOBACCO_USE: 1

## 2021-10-14 ASSESSMENT — MIFFLIN-ST. JEOR: SCORE: 1329.25

## 2021-10-14 NOTE — TELEPHONE ENCOUNTER
Health Call Center    Phone Message    May a detailed message be left on voicemail: yes     Reason for Call: Other: Jen with [facility] is calling in asking to get a message to Dr. Chatterjee. She states that they received a home care referral for skilled nursing for wound care, and they are unable to take this referral for their [place] branch due to staffing and capacity issues. Please respond accordingly.     Action Taken: Message routed to:  [facility] ([facility] Plastic Surg    Travel Screening: Not Applicable

## 2021-10-14 NOTE — ANESTHESIA PREPROCEDURE EVALUATION
Anesthesia Pre-Procedure Evaluation    Patient: Leigh Espinal   MRN: [id number] : 1965        Preoperative Diagnosis: Non-healing surgical wound, subsequent encounter [T81.89XD]    Procedure : Procedure(s):  IRRIGATION AND DEBRIDEMENT, TORSO, abdomen, and VAC placement          Past Medical History:   Diagnosis Date     Anxiety and depression      AVM (arteriovenous malformation)     Right Pulmonary     Breast cancer (H)      GERD (gastroesophageal reflux disease)      HHT (hereditary hemorrhagic telangiectasia) (H) 2015    Possible- no ACVRL1, ENG or SMAD4 mutations found on sequencing 10/6/15      Hiatal hernia     nissen done in      HTN (hypertension)      Iron deficiency anemia 2012     Problem list name updated by automated process. Provider to review     Menorrhagia      Monoallelic mutation of CHEK2 gene in female patient 2020    CHEK2 c.1100delC [facility] 2019      Past Surgical History:   Procedure Laterality Date     BIOPSY NODE SENTINEL Bilateral 2020    Procedure: BILATERAL Axillary [place] Lymph Node Biopsy;  Surgeon: Paz Smith MD;  [facility]     COLONOSCOPY  2012    Procedure: COLONOSCOPY;;  Surgeon: Radha Hector MD;  [facility]     COLONOSCOPY N/A 2021    Procedure: COLONOSCOPY;  Surgeon: Wally Padilla DO;  Location: [facility] OR     GRAFT FREE VASCULARIZED TRANSVERSE RECTUS ABDOMINIS MYOCUTANEOUS Bilateral 2021    Procedure: Bilateral breast reconstruction with free trans rectus abdominus muscle flap, additional superficial system revascularization bilaterally with left breast vein graft, strattice abdominal wall reconstruction and SPY;  Surgeon: CEE Chatterjee MD;  Location[facility]     INCISION AND DRAINAGE BREAST Right 2021    Procedure: Right BREAST hematoma evacuation;  Surgeon: CEE Chatterjee MD;  [facility]     INSERT TISSUE EXPANDER BREAST BILATERAL  Bilateral 2/14/2020    Procedure: bilateral breast expander removal and washout;  Surgeon: CEE Chatterjee MD;  Location: [facility]     LAPAROSCOPIC NISSEN FUNDOPLICATION  2007     MASTECTOMY SIMPLE Bilateral 1/29/2020    Procedure: BILATERAL Skin-Sparing Mastectomy;  Surgeon: Paz Smith MD;  [facility]     RECONSTRUCT BREAST Bilateral 1/29/2020    Procedure: Bilateral breast reconstruction with expanders and SPY;  Surgeon: CEE Chatterjee MD;  [facility]     TUBAL LIGATION  1990      Allergies   Allergen Reactions     Sulfa Drugs      Got really sick, headache, facial swelling, felt like she was dying.   Delayed reaction happened ~3-4 days after she started taking it.       Social History     Tobacco Use     Smoking status: Former Smoker     Packs/day: 1.00     Years: 20.00     Pack years: 20.00     Types: Cigarettes     Start date: 1/1/1980     Quit date: 9/13/2011     Years since quitting: 10.0     Smokeless tobacco: Never Used   Substance Use Topics     Alcohol use: Yes     Alcohol/week: 3.3 standard drinks     Types: 4 Standard drinks or equivalent per week     Comment: occ      Wt Readings from Last 1 Encounters:   10/14/21 74.4 kg (164 lb 0.4 oz)        Anesthesia Evaluation   Pt has had prior anesthetic.     No history of anesthetic complications       ROS/MED HX  ENT/Pulmonary: Comment: H/o RML AVM s/p emboliztion    (+) NEPTALI risk factors, snores loudly, hypertension, tobacco use, Past use,     Neurologic:  - neg neurologic ROS     Cardiovascular:     (+) hypertension-----Previous cardiac testing   Echo: Date: 2015 Results:  Interpretation Summary  1. Positive bubble study with agitated saline. Large amount of bubbles are   seen on the left side within few beats after opacification of the right   atrium. Traditionally, this will be considered to be due to large PFO but   fast transit from a pulmonary AV fistula is also possible. Absence of right   sided chamber enlargement and  negative color doppler goes against a large ASD   with significant shunt. Consider GUICHO for closer evaluation of interatrial   septum. MRI can also be obtained if there is high clinical suspiscion for   large pulmonary AV fistula. 2. Normal biventricular systolic function. LVEF   estimate 60-65%. 3. No significant valvular abnormalities. 4. Normal IVC   with preserved respiratory variability.  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        Other:  - neg other ROS          Physical Exam    Airway        Mallampati: II   TM distance: > 3 FB   Neck ROM: full   Mouth opening: > 3 cm    Respiratory Devices and Support         Dental  no notable dental history         Cardiovascular             Pulmonary                   OUTSIDE LABS:  CBC:   Lab Results   Component Value Date    WBC 10.6 10/12/2021    WBC 7.8 10/07/2021    HGB 9.0 (L) 10/12/2021    HGB 9.1 (L) 10/07/2021    HCT 28.2 (L) 10/12/2021    HCT 29.1 (L) 10/07/2021     (H) 10/12/2021     (H) 10/07/2021     BMP:   Lab Results   Component Value Date     10/12/2021     10/07/2021    POTASSIUM 3.2 (L) 10/12/2021    POTASSIUM 3.6 10/07/2021    CHLORIDE 100 10/12/2021    CHLORIDE 100 10/07/2021     CO2 29 10/12/2021    CO2 28 10/07/2021    BUN 13 10/12/2021    BUN 14 10/07/2021    CR 0.7 10/12/2021    CR 0.75 10/12/2021     (H) 10/12/2021     (H) 10/07/2021     COAGS:   Lab Results   Component Value Date    INR 1.20 (H) 10/12/2021     POC:   Lab Results   Component Value Date     (H) 01/29/2020    HCG Negative 02/02/2021    HCGS Negative 03/04/2015     HEPATIC:   Lab Results   Component Value Date    ALBUMIN 1.5 (L) 10/12/2021    PROTTOTAL 6.7 (L) 10/12/2021     (H) 10/12/2021     (H) 10/12/2021    ALKPHOS 152 (H) 10/12/2021    BILITOTAL 0.2 10/12/2021     OTHER:   Lab Results   Component Value Date    LACT 1.4 10/12/2021    ILIANA 9.2 10/12/2021    PHOS 1.2 (L) 09/24/2021    MAG 2.2 09/24/2021    TSH 2.54 09/09/2020    CRP <5.0 04/18/2012       Anesthesia Plan    ASA Status:  3   NPO Status:  NPO Appropriate    Anesthesia Type: General.     - Airway: LMA   Induction: Intravenous, Propofol.   Maintenance: Balanced.        Consents    Anesthesia Plan(s) and associated risks, benefits, and realistic alternatives discussed. Questions answered and patient/representative(s) expressed understanding.     - Discussed with:  Patient      - Extended Intubation/Ventilatory Support Discussed: No.      - Patient is DNR/DNI Status: No    Use of blood products discussed: No .     Postoperative Care    Pain management: IV analgesics.   PONV prophylaxis: Ondansetron (or other 5HT-3), Background Propofol Infusion     Comments:         H&P reviewed: Unable to attach H&P to encounter due to EHR limitations. H&P Update: appropriate H&P reviewed, patient examined. No interval changes since H&P (within 30 days).         Carl Garcia MD

## 2021-10-14 NOTE — ANESTHESIA CARE TRANSFER NOTE
Patient: Leigh Espinal    Procedure: Procedure(s):  IRRIGATION AND DEBRIDEMENT, TORSO, abdomen, Bilateral Breast  and VAC placement       Diagnosis: Non-healing surgical wound, subsequent encounter [T81.89XD]  Diagnosis Additional Information: No value filed.    Anesthesia Type:   General     Note:    Oropharynx: oropharynx clear of all foreign objects  Level of Consciousness: awake  Oxygen Supplementation: room air    Independent Airway: airway patency satisfactory and stable  Dentition: dentition unchanged  Vital Signs Stable: post-procedure vital signs reviewed and stable  Report to RN Given: handoff report given  Patient transferred to: PACU    Handoff Report: Identifed the Patient, Identified the Reponsible Provider, Reviewed the pertinent medical history, Discussed the surgical course, Reviewed Intra-OP anesthesia mangement and issues during anesthesia, Set expectations for post-procedure period and Allowed opportunity for questions and acknowledgement of understanding      Vitals:  Vitals Value Taken Time   /64 10/14/21 1553   Temp     Pulse 103 10/14/21 1555   Resp 22 10/14/21 1555   SpO2 97 % 10/14/21 1555   Vitals shown include unvalidated device data.    Electronically Signed By: MATTHEW Garcia CRNA  October 14, 2021  3:57 PM

## 2021-10-14 NOTE — ADDENDUM NOTE
Addended by: ELIZABETH PENNINGTON on: 10/14/2021 09:16 AM     Modules accepted: Level of Service

## 2021-10-14 NOTE — ANESTHESIA POSTPROCEDURE EVALUATION
Patient: Leigh Espinal    Procedure: Procedure(s):  IRRIGATION AND DEBRIDEMENT, TORSO, abdomen, Bilateral Breast  and VAC placement       Diagnosis:Non-healing surgical wound, subsequent encounter [T81.89XD]  Diagnosis Additional Information: No value filed.    Anesthesia Type:  General    Note:  Disposition: Outpatient   Postop Pain Control: Uneventful            Sign Out: Well controlled pain   PONV: No   Neuro/Psych: Uneventful            Sign Out: Acceptable/Baseline neuro status   Airway/Respiratory: Uneventful            Sign Out: Acceptable/Baseline resp. status   CV/Hemodynamics: Uneventful            Sign Out: Acceptable CV status; No obvious hypovolemia; No obvious fluid overload   Other NRE: NONE   DID A NON-ROUTINE EVENT OCCUR? No           Last vitals:  Vitals Value Taken Time   /80 10/14/21 1624   Temp 37.3  C (99.2  F) 10/14/21 1624   Pulse 97 10/14/21 1627   Resp 16 10/14/21 1627   SpO2 97 % 10/14/21 1628   Vitals shown include unvalidated device data.    Electronically Signed By: Danny Lu MD  October 14, 2021  6:14 PM

## 2021-10-14 NOTE — BRIEF OP NOTE
[facility]    Brief Operative Note    Pre-operative diagnosis: Non-healing surgical wound, subsequent encounter [T81.89XD]  Post-operative diagnosis Same as pre-operative diagnosis    Procedure: Procedure(s):  IRRIGATION AND DEBRIDEMENT, TORSO, abdomen, Bilateral Breast  and VAC placement  Surgeon: Surgeon(s) and Role:     * CEE Chatterjee MD - Primary  Anesthesia: General   Estimated Blood Loss: 25 mL    Drains: None  Specimens: * No specimens in log *  Findings:   No active infection, devitalized tissue on superior abdominal flap in the central portion that was excised  Complications: None.  Implants: * No implants in log *

## 2021-10-14 NOTE — PATIENT INSTRUCTIONS
Spoke with pt today to discuss her surgery this afternoon. I noted that surgical staff had called her but were only able to leave a voicemail. Pt had questions about if she was able to take her medications and update that she would not be able to shower with her current open wound and dry dressings. I provided her with the pre-op surgical nurse line and asked her to speak with them with her questions.   Also updated pt that I was having a difficult time finding home care agency but would continue to work on this as she will need someone to change wound vac. After I spoke with pt, this writer was able to secure home care agency that will start seeing pt on Monday 10/18/21 to begin wound vac dressing changes. Dr Chatterjee updated with this detail.  Morales FORTE RN

## 2021-10-14 NOTE — DISCHARGE INSTRUCTIONS
Discharge Instructions   Activity  - As tolerated    Incisions  - The wound VAC will be changed on Monday 10/18 by home health    Medications  - Do not take any additional Tylenol (acetaminophen) while using a narcotic pain medication which includes acetaminophen  - Do not take more than 4,000mg of acetaminophen in any 24 hour period, as this can cause liver damage  - Take stool softeners such as Senna or Miralax while you are using narcotics, but stop if you develop diarrhea  - Wean yourself off of narcotic pain medications    Follow-Up:  - You will have clinic visit on 10/20/2021, if you do not hear anything by Monday afternoon please call the clinic   - Call [phone] or return sooner than your regularly scheduled visit if you develop any of the following: fever >101.5, uncontrolled pain, uncontrolled nausea or vomiting, as well as increased redness, swelling, or drainage from your wound.   -  If you are receiving home care please inform your home care nurse of our contact number.           [facility]  Home Care Following Anesthesia  For 24 hours after surgery:  1. Get plenty of rest.  A responsible adult must stay with you for at least 24 hours after you leave the surgery center.  2. Do not drive or use heavy equipment.  If you have weakness or tingling, don't drive or use heavy equipment until this feeling goes away.   3. Do not drink alcohol.   4. Avoid strenuous or risky activities.  Ask for help when climbing stairs.  5. You may feel lightheaded.  IF so, sit for a few minutes before standing.  Have someone help you get up.   6. If you have nausea (feel sick to your stomach): Drink only clear liquids such as apple juice, ginger ale, broth or 7-Up.  Rest may also help.  Be sure to drink enough fluids.  Move to a regular diet as you feel able.   7. You may have a slight fever.  Call the doctor if your fever is over 100 F (37.7 C) (taken under the tongue) or lasts longer  than 24 hours.  8. You may have a dry mouth, a sore throat, muscle aches or trouble sleeping. These should go away after 24 hours.  9. Do not make important or legal decisions.   10. It is recommended to avoid smoking.               Tips for taking pain medications  To get the best pain relief possible, remember these points:    Take pain medications as directed, before pain becomes severe.    Pain medication can upset your stomach: taking it with food may help.    Constipation is a common side effect of pain medication. Drink plenty of  fluids.    Eat foods high in fiber. Take a stool softener if recommended by your doctor or pharmacist.    Do not drink alcohol, drive or operate machinery while taking pain medications.    Ask about other ways to control pain, such as with heat, ice or relaxation.    Tylenol/Acetaminophen Consumption  To help encourage the safe use of acetaminophen, the makers of TYLENOL  have lowered the maximum daily dose for single-ingredient Extra Strength TYLENOL  (acetaminophen) products sold in the [place] from 8 pills per day (4,000 mg) to 6 pills per day (3,000 mg). The dosing interval has also changed from 2 pills every 4-6 hours to 2 pills every 6 hours.    If you feel your pain relief is insufficient, you may take Tylenol/Acetaminophen in addition to your narcotic pain medication.     Be careful not to exceed 3,000 mg of Tylenol/Acetaminophen in a 24 hour period from all sources.    If you are taking extra strength Tylenol/acetaminophen (500 mg), the maximum dose is 6 tablets in 24 hours.    If you are taking regular strength acetaminophen (325 mg), the maximum dose is 9 tablets in 24 hours.    Call a doctor for any of the followin. Signs of infection (fever, growing tenderness at the surgery site, a large amount of drainage or bleeding, severe pain, foul-smelling drainage, redness, swelling).  2. It has been over 8 to 10 hours since surgery and you are still not able to urinate (pass  water).  3. Headache for over 24 hours.  4. Numbness, tingling or weakness the day after surgery (if you had spinal anesthesia).  5. Signs of Covid-19 infection (temperature over 100 degrees, shortness of breath, cough, loss of taste/smell, generalized body aches, persistent headache, chills, sore throat, nausea/vomiting/diarrhea)  Your doctor is:  Dr. Leeann Chatterjee, Plastic Surgery: [phone]                    Or dial [phone] and ask for the resident on call for:  Plastics  For emergency care, call the:  [place] [facility]:  [phone] (TTY for hearing impaired: [phone])

## 2021-10-14 NOTE — OP NOTE
From: Bianca De La Paz  To: Tina Holder MD  Sent: 4/6/2020 9:01 AM CDT  Subject: Non-Urgent Medical Question    Good morning....I was wondering when I need to do a follow-up tele doc with you for my short term disability. My short term disability is approved to April 24, 2020 and my FMLA is the same as well. I sent a picture of what they said . Hope to hear from you soon.   Procedure Date: 10/14/2021    PREOPERATIVE DIAGNOSIS:  Status post bilateral breast reconstruction with free MIKAL flap with wound dehiscence on abdomen and bilateral breasts requiring debridement.    POSTOPERATIVE DIAGNOSIS:  Status post bilateral breast reconstruction with free MIKAL flap with wound dehiscence on abdomen and bilateral breasts requiring debridement.    PROCEDURE:  Debridement of abdominal and bilateral inframammary fold breast wounds with excision of skin, subcutaneous tissue and irrigation and placement of VAC dressing.    SURGEON:  Leeann Chatterjee MD    RESIDENT:  Roberth Coppola MD    ANESTHESIA:  General anesthesia with endotracheal intubation.    COMPLICATIONS:  Nil.    DRAINS:  Nil.    SPECIMENS:  Nil.    BLOOD LOSS:  5 mL.    DESCRIPTION OF PROCEDURE:  After informed consent was taken from the patient, the proper site and procedure was ascertained with her, and she was appropriately marked and taken to the operating room.  She was placed in supine position with the knees comfortably flexed, pillows underneath and pneumoboots placed and running prior to induction of anesthesia.  Preoperative antibiotics given in the OR.  All pressure points were appropriately padded.  General anesthesia was administered without any complications.  She was prepped and draped in standard surgical fashion.  I began by first excising all of the necrotic skin and fatty tissue in the abdominal area.  I went ahead and then carried out the same thing in the breast area and then washed out everything with antibiotic irrigation and Betadine, ensured hemostasis.  I closed the ends of the abdominal wound primarily with 0 PDS suture in interrupted horizontal mattress fashion and staples, and then the rest of the wounds of the breast and abdomen were covered with a VAC sponge and VAC dressing at 125 mm of continuous pressure.  An abdominal binder and Ace wrap were placed.  The patient tolerated the procedure well.  All counts  were correct at the end of the case.  The patient was extubated and sent to recovery room in stable condition.    CEE Chatterjee MD        D: 10/14/2021   T: 10/14/2021   MT: SARAH    Name:     AZRA HERNADEZ  MRN:      [id number]        Account:        [id number]   :      1965           Procedure Date: 10/14/2021     Document: [id number]

## 2021-10-15 ENCOUNTER — PATIENT OUTREACH (OUTPATIENT)
Dept: PLASTIC SURGERY | Facility: CLINIC | Age: 56
End: 2021-10-15

## 2021-10-15 NOTE — PATIENT INSTRUCTIONS
Spoke with pt today to provide her with home care information and that she should expect a call from them to set up appt for Monday for VAC change. Pt updated me that she feels so much better now that she has her wound cleaned up and covered. Morales FORTE RN

## 2021-10-17 LAB — BACTERIA BLD CULT: NO GROWTH

## 2021-10-20 ENCOUNTER — OFFICE VISIT (OUTPATIENT)
Dept: PLASTIC SURGERY | Facility: CLINIC | Age: 56
End: 2021-10-20
Attending: STUDENT IN AN ORGANIZED HEALTH CARE EDUCATION/TRAINING PROGRAM
Payer: COMMERCIAL

## 2021-10-20 VITALS
BODY MASS INDEX: 26.72 KG/M2 | DIASTOLIC BLOOD PRESSURE: 88 MMHG | HEIGHT: 64 IN | OXYGEN SATURATION: 100 % | HEART RATE: 129 BPM | TEMPERATURE: 98.2 F | SYSTOLIC BLOOD PRESSURE: 141 MMHG | WEIGHT: 156.5 LBS

## 2021-10-20 DIAGNOSIS — T81.89XD NON-HEALING SURGICAL WOUND, SUBSEQUENT ENCOUNTER: ICD-10-CM

## 2021-10-20 PROCEDURE — 99024 POSTOP FOLLOW-UP VISIT: CPT | Performed by: PLASTIC SURGERY

## 2021-10-20 RX ORDER — OXYCODONE HYDROCHLORIDE 5 MG/1
5 TABLET ORAL EVERY 6 HOURS PRN
COMMUNITY
End: 2023-03-08

## 2021-10-20 ASSESSMENT — PAIN SCALES - GENERAL: PAINLEVEL: MILD PAIN (2)

## 2021-10-20 ASSESSMENT — MIFFLIN-ST. JEOR: SCORE: 1294.88

## 2021-10-20 NOTE — LETTER
10/20/2021       RE: Leigh Espinal  [address] [place] [address]     Dear Colleague,    Thank you for referring your patient, Leigh Espinal, to the [facility] [place] [facility]. Please see a copy of my visit note below.    Service Date: 10/20/2021    PRESENTING COMPLAINT:  Postoperative visit status post abdominal wall and bilateral inframammary fold wound debridements and VAC placement on 10/14/2021.    HISTORY OF PRESENTING COMPLAINT:  Ms. Espinal is 56 years old, a week out from surgery, overall doing well, no major issues.    PHYSICAL EXAMINATION:  Vital signs are stable.  She is afebrile, in no obvious distress.  Took down all the dressings.  Everything is clean, healing in, no evidence of infection.    ASSESSMENT AND PLAN:  Based on the above findings, a diagnosis of multiple wound debridements was made.  Replaced the VAC on the abdomen, moist-to-dry dressings on the inframammary folds, and I will see her back next week.    CEE Chatterjee MD

## 2021-10-20 NOTE — NURSING NOTE
"Chief Complaint   Patient presents with     Surgical Followup     Post op       Vitals:    10/20/21 1031   BP: (!) 141/88   BP Location: Left arm   Patient Position: Sitting   Cuff Size: Adult Regular   Pulse: (!) 129   Temp: 98.2  F (36.8  C)   TempSrc: Oral   SpO2: 100%   Weight: 156 lb 8 oz   Height: 5' 4\"       Body mass index is 26.86 kg/m .                          Samaria Jimenez, EMT    "

## 2021-10-21 ENCOUNTER — PATIENT OUTREACH (OUTPATIENT)
Dept: PLASTIC SURGERY | Facility: CLINIC | Age: 56
End: 2021-10-21

## 2021-10-21 NOTE — PATIENT INSTRUCTIONS
Received voicemail from pt's home care , Theresa Larose today requesting updated wound care orders from pt's clinic visit yesterday. I updated that there was no written order change but I did read Dr Chatterjee's note from yesterday's visit that indicated wound vac dressing changes to abdomen wound only and to start wet to dry dressing changes to incisional wounds under bilateral breasts. Theresa didn't answer the phone but I was able to leave a detailed message with my direct call back number for any further questions.  Morales FORTE RN

## 2021-10-21 NOTE — PROGRESS NOTES
Service Date: 10/20/2021    PRESENTING COMPLAINT:  Postoperative visit status post abdominal wall and bilateral inframammary fold wound debridements and VAC placement on 10/14/2021.    HISTORY OF PRESENTING COMPLAINT:  Ms. Hernadez is 56 years old, a week out from surgery, overall doing well, no major issues.    PHYSICAL EXAMINATION:  Vital signs are stable.  She is afebrile, in no obvious distress.  Took down all the dressings.  Everything is clean, healing in, no evidence of infection.    ASSESSMENT AND PLAN:  Based on the above findings, a diagnosis of multiple wound debridements was made.  Replaced the VAC on the abdomen, moist-to-dry dressings on the inframammary folds, and I will see her back next week.    CEE Chatterjee MD        D: 10/20/2021   T: 10/20/2021   MT: jett    Name:     AZRA HERNADEZ  MRN:      [id number]        Account:      [id number]   :      1965           Service Date: 10/20/2021       Document: [id number]

## 2021-10-27 ENCOUNTER — TELEPHONE (OUTPATIENT)
Dept: ONCOLOGY | Facility: CLINIC | Age: 56
End: 2021-10-27

## 2021-10-27 NOTE — TELEPHONE ENCOUNTER
Patient called triage line wanting to make sure that her Zoladex shot scheduled for Sunday has been approved to be administered in a site other than the abdomen. Patient wants to make sure there is documentation of this in the chart before she shows up to the appointment. At her last appointment they wouldn't administer the Zoladex because she has a wound vac on her abdomen.     Will route to [facility] and Dr. Swann.

## 2021-10-31 ENCOUNTER — INFUSION THERAPY VISIT (OUTPATIENT)
Dept: ONCOLOGY | Facility: CLINIC | Age: 56
End: 2021-10-31
Attending: INTERNAL MEDICINE
Payer: COMMERCIAL

## 2021-10-31 VITALS
OXYGEN SATURATION: 100 % | HEART RATE: 76 BPM | RESPIRATION RATE: 16 BRPM | SYSTOLIC BLOOD PRESSURE: 150 MMHG | TEMPERATURE: 98.1 F | DIASTOLIC BLOOD PRESSURE: 76 MMHG

## 2021-10-31 DIAGNOSIS — Z17.0 MALIGNANT NEOPLASM OF UPPER-OUTER QUADRANT OF LEFT BREAST IN FEMALE, ESTROGEN RECEPTOR POSITIVE (H): Primary | ICD-10-CM

## 2021-10-31 DIAGNOSIS — C50.412 MALIGNANT NEOPLASM OF UPPER-OUTER QUADRANT OF LEFT BREAST IN FEMALE, ESTROGEN RECEPTOR POSITIVE (H): Primary | ICD-10-CM

## 2021-10-31 PROCEDURE — 96402 CHEMO HORMON ANTINEOPL SQ/IM: CPT

## 2021-10-31 PROCEDURE — 250N000011 HC RX IP 250 OP 636: Performed by: INTERNAL MEDICINE

## 2021-10-31 RX ADMIN — GOSERELIN ACETATE 3.6 MG: 3.6 IMPLANT SUBCUTANEOUS at 12:12

## 2021-10-31 NOTE — PATIENT INSTRUCTIONS
Contact Numbers  [facility]: [phone] (for symptom and scheduling needs)    Please call the [facility] Triage line if you experience a temperature greater than or equal to 100.4, shaking chills, have uncontrolled nausea, vomiting and/or diarrhea, dizziness, shortness of breath, chest pain, bleeding, unexplained bruising, or if you have any other new/concerning symptoms, questions or concerns.     If you are having any concerning symptoms or wish to speak to a provider before your next infusion visit, please call your care coordinator or triage to notify them so we can adequately serve you.     If you need a refill on a narcotic prescription or other medication, please call triage before your infusion appointment.          October 2021 Sunday Monday Tuesday Wednesday Thursday Friday Saturday                            1     2       3     4     5    POST OP   8:00 AM   (15 min.)   CEE Chatterjee MD   [facility] Breast [facility] 6    P POST-OP  11:15 AM   (30 min.)   Morales Luna, ENOCH   [facility] Plastic and Reconstructive Surgery [facility] 7    VIDEO VISIT RETURN  11:45 AM   (30 min.)   Katarzyna Swann MD   [facility] POST-OP   1:30 PM   (15 min.)   CEE Chatterjee MD   [facility] Plastic and Reconstructive Surgery [facility]    LAB   2:45 PM   (15 min.)   [facility]   [facility]    ONC INFUSION 0.5 HR (30 MIN)   3:00 PM   (30 min.)    ONC INFUSION NURSE   [facility] 8     9       10     11     12    Admission   9:13 PM   Johny Noble MD   [facility]   (Discharge: 10/12/2021) 13    [facility] NEW WOUND NURSE   9:30 AM   (60 min.)   Marisol Regalado, RN   [facility] [place]    PRE-PROCEDURE COVID PCR   3:15 PM   (15 min.)    COVID LAB   [facility] 14    Outpatient  Visit   1:24 PM   [facility]    IRRIGATION AND DEBRIDEMENT, TORSO   2:45 PM   CEE Chatterjee MD   [facility] OR 15     16       17     18     19     20    [facility] POST-OP  10:15 AM   (15 min.)   CEE Chatterjee MD   [facility]astic [facility] [place] 21     22     23       24     25     26     27     28     29     30       31    ONC INFUSION 0.5 HR (30 MIN)  12:00 PM   (30 min.)    ONC INFUSION NURSE   [facility]                                           November 2021 Sunday Monday Tuesday Wednesday Thursday Friday Saturday        1     2     3     4     5     6       7     8     9     10     11     12     13       14     15     16     17     18     19     20       21     22     23     24     25     26     27       28     29     30                                         Lab Results:  No results found for this or any previous visit (from the past 12 hour(s)).

## 2021-10-31 NOTE — PROGRESS NOTES
"Infusion Nursing Note:  Leigh Espinal presents today for Zoladex.    Patient seen by provider today: No   present during visit today: Not Applicable.    Note: Patient presents to infusion today feeling well. Denies any fevers, chills, shortness of breath, chest pain or cough. Pain rating 3/10 related to wound vac in abdomen. Patient states this is ongoing and she manages well with home pain medications. States a home nurse comes out to see her a few times a week. Offers no new concerns or complaints today.    Zoladex given subcutaneously in far right side of abdomen due to wound vac in middle abdomen. \"OK to give in alternative subcutaneous space today per discussion with Dr. Swann\" written in order.    Intravenous Access:  No Intravenous access/labs at this visit.    Treatment Conditions:  Not Applicable.    Post Infusion Assessment:  Patient tolerated Zoladex injection in far right side of abdominal tissue without incident.  Patient iced site prior to injection.  Site patent and intact, free from redness, edema or discomfort.  No evidence of extravasations.     Discharge Plan:   Patient declined prescription refills.  Patient and/or family verbalized understanding of discharge instructions and all questions answered.  AVS to patient via [facility].  Patient will return 12/2/2021 for next appointment.   Patient discharged in stable condition accompanied by: self.  Departure Mode: Ambulatory.      Amy Michaud RN                    "

## 2021-11-01 DIAGNOSIS — T81.89XD NON-HEALING SURGICAL WOUND, SUBSEQUENT ENCOUNTER: Primary | ICD-10-CM

## 2021-11-01 RX ORDER — TRAMADOL HYDROCHLORIDE 50 MG/1
50 TABLET ORAL EVERY 6 HOURS PRN
Qty: 20 TABLET | Refills: 0 | Status: SHIPPED | OUTPATIENT
Start: 2021-11-01 | End: 2021-11-06

## 2021-11-02 ENCOUNTER — PATIENT OUTREACH (OUTPATIENT)
Dept: PLASTIC SURGERY | Facility: CLINIC | Age: 56
End: 2021-11-02

## 2021-11-02 NOTE — PATIENT INSTRUCTIONS
Spoke with pt yesterday to discuss pain she is having with the wound vac dressing changes. She is not having any signs of infection, only pain with dressing changes. Pt's pain is controlled with Tylenol on the days that she does not have home nursing visits to change the wound vac dressing. She has requested a refill of Oxycodone to use pre-dressing changes as they are very painful. She only has 2 Oxycodone pills remaining from original prescription.   I spoke with Dr Chatterjee and he agreed to prescription of Tramadol. I called pt this morning to update her of the prescription. Pt was disappointed as the dressings are painful and she is familiar with Tramadol and doesn't feel it is as effective. She updated me that she is not having any more surgeries as this recovery has been difficult for her. We discussed plan for follow up visit with wound care RN next week and she is in agreement with plan.    Morales FORTE RN

## 2021-11-03 ENCOUNTER — APPOINTMENT (OUTPATIENT)
Dept: PLASTIC SURGERY | Facility: CLINIC | Age: 56
End: 2021-11-03
Payer: COMMERCIAL

## 2021-11-10 ENCOUNTER — OFFICE VISIT (OUTPATIENT)
Dept: WOUND CARE | Facility: CLINIC | Age: 56
End: 2021-11-10
Payer: COMMERCIAL

## 2021-11-10 DIAGNOSIS — T81.89XD NON-HEALING SURGICAL WOUND, SUBSEQUENT ENCOUNTER: ICD-10-CM

## 2021-11-10 DIAGNOSIS — C50.919 BREAST CANCER (H): Primary | ICD-10-CM

## 2021-11-10 DIAGNOSIS — T81.31XA POSTOPERATIVE WOUND DEHISCENCE: ICD-10-CM

## 2021-11-10 DIAGNOSIS — T81.321A ABDOMINAL WOUND DEHISCENCE: ICD-10-CM

## 2021-11-10 PROCEDURE — 99024 POSTOP FOLLOW-UP VISIT: CPT

## 2021-11-10 NOTE — PATIENT INSTRUCTIONS
"Breast wounds  Reason for dressing use debriding dressing applied   Cleaning:     Microklenz or soap and water in the shower all wounds     Abdominal wound and sternal wound : 2 -5  minute Vash soak   Primary: Vaseline ointment on right breast wounds, Medihoney on left breast wound. Pack sternal wound with 6\" Mesalt strip     Secondary: Cover with bandage or gauze and tape. Mepilex for sternal wound.  Frequency of change two times per day. Asses weekly by home care RN    Abdominal wound:   Wound Vac placement: Non-excisional debridement: Wound washed with Microklenz soaked gauze and  vashe soak if malodorous. Patient is assessed for discomfort, which is minimal, and understanding of wound vac change procedure.  Wound is repacked with  3 pieces of Adaptic to prevent discomfort when removing sponge. 2 cm hole hole is cut in the adhesive film on top of which suction device is placed. Amount of sponges used are noted to be 2 . Vacuum pump is turned on  continuous suction at -125 mmHg and seal is confirmed. Instructions and warnings reiterated.      PLAN: Dressing changes 3 times/week. Performed by Home Care Rn    "

## 2021-11-10 NOTE — PROGRESS NOTES
"Patient comes to wound clinic for wound assessment per request of dr. Chatterjee. She has history of a Open surgical wound due to : Debridement of abdominal and bilateral inframammary fold breast wounds with excision of skin, subcutaneous tissue and irrigation and placement of VAC dressing on 10/14/2021    Clean gloves are donned and current dressing removed.     Objective findings:    Breast cancer (H)  Non-healing surgical wound, subsequent encounter  Postoperative wound dehiscence  Abdominal wound dehiscence    Number of wounds: several breast wound and one abdominal wound    Location: right and left breast, sternal and abdominal      Type of wound:   Post-operative wound: Yes,     Wound measured: abdominal wound Length 20 cm x Width 6 cm x Depth 0.1 cm ,     Sternal wound is 3 cm x 3 cm x 2.5 cm deep, Full thickness, copious drainage and 100% slough in the base.     Inferior breast wound superficial and healing well    Thickness: Full    Drainage: copious, bloody         Wound specifics: Abdominal wound    Wound Base:  no sign of infection, granulation      Devitalized Tissue or Slough appearance:  none         Eschar appearance:  none        Tenderness: Tolerable with discomfort    Odor: moderate     Periwound Skin: intact,     Subjective finding:     Pt states: doing ok, home care 3/week     Patient is assessed for discomfort which is: moderate    Today's status of the wound: initial assessment and healing    Treatment and Plan of care: Breast wounds  Reason for dressing use debriding dressing applied   Cleaning:     Microklenz or soap and water in the shower all wounds     Abdominal wound and sternal wound : 2 -5  minute Vash soak   Primary: Vaseline ointment on right breast wounds, Medihoney on left breast wound.   Pack sternal wound with 6\" Mesalt strip     Secondary: Cover with bandage or gauze and tape. Mepilex for sternal wound.  Frequency of change two times per day    Abdominal wound:   Wound Vac " placement: Non-excisional debridement: Wound washed with Microklenz soaked gauze and  vashe soak if malodorous. Patient is assessed for discomfort, which is minimal, and understanding of wound vac change procedure.  Wound is repacked with  3 pieces of Adaptic to prevent discomfort when removing sponge. 2 cm hole hole is cut in the adhesive film on top of which suction device is placed. Amount of sponges used are noted to be 2 . Vacuum pump is turned on  continuous suction at -125 mmHg and seal is confirmed. Instructions and warnings reiterated.                PLAN: Dressing changes 3 times/week. Performed by Home Care Rn    Signs and symptoms of infection taught.  Patient needs to be seen 3 weeks. Treated and follow-up appointment made. Dr. Chatterjee was available for supervision of care if needed or if questions should arise and regarding plan of care.  Marisol Regalado RN, CWON

## 2021-11-19 ENCOUNTER — TELEPHONE (OUTPATIENT)
Dept: WOUND CARE | Facility: CLINIC | Age: 56
End: 2021-11-19
Payer: COMMERCIAL

## 2021-11-19 ENCOUNTER — PATIENT OUTREACH (OUTPATIENT)
Dept: PLASTIC SURGERY | Facility: CLINIC | Age: 56
End: 2021-11-19
Payer: COMMERCIAL

## 2021-11-19 NOTE — TELEPHONE ENCOUNTER
LM with Sanaz. Confirmed with HC RN to restart medihoney on the sternal wound.pt will be seen in clinic on Wednesday     ----- Message from Morales Luna RN sent at 11/19/2021 12:04 PM CST -----  Armin Damon,     We discussed changing pt's appt on 12/2 to 12/1 so that Dr Chatterjee can poke his head in to see her. I also forwarded voicemail to you from her home care RN with wound question. Leigh said she would look at [facility] for the change if it's possible.     Thank you for your help,   Morales

## 2021-11-19 NOTE — PATIENT INSTRUCTIONS
Pt called in today to update that she is feeling so much better off of all narcotics. Her mood is improved and she feels her wounds are healing nicely. She is inquiring about when she should see Dr Chatterjee again. Pt has a wound care appt with WOC RN on 12/2 but wonders if she is seen on 12/1, would Dr Chatterjee be able to talk to her during this visit? She does not have any symptoms that need to be addressed but is only curious if Dr Chatterjee needs to see her. I updated that I will inquire if our WOC RN has an opening on 12/1, otherwise it may be possible to see him at her next [facility] visit. Pt is agreeable to this plan.  Morales FORTE RN

## 2021-11-26 ENCOUNTER — DOCUMENTATION ONLY (OUTPATIENT)
Dept: PLASTIC SURGERY | Facility: CLINIC | Age: 56
End: 2021-11-26
Payer: COMMERCIAL

## 2021-11-26 NOTE — PROGRESS NOTES
[employer] updated Short Term Disability Paperwork      - Filled Out: 11/22/21     - Faxed to: (Fax Number & Company) [employer] 11/26/21  [phone]    Original in file folder in Clinic 11/26/21    Marshall BERNABE EMT  
no

## 2021-11-30 ENCOUNTER — PATIENT OUTREACH (OUTPATIENT)
Dept: PLASTIC SURGERY | Facility: CLINIC | Age: 56
End: 2021-11-30
Payer: COMMERCIAL

## 2021-11-30 NOTE — PATIENT INSTRUCTIONS
Spoke with [facility] wound vac representative today, Aury to discuss wound vac measurement details needed for insurance coverage. I updated Aury that I did not have details of wound during initial application but provided her with pt's [facility] number as their nurses may be able to provide these details.  Morales FORTE RN

## 2021-12-01 ENCOUNTER — OFFICE VISIT (OUTPATIENT)
Dept: WOUND CARE | Facility: CLINIC | Age: 56
End: 2021-12-01
Payer: COMMERCIAL

## 2021-12-01 DIAGNOSIS — Z17.0 MALIGNANT NEOPLASM OF UPPER-OUTER QUADRANT OF LEFT BREAST IN FEMALE, ESTROGEN RECEPTOR POSITIVE (H): Primary | ICD-10-CM

## 2021-12-01 DIAGNOSIS — C50.919 BREAST CANCER (H): Primary | ICD-10-CM

## 2021-12-01 DIAGNOSIS — C50.412 MALIGNANT NEOPLASM OF UPPER-OUTER QUADRANT OF LEFT BREAST IN FEMALE, ESTROGEN RECEPTOR POSITIVE (H): Primary | ICD-10-CM

## 2021-12-01 DIAGNOSIS — T81.31XA POSTOPERATIVE WOUND DEHISCENCE: ICD-10-CM

## 2021-12-01 DIAGNOSIS — T81.321A ABDOMINAL WOUND DEHISCENCE: ICD-10-CM

## 2021-12-01 PROCEDURE — 99024 POSTOP FOLLOW-UP VISIT: CPT

## 2021-12-01 NOTE — PROGRESS NOTES
Breast wounds  Reason for dressing use debriding dressing applied   Cleaning:     Microklenz or soap and water in the shower all wounds     Abdominal wound and sternal wound : 2 -5  minute Vash soak   Primary: Vaseline ointment on right breaPatient comes to wound clinic for wound assessment per request of dr. Chatterjee. She has history of a Open surgical wound due to : Debridement of abdominal and bilateral inframammary fold breast wounds with excision of skin, subcutaneous tissue and irrigation and placement of VAC dressing on 10/14/2021    Clean gloves are donned and current dressing removed.     Objective findings: Data Unavailable    Number of wounds: several breast wound and one abdominal wound    Location: right and left breast, sternal and abdominal      Type of wound:   Post-operative wound: Yes,     Wound measured: abdominal wound: Length 16 cm x Width 5 cm x Depth 0.1 cm ,     Sternal wound is 2 cm x 2cm x 2.5 cm deep, Full thickness, copious drainage and 0% slough in the base.     Mesalt cleaned up wound base very well. Nicely granulating but bleeding due to hypergranulation. Top Medihoney and restart Mesalt    Inferior breast wound superficial and healing well    Thickness: Full    Drainage: copious, bloody         Wound specifics: Abdominal wound    Wound Base:  no sign of infection, granulation      Devitalized Tissue or Slough appearance:  none         Eschar appearance:  none        Tenderness: Tolerable with discomfort    Odor: moderate     Periwound Skin: intact,     Subjective finding:     Pt states: doing ok, home care 3/week     Patient is assessed for discomfort which is: moderate    Today's status of the wound:Healing well    Treatment and Plan of care:   Abdominal wound:   Wound Vac placement: Non-excisional debridement: Wound washed with Microklenz soaked gauze and  vashe soak if malodorous. Patient is assessed for discomfort, which is minimal, and understanding of wound vac change procedure.   "Wound is repacked with  3 pieces of Adaptic to prevent discomfort when removing sponge. 2 cm hole hole is cut in the adhesive film on top of which suction device is placed. Amount of sponges used are noted to be 2 . Vacuum pump is turned on  continuous suction at -125 mmHg and seal is confirmed. Instructions and warnings reiterated.              Breast wounds  Reason for dressing use debriding dressing applied   Cleaning:     Microklenz or soap and water in the shower all wounds     Abdominal wound and sternal wound : 2 minute Vash soak   Primary: Medihoney on left breast wound.   Pack sternal wound with 4\" Mesalt strip     Secondary: Cover with bordered bandage or gauze and tape. .  Frequency of change two times per day. Asses weekly by home care RN     PLAN: Dressing changes 3 times/week. Performed by Home Care     Signs and symptoms of infection taught.  Patient needs to be seen 4 weeks. Treated and follow-up appointment made. Dr. Chatterjee was available for supervision of care if needed or if questions should arise and regarding plan of care.  Marisol Regalado RN, CWON    "

## 2021-12-02 ENCOUNTER — INFUSION THERAPY VISIT (OUTPATIENT)
Dept: ONCOLOGY | Facility: CLINIC | Age: 56
End: 2021-12-02
Attending: INTERNAL MEDICINE
Payer: COMMERCIAL

## 2021-12-02 VITALS
DIASTOLIC BLOOD PRESSURE: 82 MMHG | OXYGEN SATURATION: 99 % | RESPIRATION RATE: 16 BRPM | SYSTOLIC BLOOD PRESSURE: 140 MMHG | TEMPERATURE: 98.2 F | HEART RATE: 63 BPM

## 2021-12-02 DIAGNOSIS — C50.412 MALIGNANT NEOPLASM OF UPPER-OUTER QUADRANT OF LEFT BREAST IN FEMALE, ESTROGEN RECEPTOR POSITIVE (H): Primary | ICD-10-CM

## 2021-12-02 DIAGNOSIS — Z17.0 MALIGNANT NEOPLASM OF UPPER-OUTER QUADRANT OF LEFT BREAST IN FEMALE, ESTROGEN RECEPTOR POSITIVE (H): Primary | ICD-10-CM

## 2021-12-02 PROCEDURE — 96402 CHEMO HORMON ANTINEOPL SQ/IM: CPT

## 2021-12-02 PROCEDURE — 250N000011 HC RX IP 250 OP 636: Performed by: INTERNAL MEDICINE

## 2021-12-02 RX ADMIN — GOSERELIN ACETATE 3.6 MG: 3.6 IMPLANT SUBCUTANEOUS at 15:28

## 2021-12-02 ASSESSMENT — PAIN SCALES - GENERAL: PAINLEVEL: MODERATE PAIN (5)

## 2021-12-02 NOTE — PROGRESS NOTES
Infusion Nursing Note:  Leigh Espinal presents today for Cycle 18 Day 1 Zoladex.    Patient seen by provider today: No   present during visit today: Not Applicable.    Note: Leigh here for Zoladex.  Saw wound care yesterday.  Possibly will have wound vac removed in 3 weeks she said.  Per Leigh, her wound is decreasing in size.  She has pain today at a 5 in the area, which has been slowly improving.    Per orders may give Zoladex in alternate areas due to wound vac.    Patient wants Zoladex to Right Abdomen, somewhat off to side.  Ice applied for only a few minutes today as she's in a hurry.        Post Infusion Assessment:  Patient tolerated subcutaneous Zoladex injection without incident to Right Abdomen a above.       Discharge Plan:   AVS to patient via [employer].  Patient will return 12/30/21 PA/Zoladex for next appointment.   Patient discharged in stable condition accompanied by: self.  Departure Mode: Ambulatory.  Face to Face time: 0.      Swati Garcia RN

## 2021-12-03 ENCOUNTER — PATIENT OUTREACH (OUTPATIENT)
Dept: PLASTIC SURGERY | Facility: CLINIC | Age: 56
End: 2021-12-03
Payer: COMMERCIAL

## 2021-12-03 NOTE — PATIENT INSTRUCTIONS
Received phone call from pt's home care RN, Theresa today to inquire about change in home care dressing change orders. I reviewed WOC RN notes and updated Theresa about changes recommended by WOC RN and Dr Chatterjee. Theresa accepted verbal orders and is in agreement with the plan.  Morales FORTE RN

## 2021-12-23 ENCOUNTER — NURSE TRIAGE (OUTPATIENT)
Dept: NURSING | Facility: CLINIC | Age: 56
End: 2021-12-23
Payer: COMMERCIAL

## 2021-12-23 NOTE — TELEPHONE ENCOUNTER
They were supposed to call in a prescription for blood pressure medication that's she's out of. It was added before surgery in September. B/P is back up. [facility] is [facility] she uses. Nothing was at the pharmacy for her to get. I explained that [facility] is closed for the holiday and that she can't get anything unless she were to take the bottle to urgent care and have them write for the prescription. She has already been without the medication for several days. She understands she can't get a hold of anyone with [facility] until Monday, that they have no one on call to handle these issues after hours. She understands.  Neeru Marx RN  [facility]       Reason for Disposition    Request for URGENT new prescription or refill of 'essential' medication (i.e., likelihood of harm to patient if not taken) and triager unable to fill per department policy    Additional Information    Negative: Drug overdose and triager unable to answer question    Negative: Caller requesting information unrelated to medicine    Negative: Caller requesting a prescription for Strep throat and has a positive culture result    Negative: Rash while taking a medication or within 3 days of stopping it    Negative: Immunization reaction suspected    Negative: Asthma and having symptoms of asthma (cough, wheezing, etc.)    Negative: Breastfeeding questions about mother's medicines and diet    Negative: MORE THAN A DOUBLE DOSE of a prescription or over-the-counter (OTC) drug    Negative: DOUBLE DOSE (an extra dose or lesser amount) of over-the-counter (OTC) drug and any symptoms (e.g., dizziness, nausea, pain, sleepiness)    Negative: DOUBLE DOSE (an extra dose or lesser amount) of prescription drug and any symptoms (e.g., dizziness, nausea, pain, sleepiness)    Negative: Took another person's prescription drug    Negative: DOUBLE DOSE (an extra dose or lesser amount) of prescription drug and NO symptoms (Exception: a double dose of  antibiotics)    Negative: Diabetes drug error or overdose (e.g., took wrong type of insulin or took extra dose)    Negative: Caller has medication question about med not prescribed by PCP and triager unable to answer question (e.g., compatibility with other med, storage)    Protocols used: MEDICATION QUESTION CALL-A-OH

## 2021-12-27 ENCOUNTER — PATIENT OUTREACH (OUTPATIENT)
Dept: PLASTIC SURGERY | Facility: CLINIC | Age: 56
End: 2021-12-27
Payer: COMMERCIAL

## 2021-12-27 NOTE — PATIENT INSTRUCTIONS
Returned pt's voicemail request to speak to me regarding WOC appointment scheduled for this Wednesday. Pt is hoping to also see Dr Chatterjee to inquire about timing of wound vac removal as well as if he feels she should return to work. Her job requires a lot of walking and she doesn't feel that she has the stamina right now to return. I updated her that Dr Chatterjee is in clinic on Wednesday and I will ask him to see pt during her WOC visit.  Morales FORTE RN

## 2021-12-29 ENCOUNTER — OFFICE VISIT (OUTPATIENT)
Dept: WOUND CARE | Facility: CLINIC | Age: 56
End: 2021-12-29
Payer: COMMERCIAL

## 2021-12-29 ENCOUNTER — TELEPHONE (OUTPATIENT)
Dept: SURGERY | Facility: CLINIC | Age: 56
End: 2021-12-29

## 2021-12-29 DIAGNOSIS — T81.31XA POSTOPERATIVE WOUND DEHISCENCE: Primary | ICD-10-CM

## 2021-12-29 DIAGNOSIS — Z98.890 S/P BREAST RECONSTRUCTION, BILATERAL: Primary | ICD-10-CM

## 2021-12-29 PROCEDURE — 99211 OFF/OP EST MAY X REQ PHY/QHP: CPT

## 2021-12-29 RX ORDER — CEFAZOLIN SODIUM 2 G/50ML
2 SOLUTION INTRAVENOUS SEE ADMIN INSTRUCTIONS
Status: CANCELLED | OUTPATIENT
Start: 2021-12-29

## 2021-12-29 RX ORDER — CEFAZOLIN SODIUM 2 G/50ML
2 SOLUTION INTRAVENOUS
Status: CANCELLED | OUTPATIENT
Start: 2021-12-29

## 2021-12-29 NOTE — PROGRESS NOTES
Breast wounds  Reason for dressing use debriding dressing applied   Cleaning:     Microklenz or soap and water in the shower all wounds     Abdominal wound and sternal wound : 2 -5  minute Vash soak   Primary: Vaseline ointment on right breaPatient comes to wound clinic for wound assessment per request of dr. Chatterjee. She has history of a Open surgical wound due to : Debridement of abdominal and bilateral inframammary fold breast wounds with excision of skin, subcutaneous tissue and irrigation and placement of VAC dressing on 10/14/2021    Clean gloves are donned and current dressing removed.     Objective findings: Postoperative wound dehiscence    Number of wounds: several breast wound and one abdominal wound    Location: right and left breast, sternal and abdominal      Type of wound:   Post-operative wound: Yes,     Wound measured: abdominal wound: Length 15 cm x Width 4 cm x Depth 0.1 cm ,     Sternal wound is essentially healed, tiny pocket only.     Inferior breast wound healed but dry    Thickness: Full    Drainage: moderate        Wound specifics: Abdominal wound    Wound Base:  no sign of infection, granulation      Devitalized Tissue or Slough appearance:  none         Eschar appearance:  none        Tenderness: Tolerable with discomfort    Odor: moderate     Periwound Skin: intact,     Subjective finding:     Pt states: doing ok, home care 3/week     Patient is assessed for discomfort which is: moderate    Today's status of the wound:Healing well    Treatment and Plan of care:   Abdominal wound:   WOUND CARE INSTRUCTIONS:   Cleanse wound with:  Microklenz.  Primary Dressing: vashe moist gauze  Secondary Dressing: ABD pad  Secure with: tape   Frequency: twice daily     PLAN: Dressing changes twice daily with weekly home care assesment  Signs and symptoms of infection taught.  Patient needs to be seen 2weeks. Treated and follow-up appointment made. Dr. Chatterjee saw ppt to discuss plan and was available for  supervision of care if needed or if questions should arise and regarding plan of care.  Marisol Regalado RN, CWON

## 2021-12-29 NOTE — TELEPHONE ENCOUNTER
Contacted the patient to confirm the scheduled dates and provide the following information:     Surgeon/surgery date/location:  Dr. Chatterjee on 3/17 at [facility].  Arrival:   9:45 AM   Pre-op consult:   Dr. Chatterjee on 3/8.   Pre-op physical with:   PCP. Patient is aware this needs to be completed within 30 days of surgery.  COVID-19 test:   3/14.   Post-op:   3/22.    The surgery packet was provided via [employer].

## 2021-12-30 ENCOUNTER — ONCOLOGY VISIT (OUTPATIENT)
Dept: ONCOLOGY | Facility: CLINIC | Age: 56
End: 2021-12-30
Attending: PHYSICIAN ASSISTANT
Payer: COMMERCIAL

## 2021-12-30 ENCOUNTER — INFUSION THERAPY VISIT (OUTPATIENT)
Dept: ONCOLOGY | Facility: CLINIC | Age: 56
End: 2021-12-30
Attending: INTERNAL MEDICINE
Payer: COMMERCIAL

## 2021-12-30 VITALS
TEMPERATURE: 98.2 F | DIASTOLIC BLOOD PRESSURE: 63 MMHG | WEIGHT: 164.1 LBS | SYSTOLIC BLOOD PRESSURE: 150 MMHG | BODY MASS INDEX: 28.17 KG/M2 | HEART RATE: 40 BPM | OXYGEN SATURATION: 99 %

## 2021-12-30 DIAGNOSIS — Z17.0 MALIGNANT NEOPLASM OF UPPER-OUTER QUADRANT OF LEFT BREAST IN FEMALE, ESTROGEN RECEPTOR POSITIVE (H): Primary | ICD-10-CM

## 2021-12-30 DIAGNOSIS — C50.412 MALIGNANT NEOPLASM OF UPPER-OUTER QUADRANT OF LEFT BREAST IN FEMALE, ESTROGEN RECEPTOR POSITIVE (H): Primary | ICD-10-CM

## 2021-12-30 PROCEDURE — 250N000011 HC RX IP 250 OP 636: Performed by: INTERNAL MEDICINE

## 2021-12-30 PROCEDURE — 96402 CHEMO HORMON ANTINEOPL SQ/IM: CPT

## 2021-12-30 PROCEDURE — G0463 HOSPITAL OUTPT CLINIC VISIT: HCPCS

## 2021-12-30 PROCEDURE — 99214 OFFICE O/P EST MOD 30 MIN: CPT | Performed by: PHYSICIAN ASSISTANT

## 2021-12-30 RX ORDER — AMLODIPINE BESYLATE 10 MG/1
TABLET ORAL
COMMUNITY
Start: 2021-12-28

## 2021-12-30 RX ADMIN — GOSERELIN ACETATE 3.6 MG: 3.6 IMPLANT SUBCUTANEOUS at 16:01

## 2021-12-30 ASSESSMENT — PAIN SCALES - GENERAL: PAINLEVEL: MILD PAIN (2)

## 2021-12-30 NOTE — PROGRESS NOTES
Reason for Visit: Follow up of bilateral breast cancer    Oncology HPI:   Ms. Espinal is a 56 year old female is here for follow-up of locally advanced breast cancer.     Ms. Espinal is a 56 year old female with bilateral breast cancers. Other past medical history is notable for HHT (follows with Dr. Real), pulmonary and liver AVMs, GERD and HTN. Bilateral screening mammograms on 10/23/19 showed architectural distortion and developing focal asymmetry in the right breast, and possible developing asymmetry in the left breast. Diagnostic mammograms showed a 2 x 1.4 cm mass at 1:00 right breast and a 9 mm mass at 2:00 left breast. An additional 1.4 cm satellite mass in the left breast (total diameter of both left breast masses, 2.1 cm) was noted on contrast mammogram on 11/21/19.  Biopsy of the right breast at 1:00 revealed invasive ductal carcinoma, grade 1, ER+ (98%), ID+ (99%), HER2 negative. Biopsy of the left breast 2:00 revealed invasive lobular carcinoma, grade 2, ER+ (95%), ID+ (75%), and HER2 negative.  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       She underwent bilateral nipple sparing mastectomies, tissue expander placement, and bilateral axillary sentinel lymph node biopsies on 1/29/20 under the care of Dr. Smith. Final pathology revealed grade 2 invasive mammary carcinoma in the right breast measuring 2.1 cm, a smaller focus of invasive mammary carcinoma (mixed ductal and cribriform) measuring 3.6 mm, admixed DCIS, and negative margins. The left breast showed grade 2 invasive lobular carcinoma, measuring 1.6 cm, DCIS and LCIS, and negative margins. The right axillary sentinel node was benign; the left axillary sentinel node showed micrometastatic carcinoma measuring 1.5 mm without extranodal extension.     OncotypeDx was ordered for right and left breast cancers.  Oncotype dx recurrence score of the left breast cancer was 10. Oncotype on the  right breast cancer was 18.       Her surgery was complicated by skin necrosis requiring additional surgery.     She received adjuvant left chest wall and low axillary radiation: 5040 cGy in 28 fractions      Started Zoladex 8/2020    Interval history: Leigh is feeling well today. She has been tired but this is stable since her surgery and subsequent complications. She was told yesterday she would no longer need a wound vac so she is happy about this. She continues to follow closely with wound care for her large abdominal wall incision dehiscence. No fevers/chills or infections.     No lumps/bumps or new or different pain. Appetite is good. No issues with deeper abdominal pain or bowels. No SOB or cough.     She has been receiving zoladex monthly.     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 May discontinue if patient having bowel movement.)  (Patient not taking: Reported on 10/31/2021) 10 suppository 0     Goserelin Acetate (ZOLADEX SC) Inject 3.6 mg Subcutaneous every 30 days        oxyCODONE (ROXICODONE) 5 MG tablet Take 5 mg by mouth every 6 hours as needed for severe pain       pantoprazole (PROTONIX) 20 MG EC tablet Take 20 mg by mouth daily as needed   3     polyethylene glycol (MIRALAX) 17 GM/Dose powder Take 17 g by mouth daily (Patient not taking: Reported on 10/20/2021) 510 g 0     senna-docusate (SENOKOT-S/PERICOLACE) 8.6-50 MG tablet Take 2 tablets by mouth 2 times daily 120 tablet 0     SERTRALINE HCL PO Take 50 mg by mouth every evening        traZODone (DESYREL) 50 MG tablet Take  mg by mouth At Bedtime            Allergies   Allergen Reactions     Sulfa Drugs      Got really sick, headache, facial swelling, felt like she was dying.   Delayed reaction happened ~3-4 days after she started taking it.        PHYSICAL EXAM:  BP (!) 150/63   Pulse (!) 40   Temp 98.2  F (36.8  C) (Oral)   Wt 74.4 kg (164 lb 1.6 oz)   LMP 03/01/2020 (Approximate)   SpO2 99%   BMI 28.17 kg/m    General: Alert, oriented, pleasant, NAD  HEENT: Normocephalic, atraumatic, PERRLA, EOMI. Moist mucus membranes, no lesions, or thrush  Neck: No cervical or supraclavicular LAD.  Axillary: No LAD  Breast: No concerning nodularity or mass along skin or chest wall.   Lungs: CTA bilaterally, normal work of breathing  Cardiac: RRR--bradycardic in the 50s, S1, S2  Abdomen: Soft, nontender, nondistended. Normoactive bowel sounds. No hepatosplenomegaly, mass. Abdominal wound is partially visualized and appears clean.   Neuro: CNII-XII grossly intact  Extremities: No pedal edema        No labs or imaging to review    Assessment/plan:   1. Bilateral Breast Cancer (Left breast cancer O5eH5pr ER + PA + her2 negative, oncotype 10 s/p mastectomy and SNLB and Right breast cancer T2(2)N0 ER + PA + her2 negative, oncotype 18 s/p mastectomy and SNLB)  - S/p bilateral mastectomy  and left chest wall RT.   - Started Zoladex. Poorly tolerated letrozole so this was discontinued. I revisited trying exemestane or anastrozole and the mechanism of how aromatase inhibitors work versus zoladex. Also reviewed tamoxifen. She still feels overwhelmed from reconstruction complications and does not want to try anything new now   - No concerning clinical s/s on H&P for recurrence. Follow-up with Dr. Swann in 3 months. She told me she would be open to discussing endocrine therapy again at that visit.     2. CHEK2 mutation: She has had a colonoscopy in 2012, which was normal. This was repeated in march 2021.  Due for repeat in 3 years given presence of polyps.      3. Lymphedema: Following with PT. Overall this has not been problem.      4. HHT: follows with Dr. Real. No significant bleeding at this time.     5. Abdominal wound dehiscence: Slowly healing. She continues to follow with wound care and plastic surgery.     Edwige Simms PA-C

## 2021-12-30 NOTE — NURSING NOTE
"Oncology Rooming Note    December 30, 2021 2:54 PM   Leigh Espinal is a 56 year old female who presents for:    Chief Complaint   Patient presents with     Oncology Clinic Visit     breast cancer     Initial Vitals: BP (!) 150/63   Pulse (!) 40   Temp 98.2  F (36.8  C) (Oral)   Wt 74.4 kg (164 lb 1.6 oz)   LMP 03/01/2020 (Approximate)   SpO2 99%   BMI 28.17 kg/m   Estimated body mass index is 28.17 kg/m  as calculated from the following:    Height as of 10/20/21: 1.626 m (5' 4\").    Weight as of this encounter: 74.4 kg (164 lb 1.6 oz). Body surface area is 1.83 meters squared.  Mild Pain (2) Comment: Data Unavailable   Patient's last menstrual period was 03/01/2020 (approximate).  Allergies reviewed: Yes  Medications reviewed: Yes    Medications: Medication refills not needed today.  Pharmacy name entered into EPIC:    [facility] [place] - [id number] [facility] - [place], [place] - [address] [place] [address] PHARMACY [place], [place] - [address] [facility] [phone]    Clinical concerns: none       Rachelle Chopra CMA            "

## 2021-12-30 NOTE — PROGRESS NOTES
Infusion Nursing Note:  Leigh Espinal presents today for Cycle 19 Day 1 Zoladex.    Patient seen by provider today: Yes: ITZ Guallpa   present during visit today: Not Applicable.    Note: Patient presents to infusion today doing well. Denies any new questions or concerns following her visit with ITZ Guallpa. Patient very happy as her wound vac was just recently removed.    Intravenous Access:  No Intravenous access/labs at this visit.    Treatment Conditions:  Not Applicable.    Post Infusion Assessment:  Patient tolerated Zoladex injection to Right Side of Abdomen without incident.  Patient iced site prior to injection.  Site patent and intact, free from redness, edema or discomfort.  No evidence of extravasations.     Discharge Plan:   Patient declined prescription refills.  Discharge instructions reviewed with: Patient.  Patient and/or family verbalized understanding of discharge instructions and all questions answered.  AVS to patient via [email or web address].  Patient will return 1/27/2022 for next appointment.   Patient discharged in stable condition accompanied by: self.  Departure Mode: Ambulatory.      Amy Michaud RN

## 2021-12-30 NOTE — LETTER
12/30/2021         RE: Leigh Espinal  [address] [place] [address]      Reason for Visit: Follow up of bilateral breast cancer    Oncology HPI:   Ms. Espinal is a 56 year old female is here for follow-up of locally advanced breast cancer.     Ms. Espinal is a 56 year old female with bilateral breast cancers. Other past medical history is notable for HHT (follows with Dr. Real), pulmonary and liver AVMs, GERD and HTN. Bilateral screening mammograms on 10/23/19 showed architectural distortion and developing focal asymmetry in the right breast, and possible developing asymmetry in the left breast. Diagnostic mammograms showed a 2 x 1.4 cm mass at 1:00 right breast and a 9 mm mass at 2:00 left breast. An additional 1.4 cm satellite mass in the left breast (total diameter of both left breast masses, 2.1 cm) was noted on contrast mammogram on 11/21/19.  Biopsy of the right breast at 1:00 revealed invasive ductal carcinoma, grade 1, ER+ (98%), MO+ (99%), HER2 negative. Biopsy of the left breast 2:00 revealed invasive lobular carcinoma, grade 2, ER+ (95%), MO+ (75%), and HER2 negative.  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       She underwent bilateral nipple sparing mastectomies, tissue expander placement, and bilateral axillary sentinel lymph node biopsies on 1/29/20 under the care of Dr. Smith. Final pathology revealed grade 2 invasive mammary carcinoma in the right breast measuring 2.1 cm, a smaller focus of invasive mammary carcinoma (mixed ductal and cribriform) measuring 3.6 mm, admixed DCIS, and negative margins. The left breast showed grade 2 invasive lobular carcinoma, measuring 1.6 cm, DCIS and LCIS, and negative margins. The right axillary sentinel node was benign; the left axillary sentinel node showed micrometastatic carcinoma measuring 1.5 mm without extranodal extension.     OncotypeDx was ordered for right and left  breast cancers.  Oncotype dx recurrence score of the left breast cancer was 10. Oncotype on the right breast cancer was 18.       Her surgery was complicated by skin necrosis requiring additional surgery.     She received adjuvant left chest wall and low axillary radiation: 5040 cGy in 28 fractions      Started Zoladex 8/2020    Interval history: Leigh is feeling well today. She has been tired but this is stable since her surgery and subsequent complications. She was told yesterday she would no longer need a wound vac so she is happy about this. She continues to follow closely with wound care for her large abdominal wall incision dehiscence. No fevers/chills or infections.     No lumps/bumps or new or different pain. Appetite is good. No issues with deeper abdominal pain or bowels. No SOB or cough.     She has been receiving zoladex monthly.     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 May discontinue if patient having bowel movement.) (Patient not taking: Reported on 10/31/2021) 10 suppository 0     Goserelin Acetate (ZOLADEX SC) Inject 3.6 mg Subcutaneous every 30 days        oxyCODONE (ROXICODONE) 5 MG tablet Take 5 mg by mouth every 6 hours as needed for severe pain       pantoprazole (PROTONIX) 20 MG EC tablet Take 20 mg by mouth daily as needed   3     polyethylene glycol (MIRALAX) 17 GM/Dose powder Take 17 g by mouth daily (Patient not taking: Reported on 10/20/2021) 510 g 0     senna-docusate (SENOKOT-S/PERICOLACE) 8.6-50 MG tablet Take 2 tablets by mouth 2 times daily 120 tablet 0     SERTRALINE HCL PO Take 50 mg by mouth every evening        traZODone (DESYREL) 50 MG tablet Take  mg by mouth At Bedtime            Allergies   Allergen Reactions     Sulfa Drugs      Got really sick, headache, facial swelling, felt like she was dying.   Delayed reaction happened ~3-4 days after she started taking it.        PHYSICAL EXAM:  BP (!) 150/63   Pulse (!) 40   Temp 98.2  F (36.8  C) (Oral)   Wt 74.4 kg (164 lb 1.6 oz)   LMP 03/01/2020 (Approximate)   SpO2 99%   BMI 28.17 kg/m    General: Alert, oriented, pleasant, NAD  HEENT: Normocephalic, atraumatic, PERRLA, EOMI. Moist mucus membranes, no lesions, or thrush  Neck: No cervical or supraclavicular LAD.  Axillary: No LAD  Breast: No concerning nodularity or mass along skin or chest wall.   Lungs: CTA bilaterally, normal work of breathing  Cardiac: RRR--bradycardic in the 50s, S1, S2  Abdomen: Soft, nontender, nondistended. Normoactive bowel sounds. No hepatosplenomegaly, mass. Abdominal wound is partially visualized and appears clean.   Neuro: CNII-XII grossly intact  Extremities: No pedal edema        No labs or imaging to review    Assessment/plan:   1. Bilateral Breast Cancer (Left breast cancer [id number] ER + UT + her2 negative, oncotype 10 s/p mastectomy and SNLB and Right breast cancer  T2(2)N0 ER + VT + her2 negative, oncotype 18 s/p mastectomy and SNLB)  - S/p bilateral mastectomy and left chest wall RT.   - Started Zoladex. Poorly tolerated letrozole so this was discontinued. I revisited trying exemestane or anastrozole and the mechanism of how aromatase inhibitors work versus zoladex. Also reviewed tamoxifen. She still feels overwhelmed from reconstruction complications and does not want to try anything new now   - No concerning clinical s/s on H&P for recurrence. Follow-up with Dr. Swann in 3 months. She told me she would be open to discussing endocrine therapy again at that visit.     2. CHEK2 mutation: She has had a colonoscopy in 2012, which was normal. This was repeated in march 2021.  Due for repeat in 3 years given presence of polyps.      3. Lymphedema: Following with PT. Overall this has not been problem.      4. HHT: follows with Dr. Real. No significant bleeding at this time.     5. Abdominal wound dehiscence: Slowly healing. She continues to follow with wound care and plastic surgery.     OSCAR Guallpa PA-C

## 2021-12-30 NOTE — PATIENT INSTRUCTIONS
Dear Patients and Caregivers,    Each day we work diligently to eliminate any barriers to your care including working with your insurance coverage to preauthorize your facility administered medication treatment. Our goal is to be transparent with any anticipated concerns with coverage for your treatment. At the beginning of every year this goal is particularly challenging because of changes to insurance coverage.    How can you help?    Provide any new insurance card to the infusion nurse at your next appointment or enter the information into your [employer] account prior to January 1st 2022.     It is vital that if a  reaches out that you return their phone call in a timely manner. Due to regulations, the team is unable to leave detailed voicemails. When you return the finance teams call they will be able to inform you of the details so a decision about your appointment can be made.    Also please understand:    We go through this process each year and each year offers new challenges.    Insurance companies will not allow the reauthorization process to begin until 2022, not allowing us to work in advance on this process.    Even if you are not changing insurance plans, insurance companies often update their policies requiring all treatments to be reauthorized.    As institutions across the country work to reauthorize treatments, insurance companies sometimes have longer than usual wait times in their call centers and leads to delayed response to prior authorization requests.     Thank you for your patience as we work this process. We do strive to keep you updated throughout the process if there are any delays or if the process is taking longer than anticipated. Lastly please feel free to speak with one of our infusion finance specialists at your next appointment or via phone ([phone]) if you have any questions. Thank you for choosing [facility] for your care.    Contact Numbers  [facility]: [phone] (for symptom and scheduling needs)    Please call the [facility] Triage line if you experience a temperature greater than or equal to 100.4, shaking chills, have uncontrolled nausea, vomiting and/or diarrhea, dizziness, shortness of breath, chest pain, bleeding, unexplained bruising, or if you have any other new/concerning symptoms, questions or concerns.     If you are having any concerning symptoms or wish to speak to a provider before your next infusion visit, please call your care coordinator or triage to notify them so we can adequately serve you.     If you need a refill on a narcotic prescription or other medication, please call triage before your infusion appointment.          December 2021 Sunday Monday Tuesday Wednesday Thursday Friday Saturday                  1    [facility] RETURN WOUND NURSE   8:45 AM   (60 min.)   Marisol Regalado RN   [facility] Wound Ostomy [facility] 2    ONC INFUSION 0.5 HR (30 MIN)   3:00 PM   (30 min.)    ONC INFUSION NURSE   [facility] 3     4       5     6     7     8     9     10     11       12     13     14     15     16     17     18       19     20     21     22     23     24     25       26     27     28     29    [facility] RETURN WOUND NURSE   9:15 AM   (60 min.)   Marisol Regalado RN   [facility] Wound Ostomy [facility] 30    RETURN   2:45 PM   (45 min.)   Edwige Simms PA-C   [facility]    ONC INFUSION 1 HR (60 MIN)   4:00 PM   (60 min.)    ONC INFUSION NURSE   [facility] 31 January 2022 Sunday Monday Tuesday Wednesday Thursday Friday Saturday                                 1       2     3     4     5     6     7     8       9     10     11    [facility] RETURN WOUND NURSE   9:15 AM   (60 min.)   Marisol Regalado RN   [facility] Wound Ostomy [facility] 12     13     14     15       16     17     18      19     20     21     22       23     24     25     26     27    ONC INFUSION 1 HR (60 MIN)   2:30 PM   (60 min.)    ONC INFUSION NURSE   [facility] 28     29       30     31                                              Lab Results:  No results found for this or any previous visit (from the past 12 hour(s)).

## 2022-01-03 ENCOUNTER — ANCILLARY PROCEDURE (OUTPATIENT)
Dept: CT IMAGING | Facility: CLINIC | Age: 57
End: 2022-01-03
Attending: INTERNAL MEDICINE
Payer: COMMERCIAL

## 2022-01-03 DIAGNOSIS — R10.32 LLQ ABDOMINAL PAIN: ICD-10-CM

## 2022-01-03 PROCEDURE — 74177 CT ABD & PELVIS W/CONTRAST: CPT | Mod: GC | Performed by: RADIOLOGY

## 2022-01-03 RX ORDER — IOPAMIDOL 755 MG/ML
100 INJECTION, SOLUTION INTRAVASCULAR ONCE
Status: COMPLETED | OUTPATIENT
Start: 2022-01-03 | End: 2022-01-03

## 2022-01-03 RX ADMIN — IOPAMIDOL 100 ML: 755 INJECTION, SOLUTION INTRAVASCULAR at 16:27

## 2022-01-11 ENCOUNTER — OFFICE VISIT (OUTPATIENT)
Dept: WOUND CARE | Facility: CLINIC | Age: 57
End: 2022-01-11
Payer: COMMERCIAL

## 2022-01-11 DIAGNOSIS — T81.31XA POSTOPERATIVE WOUND DEHISCENCE: Primary | ICD-10-CM

## 2022-01-11 DIAGNOSIS — T81.321A ABDOMINAL WOUND DEHISCENCE: ICD-10-CM

## 2022-01-11 PROCEDURE — 99211 OFF/OP EST MAY X REQ PHY/QHP: CPT

## 2022-01-11 NOTE — PROGRESS NOTES
Breast wounds  Reason for dressing use debriding dressing applied   Cleaning:     Microklenz or soap and water in the shower all wounds     Abdominal wound and sternal wound : 2 -5  minute Vash soak   Primary: Vaseline ointment on right breaPatient comes to wound clinic for wound assessment per request of dr. Chatterjee. She has history of a Open surgical wound due to : Debridement of abdominal and bilateral inframammary fold breast wounds with excision of skin, subcutaneous tissue and irrigation and placement of VAC dressing on 10/14/2021    Clean gloves are donned and current dressing removed.     Objective findings: Data Unavailable    Number of wounds: several breast wound and one abdominal wound    Location: right and left breast, sternal and abdominal      Type of wound:   Post-operative wound: Yes,     Wound measured: abdominal wound: Length 10cm x Width 2.5 cm x Depth 0.1 cm ,     Thickness: Full    Drainage: moderate        Wound specifics: Abdominal wound    Wound Base:  no sign of infection, granulation      Devitalized Tissue or Slough appearance:  none         Eschar appearance:  none        Tenderness: Tolerable with discomfort    Odor: none    Periwound Skin: intact,     Subjective finding:     Pt states: doing ok, home care 3/week     Patient is assessed for discomfort which is: moderate    Today's status of the wound vac was stopped on Dec 12:Healing well, continue with current cares    Treatment and Plan of care:   Abdominal wound:   WOUND CARE INSTRUCTIONS:   Cleanse wound with:  Microklenz.  Primary Dressing: vashe moist gauze  Secondary Dressing: ABD pad  Secure with: tape   Frequency: twice daily     PLAN: Dressing changes twice daily with weekly home care assesment  Signs and symptoms of infection taught.  Patient needs to be seen 2weeks. Treated and follow-up appointment made. Dr. Simons saw ppt to discuss plan and was available for supervision of care if needed or if questions should arise  and regarding plan of care.  Marisol Regalado RN, CWON

## 2022-01-27 ENCOUNTER — INFUSION THERAPY VISIT (OUTPATIENT)
Dept: ONCOLOGY | Facility: CLINIC | Age: 57
End: 2022-01-27
Attending: INTERNAL MEDICINE
Payer: COMMERCIAL

## 2022-01-27 VITALS
SYSTOLIC BLOOD PRESSURE: 150 MMHG | HEART RATE: 58 BPM | WEIGHT: 166 LBS | TEMPERATURE: 98.2 F | OXYGEN SATURATION: 98 % | RESPIRATION RATE: 18 BRPM | DIASTOLIC BLOOD PRESSURE: 65 MMHG | BODY MASS INDEX: 28.49 KG/M2

## 2022-01-27 DIAGNOSIS — C50.412 MALIGNANT NEOPLASM OF UPPER-OUTER QUADRANT OF LEFT BREAST IN FEMALE, ESTROGEN RECEPTOR POSITIVE (H): Primary | ICD-10-CM

## 2022-01-27 DIAGNOSIS — Z17.0 MALIGNANT NEOPLASM OF UPPER-OUTER QUADRANT OF LEFT BREAST IN FEMALE, ESTROGEN RECEPTOR POSITIVE (H): Primary | ICD-10-CM

## 2022-01-27 PROCEDURE — 96402 CHEMO HORMON ANTINEOPL SQ/IM: CPT

## 2022-01-27 PROCEDURE — 250N000011 HC RX IP 250 OP 636: Performed by: INTERNAL MEDICINE

## 2022-01-27 RX ADMIN — GOSERELIN ACETATE 3.6 MG: 3.6 IMPLANT SUBCUTANEOUS at 14:51

## 2022-01-27 NOTE — PROGRESS NOTES
Infusion Nursing Note:  Leigh CARLSON Sonnykaren presents today for C20D1 Zoladex.    Patient seen by provider today: No   present during visit today: Not Applicable.    Note: Patient reports feeling well. Denies fever/chills. Denies nausea/vomiting nor chest and abdominal discomfort. No new complaints made. Otherwise well.    Ice prior to infusion.       Intravenous Access:  No Intravenous access/labs at this visit.    Treatment Conditions:  Not Applicable.      Post Infusion Assessment:  Patient tolerated One Zoladex injection at right lower abdomen without incident.  Site patent and intact, free from redness, edema or discomfort.  No evidence of extravasations.       Discharge Plan:   Patient declined prescription refills.  Discharge instructions reviewed with: Patient.  Patient and/or family verbalized understanding of discharge instructions and all questions answered.  AVS to patient via [employer].  Patient will return 2/24/22 for next appointment.   Patient discharged in stable condition accompanied by: self.  Departure Mode: Ambulatory.      STEPH KOTHARI RN

## 2022-01-27 NOTE — PATIENT INSTRUCTIONS
Contact Numbers  [facility]: [phone]    After Hours:  [phone]  Triage: [phone]    Please call the [facility] Triage line if you experience a temperature greater than or equal to 100.5, shaking chills, have uncontrolled nausea, vomiting and/or diarrhea, dizziness, shortness of breath, chest pain, bleeding, unexplained bruising, or if you have any other new/concerning symptoms, questions or concerns.     If it is after hours, weekends, or holidays, please call the main hospital  at  [phone] and ask to speak to the Oncology doctor on call.     If you are having any concerning symptoms or wish to speak to a provider before your next infusion visit, please call your care coordinator or triage to notify them so we can adequately serve you.     If you need a refill on a narcotic prescription or other medication, please call triage before your infusion appointment.         January 2022 Sunday Monday Tuesday Wednesday Thursday Friday Saturday                                 1       2     3    CT ABDOMEN PELVIS W   4:10 PM   (20 min.)   [id number]   [facility] [place] 4     5     6     7     8       9     10     11    [facility] RETURN WOUND NURSE   9:15 AM   (60 min.)   Marisol Regalado RN   [facility] Wound Ost[facility] 12     13     14     15       16     17     18     19     20     21     22       23     24     25     26     27    ONC INFUSION 1 HR (60 MIN)   2:30 PM   (60 min.)    ONC INFUSION NURSE   [facility] 28     29       30     31                                          February 2022 Sunday Monday Tuesday Wednesday Thursday Friday Saturday             1    [facility] RETURN WOUND NURSE   8:15 AM   (60 min.)   Marisol Regalado, RN   [facility] Wound Ostomy [facility] 2     3     4     5       6     7     8     9     10     11     12       13     14     15     16     17     18     19        20     21     22     23     24    ONC INFUSION 1 HR (60 MIN)   3:30 PM   (60 min.)    ONC INFUSION NURSE   [facility] 25     26       27     28                                              Lab Results:  No results found for this or any previous visit (from the past 12 hour(s)).

## 2022-02-01 ENCOUNTER — OFFICE VISIT (OUTPATIENT)
Dept: WOUND CARE | Facility: CLINIC | Age: 57
End: 2022-02-01
Payer: COMMERCIAL

## 2022-02-01 DIAGNOSIS — T81.321A ABDOMINAL WOUND DEHISCENCE: Primary | ICD-10-CM

## 2022-02-01 PROCEDURE — 99211 OFF/OP EST MAY X REQ PHY/QHP: CPT

## 2022-02-01 NOTE — PROGRESS NOTES
Breast wounds  Reason for dressing use debriding dressing applied   Cleaning:     Microklenz or soap and water in the shower all wounds     Abdominal wound and sternal wound : 2 -5  minute Vash soak   Primary: Vaseline ointment on right breaPatient comes to wound clinic for wound assessment per request of dr. Chatterjee. She has history of a Open surgical wound due to : Debridement of abdominal and bilateral inframammary fold breast wounds with excision of skin, subcutaneous tissue and irrigation and placement of VAC dressing on 10/14/2021    Clean gloves are donned and current dressing removed.     Objective findings: Abdominal wound dehiscence    Number of wounds: several breast wound and one abdominal wound    Location: right and left breast, sternal and abdominal      Type of wound:   Post-operative wound: Yes,     Wound measured: abdominal wound: Length 4cm x Width 1 cm x Depth 0.1 cm ,     Thickness: Full    Drainage: slight    Wound specifics: Abdominal wound    Wound Base:  no sign of infection, granulation      Devitalized Tissue or Slough appearance:  none         Eschar appearance:  none        Tenderness: Tolerable with discomfort    Odor: none    Periwound Skin: intact,     Subjective finding:     Pt states: doing ok,     Patient is assessed for discomfort which is: none     Today's status of the wound:Almost healed.     Treatment and Plan of care:   Abdominal wound:   WOUND CARE INSTRUCTIONS:   Cleanse wound with:  Microklenz.  Primary Dressing: vashe moist gauze Vaseline on wound edges  Secondary Dressing: gauze  Secure with: tape   Frequency: twice daily     PLAN: Dressing changes twice daily  Signs and symptoms of infection taught.  Patient needs to be seen prn. Dr. Simons saw ppt to discuss plan and was available for supervision of care if needed or if questions should arise and regarding plan of care. Marisol Regalado RN, CWON

## 2022-02-13 ENCOUNTER — HEALTH MAINTENANCE LETTER (OUTPATIENT)
Age: 57
End: 2022-02-13

## 2022-02-15 DIAGNOSIS — Z11.59 ENCOUNTER FOR SCREENING FOR OTHER VIRAL DISEASES: Primary | ICD-10-CM

## 2022-02-23 ENCOUNTER — INFUSION THERAPY VISIT (OUTPATIENT)
Dept: ONCOLOGY | Facility: CLINIC | Age: 57
End: 2022-02-23
Attending: INTERNAL MEDICINE
Payer: COMMERCIAL

## 2022-02-23 VITALS
TEMPERATURE: 98.1 F | DIASTOLIC BLOOD PRESSURE: 73 MMHG | SYSTOLIC BLOOD PRESSURE: 140 MMHG | OXYGEN SATURATION: 96 % | RESPIRATION RATE: 16 BRPM | HEART RATE: 66 BPM

## 2022-02-23 DIAGNOSIS — C50.412 MALIGNANT NEOPLASM OF UPPER-OUTER QUADRANT OF LEFT BREAST IN FEMALE, ESTROGEN RECEPTOR POSITIVE (H): Primary | ICD-10-CM

## 2022-02-23 DIAGNOSIS — Z17.0 MALIGNANT NEOPLASM OF UPPER-OUTER QUADRANT OF LEFT BREAST IN FEMALE, ESTROGEN RECEPTOR POSITIVE (H): Primary | ICD-10-CM

## 2022-02-23 PROCEDURE — 250N000011 HC RX IP 250 OP 636: Performed by: INTERNAL MEDICINE

## 2022-02-23 PROCEDURE — 96402 CHEMO HORMON ANTINEOPL SQ/IM: CPT

## 2022-02-23 RX ADMIN — GOSERELIN ACETATE 3.6 MG: 3.6 IMPLANT SUBCUTANEOUS at 13:09

## 2022-02-23 ASSESSMENT — PAIN SCALES - GENERAL: PAINLEVEL: NO PAIN (0)

## 2022-02-23 NOTE — PROGRESS NOTES
Infusion Nursing Note:  Leigh Espinal presents today for Zoladex.    Patient seen by provider today: No   present during visit today: Not Applicable.    Note:       Intravenous Access:  No Intravenous access/labs at this visit.      Post Infusion Assessment:  Patient tolerated injection without incident to LEFT abdomen.  Ices prior to injection.       Discharge Plan:   Discharge instructions reviewed with: Patient.  Patient will return 3/23 for next appointment- msg sent to reschedule to 3/23 @ 1500 and will see Dr. Swann soon thereafter.       Devi Cortez RN

## 2022-03-10 ENCOUNTER — DOCUMENTATION ONLY (OUTPATIENT)
Dept: SURGERY | Facility: CLINIC | Age: 57
End: 2022-03-10
Payer: COMMERCIAL

## 2022-03-10 NOTE — PROGRESS NOTES
Received message from RNCC on 3/8.     Sent message to patient about rescheduling. Patient read message but has not replied.    Will wait for patient to reach out.    Cancelled appointments. Surgery removed from schedule.    Skye Raygoza RN Tschida, Kayla; CEE Chatterjee MD Hi Kayla,     This pt was to see us in pre-op today and she cancelled because she is not feeling well. I called her to reschedule the pre-op and she said she would like to reschedule the surgery. She is feeling like she is still recovering from the last surgery and wants to move the date out. I let her know you would call as able

## 2022-03-22 DIAGNOSIS — C50.412 MALIGNANT NEOPLASM OF UPPER-OUTER QUADRANT OF LEFT BREAST IN FEMALE, ESTROGEN RECEPTOR POSITIVE (H): Primary | ICD-10-CM

## 2022-03-22 DIAGNOSIS — Z17.0 MALIGNANT NEOPLASM OF UPPER-OUTER QUADRANT OF LEFT BREAST IN FEMALE, ESTROGEN RECEPTOR POSITIVE (H): Primary | ICD-10-CM

## 2022-03-23 ENCOUNTER — INFUSION THERAPY VISIT (OUTPATIENT)
Dept: ONCOLOGY | Facility: CLINIC | Age: 57
End: 2022-03-23
Attending: PHYSICIAN ASSISTANT
Payer: COMMERCIAL

## 2022-03-23 VITALS
OXYGEN SATURATION: 97 % | RESPIRATION RATE: 12 BRPM | DIASTOLIC BLOOD PRESSURE: 66 MMHG | TEMPERATURE: 98.6 F | SYSTOLIC BLOOD PRESSURE: 139 MMHG | HEART RATE: 54 BPM

## 2022-03-23 DIAGNOSIS — C50.412 MALIGNANT NEOPLASM OF UPPER-OUTER QUADRANT OF LEFT BREAST IN FEMALE, ESTROGEN RECEPTOR POSITIVE (H): Primary | ICD-10-CM

## 2022-03-23 DIAGNOSIS — Z17.0 MALIGNANT NEOPLASM OF UPPER-OUTER QUADRANT OF LEFT BREAST IN FEMALE, ESTROGEN RECEPTOR POSITIVE (H): Primary | ICD-10-CM

## 2022-03-23 PROCEDURE — 96402 CHEMO HORMON ANTINEOPL SQ/IM: CPT

## 2022-03-23 PROCEDURE — 250N000011 HC RX IP 250 OP 636: Performed by: PHYSICIAN ASSISTANT

## 2022-03-23 RX ADMIN — GOSERELIN ACETATE 3.6 MG: 3.6 IMPLANT SUBCUTANEOUS at 15:17

## 2022-03-23 ASSESSMENT — PAIN SCALES - GENERAL: PAINLEVEL: NO PAIN (0)

## 2022-03-23 NOTE — PROGRESS NOTES
"Infusion Nursing Note:  Leigh Espinal presents today for Zoladex.    Patient seen by provider today: No   present during visit today: Not Applicable.    Note: Patient denies the following: fevers, body aches, chills, headaches, vision changes, dizziness, chest pain, shortness of breath, nausea, vomiting, constipation, abdominal pain, rashes, bruising/bleeding, mouth sores, swelling or pain in the legs.     -c/o bilateral \"lymph node pain in both my legs\"   -not sure if that is where the pain in coming from but \"feels like it is\"  -started a week ago  -only notices at night  -elevating legs helps  -Tylenol has helped  -denies any redness or swelling in bilateral legs    Pt sees Dr Swann in 5 days, if her symptoms worsen she will call.         Intravenous Access:  No Intravenous access/labs at this visit.    Treatment Conditions:  Not Applicable.      Post Infusion Assessment:  Patient tolerated Zoladex injection into RUQ of abdomen without incident.   Ice applied to site prior to injection.      Discharge Plan:   Patient declined prescription refills.  Discharge instructions reviewed with: Patient.  Patient and/or family verbalized understanding of discharge instructions and all questions answered.  AVS to patient via [employer].  Patient will return 3/28 for provider visit.   Patient discharged in stable condition accompanied by: self.  Departure Mode: Ambulatory.    Bebe Stokes RN        "

## 2022-03-28 ENCOUNTER — ONCOLOGY VISIT (OUTPATIENT)
Dept: ONCOLOGY | Facility: CLINIC | Age: 57
End: 2022-03-28
Attending: INTERNAL MEDICINE
Payer: COMMERCIAL

## 2022-03-28 VITALS
DIASTOLIC BLOOD PRESSURE: 82 MMHG | TEMPERATURE: 98.2 F | BODY MASS INDEX: 29.32 KG/M2 | SYSTOLIC BLOOD PRESSURE: 158 MMHG | OXYGEN SATURATION: 98 % | HEART RATE: 48 BPM | WEIGHT: 170.8 LBS

## 2022-03-28 DIAGNOSIS — Z15.02 MONOALLELIC MUTATION OF CHEK2 GENE IN FEMALE PATIENT: ICD-10-CM

## 2022-03-28 DIAGNOSIS — Z15.09 MONOALLELIC MUTATION OF CHEK2 GENE IN FEMALE PATIENT: ICD-10-CM

## 2022-03-28 DIAGNOSIS — Z15.89 MONOALLELIC MUTATION OF CHEK2 GENE IN FEMALE PATIENT: ICD-10-CM

## 2022-03-28 DIAGNOSIS — Z17.0 MALIGNANT NEOPLASM OF UPPER-OUTER QUADRANT OF LEFT BREAST IN FEMALE, ESTROGEN RECEPTOR POSITIVE (H): Primary | ICD-10-CM

## 2022-03-28 DIAGNOSIS — I78.0 HHT (HEREDITARY HEMORRHAGIC TELANGIECTASIA) (H): ICD-10-CM

## 2022-03-28 DIAGNOSIS — C50.412 MALIGNANT NEOPLASM OF UPPER-OUTER QUADRANT OF LEFT BREAST IN FEMALE, ESTROGEN RECEPTOR POSITIVE (H): Primary | ICD-10-CM

## 2022-03-28 DIAGNOSIS — Z15.01 MONOALLELIC MUTATION OF CHEK2 GENE IN FEMALE PATIENT: ICD-10-CM

## 2022-03-28 DIAGNOSIS — Z98.890 S/P BREAST RECONSTRUCTION, BILATERAL: ICD-10-CM

## 2022-03-28 PROCEDURE — 99214 OFFICE O/P EST MOD 30 MIN: CPT | Mod: GC | Performed by: INTERNAL MEDICINE

## 2022-03-28 PROCEDURE — G0463 HOSPITAL OUTPT CLINIC VISIT: HCPCS

## 2022-03-28 RX ORDER — ANASTROZOLE 1 MG/1
1 TABLET ORAL DAILY
Qty: 30 TABLET | Refills: 0 | Status: SHIPPED | OUTPATIENT
Start: 2022-03-28 | End: 2023-03-08

## 2022-03-28 RX ORDER — ANASTROZOLE 1 MG/1
1 TABLET ORAL DAILY
Status: DISCONTINUED | OUTPATIENT
Start: 2022-03-28 | End: 2022-03-28

## 2022-03-28 ASSESSMENT — PAIN SCALES - GENERAL: PAINLEVEL: MILD PAIN (2)

## 2022-03-28 NOTE — NURSING NOTE
"Oncology Rooming Note    March 28, 2022 9:24 AM   Leigh Espinal is a 56 year old female who presents for:    Chief Complaint   Patient presents with     Oncology Clinic Visit     Breast Cancer      Initial Vitals: BP (!) 158/82 (BP Location: Right arm, Patient Position: Sitting, Cuff Size: Adult Regular)   Pulse (!) 48   Temp 98.2  F (36.8  C) (Oral)   Wt 77.5 kg (170 lb 12.8 oz)   LMP 03/01/2020 (Approximate)   SpO2 98%   BMI 29.32 kg/m   Estimated body mass index is 29.32 kg/m  as calculated from the following:    Height as of 10/20/21: 1.626 m (5' 4\").    Weight as of this encounter: 77.5 kg (170 lb 12.8 oz). Body surface area is 1.87 meters squared.  Mild Pain (2) Comment: Data Unavailable   Patient's last menstrual period was 03/01/2020 (approximate).  Allergies reviewed: Yes  Medications reviewed: Yes    Medications: Medication refills not needed today.  Pharmacy name entered into EPIC:    [facility] - [place], [place] - [address] [facility] - [place], [place] - [address] [place] [address] PHARMACY [place], [place] - [address] [facility] [phone]    Clinical concerns:     Pt has had pain/discomfort on the right side on her groin. The pain has reach 8/10 levels accompanied with pain/discomfort in her right armpit. She's noticed that elevating her leg has helped relieve that pain. This has also occurred on the left side.     Duration of symptoms: 3 weeks to a month     Devaughn Trujillo            "

## 2022-03-28 NOTE — LETTER
3/28/2022         RE: Leigh Espinal  [address] [place] [address]        Dear Colleague,    Thank you for referring your patient, Leigh Espinal, to the [facility]. Please see a copy of my visit note below.    Medical oncology follow-up visit  03/28/2022     Reason for Visit: Follow up of bilateral breast cancer    Oncology HPI:     Ms. Espinal is a 56 year old female with bilateral breast cancers. Other past medical history is notable for HHT (follows with Dr. Real), pulmonary and liver AVMs, GERD and HTN. Bilateral screening mammograms on 10/23/19 showed architectural distortion and developing focal asymmetry in the right breast, and possible developing asymmetry in the left breast. Diagnostic mammograms showed a 2 x 1.4 cm mass at 1:00 right breast and a 9 mm mass at 2:00 left breast. An additional 1.4 cm satellite mass in the left breast (total diameter of both left breast masses, 2.1 cm) was noted on contrast mammogram on 11/21/19.  Biopsy of the right breast at 1:00 revealed invasive ductal carcinoma, grade 1, ER+ (98%), NJ+ (99%), HER2 negative. Biopsy of the left breast 2:00 revealed invasive lobular carcinoma, grade 2, ER+ (95%), NJ+ (75%), and HER2 negative.  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       She underwent bilateral nipple sparing mastectomies, tissue expander placement, and bilateral axillary sentinel lymph node biopsies on 1/29/20 under the care of Dr. Smith. Final pathology revealed grade 2 invasive mammary carcinoma in the right breast measuring 2.1 cm, a smaller focus of invasive mammary carcinoma (mixed ductal and cribriform) measuring 3.6 mm, admixed DCIS, and negative margins. The left breast showed grade 2 invasive lobular carcinoma, measuring 1.6 cm, DCIS and LCIS, and negative margins. The right axillary sentinel node was benign; the left axillary sentinel node  showed micrometastatic carcinoma measuring 1.5 mm without extranodal extension.     OncotypeDx was ordered for right and left breast cancers.  Oncotype dx recurrence score of the left breast cancer was 10. Oncotype on the right breast cancer was 18.       Her surgery was complicated by skin necrosis requiring additional surgery.     She received adjuvant left chest wall and low axillary radiation: 5040 cGy in 28 fractions      Started Zoladex 8/2020.  She had her reconstruction surgery Sept 20.  She underwent a bilateral free MIKAL flap reconstruction on 09/20/2021.    Interval history:   Leigh returns to clinic for follow-up. She is unaccompanied. She reports feeling well overall. She has been on Zoladex and is tolerating it without any major side effects. No hot flashes, fatigue. She stopped letrozole due to intolerance. She reports feeling like she was 'going to die', felt nauseous similar to how she felt with the sulfa allergy. She had to miss work due to her symptoms. She reports pain in b/l inguinal areas. No history of headache, vision changes, lightheadedness, sob, cough, chest pain, palpitations, n/v/d, bowel or bladder changes, abdominal pain, fatigue, appetite changes, fever or chills.       ROS: 10 point ROS neg other than the symptoms noted above in the HPI.    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 May discontinue if patient having bowel movement.) (Patient not taking: Reported on 10/31/2021) 10 suppository 0     Goserelin Acetate (ZOLADEX SC) Inject 3.6 mg Subcutaneous every 30 days        oxyCODONE (ROXICODONE) 5 MG tablet Take 5 mg by mouth every 6 hours as needed for severe pain       pantoprazole (PROTONIX) 20 MG EC tablet Take 20 mg by mouth daily as needed   3     polyethylene glycol (MIRALAX) 17 GM/Dose powder Take 17 g by mouth daily (Patient not taking: Reported on 10/20/2021) 510 g 0     senna-docusate (SENOKOT-S/PERICOLACE) 8.6-50 MG tablet Take 2 tablets by mouth 2 times daily 120 tablet 0     SERTRALINE HCL PO Take 50 mg by mouth every evening        traZODone (DESYREL) 50 MG tablet Take  mg by mouth At Bedtime            Allergies   Allergen Reactions     Sulfa Drugs      Got really sick, headache, facial swelling, felt like she was dying.   Delayed reaction happened ~3-4 days after she started taking it.        Exam:  Last menstrual period 03/01/2020, not currently breastfeeding.  Wt Readings from Last 4 Encounters:   01/27/22 75.3 kg (166 lb)   12/30/21 74.4 kg (164 lb 1.6 oz)   10/20/21 71 kg (156 lb 8 oz)   10/14/21 74.4 kg (164 lb 0.4 oz)     BP (!) 158/82 (BP Location: Right arm, Patient Position: Sitting, Cuff Size: Adult Regular)   Pulse (!) 48   Temp 98.2  F (36.8  C) (Oral)   Wt 77.5 kg (170 lb 12.8 oz)   LMP 03/01/2020 (Approximate)   SpO2 98%   BMI 29.32 kg/m        GENERAL:  Alert, no acute  distress  HEAD/NECK:  PERRL, EOMI, no scleral icterus  LYMPH NODES: No palpable cervical, axillary, abdominal or inguinal lymph nodes  LUNGS:  Clear to auscultation b/l, no wheezes or crackles  CARDIOVASCULAR:  Regular rate and rhythm, normal S1, S2 without rub, gallop or murmur  ABDOMEN: Soft, non-distended, no ttp, mild tenderness in b/l inguinal areas, no hernias  EXTREMITIES:  No lower extremity edema  NEURO: CN 3-12 grossly intact, equal motor strength in major muscle groups of all four extremities  SKIN: Hyperpigmented and erythematous dry skin over the lower abdomen  PSYCH: Normal affect    No labs or imaging to review    Assessment/plan:   1. Bilateral Breast Cancer (Left breast cancer [id number] ER + [place] + her2 negative, oncotype 10 s/p mastectomy and SNLB and Right breast cancer T2(2)N0 ER + [place] + her2 negative, oncotype 18 s/p mastectomy and SNLB)  - S/p bilateral mastectomy and left chest wall RT   - Current therapy Zoladex monthly. Continue.   - Letrozole was stopped due to intolerance. We discussed trying other AIs and patient was agreeable. Anastrazole (30 tablets) ordered.   - She has had her second reconstruction, and has had complications. She reports b/l inguinal region pain which could be due to edema post surgery. Recommended keeping the appointment with Dr. Driver on 4/6.    2. CHEK2 mutation: She has had a colonoscopy in 2012, which was normal. This was repeated in March 2021.  Due for repeat in 3 years given presence of polyps.      3. Lymphedema: Following with PT. Overall this has not been problem.      4. HHT: follows with Dr. Real. No significant bleeding at this time.     Patient was seen and plan discussed with attending physician, Dr. Swann.     Teodora Gore  Hematology, Oncology & Transplantation fellow  03/28/2022     Addendum:  Pt was seen and evaluated by me with Dr. Gore.  We reviewed her clinical course and intolerance of letrozole.  She is agreeable to a trial of anastrazole.   Side effects discussed.  She remains on zoladex.    Katarzyna Swann MD

## 2022-03-28 NOTE — PROGRESS NOTES
Medical oncology follow-up visit  03/28/2022     Reason for Visit: Follow up of bilateral breast cancer    Oncology HPI:     Ms. Espinal is a 56 year old female with bilateral breast cancers. Other past medical history is notable for HHT (follows with Dr. Real), pulmonary and liver AVMs, GERD and HTN. Bilateral screening mammograms on 10/23/19 showed architectural distortion and developing focal asymmetry in the right breast, and possible developing asymmetry in the left breast. Diagnostic mammograms showed a 2 x 1.4 cm mass at 1:00 right breast and a 9 mm mass at 2:00 left breast. An additional 1.4 cm satellite mass in the left breast (total diameter of both left breast masses, 2.1 cm) was noted on contrast mammogram on 11/21/19.  Biopsy of the right breast at 1:00 revealed invasive ductal carcinoma, grade 1, ER+ (98%), VA+ (99%), HER2 negative. Biopsy of the left breast 2:00 revealed invasive lobular carcinoma, grade 2, ER+ (95%), VA+ (75%), and HER2 negative.  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       She underwent bilateral nipple sparing mastectomies, tissue expander placement, and bilateral axillary sentinel lymph node biopsies on 1/29/20 under the care of Dr. Smith. Final pathology revealed grade 2 invasive mammary carcinoma in the right breast measuring 2.1 cm, a smaller focus of invasive mammary carcinoma (mixed ductal and cribriform) measuring 3.6 mm, admixed DCIS, and negative margins. The left breast showed grade 2 invasive lobular carcinoma, measuring 1.6 cm, DCIS and LCIS, and negative margins. The right axillary sentinel node was benign; the left axillary sentinel node showed micrometastatic carcinoma measuring 1.5 mm without extranodal extension.     OncotypeDx was ordered for right and left breast cancers.  Oncotype dx recurrence score of the left breast cancer was 10. Oncotype on the right breast cancer was 18.       Her surgery  was complicated by skin necrosis requiring additional surgery.     She received adjuvant left chest wall and low axillary radiation: 5040 cGy in 28 fractions      Started Zoladex 8/2020.  She had her reconstruction surgery Sept 20.  She underwent a bilateral free MIKAL flap reconstruction on 09/20/2021.    Interval history:   Leigh returns to clinic for follow-up. She is unaccompanied. She reports feeling well overall. She has been on Zoladex and is tolerating it without any major side effects. No hot flashes, fatigue. She stopped letrozole due to intolerance. She reports feeling like she was 'going to die', felt nauseous similar to how she felt with the sulfa allergy. She had to miss work due to her symptoms. She reports pain in b/l inguinal areas. No history of headache, vision changes, lightheadedness, sob, cough, chest pain, palpitations, n/v/d, bowel or bladder changes, abdominal pain, fatigue, appetite changes, fever or chills.       ROS: 10 point ROS neg other than the symptoms noted above in the HPI.    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 May discontinue if patient having bowel movement.) (Patient not taking: Reported on 10/31/2021) 10 suppository 0     Goserelin Acetate (ZOLADEX SC) Inject 3.6 mg Subcutaneous every 30 days        oxyCODONE (ROXICODONE) 5 MG tablet Take 5 mg by mouth every 6 hours as needed for severe pain       pantoprazole (PROTONIX) 20 MG EC tablet Take 20 mg by mouth daily as needed   3     polyethylene glycol (MIRALAX) 17 GM/Dose powder Take 17 g by mouth daily (Patient not taking: Reported on 10/20/2021) 510 g 0     senna-docusate (SENOKOT-S/PERICOLACE) 8.6-50 MG tablet Take 2 tablets by mouth 2 times daily 120 tablet 0     SERTRALINE HCL PO Take 50 mg by mouth every evening        traZODone (DESYREL) 50 MG tablet Take  mg by mouth At Bedtime            Allergies   Allergen Reactions     Sulfa Drugs      Got really sick, headache, facial swelling, felt like she was dying.   Delayed reaction happened ~3-4 days after she started taking it.        Exam:  Last menstrual period 03/01/2020, not currently breastfeeding.  Wt Readings from Last 4 Encounters:   01/27/22 75.3 kg (166 lb)   12/30/21 74.4 kg (164 lb 1.6 oz)   10/20/21 71 kg (156 lb 8 oz)   10/14/21 74.4 kg (164 lb 0.4 oz)     BP (!) 158/82 (BP Location: Right arm, Patient Position: Sitting, Cuff Size: Adult Regular)   Pulse (!) 48   Temp 98.2  F (36.8  C) (Oral)   Wt 77.5 kg (170 lb 12.8 oz)   LMP 03/01/2020 (Approximate)   SpO2 98%   BMI 29.32 kg/m        GENERAL:  Alert, no acute distress  HEAD/NECK:  PERRL, EOMI, no scleral icterus  LYMPH NODES: No palpable cervical, axillary, abdominal or inguinal lymph nodes  LUNGS:  Clear to auscultation b/l, no wheezes or crackles  CARDIOVASCULAR:  Regular rate and rhythm, normal S1, S2 without rub, gallop or  murmur  ABDOMEN: Soft, non-distended, no ttp, mild tenderness in b/l inguinal areas, no hernias  EXTREMITIES:  No lower extremity edema  NEURO: CN 3-12 grossly intact, equal motor strength in major muscle groups of all four extremities  SKIN: Hyperpigmented and erythematous dry skin over the lower abdomen  PSYCH: Normal affect    No labs or imaging to review    Assessment/plan:   1. Bilateral Breast Cancer (Left breast cancer [id number] ER + [place] + her2 negative, oncotype 10 s/p mastectomy and SNLB and Right breast cancer T2(2)N0 ER + [place] + her2 negative, oncotype 18 s/p mastectomy and SNLB)  - S/p bilateral mastectomy and left chest wall RT   - Current therapy Zoladex monthly. Continue.   - Letrozole was stopped due to intolerance. We discussed trying other AIs and patient was agreeable. Anastrazole (30 tablets) ordered.   - She has had her second reconstruction, and has had complications. She reports b/l inguinal region pain which could be due to edema post surgery. Recommended keeping the appointment with Dr. Driver on 4/6.    2. CHEK2 mutation: She has had a colonoscopy in 2012, which was normal. This was repeated in March 2021.  Due for repeat in 3 years given presence of polyps.      3. Lymphedema: Following with PT. Overall this has not been problem.      4. HHT: follows with Dr. Real. No significant bleeding at this time.     Patient was seen and plan discussed with attending physician, Dr. Swann.     Teodora Gore  Hematology, Oncology & Transplantation fellow  03/28/2022     Addendum:  Pt was seen and evaluated by me with Dr. Gore.  We reviewed her clinical course and intolerance of letrozole.  She is agreeable to a trial of anastrazole.  Side effects discussed.  She remains on zoladex.    Katarzyna Swann MD

## 2022-05-03 ENCOUNTER — INFUSION THERAPY VISIT (OUTPATIENT)
Dept: ONCOLOGY | Facility: CLINIC | Age: 57
End: 2022-05-03
Attending: INTERNAL MEDICINE
Payer: COMMERCIAL

## 2022-05-03 VITALS
DIASTOLIC BLOOD PRESSURE: 70 MMHG | OXYGEN SATURATION: 98 % | SYSTOLIC BLOOD PRESSURE: 147 MMHG | RESPIRATION RATE: 16 BRPM | TEMPERATURE: 97.8 F | HEART RATE: 52 BPM

## 2022-05-03 DIAGNOSIS — C50.412 MALIGNANT NEOPLASM OF UPPER-OUTER QUADRANT OF LEFT BREAST IN FEMALE, ESTROGEN RECEPTOR POSITIVE (H): Primary | ICD-10-CM

## 2022-05-03 DIAGNOSIS — Z17.0 MALIGNANT NEOPLASM OF UPPER-OUTER QUADRANT OF LEFT BREAST IN FEMALE, ESTROGEN RECEPTOR POSITIVE (H): Primary | ICD-10-CM

## 2022-05-03 PROCEDURE — 96402 CHEMO HORMON ANTINEOPL SQ/IM: CPT

## 2022-05-03 PROCEDURE — 91305 HC RX IP 250 OP 636: CPT | Performed by: INTERNAL MEDICINE

## 2022-05-03 PROCEDURE — 250N000011 HC RX IP 250 OP 636: Performed by: INTERNAL MEDICINE

## 2022-05-03 PROCEDURE — 0054A HC ADMIN COVID VAC PFIZER TRS-SUCR, BOOSTER: CPT | Performed by: INTERNAL MEDICINE

## 2022-05-03 RX ADMIN — GOSERELIN ACETATE 3.6 MG: 3.6 IMPLANT SUBCUTANEOUS at 07:57

## 2022-05-03 RX ADMIN — BNT162B2 30 MCG: 0.23 INJECTION, SUSPENSION INTRAMUSCULAR at 08:02

## 2022-05-03 NOTE — PROGRESS NOTES
Infusion Nursing Note:  Leigh Espinal presents today for Day 1 Cycle 23 Zoladex. 4th COVID dose (3rd dose given 9/1/2021)  Patient seen by provider today: No   present during visit today: Not Applicable.    Note: Patient presents to infusion feeling well. Patient denies acute discomfort and states no acute complaints or concerns needing to be addressed today. Specifically, patient denies s/s of infection such as fever, sore throat, cough, chest pain, shortness of breath, or changes in taste/smell.      Intravenous Access:  No Intravenous access/labs at this visit.    Treatment Conditions:  Not Applicable.      Post Infusion Assessment:  Patient tolerated 1 subcutaneous injection of Zoladex in LLQ of abdomen without incident. Area ice'd prior to  Patient tolerated 1 IM COVID dose via left deltoid without issues-patient observed for 15 minutes post injection without issues. COVID consent form obtained and signed. Pt states she had no adverse symptoms post previous COVID injections.       Discharge Plan:   Patient declined prescription refills.  Discharge instructions reviewed with: Patient.  Patient and/or family verbalized understanding of discharge instructions and all questions answered.  AVS to patient via [employer].  Patient will return 6/15 for next appointment.   Patient discharged in stable condition accompanied by: self.  Departure Mode: Ambulatory.  Face to Face time: 0 minutes.      Jose Chan RN                      
STEMI involving left anterior descending coronary artery

## 2022-05-03 NOTE — PATIENT INSTRUCTIONS
[facility] Triage and after hours / weekends / holidays:  [phone]    Please call the triage or after hours line if you experience a temperature greater than or equal to 100.4, shaking chills, have uncontrolled nausea, vomiting and/or diarrhea, dizziness, shortness of breath, chest pain, bleeding, unexplained bruising, or if you have any other new/concerning symptoms, questions or concerns.      If you are having any concerning symptoms or wish to speak to a provider before your next infusion visit, please call your care coordinator or triage to notify them so we can adequately serve you.     If you need a refill on a narcotic prescription or other medication, please call before your infusion appointment.             May 2022      Daniel Monday Tuesday Wednesday Thursday Friday Saturday   1     2     3    ONC INFUSION 0.5 HR (30 MIN)   7:30 AM   (30 min.)    ONC INFUSION NURSE   [facility] 4     5     6     7       8     9     10     11     12     13     14  Happy Birthday!       15     16     17     18     19     20     21       22     23     24     25     26     27     28       29     30     31                                       June 2022 Sunday Monday Tuesday Wednesday Thursday Friday Saturday                  1     2     3     4       5     6     7     8     9     10     11       12     13     14     15    RETURN   1:45 PM   (45 min.)   Makenna Abrams PA-C   [facility]    ONC INFUSION 0.5 HR (30 MIN)   3:00 PM   (30 min.)    ONC INFUSION NURSE   [facility] 16     17     18       19     20     21     22     23     24     25       26     27     28     29     30                                 Lab Results:  No results found for this or any previous visit (from the past 12 hour(s)).

## 2022-05-27 ENCOUNTER — E-VISIT (OUTPATIENT)
Dept: URGENT CARE | Facility: CLINIC | Age: 57
End: 2022-05-27
Payer: COMMERCIAL

## 2022-05-27 ENCOUNTER — PATIENT OUTREACH (OUTPATIENT)
Dept: ONCOLOGY | Facility: CLINIC | Age: 57
End: 2022-05-27

## 2022-05-27 DIAGNOSIS — Z20.822 SUSPECTED COVID-19 VIRUS INFECTION: Primary | ICD-10-CM

## 2022-05-27 PROCEDURE — 99421 OL DIG E/M SVC 5-10 MIN: CPT | Performed by: EMERGENCY MEDICINE

## 2022-05-27 NOTE — PROGRESS NOTES
[facility]: Cancer Care Long Assessment    Discussion with Patient:                                                      Spoke with Leigh who reports she tested (home test) positive for COVID yesterday, symptoms started yesterday (see assessment below). She is calling to ask if there is anything special that she needs to do.   She feels symptoms are slowly improving today.                                        Dates of Treament:                                                      Infusion given in last 28 days     Administered MAR Action Medication Dose Rate Visit    05/03/2022 07:57 Given goserelin (ZOLADEX) injection 3.6 mg 3.6 mg   Infusion Therapy Visit on 05/03/2022 in [facility]          Assessment:                                                      Assessment completed with:: Patient    Constitutional  Patient Reported Constitutional Symptoms?: Yes  Fatigue: Fatigue relieved by rest  Fever: 38.0 - 39.0 degrees C (100.4 - 102.2 degrees F)    Neurosensory  Patient Reported Neurosensory Symptoms?: No    Eye Disorders  Patient Reported Eye Disorders?: No    Ear Disorders  Ear Disorders  Patient Reported Ear Disorder?: No    Cardiovascular  Patient Reported Cardiovascular Symptoms?: No    Respiratory  Patien Reported Respiratory Symptoms?: Yes  Cough: Mild symptoms OR nonprescription intervention indicated  Dyspnea: Absent or within normal limits  Hypoxia: Absent or within normal limits    Gastrointestinal  Patient Reported Gastrointestinal Symptoms?: No    Genitourinary  Patient Reported Genitourinary Symptoms?: No    Lymph System Disorders  Patient Reported Lymph System Disorders?: No    Musculoskeletal and Connective Tissue Disorders  Patient Reported Musculoskeletal or Connective Tissue Disorders?: No    Integumentary  Patient Reported Integumentary Symptoms?: No    Pain  Patient Reported Pain?: Yes, is currently well-managed (headache)    Patient  Coping  Accepting    Clinic Utilization       Other Patient Concerns  Other Patient Reported Concerns: Yes, see notes (headache, congestion)        Intervention/Education provided during outreach:                                                       How do I self-isolate?  You isolate when you have symptoms of COVID or a test shows you have COVID, even if you don t have symptoms.     If you DO have symptoms:  ? Stay home and away from others    For at least 5 days after your symptoms started, AND     You are fever free for 24 hours (with no medicine that reduces fever), AND    Your other symptoms are better.  ? Wear a mask for 10 full days any time you are around others.    If you DON T have symptoms:  ? Stay at home and away from others for at least 5 days after your positive test.  ? Wear a mask for 10 full days any time you are around others.     How can I take care of myself?  Over the counter medications may help with your symptoms such as runny or stuffy nose, cough, chills, or fever.  Talk to your care team about your options.      Some people are at high risk of severe illness (for example, you have a weak immune system, you re 65 years or older, or you have certain medical problems). If your risk is high and your symptoms started in the last 5 to 7 days, we strongly recommend for you to get COVID treatment as soon as possible. Paxlovid, Molnupiravir and the monoclonal antibody treatments are proven safe and effective, make you feel better faster, and prevent hospitalization and death.        To schedule an appointment to discuss COVID treatment, request an appointment on [facility] (select  COVID-19 Treatment ) or call [facility] ([phone]), press 7.      Get lots of rest. Drink extra fluids (unless a doctor has told you not to)    Take Tylenol (acetaminophen) or ibuprofen for fever or pain. If you have liver or kidney problems, ask your family doctor if it's okay to take Tylenol or ibuprofen    Take  over the counter medications for your symptoms, as directed by your doctor. You may also talk to your pharmacist.      If you have other health problems (like cancer, heart failure, an organ transplant or severe kidney disease): Call your specialty clinic if you don't feel better in the next 2 days.    Know when to call 911. Emergency warning signs include:  ? Trouble breathing or shortness of breath  ? Pain or pressure in the chest that doesn't go away  ? Feeling confused like you haven't felt before, or not being able to wake up  ? Bluish-colored lips or face     Where can I get more information?    [facility] - About COVID-19: [email or web address]     CDC - What to Do If You're Sick: https://www.cdc.gov/coronavirus/2019-ncov/if-you-are-sick/index.html     CDC - Quarantine & Isolation: https://www.cdc.gov/coronavirus/2019-ncov/your-health/quarantine-isolation.html     [facility] clinical trials (COVID-19 research studies): clinicalaffairs.[facility]-clinical-trials    Below are the COVID-19 hotlines at the [facility] ([facility]). Interpreters are available.  ? For health questions: Call 608-464-6965 or 1-768.809.1752 (7 a.m. to 7 p.m.)  ? For questions about schools and childcare: Call 310-589-6209 or 1-338.637.9773 (7 a.m. to 7 p.m.)             Ping Brennan MSN, RN ,OCN  RN Care Coordinator   [facility]  [phone]

## 2022-05-27 NOTE — PATIENT INSTRUCTIONS
Dear Leigh,      Based on your responses, you may have coronavirus (COVID-19).     Will I be tested for COVID-19?  We would like to test you for COVID. I have placed orders for this test.     To schedule: go to your [facility] home page and scroll down to the section that says  You have an appointment that needs to be scheduled  and click the large green button that says  Schedule Now  and follow the steps to find the next available openings.    If you are unable to complete these [facility] scheduling steps, please call [phone] to schedule your testing.     These guidelines are for isolating before returning to work, school or .     For employers, schools and day cares: This is an official notice for this person s medical guidelines for returning in-person.     For health care sites: The CDC gives different isolation and quarantine guidelines for healthcare sites, please check with these sites before arriving.     How do I self-isolate?  You isolate when you have symptoms of COVID or a test shows you have COVID, even if you don t have symptoms.     If you DO have symptoms:  o Stay home and away from others  - For at least 5 days after your symptoms started, AND   - You are fever free for 24 hours (with no medicine that reduces fever), AND  - Your other symptoms are better.  o Wear a mask for 10 full days any time you are around others.    If you DON T have symptoms:  o Stay at home and away from others for at least 5 days after your positive test.  o Wear a mask for 10 full days any time you are around others.    How can I take care of myself?  Over the counter medications may help with your symptoms such as runny or stuffy nose, cough, chills, or fever.  Talk to your care team about your options.     Some people are at high risk of severe illness (for example, you have a weak immune system, you re 65 years or older, or you have certain medical problems). If your risk is high and your symptoms started in  the last 5 to 7 days, we strongly recommend for you to get COVID treatment as soon as possible. Paxlovid, Molnupiravir and the monoclonal antibody treatments are proven safe and effective, make you feel better faster, and prevent hospitalization and death.       To schedule an appointment to discuss COVID treatment, request an appointment on MyChart (select  COVID-19 Treatment ) or call [phone] ([phone]), press 7.      Get lots of rest. Drink extra fluids (unless a doctor has told you not to)    Take Tylenol (acetaminophen) or ibuprofen for fever or pain. If you have liver or kidney problems, ask your family doctor if it's okay to take Tylenol or ibuprofen    Take over the counter medications for your symptoms, as directed by your doctor. You may also talk to your pharmacist.      If you have other health problems (like cancer, heart failure, an organ transplant or severe kidney disease): Call your specialty clinic if you don't feel better in the next 2 days.    Know when to call 911. Emergency warning signs include:  o Trouble breathing or shortness of breath  o Pain or pressure in the chest that doesn't go away  o Feeling confused like you haven't felt before, or not being able to wake up  o Bluish-colored lips or face    Where can I get more information?    [facility] - About COVID-19: [email or web address]     CDC - What to Do If You're Sick: https://www.cdc.gov/coronavirus/2019-ncov/if-you-are-sick/index.html     CDC - Quarantine & Isolation: https://www.cdc.gov/coronavirus/2019-ncov/your-health/quarantine-isolation.html     [facility] clinical trials (COVID-19 research studies): clinicalaffairs.[facility]/umn-clinical-trials    Below are the COVID-19 hotlines at the [facility] ([facility]). Interpreters are available.  o For health questions: Call 892-913-5113 or 1-801.938.9356 (7 a.m. to 7 p.m.)  o For questions about schools and childcare: Call  [phone] or 1-745.650.1360 (7 a.m. to 7 p.m.)  May 27, 2022  RE:  Leigh Espinal                                                                                                                  [address] [place] [address]      To whom it may concern:    I evaluated Leigh Espinal on May 27, 2022. Leigh Espinal should be excused from work/school.     They should let their workplace manager and staffing office know when their quarantine ends.    We can not give an exact date as it depends on the information below. They can calculate this on their own or work with their manager/staffing office to calculate this. (For example if they were exposed on 10/04, they would have to quarantine for 14 full days. That would be through 10/18. They could return on 10/19.)    Quarantine Guidelines:    If patient receives a positive COVID-19 test result, they should follow the guidance of those who are giving the results. Usually the return to work is 10 (or in some cases 20 days from symptom onset.) If they work at [employer], they must be cleared by Employee Occupational Health and Safety to return to work.      If patient receives a negative COVID-19 test result and did not have a high risk exposure to someone with a known positive COVID-19 test, they can return to work once they're free of fever for 24 hours without fever-reducing medication and their symptoms are improving or resolved.    If patient receives a negative COVID-19 test and if they had a high risk exposure to someone who has tested positive for COVID-19 then they can return to work 14 days after their last contact with the positive individual    Note: If there is ongoing exposure to the covid positive person, this quarantine period may be longer than 14 days. (For example, if they are continually exposed to their child, who tested positive and cannot isolate from them, then the quarantine of 7-14 days can't start until  their child is no longer contagious. This is typically 10 days from onset to the child's symptoms. So the total duration may be 17-24 days in this case.)     Sincerely,  Zev Gage MD          o

## 2022-06-02 ENCOUNTER — LAB (OUTPATIENT)
Dept: FAMILY MEDICINE | Facility: CLINIC | Age: 57
End: 2022-06-02
Attending: FAMILY MEDICINE
Payer: COMMERCIAL

## 2022-06-02 DIAGNOSIS — Z20.822 SUSPECTED 2019 NOVEL CORONAVIRUS INFECTION: ICD-10-CM

## 2022-06-02 PROCEDURE — U0003 INFECTIOUS AGENT DETECTION BY NUCLEIC ACID (DNA OR RNA); SEVERE ACUTE RESPIRATORY SYNDROME CORONAVIRUS 2 (SARS-COV-2) (CORONAVIRUS DISEASE [COVID-19]), AMPLIFIED PROBE TECHNIQUE, MAKING USE OF HIGH THROUGHPUT TECHNOLOGIES AS DESCRIBED BY CMS-2020-01-R: HCPCS

## 2022-06-02 PROCEDURE — U0005 INFEC AGEN DETEC AMPLI PROBE: HCPCS

## 2022-06-02 PROCEDURE — 99207 PR NO CHARGE LOS: CPT

## 2022-06-03 ENCOUNTER — TELEPHONE (OUTPATIENT)
Dept: NURSING | Facility: CLINIC | Age: 57
End: 2022-06-03
Payer: COMMERCIAL

## 2022-06-03 LAB — SARS-COV-2 RNA RESP QL NAA+PROBE: POSITIVE

## 2022-06-03 NOTE — TELEPHONE ENCOUNTER
Patient classified as COVID treatment eligible by Epic high risk algorithm:  Yes    Coronavirus (COVID-19) Notification    Reason for call  Notify of POSITIVE COVID-19 lab result, assess symptoms,  review [facility] recommendations    Lab Result   Lab test for 2019-nCoV rRt-PCR or SARS-COV-2 PCR  Oropharyngeal AND/OR nasopharyngeal swabs were POSITIVE for 2019-nCoV RNA [OR] SARS-COV-2 RNA (COVID-19) RNA     We have been unable to reach patient by phone at this time to notify of their Positive COVID-19 result.    UNABLE TO LEAVE voicemail message requesting a call back to [phone] [facility] for results.        A Positive COVID-19 letter will be sent via [employer] or the mail. (Exception, no letters sent to Presurgerical/Preprocedure Patients)    Nichelle Howell

## 2022-06-07 ENCOUNTER — E-VISIT (OUTPATIENT)
Dept: URGENT CARE | Facility: CLINIC | Age: 57
End: 2022-06-07
Payer: COMMERCIAL

## 2022-06-07 DIAGNOSIS — R05.9 COUGH: Primary | ICD-10-CM

## 2022-06-07 PROCEDURE — 99207 PR NO CHARGE LOS: CPT | Performed by: EMERGENCY MEDICINE

## 2022-06-07 NOTE — PATIENT INSTRUCTIONS
Dear Leigh Espinal,    Due to this many symptoms for this long you need to be seen and examined. Please come into urgent care today.        We are sorry you are not feeling well. Based on the responses you provided, it is recommended that you be seen in-person in urgent care so we can better evaluate your symptoms. Please click here to find the nearest urgent care location to you.   You will not be charged for this Visit. Thank you for trusting us with your care.    Zev Gage MD

## 2022-06-15 ENCOUNTER — INFUSION THERAPY VISIT (OUTPATIENT)
Dept: ONCOLOGY | Facility: CLINIC | Age: 57
End: 2022-06-15
Attending: INTERNAL MEDICINE
Payer: COMMERCIAL

## 2022-06-15 DIAGNOSIS — C50.412 MALIGNANT NEOPLASM OF UPPER-OUTER QUADRANT OF LEFT BREAST IN FEMALE, ESTROGEN RECEPTOR POSITIVE (H): Primary | ICD-10-CM

## 2022-06-15 DIAGNOSIS — Z17.0 MALIGNANT NEOPLASM OF UPPER-OUTER QUADRANT OF LEFT BREAST IN FEMALE, ESTROGEN RECEPTOR POSITIVE (H): Primary | ICD-10-CM

## 2022-06-15 PROCEDURE — 250N000011 HC RX IP 250 OP 636: Performed by: INTERNAL MEDICINE

## 2022-06-15 PROCEDURE — 96402 CHEMO HORMON ANTINEOPL SQ/IM: CPT

## 2022-06-15 RX ADMIN — GOSERELIN ACETATE 3.6 MG: 3.6 IMPLANT SUBCUTANEOUS at 15:21

## 2022-07-13 ENCOUNTER — INFUSION THERAPY VISIT (OUTPATIENT)
Dept: ONCOLOGY | Facility: CLINIC | Age: 57
End: 2022-07-13
Attending: INTERNAL MEDICINE
Payer: COMMERCIAL

## 2022-07-13 VITALS
HEART RATE: 70 BPM | OXYGEN SATURATION: 97 % | DIASTOLIC BLOOD PRESSURE: 79 MMHG | RESPIRATION RATE: 18 BRPM | SYSTOLIC BLOOD PRESSURE: 170 MMHG | TEMPERATURE: 98.2 F

## 2022-07-13 DIAGNOSIS — Z17.0 MALIGNANT NEOPLASM OF UPPER-OUTER QUADRANT OF LEFT BREAST IN FEMALE, ESTROGEN RECEPTOR POSITIVE (H): Primary | ICD-10-CM

## 2022-07-13 DIAGNOSIS — C50.412 MALIGNANT NEOPLASM OF UPPER-OUTER QUADRANT OF LEFT BREAST IN FEMALE, ESTROGEN RECEPTOR POSITIVE (H): Primary | ICD-10-CM

## 2022-07-13 PROCEDURE — 250N000011 HC RX IP 250 OP 636: Performed by: INTERNAL MEDICINE

## 2022-07-13 PROCEDURE — 96402 CHEMO HORMON ANTINEOPL SQ/IM: CPT

## 2022-07-13 RX ADMIN — GOSERELIN ACETATE 3.6 MG: 3.6 IMPLANT SUBCUTANEOUS at 16:07

## 2022-07-13 ASSESSMENT — PAIN SCALES - GENERAL: PAINLEVEL: NO PAIN (0)

## 2022-07-13 NOTE — PATIENT INSTRUCTIONS
Remember to take your Amlodipine and Atenolol when you get home. Check your BP at home - keep record of these measurements and let us know if they continue to be over 140/100. Take care!      [facility] Triage and after hours / weekends / holidays:  [phone]    Please call the triage or after hours line if you experience a temperature greater than or equal to 100.4, shaking chills, have uncontrolled nausea, vomiting and/or diarrhea, dizziness, shortness of breath, chest pain, bleeding, unexplained bruising, or if you have any other new/concerning symptoms, questions or concerns.      If you are having any concerning symptoms or wish to speak to a provider before your next infusion visit, please call your care coordinator or triage to notify them so we can adequately serve you.     If you need a refill on a narcotic prescription or other medication, please call before your infusion appointment.                July 2022 Sunday Monday Tuesday Wednesday Thursday Friday Saturday                            1     2       3     4     5     6     7     8     9       10     11     12     13    ONC INFUSION 0.5 HR (30 MIN)   4:00 PM   (30 min.)    ONC INFUSION NURSE   [facility] 14     15     16       17     18     19     20     21     22     23       24     25     26     27     28     29     30       31                                                 August 2022 Sunday Monday Tuesday Wednesday Thursday Friday Saturday        1     2     3     4     5     6       7     8     9     10    ONC INFUSION 0.5 HR (30 MIN)   3:00 PM   (30 min.)    ONC INFUSION NURSE   [facility] 11     12     13       14     15     16     17     18     19     20       21     22     23     24     25     26     27       28     29     30     31                                      Lab Results:  No results found for this or any previous visit (from the past 12 hour(s)).

## 2022-07-13 NOTE — PROGRESS NOTES
Infusion Nursing Note:  Leigh Espinal presents today for Zoladex.    Patient seen by provider today: No   present during visit today: Not Applicable.    Note: Pt presents to infusion feeling well. She endorses fatigue and continued hot flashes, but states that her energy level is slowly improving with time. She denies fevers/chills, SOB, chest pain, bruising/bleeding, nausea/vomiting or further concerns today.    Pt hypertensive 170/70s, taken x3. She states that she tends to run high in clinic, and has had a stressful day (her  is currently hospitalized). Upon med rec, she states she forgot to take her Amlodipine or Atenolol this morning.    She plans to take her BP medications when she arrives home. She has a home BP cuff and will monitor these readings at home - pt aware to contact us or PCP if BP remains elevated.    Intravenous Access:  No Intravenous access/labs at this visit.    Post Infusion Assessment:  Patient tolerated injection to RIGHT lower abdomen without incident. Iced prior.    Discharge Plan:   Patient declined prescription refills.  Discharge instructions reviewed with: Patient.  Patient and/or family verbalized understanding of discharge instructions and all questions answered.  AVS to patient via [employer]. Patient will return 8/10/22 for next appointment.   IB sent to scheduling to add lab appointment prior to Dr. Swann visit on 9/12.  Patient discharged in stable condition accompanied by: self.  Departure Mode: Ambulatory.      Melly Velázquez RN

## 2022-08-10 ENCOUNTER — INFUSION THERAPY VISIT (OUTPATIENT)
Dept: ONCOLOGY | Facility: CLINIC | Age: 57
End: 2022-08-10
Attending: INTERNAL MEDICINE
Payer: COMMERCIAL

## 2022-08-10 VITALS
HEART RATE: 64 BPM | RESPIRATION RATE: 18 BRPM | TEMPERATURE: 98.1 F | DIASTOLIC BLOOD PRESSURE: 60 MMHG | OXYGEN SATURATION: 97 % | SYSTOLIC BLOOD PRESSURE: 154 MMHG

## 2022-08-10 DIAGNOSIS — C50.412 MALIGNANT NEOPLASM OF UPPER-OUTER QUADRANT OF LEFT BREAST IN FEMALE, ESTROGEN RECEPTOR POSITIVE (H): Primary | ICD-10-CM

## 2022-08-10 DIAGNOSIS — Z17.0 MALIGNANT NEOPLASM OF UPPER-OUTER QUADRANT OF LEFT BREAST IN FEMALE, ESTROGEN RECEPTOR POSITIVE (H): Primary | ICD-10-CM

## 2022-08-10 PROCEDURE — 96402 CHEMO HORMON ANTINEOPL SQ/IM: CPT

## 2022-08-10 PROCEDURE — 250N000011 HC RX IP 250 OP 636: Performed by: INTERNAL MEDICINE

## 2022-08-10 RX ADMIN — GOSERELIN ACETATE 3.6 MG: 3.6 IMPLANT SUBCUTANEOUS at 15:16

## 2022-08-10 ASSESSMENT — PAIN SCALES - GENERAL: PAINLEVEL: NO PAIN (0)

## 2022-08-10 NOTE — PATIENT INSTRUCTIONS
Contact Numbers  [facility]: [phone] (for symptom and scheduling needs)    Please call the [facility] Triage line if you experience a temperature greater than or equal to 100.4, shaking chills, have uncontrolled nausea, vomiting and/or diarrhea, dizziness, shortness of breath, chest pain, bleeding, unexplained bruising, or if you have any other new/concerning symptoms, questions or concerns.     If you are having any concerning symptoms or wish to speak to a provider before your next infusion visit, please call your care coordinator or triage to notify them so we can adequately serve you.     If you need a refill on a narcotic prescription or other medication, please call triage before your infusion appointment.

## 2022-08-10 NOTE — PROGRESS NOTES
Infusion Nursing Note:  Leigh Espinal presents today for Zoladex.    Patient seen by provider today: No   present during visit today: Not Applicable.    Note:   Patient arrives to infusion feeling well today.  She denies signs and symptoms of infection including:fever, cough, shortness of breath, sore throat, diarrhea, vomiting, rash, or pain with urination.     During medication review today patient stated that she only took anastrozole for a few days after it was prescribed and stopped taking it since.  [facility] sent to Dr. Swann, ITZ Stauffer and Ping Brennan RNCC that patient isn't taking anastrozole.    Post Infusion Assessment:  Patient tolerated injection without incident into left abdominal tissue.  Patient iced prior to injection.     Discharge Plan:   Patient declined prescription refills.  Discharge instructions reviewed with: Patient.  Patient and/or family verbalized understanding of discharge instructions and all questions answered.  AVS to patient via [employer]. Requested that scheduling move Zoladex injection scheduled for 9/12 to 9/7.  Patient aware to watch for the change.  Patient discharged in stable condition accompanied by: self.  Departure Mode: Ambulatory.      Sera Morales RN

## 2022-09-07 ENCOUNTER — INFUSION THERAPY VISIT (OUTPATIENT)
Dept: ONCOLOGY | Facility: CLINIC | Age: 57
End: 2022-09-07
Attending: INTERNAL MEDICINE
Payer: COMMERCIAL

## 2022-09-07 VITALS
RESPIRATION RATE: 16 BRPM | DIASTOLIC BLOOD PRESSURE: 88 MMHG | TEMPERATURE: 98.3 F | OXYGEN SATURATION: 99 % | HEART RATE: 50 BPM | SYSTOLIC BLOOD PRESSURE: 170 MMHG

## 2022-09-07 DIAGNOSIS — Z17.0 MALIGNANT NEOPLASM OF UPPER-OUTER QUADRANT OF LEFT BREAST IN FEMALE, ESTROGEN RECEPTOR POSITIVE (H): Primary | ICD-10-CM

## 2022-09-07 DIAGNOSIS — C50.412 MALIGNANT NEOPLASM OF UPPER-OUTER QUADRANT OF LEFT BREAST IN FEMALE, ESTROGEN RECEPTOR POSITIVE (H): Primary | ICD-10-CM

## 2022-09-07 PROCEDURE — 96402 CHEMO HORMON ANTINEOPL SQ/IM: CPT

## 2022-09-07 PROCEDURE — 250N000011 HC RX IP 250 OP 636: Performed by: INTERNAL MEDICINE

## 2022-09-07 RX ADMIN — GOSERELIN ACETATE 3.6 MG: 3.6 IMPLANT SUBCUTANEOUS at 11:41

## 2022-09-07 NOTE — PROGRESS NOTES
Infusion Nursing Note:  Leigh Espinal presents today for Zoladex injection.    Patient seen by provider today: No   present during visit today: Not Applicable.    Note: Pt assessed upon arrival to infusion suite. Denies fever, chills, cough, SOB or other signs/symptoms of infection. Pt continues to have hot flashes, but denies any other issues or concerns. Pt's BP elevated today. Pt states she took her home BP medication this morning and denies any headache, dizziness, blurred vision. Encouraged patient to monitor her BP at home and notify her primary MD if it continues to run elevated or she becomes symptomatic. Pt verbalized understanding and was in a hurry to leave infusion today.     Post Infusion Assessment:  Patient tolerated injection to right lower abdomen without incident.     Discharge Plan:   Patient declined prescription refills.  AVS to patient via [employer].  Patient will return 9/12/22 for next MD appointment. Message sent to scheduling for next Zoladex injection.  Patient discharged in stable condition accompanied by: self.  Departure Mode: Ambulatory.      Eleonora Lew RN

## 2022-09-12 ENCOUNTER — TELEPHONE (OUTPATIENT)
Dept: ONCOLOGY | Facility: CLINIC | Age: 57
End: 2022-09-12

## 2022-09-12 DIAGNOSIS — Z17.0 MALIGNANT NEOPLASM OF UPPER-OUTER QUADRANT OF LEFT BREAST IN FEMALE, ESTROGEN RECEPTOR POSITIVE (H): Primary | ICD-10-CM

## 2022-09-12 DIAGNOSIS — C50.412 MALIGNANT NEOPLASM OF UPPER-OUTER QUADRANT OF LEFT BREAST IN FEMALE, ESTROGEN RECEPTOR POSITIVE (H): Primary | ICD-10-CM

## 2022-09-12 NOTE — TELEPHONE ENCOUNTER
I called patient to discuss her appt today.  She forgot about the appt and thought it was later this week.  She continues on zoladex.  She is not taking her AI as she did not tolerate it.    She has no pain.  She reports good energy.  She was actively painting her garage, and was finding no difficulty doing this.    We discussed that I would like to check her FSH and estradiol levels; given her age of 57, she may not longer need zoladex.  Will plan for bloodwork with next appt; I anticipate a trial off of zoladex this winter with rechecking bloodwork.    Will work to reschedule appt with me.  Given how well she is doing, this can be done in a few months.    Katarzyna Swann MD

## 2022-10-05 ENCOUNTER — INFUSION THERAPY VISIT (OUTPATIENT)
Dept: ONCOLOGY | Facility: CLINIC | Age: 57
End: 2022-10-05
Attending: INTERNAL MEDICINE
Payer: COMMERCIAL

## 2022-10-05 ENCOUNTER — APPOINTMENT (OUTPATIENT)
Dept: LAB | Facility: CLINIC | Age: 57
End: 2022-10-05
Attending: INTERNAL MEDICINE
Payer: COMMERCIAL

## 2022-10-05 VITALS
OXYGEN SATURATION: 98 % | SYSTOLIC BLOOD PRESSURE: 176 MMHG | RESPIRATION RATE: 16 BRPM | TEMPERATURE: 97.8 F | HEART RATE: 48 BPM | DIASTOLIC BLOOD PRESSURE: 80 MMHG

## 2022-10-05 DIAGNOSIS — Z17.0 MALIGNANT NEOPLASM OF UPPER-OUTER QUADRANT OF LEFT BREAST IN FEMALE, ESTROGEN RECEPTOR POSITIVE (H): Primary | ICD-10-CM

## 2022-10-05 DIAGNOSIS — C50.412 MALIGNANT NEOPLASM OF UPPER-OUTER QUADRANT OF LEFT BREAST IN FEMALE, ESTROGEN RECEPTOR POSITIVE (H): Primary | ICD-10-CM

## 2022-10-05 LAB — FSH SERPL IRP2-ACNC: 6.3 MIU/ML

## 2022-10-05 PROCEDURE — 96402 CHEMO HORMON ANTINEOPL SQ/IM: CPT

## 2022-10-05 PROCEDURE — 36415 COLL VENOUS BLD VENIPUNCTURE: CPT

## 2022-10-05 PROCEDURE — 83001 ASSAY OF GONADOTROPIN (FSH): CPT

## 2022-10-05 PROCEDURE — 82670 ASSAY OF TOTAL ESTRADIOL: CPT

## 2022-10-05 PROCEDURE — 250N000011 HC RX IP 250 OP 636: Performed by: INTERNAL MEDICINE

## 2022-10-05 RX ADMIN — GOSERELIN ACETATE 3.6 MG: 3.6 IMPLANT SUBCUTANEOUS at 07:57

## 2022-10-05 ASSESSMENT — PAIN SCALES - GENERAL: PAINLEVEL: NO PAIN (0)

## 2022-10-05 NOTE — NURSING NOTE
Chief Complaint   Patient presents with     Blood Draw     Labs drawn via  by RN. VS taken.     Labs drawn with  by rn.  Pt tolerated well.  VS taken.  Pt checked in for next appt.    Tom Harrington RN

## 2022-10-05 NOTE — PROGRESS NOTES
Infusion Nursing Note:  Leigh Espinal presents today for Cycle 28 Day 1 Zoladex.    Patient seen by provider today: No   present during visit today: Not Applicable.    Note: Pt denies any acute issues or concerns today. She has an occasional hot flash but otherwise is feeling well in general. She spoke with Dr. Swann a few weeks ago and had labs done today to assess her hormones and determine if she will continue on zoladex. For now, pt verbalizes understanding that she is scheduled next month and will plan on that unless she hears otherwise from Dr. Swann.     Pt's BP was elevated in lab this morning; states she just took her amlodipine as she came in; states she checks her BP at home it its okay; she will re-check it later today and was planning on calling her PCP anyway as she has noticed she runs high at this clinic (eventhough she does not feel nervous).      Intravenous Access:  No Intravenous access/labs at this visit.    Treatment Conditions:  Not Applicable.    Post Infusion Assessment:  Patient tolerated injection without incident to LEFT abdomen with ice prior.     Discharge Plan:   Discharge instructions reviewed with: Patient.  AVS to patient via [employer].  Patient will return 11/2 for next appointment.       Devi Cortez RN

## 2022-10-11 LAB — ESTRADIOL SERPL HS-MCNC: 8 PG/ML

## 2022-10-16 ENCOUNTER — HEALTH MAINTENANCE LETTER (OUTPATIENT)
Age: 57
End: 2022-10-16

## 2022-11-01 DIAGNOSIS — Z17.0 MALIGNANT NEOPLASM OF UPPER-OUTER QUADRANT OF LEFT BREAST IN FEMALE, ESTROGEN RECEPTOR POSITIVE (H): Primary | ICD-10-CM

## 2022-11-01 DIAGNOSIS — C50.412 MALIGNANT NEOPLASM OF UPPER-OUTER QUADRANT OF LEFT BREAST IN FEMALE, ESTROGEN RECEPTOR POSITIVE (H): Primary | ICD-10-CM

## 2022-11-02 ENCOUNTER — INFUSION THERAPY VISIT (OUTPATIENT)
Dept: ONCOLOGY | Facility: CLINIC | Age: 57
End: 2022-11-02
Attending: INTERNAL MEDICINE
Payer: COMMERCIAL

## 2022-11-02 VITALS
DIASTOLIC BLOOD PRESSURE: 84 MMHG | SYSTOLIC BLOOD PRESSURE: 161 MMHG | HEART RATE: 50 BPM | RESPIRATION RATE: 14 BRPM | TEMPERATURE: 98.3 F | OXYGEN SATURATION: 97 %

## 2022-11-02 DIAGNOSIS — Z17.0 MALIGNANT NEOPLASM OF UPPER-OUTER QUADRANT OF LEFT BREAST IN FEMALE, ESTROGEN RECEPTOR POSITIVE (H): Primary | ICD-10-CM

## 2022-11-02 DIAGNOSIS — C50.412 MALIGNANT NEOPLASM OF UPPER-OUTER QUADRANT OF LEFT BREAST IN FEMALE, ESTROGEN RECEPTOR POSITIVE (H): Primary | ICD-10-CM

## 2022-11-02 PROCEDURE — G0008 ADMIN INFLUENZA VIRUS VAC: HCPCS | Performed by: INTERNAL MEDICINE

## 2022-11-02 PROCEDURE — 96402 CHEMO HORMON ANTINEOPL SQ/IM: CPT

## 2022-11-02 PROCEDURE — 91312 HC RX IP 250 OP 636: CPT | Performed by: INTERNAL MEDICINE

## 2022-11-02 PROCEDURE — 90682 RIV4 VACC RECOMBINANT DNA IM: CPT | Performed by: INTERNAL MEDICINE

## 2022-11-02 PROCEDURE — 0124A HC ADMIN COVID VAC PFIZER 12+ BIVAL ADDITIONAL: CPT | Performed by: INTERNAL MEDICINE

## 2022-11-02 PROCEDURE — 250N000011 HC RX IP 250 OP 636: Performed by: INTERNAL MEDICINE

## 2022-11-02 RX ORDER — DIPHENHYDRAMINE HYDROCHLORIDE 50 MG/ML
50 INJECTION INTRAMUSCULAR; INTRAVENOUS
Status: DISCONTINUED | OUTPATIENT
Start: 2022-11-02 | End: 2022-11-02 | Stop reason: HOSPADM

## 2022-11-02 RX ORDER — DIPHENHYDRAMINE HCL 25 MG
50 CAPSULE ORAL
Status: DISCONTINUED | OUTPATIENT
Start: 2022-11-02 | End: 2022-11-02 | Stop reason: HOSPADM

## 2022-11-02 RX ORDER — EPINEPHRINE 1 MG/ML
0.3 INJECTION, SOLUTION INTRAMUSCULAR; SUBCUTANEOUS EVERY 5 MIN PRN
Status: DISCONTINUED | OUTPATIENT
Start: 2022-11-02 | End: 2022-11-02 | Stop reason: HOSPADM

## 2022-11-02 RX ORDER — ONDANSETRON 8 MG/1
TABLET, FILM COATED ORAL EVERY 8 HOURS PRN
Status: CANCELLED | OUTPATIENT
Start: 2022-11-02

## 2022-11-02 RX ADMIN — INFLUENZA A VIRUS A/WISCONSIN/588/2019 (H1N1) RECOMBINANT HEMAGGLUTININ ANTIGEN, INFLUENZA A VIRUS A/DARWIN/6/2021 (H3N2) RECOMBINANT HEMAGGLUTININ ANTIGEN, INFLUENZA B VIRUS B/AUSTRIA/1359417/2021 RECOMBINANT HEMAGGLUTININ ANTIGEN, AND INFLUENZA B VIRUS B/PHUKET/3073/2013 RECOMBINANT HEMAGGLUTININ ANTIGEN 0.5 ML: 45; 45; 45; 45 INJECTION INTRAMUSCULAR at 08:13

## 2022-11-02 RX ADMIN — GOSERELIN ACETATE 3.6 MG: 3.6 IMPLANT SUBCUTANEOUS at 08:01

## 2022-11-02 RX ADMIN — BNT162B2 ORIGINAL AND OMICRON BA.4/BA.5 30 MCG: .1125; .1125 INJECTION, SUSPENSION INTRAMUSCULAR at 08:08

## 2022-11-02 ASSESSMENT — PAIN SCALES - GENERAL: PAINLEVEL: NO PAIN (0)

## 2022-11-02 NOTE — PATIENT INSTRUCTIONS
[facility] Triage and after hours / weekends / holidays:  [phone]    Please call the triage or after hours line if you experience a temperature greater than or equal to 100.4, shaking chills, have uncontrolled nausea, vomiting and/or diarrhea, dizziness, shortness of breath, chest pain, bleeding, unexplained bruising, or if you have any other new/concerning symptoms, questions or concerns.      If you are having any concerning symptoms or wish to speak to a provider before your next infusion visit, please call your care coordinator or triage to notify them so we can adequately serve you.     If you need a refill on a narcotic prescription or other medication, please call before your infusion appointment.           November 2022 Sunday Monday Tuesday Wednesday Thursday Friday Saturday             1     2    ONC INFUSION 0.5 HR (30 MIN)   7:30 AM   (30 min.)    ONC INFUSION NURSE   [facility] 3     4     5       6     7     8     9     10     11     12       13     14     15     16     17     18     19       20     21     22     23     24     25     26       27     28     29     30    ONC INFUSION 0.5 HR (30 MIN)   7:30 AM   (30 min.)    ONC INFUSION NURSE   [facility]                              December 2022 Sunday Monday Tuesday Wednesday Thursday Friday Saturday                       1     2     3       4     5     6     7     8     9     10       11     12     13     14     15     16     17       18     19     20     21     22     23     24       25     26     27     28    ONC INFUSION 0.5 HR (30 MIN)   7:30 AM   (30 min.)    ONC INFUSION NURSE   [facility] 29     30     31                      No results found for this or any previous visit (from the past 24 hour(s)).

## 2022-11-02 NOTE — PROGRESS NOTES
Infusion Nursing Note:  Leigh Espinal presents today for zoladex, flu shot, COVID booster.    Patient seen by provider today: No   present during visit today: Not Applicable.    Note: Patient denies any new concerns today, states she is feeling well.  Blood pressure elevated in clinic today, patient asymptomatic and states it is running 130s/70s at home. Will continue to monitor.    Intravenous Access:  No Intravenous access/labs at this visit.    Treatment Conditions:  Not Applicable.    Post Infusion Assessment:  Patient tolerated injections without incident. Zoladex given subcutaneously into RIGHT abdomen. Patient iced prior to the injection   Flu shot and COVID booster administered by Kelsea CheemaEMT     Discharge Plan:   Patient declined prescription refills.  Discharge instructions reviewed with: Patient.  Patient and/or family verbalized understanding of discharge instructions and all questions answered.  AVS to patient via [employer] (now [employer]).  Patient will return 11/30/22 for next appointment.   Patient discharged in stable condition accompanied by: self.  Departure Mode: Ambulatory.  Face to Face time: 0.      Jaky Zavala RN

## 2022-11-29 DIAGNOSIS — Z17.0 MALIGNANT NEOPLASM OF UPPER-OUTER QUADRANT OF LEFT BREAST IN FEMALE, ESTROGEN RECEPTOR POSITIVE (H): Primary | ICD-10-CM

## 2022-11-29 DIAGNOSIS — C50.412 MALIGNANT NEOPLASM OF UPPER-OUTER QUADRANT OF LEFT BREAST IN FEMALE, ESTROGEN RECEPTOR POSITIVE (H): Primary | ICD-10-CM

## 2022-12-02 ENCOUNTER — INFUSION THERAPY VISIT (OUTPATIENT)
Dept: ONCOLOGY | Facility: CLINIC | Age: 57
End: 2022-12-02
Attending: INTERNAL MEDICINE
Payer: COMMERCIAL

## 2022-12-02 VITALS
OXYGEN SATURATION: 98 % | DIASTOLIC BLOOD PRESSURE: 87 MMHG | TEMPERATURE: 98 F | RESPIRATION RATE: 18 BRPM | SYSTOLIC BLOOD PRESSURE: 151 MMHG | HEART RATE: 58 BPM

## 2022-12-02 DIAGNOSIS — Z17.0 MALIGNANT NEOPLASM OF UPPER-OUTER QUADRANT OF LEFT BREAST IN FEMALE, ESTROGEN RECEPTOR POSITIVE (H): Primary | ICD-10-CM

## 2022-12-02 DIAGNOSIS — C50.412 MALIGNANT NEOPLASM OF UPPER-OUTER QUADRANT OF LEFT BREAST IN FEMALE, ESTROGEN RECEPTOR POSITIVE (H): Primary | ICD-10-CM

## 2022-12-02 PROCEDURE — 250N000011 HC RX IP 250 OP 636: Performed by: INTERNAL MEDICINE

## 2022-12-02 PROCEDURE — 96402 CHEMO HORMON ANTINEOPL SQ/IM: CPT

## 2022-12-02 RX ADMIN — GOSERELIN ACETATE 3.6 MG: 3.6 IMPLANT SUBCUTANEOUS at 14:32

## 2022-12-02 NOTE — PROGRESS NOTES
Infusion Nursing Note:  Leigh Espinal presents today for cycle 30 day 1 zoladex injection.    Patient seen by provider today: No   present during visit today: Not Applicable.    Note: Pt comes to infusion today with no questions or concerns.  Pt has no pain today and denies any need for intervention at this appointment. Pt has been afebrile and denies signs and symptoms of infection including: cough, SOB, sore throat, diarrhea, vomiting, rash, or pain with urination. Pt wishes to proceed with today's planned treatment.    Intravenous Access:  No Intravenous access/labs at this visit.    Treatment Conditions:  Not Applicable.    Post Infusion Assessment:  Patient tolerated zoladex injection to LEFT abdomen without incident.     Discharge Plan:   Patient declined prescription refills.  Discharge instructions reviewed with: Patient.  Patient and/or family verbalized understanding of discharge instructions and all questions answered.  AVS to patient via [employer].  Patient will return 12/28/22 for next appointment.   Patient discharged in stable condition accompanied by: self.  Departure Mode: Ambulatory.      Emily Meese, RN

## 2022-12-14 NOTE — PROGRESS NOTES
INTERVENTIONAL RADIOLOGY CONSULTATION    Name: Leigh Espinal  Age: 53 year old   Referring Physician: Dr. Ab Cuellar, Dr. Real  REASON FOR REFERRAL: f/u pulmonary AVM     HPI: Productive (clear mucus) cough x several days, nasal congestion started at same time. No chest pain, dyspnea, fever. Cough medicine helps with cough (Robitussin CF). Past smoker (quit 5-6 years ago).    No dizziness, vision changes, headaches, focal neurologic symptoms. A few bloody noses (2 this month), lasts 2-3 minutes, able to stop with compression. Energy level good. No fatigue with house hold chores or walking. No blood in stool. No vomiting. No chest pain.    PAST MEDICAL HISTORY:   Past Medical History:   Diagnosis Date     AVM (arteriovenous malformation)     Right Pulmonary     GERD (gastroesophageal reflux disease)      Hiatal hernia     nissen done in 2007     HTN (hypertension)        PAST SURGICAL HISTORY:   Past Surgical History:   Procedure Laterality Date     COLONOSCOPY  6/21/2012    Procedure: COLONOSCOPY;;  Surgeon: Radha Hector MD;  Location: [facility]     LAPAROSCOPIC NISSEN FUNDOPLICATION  2007     TUBAL LIGATION  1990       FAMILY HISTORY:   Family History   Problem Relation Age of Onset     C.A.D. 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 Found in lobby of apartment, police were called and put pt on a hold. Pt. Tried to flee and did get restrained. Pt did turn several times on the EMS cart even in restraints. 5mg IM haldol given by EMS. Pt. Taken out of restraints on admit and MD in room as pt more cooperative/sleepy now.     Triage Assessment       Row Name 12/14/22 6751       Triage Assessment (Adult)    Airway WDL WDL       Respiratory WDL    Respiratory WDL WDL       Skin Circulation/Temperature WDL    Skin Circulation/Temperature WDL X  lip laceration on left side of lip       Cardiac WDL    Cardiac WDL WDL       Peripheral/Neurovascular WDL    Peripheral Neurovascular WDL WDL       Cognitive/Neuro/Behavioral WDL    Cognitive/Neuro/Behavioral WDL X  etoh                   "anemia 06/08/2012     Priority: Medium     Problem list name updated by automated process. Provider to review         MEDICATIONS:   Prescription Medications as of 10/10/2018             amoxicillin (AMOXIL) 500 MG tablet Take 4 pills (2000 mg) 30-60 minutes before dental procedures, including teeth cleaning.    atenolol (TENORMIN) 25 MG tablet Take 25 mg by mouth. Takes 1 tab a day.     LISINOPRIL PO Take 20 mg by mouth daily    pantoprazole (PROTONIX) 20 MG EC tablet TK 1 T PO D    SERTRALINE HCL PO Take 50 mg by mouth daily    TRAZodone (DESYREL) 25 MG TABS Take 150 mg by mouth At Bedtime.      Facility Administered Medications as of 10/10/2018             iopamidol (ISOVUE-370) solution 61 mL Inject 61 mLs into the vein once          ALLERGIES:   Sulfa drugs    ROS:  As above    Physical Examination:   VITALS:   /78 (BP Location: Left arm, Patient Position: Chair, Cuff Size: Adult Large)  Pulse 59  Temp 98.3  F (36.8  C) (Oral)  Ht 1.613 m (5' 3.5\")  Wt 75.3 kg (166 lb 1.6 oz)  BMI 28.96 kg/m2  Constitutional: healthy, alert and no distress  Head: Normocephalic.   Cardiovascular: Acyanotic  Respiratory: Normal effort  Psychiatric: affect normal/bright and mentation appears normal.    Labs:    BMP RESULTS:  Lab Results   Component Value Date     10/18/2017    POTASSIUM 3.8 10/18/2017    CHLORIDE 105 10/18/2017    CO2 26 10/18/2017    ANIONGAP 7 10/18/2017     (H) 10/18/2017    BUN 13 10/18/2017    CR 0.84 10/18/2017    GFRESTIMATED 72 10/18/2017    GFRESTBLACK 87 10/18/2017    ILIANA 8.8 10/18/2017        CBC RESULTS:  Lab Results   Component Value Date    WBC 7.0 10/18/2017    RBC 5.19 10/18/2017    HGB 15.2 10/18/2017    HCT 47.2 (H) 10/18/2017    MCV 91 10/18/2017    MCH 29.3 10/18/2017    MCHC 32.2 10/18/2017    RDW 14.0 10/18/2017     (L) 10/18/2017       INR/PTT:  Lab Results   Component Value Date    INR 1.11 03/04/2015       Diagnostic studies:   Imaging reviewed by me: Pulm " CTA today shows stable decompressed AVM, no new AVM. There is bronchial wall thickening with no pulmonary consolidation    Final read:  IMPRESSION:   1. Stable embolization changes of the right upper lobe AV  malformation.  2. Mild diffuse bronchial wall thickening. Correlate for bronchitis.     ROULA BAZZI MD      Assessment/Plan:  53-year-old female with history of gastroesophageal reflux disease, status post Nissen revision, hypertension, HHT and a right middle lobe pulmonary AVM status post embolization March 2015. AVM is stable on imaging, significantly reduced in size compared to preembolization images. Plan to continue to follow with yearly clinic visit with pulmonary CT angiogram. Regarding her upper respiratory symptoms, should they not resolve or worsen, she will consult her primary care physician.    It was a pleasure to see Mrs. Espinal in clinic today. Thank you for involving the Interventional Radiology service in her care.     I spent a total of 20 minutes with this patient, over 50% time was for counseling and coordination.     Minerva Israel MD  Interventional Radiology Attending                 [facility]    CC  Patient Care Team:  Darleen Ventura MD as PCP - General (Family Practice)  Minerva Israel MD as MD (Radiology)  SELF, REFERRED

## 2022-12-28 ENCOUNTER — INFUSION THERAPY VISIT (OUTPATIENT)
Dept: ONCOLOGY | Facility: CLINIC | Age: 57
End: 2022-12-28
Payer: COMMERCIAL

## 2022-12-28 VITALS
RESPIRATION RATE: 16 BRPM | HEART RATE: 75 BPM | TEMPERATURE: 98.2 F | DIASTOLIC BLOOD PRESSURE: 72 MMHG | SYSTOLIC BLOOD PRESSURE: 157 MMHG | OXYGEN SATURATION: 99 %

## 2022-12-28 DIAGNOSIS — Z17.0 MALIGNANT NEOPLASM OF UPPER-OUTER QUADRANT OF LEFT BREAST IN FEMALE, ESTROGEN RECEPTOR POSITIVE (H): Primary | ICD-10-CM

## 2022-12-28 DIAGNOSIS — C50.412 MALIGNANT NEOPLASM OF UPPER-OUTER QUADRANT OF LEFT BREAST IN FEMALE, ESTROGEN RECEPTOR POSITIVE (H): Primary | ICD-10-CM

## 2022-12-28 PROCEDURE — 250N000011 HC RX IP 250 OP 636: Performed by: INTERNAL MEDICINE

## 2022-12-28 PROCEDURE — 96402 CHEMO HORMON ANTINEOPL SQ/IM: CPT

## 2022-12-28 RX ADMIN — GOSERELIN ACETATE 3.6 MG: 3.6 IMPLANT SUBCUTANEOUS at 15:58

## 2022-12-28 ASSESSMENT — PAIN SCALES - GENERAL: PAINLEVEL: NO PAIN (0)

## 2022-12-28 NOTE — PATIENT INSTRUCTIONS
[facility] Triage and after hours / weekends / holidays:  [phone]    Please call the triage or after hours line if you experience a temperature greater than or equal to 100.4, shaking chills, have uncontrolled nausea, vomiting and/or diarrhea, dizziness, shortness of breath, chest pain, bleeding, unexplained bruising, or if you have any other new/concerning symptoms, questions or concerns.      If you are having any concerning symptoms or wish to speak to a provider before your next infusion visit, please call your care coordinator or triage to notify them so we can adequately serve you.     If you need a refill on a narcotic prescription or other medication, please call before your infusion appointment.

## 2022-12-28 NOTE — PROGRESS NOTES
Infusion Nursing Note:  Leigh Espinal presents today for Zoladex Injection.    Patient seen by provider today: No   present during visit today: Not Applicable.    Note: Leigh denied fevers, chills, cough, SOB, and chest pain. She has been feeling well. She has been eating and drinking well and denied any constipation or diarrhea. She reports she has been tolerating her zoladex injections well.    Intravenous Access:  Not applicable.    Treatment Conditions:  Not Applicable.    Post Infusion Assessment:  Patient tolerated zoladex injection to right abdomen without incident.     Patient iced prior to her injection today.    Discharge Plan:   Patient and/or family verbalized understanding of discharge instructions and all questions answered.  AVS to patient via [email or web address]. IB message sent to scheduling to schedule injection for January.  Patient discharged in stable condition accompanied by: self.  Departure Mode: Ambulatory.      Jazmin Sears RN

## 2023-01-05 ENCOUNTER — VIRTUAL VISIT (OUTPATIENT)
Dept: ONCOLOGY | Facility: CLINIC | Age: 58
End: 2023-01-05
Attending: INTERNAL MEDICINE
Payer: COMMERCIAL

## 2023-01-05 DIAGNOSIS — C50.412 MALIGNANT NEOPLASM OF UPPER-OUTER QUADRANT OF LEFT BREAST IN FEMALE, ESTROGEN RECEPTOR POSITIVE (H): Primary | ICD-10-CM

## 2023-01-05 DIAGNOSIS — Z15.01 MONOALLELIC MUTATION OF CHEK2 GENE IN FEMALE PATIENT: ICD-10-CM

## 2023-01-05 DIAGNOSIS — I78.0 HHT (HEREDITARY HEMORRHAGIC TELANGIECTASIA) (H): ICD-10-CM

## 2023-01-05 DIAGNOSIS — Z15.89 MONOALLELIC MUTATION OF CHEK2 GENE IN FEMALE PATIENT: ICD-10-CM

## 2023-01-05 DIAGNOSIS — Z15.02 MONOALLELIC MUTATION OF CHEK2 GENE IN FEMALE PATIENT: ICD-10-CM

## 2023-01-05 DIAGNOSIS — Z15.09 MONOALLELIC MUTATION OF CHEK2 GENE IN FEMALE PATIENT: ICD-10-CM

## 2023-01-05 DIAGNOSIS — Z17.0 MALIGNANT NEOPLASM OF UPPER-OUTER QUADRANT OF LEFT BREAST IN FEMALE, ESTROGEN RECEPTOR POSITIVE (H): Primary | ICD-10-CM

## 2023-01-05 PROCEDURE — G0463 HOSPITAL OUTPT CLINIC VISIT: HCPCS | Mod: PN,GT | Performed by: INTERNAL MEDICINE

## 2023-01-05 PROCEDURE — 99213 OFFICE O/P EST LOW 20 MIN: CPT | Mod: 95 | Performed by: INTERNAL MEDICINE

## 2023-01-05 NOTE — LETTER
1/5/2023         RE: Leigh Espinal  [address] [place] [address]        Dear Colleague,    Thank you for referring your patient, Leigh Espinal, to the [facility]. Please see a copy of my visit note below.         Medical oncology follow-up visit  01/05/2023     Reason for Visit: Follow up of bilateral breast cancer    Oncology HPI:   Ms. Espinal is a 56 year old female with bilateral breast cancers. Other past medical history is notable for HHT (follows with Dr. Real), pulmonary and liver AVMs, GERD and HTN. Bilateral screening mammograms on 10/23/19 showed architectural distortion and developing focal asymmetry in the right breast, and possible developing asymmetry in the left breast. Diagnostic mammograms showed a 2 x 1.4 cm mass at 1:00 right breast and a 9 mm mass at 2:00 left breast. An additional 1.4 cm satellite mass in the left breast (total diameter of both left breast masses, 2.1 cm) was noted on contrast mammogram on 11/21/19.  Biopsy of the right breast at 1:00 revealed invasive ductal carcinoma, grade 1, ER+ (98%), ND+ (99%), HER2 negative. Biopsy of the left breast 2:00 revealed invasive lobular carcinoma, grade 2, ER+ (95%), ND+ (75%), and HER2 negative.  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       She underwent bilateral nipple sparing mastectomies, tissue expander placement, and bilateral axillary sentinel lymph node biopsies on 1/29/20 under the care of Dr. Smith. Final pathology revealed grade 2 invasive mammary carcinoma in the right breast measuring 2.1 cm, a smaller focus of invasive mammary carcinoma (mixed ductal and cribriform) measuring 3.6 mm, admixed DCIS, and negative margins. The left breast showed grade 2 invasive lobular carcinoma, measuring 1.6 cm, DCIS and LCIS, and negative margins. The right axillary sentinel node was benign; the left axillary sentinel  node showed micrometastatic carcinoma measuring 1.5 mm without extranodal extension.     OncotypeDx was ordered for right and left breast cancers.  Oncotype dx recurrence score of the left breast cancer was 10. Oncotype on the right breast cancer was 18.       Her surgery was complicated by skin necrosis requiring additional surgery.     She received adjuvant left chest wall and low axillary radiation: 5040 cGy in 28 fractions under the care of Dr. Mary Brunson from 3/26/20 -5/5/20.      Started Zoladex 8/2020.  She had her reconstruction surgery Sept 20.  She underwent a bilateral free MIKAL flap reconstruction on 09/20/2021.    She has been unable to tolerate AI.     Interval history:   Leigh is here for follow-up via video visit. She reports her energy is overall pretty good, but has noted some fatigue and weight gain for which she is seeing a dietitian tomorrow. She does have a few hot flashes daily, but is not bothered by this. Sleep is okay; she continues on Trazodone nightly. She has been on Zoladex and is tolerating it with other than the hot flashes. She has noted also a pain in the right lateral aspect of her chest that she thinks happens on occasion maybe weekly.     She has no intention of restarting AI given intolerance to both letrozole and anastrozole.     ROS: 10 point ROS neg other than the symptoms noted above in the HPI.    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 Provider to review     Menorrhagia      Monoallelic mutation of CHEK2 gene in female patient  6/5/2020    CHEK2 [id number] [facility] 12/19/2019        Current Outpatient Medications   Medication Sig Dispense Refill     acetaminophen (TYLENOL) 500 MG tablet Take 2 tablets (1,000 mg) by mouth 3 times daily 120 tablet 0     amLODIPine (NORVASC) 10 MG tablet        anastrozole (ARIMIDEX) 1 MG tablet Take 1 tablet (1 mg) by mouth daily (Patient not taking: Reported on 8/10/2022) 30 tablet 0     atenolol (TENORMIN) 50 MG tablet Take 50 mg by mouth every evening        bisacodyl (DULCOLAX) 10 MG suppository Place 1 suppository (10 mg) rectally daily as needed for constipation (Use if Magnesium hydroxide (MILK of MAGNESIA) not effective after 24 hours. May discontinue if patient having bowel movement.) (Patient not taking: Reported on 10/31/2021) 10 suppository 0     Goserelin Acetate (ZOLADEX SC) Inject 3.6 mg Subcutaneous every 30 days        oxyCODONE (ROXICODONE) 5 MG tablet Take 5 mg by mouth every 6 hours as needed for severe pain (Patient not taking: Reported on 5/3/2022)       pantoprazole (PROTONIX) 20 MG EC tablet Take 20 mg by mouth daily as needed   3     polyethylene glycol (MIRALAX) 17 GM/Dose powder Take 17 g by mouth daily (Patient not taking: Reported on 10/20/2021) 510 g 0     senna-docusate (SENOKOT-S/PERICOLACE) 8.6-50 MG tablet Take 2 tablets by mouth 2 times daily (Patient not taking: Reported on 5/3/2022) 120 tablet 0     SERTRALINE HCL PO Take 50 mg by mouth every evening        traZODone (DESYREL) 50 MG tablet Take  mg by mouth At Bedtime            Allergies   Allergen Reactions     Sulfa Drugs      Got really sick, headache, facial swelling, felt like she was dying.   Delayed reaction happened ~3-4 days after she started taking it.        Exam:  Last menstrual period 03/01/2020, not currently breastfeeding.  Wt Readings from Last 4 Encounters:   03/28/22 77.5 kg (170 lb 12.8 oz)   01/27/22 75.3 kg (166 lb)   12/30/21 74.4 kg (164 lb 1.6 oz)   10/20/21 71 kg (156  lb 8 oz)     LMP 03/01/2020 (Approximate)         *Physical exam is limited by video visit  GENERAL:  Alert, no acute distress  HEAD/NECK:  No scleral icterus  PSYCH: Normal affect    LABS:   Estradiol Ultrasensitive (10/5/22): 8  Estradiol Reference Ranges:  Follicular Phase:  12.5 - 166 pg/ml  Ovulation Phase:  85.5 - 498 pg/ml  Luteal Phase:  43.8 - 211 pg/ml    FSH (10/5/22) 6.3  FSH Reference Ranges:  Follicular Phase:  12.5 mIU/ml  Ovulation Phase:  21.5 mIU/ml  Luteal Phase 7.7 mIU/ml  Adult Male:  12.4 mIU/ml    Assessment/plan:   1. Bilateral Breast Cancer (Left breast cancer [id number] ER + ID + her2 negative, oncotype 10 s/p mastectomy and SNLB and Right breast cancer T2(2)N0 ER + ID + her2 negative, oncotype 18 s/p mastectomy and SNLB)  - S/p bilateral mastectomy and left chest wall RT   - Discussed follow-up with Plastic Surgery given intermittent discomfort along the right lateral chest. Patient states this is not bothersome to her at this juncture.   - Zoladex last on 12/28/22. FSH is still within range, but estrogen demonstrates post-menopausal range. Will plan to hold Zoladex next month and recheck estrogen/FSH at the beginning of March. Return to clinic 1 week after to discuss whether the patient can halt Zoladex at that time.   - Off AI. Was unable to tolerate trial of Anastrozole. Discussed possibility of restarting, and patient has declined.     2. CHEK2 mutation: She has had a colonoscopy in 2012, which was normal. This was repeated in March 2021.  Due for repeat in 3 years (2024) given presence of polyps.      3. Lymphedema: Followed with PT. Overall this has not been problem.      4. HHT: follows with Dr. Real. No significant bleeding at this time.       The patient was seen and assessed by my attending, Dr. Swann, who agrees with the above assessment and plan.    Erlinda Izquierdo MD PGY4  Department of Radiation Oncology  [phone] Clinic  [phone] Pager      Addendum:  Pt was seen and  evaluated by me with Dr Izquierdo.  Overall Leigh is doing well on zoladex.    We discussed she is now 57. It is not clear whether she may have gone into menopause on her own.  It would be reasonable to look at her estrogen and FSH levels next month.  She will come in for bloodwork.  We discussed if she resumes any menstrual bleeding, she should notify us, and we will reinitiate zoladex.  She expressed understanding.      She is not interested in further endocrine therapy.    Discussed the possibility of returning to see plastic surgery.    Katarzyna Swann MD

## 2023-01-05 NOTE — PROGRESS NOTES
Leigh is a 57 year old who is being evaluated via a billable video visit.      How would you like to obtain your AVS? MyChart  If the video visit is dropped, the invitation should be resent by: Text to cell phone: [phone]  Will anyone else be joining your video visit? Simran Thomasherbie Ashtonrupali MARTINEZ    Video-Visit Details    Type of service:  Video Visit     Originating Location (pt. Location): Home    Distant Location (provider location):  On-site  Platform used for Video Visit: [facility] oncology follow-up visit  01/05/2023     Reason for Visit: Follow up of bilateral breast cancer    Oncology HPI:   Ms. Espinal is a 56 year old female with bilateral breast cancers. Other past medical history is notable for HHT (follows with Dr. Real), pulmonary and liver AVMs, GERD and HTN. Bilateral screening mammograms on 10/23/19 showed architectural distortion and developing focal asymmetry in the right breast, and possible developing asymmetry in the left breast. Diagnostic mammograms showed a 2 x 1.4 cm mass at 1:00 right breast and a 9 mm mass at 2:00 left breast. An additional 1.4 cm satellite mass in the left breast (total diameter of both left breast masses, 2.1 cm) was noted on contrast mammogram on 11/21/19.  Biopsy of the right breast at 1:00 revealed invasive ductal carcinoma, grade 1, ER+ (98%), VT+ (99%), HER2 negative. Biopsy of the left breast 2:00 revealed invasive lobular carcinoma, grade 2, ER+ (95%), VT+ (75%), and HER2 negative.  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       She underwent bilateral nipple sparing mastectomies, tissue expander placement, and bilateral axillary sentinel lymph node biopsies on 1/29/20 under the care of Dr. Smith. Final pathology revealed grade 2 invasive mammary carcinoma in the right breast measuring 2.1 cm, a smaller focus of invasive mammary carcinoma (mixed ductal and cribriform) measuring 3.6 mm,  admixed DCIS, and negative margins. The left breast showed grade 2 invasive lobular carcinoma, measuring 1.6 cm, DCIS and LCIS, and negative margins. The right axillary sentinel node was benign; the left axillary sentinel node showed micrometastatic carcinoma measuring 1.5 mm without extranodal extension.     OncotypeDx was ordered for right and left breast cancers.  Oncotype dx recurrence score of the left breast cancer was 10. Oncotype on the right breast cancer was 18.       Her surgery was complicated by skin necrosis requiring additional surgery.     She received adjuvant left chest wall and low axillary radiation: 5040 cGy in 28 fractions under the care of Dr. Mary Brunson from 3/26/20 -5/5/20.      Started Zoladex 8/2020.  She had her reconstruction surgery Sept 20.  She underwent a bilateral free MIKAL flap reconstruction on 09/20/2021.    She has been unable to tolerate AI.     Interval history:   Leigh is here for follow-up via video visit. She reports her energy is overall pretty good, but has noted some fatigue and weight gain for which she is seeing a dietitian tomorrow. She does have a few hot flashes daily, but is not bothered by this. Sleep is okay; she continues on Trazodone nightly. She has been on Zoladex and is tolerating it with other than the hot flashes. She has noted also a pain in the right lateral aspect of her chest that she thinks happens on occasion maybe weekly.     She has no intention of restarting AI given intolerance to both letrozole and anastrozole.     ROS: 10 point ROS neg other than the symptoms noted above in the HPI.    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    CHEK2 c.1100delC [facility] 12/19/2019        Current Outpatient Medications   Medication Sig Dispense Refill     acetaminophen (TYLENOL) 500 MG tablet Take 2 tablets (1,000 mg) by mouth 3 times daily 120 tablet 0     amLODIPine (NORVASC) 10 MG tablet        anastrozole (ARIMIDEX) 1 MG tablet Take 1 tablet (1 mg) by mouth daily (Patient not taking: Reported on 8/10/2022) 30 tablet 0     atenolol (TENORMIN) 50 MG tablet Take 50 mg by mouth every evening        bisacodyl (DULCOLAX) 10 MG suppository Place 1 suppository (10 mg) rectally daily as needed for constipation (Use if Magnesium hydroxide (MILK of MAGNESIA) not effective after 24 hours. May discontinue if patient having bowel movement.) (Patient not taking: Reported on 10/31/2021) 10 suppository 0     Goserelin Acetate (ZOLADEX SC) Inject 3.6 mg Subcutaneous every 30 days        oxyCODONE (ROXICODONE) 5 MG tablet Take 5 mg by mouth every 6 hours as needed for severe pain (Patient not taking: Reported on 5/3/2022)       pantoprazole (PROTONIX) 20 MG EC tablet Take 20 mg by mouth daily as needed   3     polyethylene glycol (MIRALAX) 17 GM/Dose powder Take 17 g by mouth daily (Patient not taking: Reported on 10/20/2021) 510 g 0     senna-docusate (SENOKOT-S/PERICOLACE) 8.6-50 MG tablet Take 2 tablets by mouth 2 times daily (Patient not taking: Reported on 5/3/2022) 120 tablet 0     SERTRALINE HCL PO Take 50 mg by mouth every evening        traZODone (DESYREL) 50 MG tablet Take  mg by mouth At Bedtime            Allergies   Allergen Reactions     Sulfa Drugs      Got really sick, headache, facial swelling, felt like she was dying.   Delayed reaction happened ~3-4 days after she started taking it.         Exam:  Last menstrual period 03/01/2020, not currently breastfeeding.  Wt Readings from Last 4 Encounters:   03/28/22 77.5 kg (170 lb 12.8 oz)   01/27/22 75.3 kg (166 lb)   12/30/21 74.4 kg (164 lb 1.6 oz)   10/20/21 71 kg (156 lb 8 oz)     LMP 03/01/2020 (Approximate)         *Physical exam is limited by video visit  GENERAL:  Alert, no acute distress  HEAD/NECK:  No scleral icterus  PSYCH: Normal affect    LABS:   Estradiol Ultrasensitive (10/5/22): 8  Estradiol Reference Ranges:  Follicular Phase:  12.5 - 166 pg/ml  Ovulation Phase:  85.5 - 498 pg/ml  Luteal Phase:  43.8 - 211 pg/ml    FSH (10/5/22) 6.3  FSH Reference Ranges:  Follicular Phase:  12.5 mIU/ml  Ovulation Phase:  21.5 mIU/ml  Luteal Phase 7.7 mIU/ml  Adult Male:  12.4 mIU/ml    Assessment/plan:   1. Bilateral Breast Cancer (Left breast cancer [id number] ER + [place] + her2 negative, oncotype 10 s/p mastectomy and SNLB and Right breast cancer T2(2)N0 ER + [place] + her2 negative, oncotype 18 s/p mastectomy and SNLB)  - S/p bilateral mastectomy and left chest wall RT   - Discussed follow-up with Plastic Surgery given intermittent discomfort along the right lateral chest. Patient states this is not bothersome to her at this juncture.   - Zoladex last on 12/28/22. FSH is still within range, but estrogen demonstrates post-menopausal range. Will plan to hold Zoladex next month and recheck estrogen/FSH at the beginning of March. Return to clinic 1 week after to discuss whether the patient can halt Zoladex at that time.   - Off AI. Was unable to tolerate trial of Anastrozole. Discussed possibility of restarting, and patient has declined.     2. CHEK2 mutation: She has had a colonoscopy in 2012, which was normal. This was repeated in March 2021.  Due for repeat in 3 years (2024) given presence of polyps.      3. Lymphedema: Followed with PT. Overall this has not been problem.      4. HHT: follows with Dr. Real. No significant bleeding at this time.       The  patient was seen and assessed by my attending, Dr. Swann, who agrees with the above assessment and plan.    Erlinda Izquierdo MD PGY4  Department of Radiation Oncology  [phone] Clinic  [phone] Pager      Addendum:  Pt was seen and evaluated by me with Dr Izquierdo.  Overall Leigh is doing well on zoladex.    We discussed she is now 57. It is not clear whether she may have gone into menopause on her own.  It would be reasonable to look at her estrogen and FSH levels next month.  She will come in for bloodwork.  We discussed if she resumes any menstrual bleeding, she should notify us, and we will reinitiate zoladex.  She expressed understanding.      She is not interested in further endocrine therapy.    Discussed the possibility of returning to see plastic surgery.    Katarzyna Swann MD

## 2023-01-10 ENCOUNTER — TELEPHONE (OUTPATIENT)
Dept: ONCOLOGY | Facility: CLINIC | Age: 58
End: 2023-01-10

## 2023-01-10 DIAGNOSIS — C50.412 MALIGNANT NEOPLASM OF UPPER-OUTER QUADRANT OF LEFT BREAST IN FEMALE, ESTROGEN RECEPTOR POSITIVE (H): Primary | ICD-10-CM

## 2023-01-10 DIAGNOSIS — Z17.0 MALIGNANT NEOPLASM OF UPPER-OUTER QUADRANT OF LEFT BREAST IN FEMALE, ESTROGEN RECEPTOR POSITIVE (H): Primary | ICD-10-CM

## 2023-01-10 NOTE — PROGRESS NOTES
CLINICAL NUTRITION SERVICES- ONCOLOGY DISTRESS SCREENING     Identified Concern and Score From Distress Screenin. How concerned are you about your ability to eat? :  0  2. How concerned are you about unintended weight loss or your current weight? : 5  3. Patient requested to speak with a Dietitian on the Oncology Distress Screening tool. Yes     Date of Distress Screenin/5     Findings: Leigh reports that she is interested in seeing a dietitian to help her lose weight.      Follow-up Required: Requested nutrition referral from pt's provider.     Misty Navarrete RD, LD  [phone]

## 2023-01-23 ENCOUNTER — TELEPHONE (OUTPATIENT)
Dept: ONCOLOGY | Facility: CLINIC | Age: 58
End: 2023-01-23
Payer: COMMERCIAL

## 2023-01-23 NOTE — TELEPHONE ENCOUNTER
CLINICAL NUTRITION SERVICES     Reason for Contact: Patient was contacted by phone for MNT visit with dietitian.  She did not connect to video text link after 15 minutes.  RD called patient's primary contact which indicated 'do not leave a message on this voicemail'.    No voice mail was left. RD sent [employer] message with appointment reminder and reschedule as desired.       Alecia Mari RD, , LD  [facility]  [phone]

## 2023-01-26 NOTE — LETTER
"    June 9, 2020       TO: Leigh J Kylie  [address] [place] [address]         Dear MsCeasar Espinal,    Our records indicate that you have not scheduled an appointment for a consult, as recommended by Samaria Gooden GC. If you wish to schedule within [facility], we have several options to help you schedule your appointment:      Call [phone]    Visit our website at [email or web address] and click \"I Want To\" (in upper right) and select Request an Appointment    Request an appointment via [email or web address] at [email or web address]     If you have chosen to schedule elsewhere or if you have already made an appointment, please disregard this letter.    If you have any questions or concerns regarding the information above, please contact the [facility] at [phone].      Sincerely,      [facility]                  " Gen: NAD  Head: NC/AT  Neck: trachea midline  Resp:  No distress, lungs cta b/l  Cardiac: Heart irregularly irregular rate and rhythm. 1+ b/l LE edema.   Abdomen: Soft, nontender, nondistended.   Ext: no deformities  Neuro:  A&O appears non focal  Skin:  Warm and dry as visualized  Psych:  Normal affect and mood

## 2023-03-07 ENCOUNTER — LAB (OUTPATIENT)
Dept: LAB | Facility: CLINIC | Age: 58
End: 2023-03-07
Attending: PHYSICIAN ASSISTANT
Payer: COMMERCIAL

## 2023-03-07 DIAGNOSIS — C50.412 MALIGNANT NEOPLASM OF UPPER-OUTER QUADRANT OF LEFT BREAST IN FEMALE, ESTROGEN RECEPTOR POSITIVE (H): ICD-10-CM

## 2023-03-07 DIAGNOSIS — Z17.0 MALIGNANT NEOPLASM OF UPPER-OUTER QUADRANT OF LEFT BREAST IN FEMALE, ESTROGEN RECEPTOR POSITIVE (H): ICD-10-CM

## 2023-03-07 LAB
ALBUMIN SERPL BCG-MCNC: 4 G/DL (ref 3.5–5.2)
ALP SERPL-CCNC: 136 U/L (ref 35–104)
ALT SERPL W P-5'-P-CCNC: 23 U/L (ref 10–35)
ANION GAP SERPL CALCULATED.3IONS-SCNC: 8 MMOL/L (ref 7–15)
AST SERPL W P-5'-P-CCNC: 21 U/L (ref 10–35)
BASOPHILS # BLD AUTO: 0 10E3/UL (ref 0–0.2)
BASOPHILS NFR BLD AUTO: 1 %
BILIRUB SERPL-MCNC: 0.2 MG/DL
BUN SERPL-MCNC: 10.4 MG/DL (ref 6–20)
CALCIUM SERPL-MCNC: 10.2 MG/DL (ref 8.6–10)
CHLORIDE SERPL-SCNC: 105 MMOL/L (ref 98–107)
CREAT SERPL-MCNC: 0.76 MG/DL (ref 0.51–0.95)
DEPRECATED HCO3 PLAS-SCNC: 28 MMOL/L (ref 22–29)
EOSINOPHIL # BLD AUTO: 0.1 10E3/UL (ref 0–0.7)
EOSINOPHIL NFR BLD AUTO: 1 %
ERYTHROCYTE [DISTWIDTH] IN BLOOD BY AUTOMATED COUNT: 14.6 % (ref 10–15)
FSH SERPL IRP2-ACNC: 10.7 MIU/ML
GFR SERPL CREATININE-BSD FRML MDRD: >90 ML/MIN/1.73M2
GLUCOSE SERPL-MCNC: 123 MG/DL (ref 70–99)
HCT VFR BLD AUTO: 45.9 % (ref 35–47)
HGB BLD-MCNC: 14.8 G/DL (ref 11.7–15.7)
IMM GRANULOCYTES # BLD: 0 10E3/UL
IMM GRANULOCYTES NFR BLD: 0 %
LYMPHOCYTES # BLD AUTO: 2.4 10E3/UL (ref 0.8–5.3)
LYMPHOCYTES NFR BLD AUTO: 38 %
MCH RBC QN AUTO: 28 PG (ref 26.5–33)
MCHC RBC AUTO-ENTMCNC: 32.2 G/DL (ref 31.5–36.5)
MCV RBC AUTO: 87 FL (ref 78–100)
MONOCYTES # BLD AUTO: 0.4 10E3/UL (ref 0–1.3)
MONOCYTES NFR BLD AUTO: 6 %
NEUTROPHILS # BLD AUTO: 3.5 10E3/UL (ref 1.6–8.3)
NEUTROPHILS NFR BLD AUTO: 54 %
NRBC # BLD AUTO: 0 10E3/UL
NRBC BLD AUTO-RTO: 0 /100
PLATELET # BLD AUTO: 190 10E3/UL (ref 150–450)
POTASSIUM SERPL-SCNC: 4 MMOL/L (ref 3.4–5.3)
PROT SERPL-MCNC: 6.7 G/DL (ref 6.4–8.3)
RBC # BLD AUTO: 5.28 10E6/UL (ref 3.8–5.2)
SODIUM SERPL-SCNC: 141 MMOL/L (ref 136–145)
WBC # BLD AUTO: 6.4 10E3/UL (ref 4–11)

## 2023-03-07 PROCEDURE — 85025 COMPLETE CBC W/AUTO DIFF WBC: CPT

## 2023-03-07 PROCEDURE — 36415 COLL VENOUS BLD VENIPUNCTURE: CPT

## 2023-03-07 PROCEDURE — 83001 ASSAY OF GONADOTROPIN (FSH): CPT

## 2023-03-07 PROCEDURE — 82670 ASSAY OF TOTAL ESTRADIOL: CPT

## 2023-03-07 PROCEDURE — 80053 COMPREHEN METABOLIC PANEL: CPT

## 2023-03-07 NOTE — NURSING NOTE
Chief Complaint   Patient presents with     Labs Only     Labs drawn via  by RN.     Labs collected from venipuncture by RN.     Nery Velasco RN

## 2023-03-08 ENCOUNTER — VIRTUAL VISIT (OUTPATIENT)
Dept: ONCOLOGY | Facility: CLINIC | Age: 58
End: 2023-03-08
Attending: PHYSICIAN ASSISTANT
Payer: COMMERCIAL

## 2023-03-08 DIAGNOSIS — Z17.0 MALIGNANT NEOPLASM OF UPPER-OUTER QUADRANT OF LEFT BREAST IN FEMALE, ESTROGEN RECEPTOR POSITIVE (H): ICD-10-CM

## 2023-03-08 DIAGNOSIS — Z15.01 MONOALLELIC MUTATION OF CHEK2 GENE IN FEMALE PATIENT: ICD-10-CM

## 2023-03-08 DIAGNOSIS — Z15.09 MONOALLELIC MUTATION OF CHEK2 GENE IN FEMALE PATIENT: ICD-10-CM

## 2023-03-08 DIAGNOSIS — Z15.89 MONOALLELIC MUTATION OF CHEK2 GENE IN FEMALE PATIENT: ICD-10-CM

## 2023-03-08 DIAGNOSIS — F43.22 ADJUSTMENT DISORDER WITH ANXIOUS MOOD: Primary | ICD-10-CM

## 2023-03-08 DIAGNOSIS — C50.412 MALIGNANT NEOPLASM OF UPPER-OUTER QUADRANT OF LEFT BREAST IN FEMALE, ESTROGEN RECEPTOR POSITIVE (H): ICD-10-CM

## 2023-03-08 DIAGNOSIS — Z15.02 MONOALLELIC MUTATION OF CHEK2 GENE IN FEMALE PATIENT: ICD-10-CM

## 2023-03-08 PROCEDURE — 99213 OFFICE O/P EST LOW 20 MIN: CPT | Mod: VID | Performed by: PHYSICIAN ASSISTANT

## 2023-03-08 RX ORDER — LISINOPRIL AND HYDROCHLOROTHIAZIDE 20; 25 MG/1; MG/1
TABLET ORAL
COMMUNITY
Start: 2023-01-17

## 2023-03-08 NOTE — PROGRESS NOTES
Video-Visit Details    Type of service:  Video Visit    Video Start Time (time video started): 12:15    Video End Time (time video stopped): 12:40    Originating Location (pt. Location): Other Work    Distant Location (provider location):  On-site    Mode of Communication:  Video Conference via [employer]      Leigh is a 57 year old who is being evaluated via a billable video visit.      How would you like to obtain your AVS? MyChart  If the video visit is dropped, the invitation should be resent by: Text to cell phone: [phone]  Will anyone else be joining your video visit? Simran MARTINEZ    Video-Visit Details    Type of service:  Video Visit     Originating Location (pt. Location): Home    Distant Location (provider location):  On-site  Platform used for Video Visit: [facility] oncology follow-up visit  03/08/2023     Reason for Visit: Follow up of bilateral breast cancer    Oncology HPI:   Ms. Espinal is a 57 year old female with bilateral breast cancers. Other past medical history is notable for HHT (follows with Dr. Real), pulmonary and liver AVMs, GERD and HTN. Bilateral screening mammograms on 10/23/19 showed architectural distortion and developing focal asymmetry in the right breast, and possible developing asymmetry in the left breast. Diagnostic mammograms showed a 2 x 1.4 cm mass at 1:00 right breast and a 9 mm mass at 2:00 left breast. An additional 1.4 cm satellite mass in the left breast (total diameter of both left breast masses, 2.1 cm) was noted on contrast mammogram on 11/21/19.  Biopsy of the right breast at 1:00 revealed invasive ductal carcinoma, grade 1, ER+ (98%), ID+ (99%), HER2 negative. Biopsy of the left breast 2:00 revealed invasive lobular carcinoma, grade 2, ER+ (95%), ID+ (75%), and HER2 negative.  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       She underwent bilateral nipple sparing mastectomies,  tissue expander placement, and bilateral axillary sentinel lymph node biopsies on 1/29/20 under the care of Dr. Smith. Final pathology revealed grade 2 invasive mammary carcinoma in the right breast measuring 2.1 cm, a smaller focus of invasive mammary carcinoma (mixed ductal and cribriform) measuring 3.6 mm, admixed DCIS, and negative margins. The left breast showed grade 2 invasive lobular carcinoma, measuring 1.6 cm, DCIS and LCIS, and negative margins. The right axillary sentinel node was benign; the left axillary sentinel node showed micrometastatic carcinoma measuring 1.5 mm without extranodal extension.     OncotypeDx was ordered for right and left breast cancers.  Oncotype dx recurrence score of the left breast cancer was 10. Oncotype on the right breast cancer was 18.       Her surgery was complicated by skin necrosis requiring additional surgery.     She received adjuvant left chest wall and low axillary radiation: 5040 cGy in 28 fractions under the care of Dr. Mary Brunson from 3/26/20 -5/5/20.      Started Zoladex 8/2020.  She had her reconstruction surgery Sept 20.  She underwent a bilateral free MIKAL flap reconstruction on 09/20/2021.    She has been unable to tolerate AI.     12/28/22 was her last Zoladex injection.  Given she is 57 we are stopping the Zoladex, assuming she is in permanent menopause.     Interval history:     Leigh is here for follow-up via video visit. She has not noticed any major changes from stopping the Zoladex.  She still has routine hot flashes which have not changed.  She has no abdominal cramping or concerns for recurrence of menses.      She has no intention of restarting AI given intolerance to both letrozole and anastrozole.     Feels irritated a lot of the time.  Feels she is quiet because she is aware of her irritation and doesn't want to miss treat anyone.  She feels this is getting worse.  She denies depression - feels her 50 mg of Zoloft has helped her with that  since her mom passed years ago.  She has some situational anxiety but feels she can manage this.  Is feeling frustrated though with her level of irritation.     ROS: 10 point ROS neg other than the symptoms noted above in the HPI.    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    CHEK2 c.1100delC [facility] 12/19/2019        Current Outpatient Medications   Medication Sig Dispense Refill     amLODIPine (NORVASC) 10 MG tablet        pantoprazole (PROTONIX) 20 MG EC tablet Take 20 mg by mouth daily as needed   3     SERTRALINE HCL PO Take 50 mg by mouth every evening        traZODone (DESYREL) 50 MG tablet Take  mg by mouth At Bedtime       acetaminophen (TYLENOL) 500 MG tablet Take 2 tablets (1,000 mg) by mouth 3 times daily (Patient not taking: Reported on 3/8/2023) 120 tablet 0     anastrozole (ARIMIDEX) 1 MG tablet Take 1 tablet (1 mg) by mouth daily (Patient not taking: Reported on 8/10/2022) 30 tablet 0     atenolol (TENORMIN) 50 MG tablet Take 50 mg by mouth every evening  (Patient not taking: Reported on 3/8/2023)       bisacodyl (DULCOLAX) 10 MG suppository Place 1 suppository (10 mg) rectally daily as needed for constipation (Use if Magnesium hydroxide (MILK of MAGNESIA) not effective after 24 hours. May discontinue if patient having bowel movement.) (Patient not taking: Reported on 10/31/2021) 10 suppository 0     Goserelin Acetate (ZOLADEX SC)  Inject 3.6 mg Subcutaneous every 30 days  (Patient not taking: Reported on 3/8/2023)       oxyCODONE (ROXICODONE) 5 MG tablet Take 5 mg by mouth every 6 hours as needed for severe pain (Patient not taking: Reported on 5/3/2022)       polyethylene glycol (MIRALAX) 17 GM/Dose powder Take 17 g by mouth daily (Patient not taking: Reported on 10/20/2021) 510 g 0     senna-docusate (SENOKOT-S/PERICOLACE) 8.6-50 MG tablet Take 2 tablets by mouth 2 times daily (Patient not taking: Reported on 5/3/2022) 120 tablet 0          Allergies   Allergen Reactions     Sulfa Drugs      Got really sick, headache, facial swelling, felt like she was dying.   Delayed reaction happened ~3-4 days after she started taking it.        Exam:  Last menstrual period 03/01/2020, not currently breastfeeding.  Wt Readings from Last 4 Encounters:   03/28/22 77.5 kg (170 lb 12.8 oz)   01/27/22 75.3 kg (166 lb)   12/30/21 74.4 kg (164 lb 1.6 oz)   10/20/21 71 kg (156 lb 8 oz)     LMP 03/01/2020 (Approximate)       Video physical exam  General: Patient appears well in no acute distress.   Skin: No visualized rash or lesions on visualized skin  Eyes: EOMI, no erythema, sclera icterus or discharge noted  Resp: Appears to be breathing comfortably without accessory muscle usage, speaking in full sentences, no cough  MSK: Appears to have normal range of motion based on visualized movements  Neurologic: No apparent tremors, facial movements symmetric  Psych: affect bright, alert and oriented      LABS:    03/07/23 08:06   Sodium 141   Potassium 4.0   Chloride 105   Carbon Dioxide (CO2) 28   Urea Nitrogen 10.4   Creatinine 0.76   GFR Estimate >90   Calcium 10.2 (H)   Anion Gap 8   Albumin 4.0   Protein Total 6.7   Alkaline Phosphatase 136 (H)   ALT 23   AST 21   Bilirubin Total 0.2      03/07/23 08:06   FSH 10.7   Glucose 123 (H)      03/07/23 08:06   WBC 6.4   Hemoglobin 14.8   Hematocrit 45.9   Platelet Count 190   RBC Count 5.28 (H)   MCV 87       Estradiol  ultra sensitive pending      Assessment/plan:   1. Bilateral Breast Cancer (Left breast cancer [id number] ER + [place] + her2 negative, oncotype 10 s/p mastectomy and SNLB and Right breast cancer T2(2)N0 ER + [place] + her2 negative, oncotype 18 s/p mastectomy and SNLB)  - S/p bilateral mastectomy and left chest wall RT   - Zoladex last on 12/28/22. FSH is still within range, but estrogen demonstrates post-menopausal range. Will wait for the estradiol ultrasensitive which will likely come back next week. No concerning symptoms of menses return. Still having routine hot flashes  - Off AI. Was unable to tolerate trial of Anastrozole. Discussed possibility of restarting, and patient has declined.     2. CHEK2 mutation: She has had a colonoscopy in 2012, which was normal. This was repeated in March 2021.  Due for repeat in 3 years (2024) given presence of polyps.      3. Lymphedema: Followed with PT. Overall this has not been problem.      4. HHT: follows with Dr. Real. No significant bleeding at this time.     5. Mood: on zoloft 50 mg daily. Feeling irritable daily and feeling this started with menopause and is getting worse.  We will try increasing to 75 mg daily.  She sees her PCP in the upcoming months to review.     6. Would like a copy of her genetics to give to her daughter.  Will do this.           Iliana Abrams PA-C

## 2023-03-08 NOTE — NURSING NOTE
Is the patient currently in the state of [place]? YES    Visit mode:VIDEO    If the visit is dropped, the patient can be reconnected by: VIDEO VISIT: Text to cell phone: [phone]    Will anyone else be joining the visit? NO         How would you like to obtain your AVS? MyChart    Are changes needed to the allergy or medication list? NO  Mey Arshad    Reason for visit: Follow up

## 2023-03-14 LAB — ESTRADIOL SERPL HS-MCNC: 7 PG/ML

## 2023-03-19 DIAGNOSIS — Z17.0 MALIGNANT NEOPLASM OF UPPER-OUTER QUADRANT OF LEFT BREAST IN FEMALE, ESTROGEN RECEPTOR POSITIVE (H): Primary | ICD-10-CM

## 2023-03-19 DIAGNOSIS — C50.412 MALIGNANT NEOPLASM OF UPPER-OUTER QUADRANT OF LEFT BREAST IN FEMALE, ESTROGEN RECEPTOR POSITIVE (H): Primary | ICD-10-CM

## 2023-03-26 ENCOUNTER — HEALTH MAINTENANCE LETTER (OUTPATIENT)
Age: 58
End: 2023-03-26

## 2023-04-17 DIAGNOSIS — Z17.0 MALIGNANT NEOPLASM OF UPPER-OUTER QUADRANT OF LEFT BREAST IN FEMALE, ESTROGEN RECEPTOR POSITIVE (H): Primary | ICD-10-CM

## 2023-04-17 DIAGNOSIS — C50.412 MALIGNANT NEOPLASM OF UPPER-OUTER QUADRANT OF LEFT BREAST IN FEMALE, ESTROGEN RECEPTOR POSITIVE (H): Primary | ICD-10-CM

## 2023-04-26 ENCOUNTER — INFUSION THERAPY VISIT (OUTPATIENT)
Dept: ONCOLOGY | Facility: CLINIC | Age: 58
End: 2023-04-26
Payer: COMMERCIAL

## 2023-04-26 ENCOUNTER — APPOINTMENT (OUTPATIENT)
Dept: LAB | Facility: CLINIC | Age: 58
End: 2023-04-26
Payer: COMMERCIAL

## 2023-04-26 VITALS
DIASTOLIC BLOOD PRESSURE: 53 MMHG | TEMPERATURE: 98.2 F | OXYGEN SATURATION: 97 % | HEART RATE: 63 BPM | SYSTOLIC BLOOD PRESSURE: 130 MMHG | RESPIRATION RATE: 16 BRPM

## 2023-04-26 DIAGNOSIS — Z17.0 MALIGNANT NEOPLASM OF UPPER-OUTER QUADRANT OF LEFT BREAST IN FEMALE, ESTROGEN RECEPTOR POSITIVE (H): Primary | ICD-10-CM

## 2023-04-26 DIAGNOSIS — C50.412 MALIGNANT NEOPLASM OF UPPER-OUTER QUADRANT OF LEFT BREAST IN FEMALE, ESTROGEN RECEPTOR POSITIVE (H): Primary | ICD-10-CM

## 2023-04-26 PROCEDURE — 96402 CHEMO HORMON ANTINEOPL SQ/IM: CPT

## 2023-04-26 PROCEDURE — 36415 COLL VENOUS BLD VENIPUNCTURE: CPT

## 2023-04-26 PROCEDURE — 82670 ASSAY OF TOTAL ESTRADIOL: CPT

## 2023-04-26 PROCEDURE — 250N000011 HC RX IP 250 OP 636: Performed by: PHYSICIAN ASSISTANT

## 2023-04-26 RX ORDER — CHLORAL HYDRATE 500 MG
2 CAPSULE ORAL DAILY
COMMUNITY
End: 2023-06-19

## 2023-04-26 RX ADMIN — GOSERELIN ACETATE 3.6 MG: 3.6 IMPLANT SUBCUTANEOUS at 11:45

## 2023-04-26 ASSESSMENT — PAIN SCALES - GENERAL: PAINLEVEL: NO PAIN (0)

## 2023-04-26 NOTE — PROGRESS NOTES
Infusion Nursing Note:  Leigh Espinal presents today for Cycle 32 Zoladex.    Patient seen by provider today: No   present during visit today: Not Applicable.    Note: Patient restarting Zoladex due to recent menses. Pt states hot flashes are very mild right now due to being off Zoladex since December. No other complaints or concerns.    Venipuncture performed in lab.    Treatment Conditions:  Lab Results   Component Value Date    HGB 14.8 03/07/2023    WBC 6.4 03/07/2023    ANEU 7.2 10/12/2021    ANEUTAUTO 3.5 03/07/2023     03/07/2023            Results reviewed.      Post Infusion Assessment:  Patient tolerated Zoladex injection into LEFT abdomen without incident.       Discharge Plan:   Patient declined prescription refills.  Discharge instructions reviewed with: Patient.  Patient and/or family verbalized understanding of discharge instructions and all questions answered.  AVS to patient via [employer]. Requested to have patient return 5/24 for next Zoladex. Pt will look on [employer] for updates.  Patient discharged in stable condition accompanied by: self.  Departure Mode: Ambulatory.      Maria Guadalupe Shin RN

## 2023-04-26 NOTE — NURSING NOTE
Chief Complaint   Patient presents with     Blood Draw     Labs drawn via  by RN in lab. VS taken.      Labs drawn via venipuncture. Vital signs taken. Checked into next appointment.   Ana Broussard RN

## 2023-05-02 LAB — ESTRADIOL SERPL HS-MCNC: 7 PG/ML

## 2023-05-24 ENCOUNTER — INFUSION THERAPY VISIT (OUTPATIENT)
Dept: ONCOLOGY | Facility: CLINIC | Age: 58
End: 2023-05-24
Attending: INTERNAL MEDICINE
Payer: COMMERCIAL

## 2023-05-24 VITALS
DIASTOLIC BLOOD PRESSURE: 78 MMHG | SYSTOLIC BLOOD PRESSURE: 158 MMHG | RESPIRATION RATE: 18 BRPM | HEART RATE: 77 BPM | OXYGEN SATURATION: 98 % | TEMPERATURE: 98.7 F

## 2023-05-24 DIAGNOSIS — C50.412 MALIGNANT NEOPLASM OF UPPER-OUTER QUADRANT OF LEFT BREAST IN FEMALE, ESTROGEN RECEPTOR POSITIVE (H): Primary | ICD-10-CM

## 2023-05-24 DIAGNOSIS — Z17.0 MALIGNANT NEOPLASM OF UPPER-OUTER QUADRANT OF LEFT BREAST IN FEMALE, ESTROGEN RECEPTOR POSITIVE (H): Primary | ICD-10-CM

## 2023-05-24 PROCEDURE — 250N000011 HC RX IP 250 OP 636: Performed by: PHYSICIAN ASSISTANT

## 2023-05-24 PROCEDURE — 96402 CHEMO HORMON ANTINEOPL SQ/IM: CPT

## 2023-05-24 PROCEDURE — 99207 PR NO BILLABLE SERVICE THIS VISIT: CPT

## 2023-05-24 RX ADMIN — GOSERELIN ACETATE 3.6 MG: 3.6 IMPLANT SUBCUTANEOUS at 14:32

## 2023-05-24 NOTE — PROGRESS NOTES
Infusion Nursing Note:  Leigh Espinal presents today for C33D1 Zoladex.    Patient seen by provider today: No   present during visit today: Not Applicable.    Note: Patient reports at her baseline. Denies fever/chills. No new complaints made.      Intravenous Access:  No Intravenous access/labs at this visit.    Treatment Conditions:  Not Applicable.      Post Infusion Assessment:  Patient tolerated one Zoladex injection injection on right lower abdomen without incident.  Site patent and intact, free from redness, edema or discomfort.  No evidence of extravasations.       Discharge Plan:   Patient declined prescription refills.  Discharge instructions reviewed with: Patient.  Patient and/or family verbalized understanding of discharge instructions and all questions answered.  AVS to patient via [employer].  Patient will return 6/19/23 for next appointment.   Patient discharged in stable condition accompanied by: self.  Departure Mode: Ambulatory.      STEPH KOTHARI RN

## 2023-05-24 NOTE — PATIENT INSTRUCTIONS
May 2023      Daniel Monday Tuesday Wednesday Thursday Friday Saturday        1     2     3     4     5     6       7     8     9     10     11     12     13       14  Happy Birthday!     15     16     17     18     19     20       21     22     23     24    ONC INFUSION 0.5 HR (30 MIN)   2:30 PM   (30 min.)   [facility] ON[facility] INFUSION NURSE   [facility] 25     26     27       28     29     30     31 June 2023 Sunday Monday Tuesday Wednesday Thursday Friday Saturday                       1     2     3       4     5     6     7     8     9     10       11     12     13     14     15     16     17       18     19    LAB PERIPHERAL   8:30 AM   (15 min.)   [facility] DRAW   [facility]    RETURN   8:45 AM   (30 min.)   Katarzyna Swann MD   [facility]    ONC INFUSION 0.5 HR (30 MIN)  11:30 AM   (30 min.)    ONC INFUSION NURSE   [facility] 20     21     22     23     24       25     26     27     28     29     30                            Lab Results:  No results found for this or any previous visit (from the past 12 hour(s)).

## 2023-06-19 ENCOUNTER — INFUSION THERAPY VISIT (OUTPATIENT)
Dept: ONCOLOGY | Facility: CLINIC | Age: 58
End: 2023-06-19
Attending: INTERNAL MEDICINE
Payer: COMMERCIAL

## 2023-06-19 VITALS
RESPIRATION RATE: 16 BRPM | BODY MASS INDEX: 30.91 KG/M2 | TEMPERATURE: 98 F | HEART RATE: 44 BPM | WEIGHT: 180.1 LBS | SYSTOLIC BLOOD PRESSURE: 163 MMHG | DIASTOLIC BLOOD PRESSURE: 90 MMHG | OXYGEN SATURATION: 98 %

## 2023-06-19 DIAGNOSIS — C50.412 MALIGNANT NEOPLASM OF UPPER-OUTER QUADRANT OF LEFT BREAST IN FEMALE, ESTROGEN RECEPTOR POSITIVE (H): Primary | ICD-10-CM

## 2023-06-19 DIAGNOSIS — Z17.0 MALIGNANT NEOPLASM OF UPPER-OUTER QUADRANT OF LEFT BREAST IN FEMALE, ESTROGEN RECEPTOR POSITIVE (H): Primary | ICD-10-CM

## 2023-06-19 DIAGNOSIS — N30.00 ACUTE CYSTITIS WITHOUT HEMATURIA: ICD-10-CM

## 2023-06-19 DIAGNOSIS — R30.0 DYSURIA: ICD-10-CM

## 2023-06-19 LAB
ALBUMIN UR-MCNC: NEGATIVE MG/DL
APPEARANCE UR: ABNORMAL
BILIRUB UR QL STRIP: NEGATIVE
COLOR UR AUTO: ABNORMAL
GLUCOSE UR STRIP-MCNC: NEGATIVE MG/DL
HGB UR QL STRIP: ABNORMAL
KETONES UR STRIP-MCNC: NEGATIVE MG/DL
LEUKOCYTE ESTERASE UR QL STRIP: ABNORMAL
MUCOUS THREADS #/AREA URNS LPF: PRESENT /LPF
NITRATE UR QL: NEGATIVE
PH UR STRIP: 7 [PH] (ref 5–7)
RBC URINE: 7 /HPF
SP GR UR STRIP: 1.01 (ref 1–1.03)
SQUAMOUS EPITHELIAL: 1 /HPF
UROBILINOGEN UR STRIP-MCNC: NORMAL MG/DL
WBC CLUMPS #/AREA URNS HPF: PRESENT /HPF
WBC URINE: >182 /HPF

## 2023-06-19 PROCEDURE — 99214 OFFICE O/P EST MOD 30 MIN: CPT | Performed by: INTERNAL MEDICINE

## 2023-06-19 PROCEDURE — 96402 CHEMO HORMON ANTINEOPL SQ/IM: CPT

## 2023-06-19 PROCEDURE — 87086 URINE CULTURE/COLONY COUNT: CPT | Performed by: INTERNAL MEDICINE

## 2023-06-19 PROCEDURE — G0463 HOSPITAL OUTPT CLINIC VISIT: HCPCS | Performed by: INTERNAL MEDICINE

## 2023-06-19 PROCEDURE — 81001 URINALYSIS AUTO W/SCOPE: CPT | Performed by: INTERNAL MEDICINE

## 2023-06-19 PROCEDURE — 250N000011 HC RX IP 250 OP 636: Performed by: PHYSICIAN ASSISTANT

## 2023-06-19 RX ORDER — CIPROFLOXACIN 500 MG/1
500 TABLET, FILM COATED ORAL 2 TIMES DAILY
Qty: 14 TABLET | Refills: 0 | Status: SHIPPED | OUTPATIENT
Start: 2023-06-19 | End: 2023-10-13

## 2023-06-19 RX ADMIN — GOSERELIN ACETATE 3.6 MG: 3.6 IMPLANT SUBCUTANEOUS at 10:01

## 2023-06-19 ASSESSMENT — PAIN SCALES - GENERAL: PAINLEVEL: NO PAIN (0)

## 2023-06-19 NOTE — PROGRESS NOTES
Medical oncology follow-up visit  06/19/2023     Reason for Visit: Follow up of bilateral breast cancer    Oncology HPI:   Ms. Espinal is a 58 year old female with bilateral breast cancers. Other past medical history is notable for HHT (follows with Dr. Real), pulmonary and liver AVMs, GERD and HTN. Bilateral screening mammograms on 10/23/19 showed architectural distortion and developing focal asymmetry in the right breast, and possible developing asymmetry in the left breast. Diagnostic mammograms showed a 2 x 1.4 cm mass at 1:00 right breast and a 9 mm mass at 2:00 left breast. An additional 1.4 cm satellite mass in the left breast (total diameter of both left breast masses, 2.1 cm) was noted on contrast mammogram on 11/21/19.  Biopsy of the right breast at 1:00 revealed invasive ductal carcinoma, grade 1, ER+ (98%), GA+ (99%), HER2 negative. Biopsy of the left breast 2:00 revealed invasive lobular carcinoma, grade 2, ER+ (95%), GA+ (75%), and HER2 negative.  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       She underwent bilateral nipple sparing mastectomies, tissue expander placement, and bilateral axillary sentinel lymph node biopsies on 1/29/20 under the care of Dr. Smith. Final pathology revealed grade 2 invasive mammary carcinoma in the right breast measuring 2.1 cm, a smaller focus of invasive mammary carcinoma (mixed ductal and cribriform) measuring 3.6 mm, admixed DCIS, and negative margins. The left breast showed grade 2 invasive lobular carcinoma, measuring 1.6 cm, DCIS and LCIS, and negative margins. The right axillary sentinel node was benign; the left axillary sentinel node showed micrometastatic carcinoma measuring 1.5 mm without extranodal extension.     OncotypeDx was ordered for right and left breast cancers.  Oncotype dx recurrence score of the left breast cancer was 10. Oncotype on the right breast cancer was 18.       Her surgery  was complicated by skin necrosis requiring additional surgery.     She received adjuvant left chest wall and low axillary radiation: 5040 cGy in 28 fractions under the care of Dr. Mary Brunson from 3/26/20 -5/5/20.      Started Zoladex 8/2020.  She had her reconstruction surgery Sept 20.  She underwent a bilateral free MIKAL flap reconstruction on 09/20/2021.    She has been unable to tolerate AI.     Interval history:      Leigh returns today for follow-up.  Overall she is doing well.  She had a trial off of Zoladex given she is now 58 years old.  She did resume menstrual periods with elevations in her estradiol levels.  She subsequently restarted Zoladex shots.    Her blood pressure is elevated today she is anxious about her appointment.  She has noticed tingling and discomfort involving her breast bilaterally.  She describes an itching sensation involving her left breast.  In the right breast she will occasionally notice tingling or painful sensation.  These are fleeting and lasts only a few seconds and then subsequently resolved.  She has not noticed any nodules or masses.    She has good energy.  She wishes she was exercising regularly.  She has no other complaints today.    ROS: 10 point ROS neg other than the symptoms noted above in the HPI.    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    CHEK2 c.1100delC [facility] 12/19/2019        Current  Outpatient Medications   Medication Sig Dispense Refill     amLODIPine (NORVASC) 10 MG tablet        atenolol (TENORMIN) 50 MG tablet Take 50 mg by mouth every evening       lisinopril-hydrochlorothiazide (ZESTORETIC) 20-25 MG tablet        pantoprazole (PROTONIX) 20 MG EC tablet Take 20 mg by mouth daily as needed   3     sertraline (ZOLOFT) 50 MG tablet Take 1.5 tablets (75 mg) by mouth every evening 135 tablet 3     traZODone (DESYREL) 50 MG tablet Take  mg by mouth At Bedtime       acetaminophen (TYLENOL) 500 MG tablet Take 2 tablets (1,000 mg) by mouth 3 times daily 120 tablet 0          Allergies   Allergen Reactions     Sulfa Antibiotics      Got really sick, headache, facial swelling, felt like she was dying.   Delayed reaction happened ~3-4 days after she started taking it.        Exam:  Blood pressure (!) 163/90, pulse (!) 44, temperature 98  F (36.7  C), resp. rate 16, weight 81.7 kg (180 lb 1.6 oz), last menstrual period 03/01/2020, SpO2 98 %, not currently breastfeeding.  Wt Readings from Last 4 Encounters:   06/19/23 81.7 kg (180 lb 1.6 oz)   03/28/22 77.5 kg (170 lb 12.8 oz)   01/27/22 75.3 kg (166 lb)   12/30/21 74.4 kg (164 lb 1.6 oz)     BP (!) 163/90 (BP Location: Right arm, Patient Position: Sitting, Cuff Size: Adult Large)   Pulse (!) 44   Temp 98  F (36.7  C)   Resp 16   Wt 81.7 kg (180 lb 1.6 oz)   LMP 03/01/2020 (Approximate)   SpO2 98%   BMI 30.91 kg/m          PE:  GENERAL:  Alert, no acute distress  HEAD/NECK:  No scleral icterus  CV: rrr  Lungs: clear  Abd: soft, nt, nd + bs  Ext : no edema  Breast: s/p bilateral mastectomy with MIKAL reconstruction.  She has scar tissue in the medial right breast that is unchanged,  No nodules or masses, no axillary adenopathy  PSYCH: Normal affect    LABS:   Reviewed her prior imaging and labs.      Assessment/plan:   1. Bilateral Breast Cancer (Left breast cancer [id number] ER + [place] + her2 negative, oncotype 10 s/p mastectomy and SNLB and Right  breast cancer T2(2)N0 ER + [facility] + her2 negative, oncotype 18 s/p mastectomy and SNLB)  - S/p bilateral mastectomy and left chest wall RT   - Discussed follow-up with Plastic Surgery given intermittent discomfort along the right lateral chest. Patient states this is not bothersome to her at this juncture.   - Zoladex monthly.  - Off AI. Was unable to tolerate trial of Anastrozole. Discussed possibility of restarting, and patient has declined.     2. CHEK2 mutation: She has had a colonoscopy in 2012, which was normal. This was repeated in March 2021.  Due for repeat in 3 years (2024) given presence of polyps.      3. Lymphedema: Followed with PT. Overall this has not been problem.      4. HHT: follows with Dr. Real. No significant bleeding at this time.       Katarzyna Swann MD

## 2023-06-19 NOTE — PROGRESS NOTES
Infusion Nursing Note:  Leigh CARLSON Sienamoo presents today for Zoladex.    Patient seen by provider today: Yes: Dr. Swann    Note: Patient presents to clinic today feeling well with no questions.  Pt did not request or require any intervention for pain today.    Intravenous Access:  No Intravenous access/labs at this visit.    Treatment Conditions:  Not Applicable.    Post Infusion Assessment:  Patient tolerated injection without incident to LUQ of abdomen with ice prior.    Discharge Plan:   Patient declined prescription refills.  Discharge instructions reviewed with: Patient.  Patient and/or family verbalized understanding of discharge instructions and all questions answered.  AVS to patient via [employer].  Patient will return 7/20/2023 for next appointment.   Patient discharged in stable condition accompanied by: self.  Departure Mode: Ambulatory.    Uma Upton RN

## 2023-06-19 NOTE — PATIENT INSTRUCTIONS
Contact Numbers    [facility]: [phone]    Call with chills and/or temperature greater than or equal to 100.5, uncontrolled nausea/vomiting, diarrhea, constipation, dizziness, shortness of breath, chest pain, bleeding, unexplained bruising, or any new/concerning symptoms, questions/concerns.     If you are having any concerning symptoms or wish to speak to a provider before your next infusion visit, please call your care coordinator or triage to notify them so we can adequately serve you.       After Hours: [phone]    If after hours, weekends, or holidays, call main hospital  and ask for Oncology doctor on call.      June 2023 Sunday Monday Tuesday Wednesday Thursday Friday Saturday                       1     2     3       4     5     6     7     8     9     10       11     12     13     14     15     16     17       18     19    RETURN   8:45 AM   (30 min.)   Katarzyna Swann MD   [facility]    ONC INFUSION 0.5 HR (30 MIN)  11:30 AM   (30 min.)    ONC INFUSION NURSE   [facility] 20 21 22     23     24       25     26     27     28     29     30                        July 2023 Sunday Monday Tuesday Wednesday Thursday Friday Saturday                                 1       2     3     4     5     6     7     8       9     10     11     12     13     14     15       16     17     18     19     20    ONC INFUSION 0.5 HR (30 MIN)   3:30 PM   (30 min.)    ONC INFUSION NURSE   [facility] 21 22       23     24     25     26     27     28     29       30     31                                                Lab Results:  Recent Results (from the past 12 hour(s))   Routine UA with micro reflex to culture    Collection Time: 06/19/23  9:39 AM    Specimen: Urine, Midstream   Result Value Ref Range    Color Urine Light Yellow Colorless, Straw, Light Yellow, Yellow    Appearance Urine  Slightly Cloudy (A) Clear    Glucose Urine Negative Negative mg/dL    Bilirubin Urine Negative Negative    Ketones Urine Negative Negative mg/dL    Specific Gravity Urine 1.012 1.003 - 1.035    Blood Urine Trace (A) Negative    pH Urine 7.0 5.0 - 7.0    Protein Albumin Urine Negative Negative mg/dL    Urobilinogen Urine Normal Normal, 2.0 mg/dL    Nitrite Urine Negative Negative    Leukocyte Esterase Urine Large (A) Negative    WBC Clumps Urine Present (A) None Seen /HPF    Mucus Urine Present (A) None Seen /LPF    RBC Urine 7 (H) <=2 /HPF    WBC Urine >182 (H) <=5 /HPF    Squamous Epithelials Urine 1 <=1 /HPF

## 2023-06-19 NOTE — NURSING NOTE
Midstream UA collected in clinic, pt tolerated. Specimen sent to lab.    Rachelle Chopra CMA on 6/19/2023 at 9:41 AM

## 2023-06-19 NOTE — LETTER
6/19/2023         RE: Leigh Espinal  [address] [place] [address]        Dear Colleague,    Thank you for referring your patient, Leigh Espinal, to the [facility]. Please see a copy of my visit note below.       Medical oncology follow-up visit  06/19/2023     Reason for Visit: Follow up of bilateral breast cancer    Oncology HPI:   Ms. Espinal is a 58 year old female with bilateral breast cancers. Other past medical history is notable for HHT (follows with Dr. Real), pulmonary and liver AVMs, GERD and HTN. Bilateral screening mammograms on 10/23/19 showed architectural distortion and developing focal asymmetry in the right breast, and possible developing asymmetry in the left breast. Diagnostic mammograms showed a 2 x 1.4 cm mass at 1:00 right breast and a 9 mm mass at 2:00 left breast. An additional 1.4 cm satellite mass in the left breast (total diameter of both left breast masses, 2.1 cm) was noted on contrast mammogram on 11/21/19.  Biopsy of the right breast at 1:00 revealed invasive ductal carcinoma, grade 1, ER+ (98%), WI+ (99%), HER2 negative. Biopsy of the left breast 2:00 revealed invasive lobular carcinoma, grade 2, ER+ (95%), WI+ (75%), and HER2 negative.  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       She underwent bilateral nipple sparing mastectomies, tissue expander placement, and bilateral axillary sentinel lymph node biopsies on 1/29/20 under the care of Dr. Smith. Final pathology revealed grade 2 invasive mammary carcinoma in the right breast measuring 2.1 cm, a smaller focus of invasive mammary carcinoma (mixed ductal and cribriform) measuring 3.6 mm, admixed DCIS, and negative margins. The left breast showed grade 2 invasive lobular carcinoma, measuring 1.6 cm, DCIS and LCIS, and negative margins. The right axillary sentinel node was benign; the left axillary sentinel node  showed micrometastatic carcinoma measuring 1.5 mm without extranodal extension.     OncotypeDx was ordered for right and left breast cancers.  Oncotype dx recurrence score of the left breast cancer was 10. Oncotype on the right breast cancer was 18.       Her surgery was complicated by skin necrosis requiring additional surgery.     She received adjuvant left chest wall and low axillary radiation: 5040 cGy in 28 fractions under the care of Dr. Mary Brunson from 3/26/20 -5/5/20.      Started Zoladex 8/2020.  She had her reconstruction surgery Sept 20.  She underwent a bilateral free MIKAL flap reconstruction on 09/20/2021.    She has been unable to tolerate AI.     Interval history:      Leigh returns today for follow-up.  Overall she is doing well.  She had a trial off of Zoladex given she is now 58 years old.  She did resume menstrual periods with elevations in her estradiol levels.  She subsequently restarted Zoladex shots.    Her blood pressure is elevated today she is anxious about her appointment.  She has noticed tingling and discomfort involving her breast bilaterally.  She describes an itching sensation involving her left breast.  In the right breast she will occasionally notice tingling or painful sensation.  These are fleeting and lasts only a few seconds and then subsequently resolved.  She has not noticed any nodules or masses.    She has good energy.  She wishes she was exercising regularly.  She has no other complaints today.    ROS: 10 point ROS neg other than the symptoms noted above in the HPI.    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    CHEK2 c.1100delC [facility] 12/19/2019        Current Outpatient Medications   Medication Sig Dispense Refill    amLODIPine (NORVASC) 10 MG tablet       atenolol (TENORMIN) 50 MG tablet Take 50 mg by mouth every evening      lisinopril-hydrochlorothiazide (ZESTORETIC) 20-25 MG tablet       pantoprazole (PROTONIX) 20 MG EC tablet Take 20 mg by mouth daily as needed   3    sertraline (ZOLOFT) 50 MG tablet Take 1.5 tablets (75 mg) by mouth every evening 135 tablet 3    traZODone (DESYREL) 50 MG tablet Take  mg by mouth At Bedtime      acetaminophen (TYLENOL) 500 MG tablet Take 2 tablets (1,000 mg) by mouth 3 times daily 120 tablet 0          Allergies   Allergen Reactions    Sulfa Antibiotics      Got really sick, headache, facial swelling, felt like she was dying.   Delayed reaction happened ~3-4 days after she started taking it.        Exam:  Blood pressure (!) 163/90, pulse (!) 44, temperature 98  F (36.7  C), resp. rate 16, weight 81.7 kg (180 lb 1.6 oz), last menstrual period 03/01/2020, SpO2 98 %, not currently breastfeeding.  Wt Readings from Last 4 Encounters:   06/19/23 81.7 kg (180 lb 1.6 oz)   03/28/22 77.5 kg (170 lb 12.8 oz)   01/27/22 75.3 kg (166 lb)   12/30/21 74.4 kg (164 lb 1.6 oz)     BP (!) 163/90 (BP Location: Right arm, Patient Position: Sitting, Cuff Size: Adult Large)   Pulse (!) 44   Temp 98  F (36.7  C)   Resp 16   Wt 81.7 kg (180 lb 1.6 oz)   LMP 03/01/2020 (Approximate)   SpO2 98%   BMI 30.91 kg/m          PE:  GENERAL:  Alert, no acute distress  HEAD/NECK:  No scleral icterus  CV: rrr  Lungs: clear  Abd: soft, nt, nd + bs  Ext : no edema  Breast: s/p bilateral mastectomy with MIKAL reconstruction.  She has scar tissue in the medial right breast that is unchanged,  No nodules or  masses, no axillary adenopathy  PSYCH: Normal affect    LABS:   Reviewed her prior imaging and labs.      Assessment/plan:   1. Bilateral Breast Cancer (Left breast cancer [id number] ER + [place] + her2 negative, oncotype 10 s/p mastectomy and SNLB and Right breast cancer T2(2)N0 ER + [place] + her2 negative, oncotype 18 s/p mastectomy and SNLB)  - S/p bilateral mastectomy and left chest wall RT   - Discussed follow-up with Plastic Surgery given intermittent discomfort along the right lateral chest. Patient states this is not bothersome to her at this juncture.   - Zoladex monthly.  - Off AI. Was unable to tolerate trial of Anastrozole. Discussed possibility of restarting, and patient has declined.     2. CHEK2 mutation: She has had a colonoscopy in 2012, which was normal. This was repeated in March 2021.  Due for repeat in 3 years (2024) given presence of polyps.      3. Lymphedema: Followed with PT. Overall this has not been problem.      4. HHT: follows with Dr. Real. No significant bleeding at this time.       Katarzyna Swann MD

## 2023-06-19 NOTE — NURSING NOTE
"Oncology Rooming Note    June 19, 2023 9:00 AM   Leigh Espinal is a 58 year old female who presents for:    Chief Complaint   Patient presents with     Oncology Clinic Visit     Malignant Neoplasm of Left Breast     Initial Vitals: BP (!) 163/90 (BP Location: Right arm, Patient Position: Sitting, Cuff Size: Adult Large)   Pulse (!) 44   Temp 98  F (36.7  C)   Resp 16   Wt 81.7 kg (180 lb 1.6 oz)   LMP 03/01/2020 (Approximate)   SpO2 98%   BMI 30.91 kg/m   Estimated body mass index is 30.91 kg/m  as calculated from the following:    Height as of 10/20/21: 1.626 m (5' 4\").    Weight as of this encounter: 81.7 kg (180 lb 1.6 oz). Body surface area is 1.92 meters squared.  No Pain (0) Comment: Data Unavailable   Patient's last menstrual period was 03/01/2020 (approximate).  Allergies reviewed: Yes  Medications reviewed: Yes    Medications: Medication refills not needed today.  Pharmacy name entered into EPIC:    [facility] (MAIL ORDER) [facility] - [place], [place] - [phone] [facility] - [place], [place] - [phone] [facility] - [place], [place] - [phone]    Clinical concerns: Pt presents today for f/u with Dr Swann.       Patricia Odell, ANGEL  6/19/2023              "

## 2023-06-20 ENCOUNTER — PATIENT OUTREACH (OUTPATIENT)
Dept: ONCOLOGY | Facility: CLINIC | Age: 58
End: 2023-06-20
Payer: COMMERCIAL

## 2023-06-20 LAB — BACTERIA UR CULT: NORMAL

## 2023-06-20 NOTE — PROGRESS NOTES
[facility]: Cancer Care                                                                                          Called pt to let her know her urine did show an infection and that Dr Swann ordered and antibiotic for her. Dr Swann had sent a MyChart message which she had not seen yet. Pt verbalized understanding that she has UTI and meds are called into the pharmacy without other questions.       Signature:  Corinne Shepherd RN

## 2023-07-24 ENCOUNTER — INFUSION THERAPY VISIT (OUTPATIENT)
Dept: ONCOLOGY | Facility: CLINIC | Age: 58
End: 2023-07-24
Attending: INTERNAL MEDICINE
Payer: COMMERCIAL

## 2023-07-24 VITALS
DIASTOLIC BLOOD PRESSURE: 86 MMHG | SYSTOLIC BLOOD PRESSURE: 128 MMHG | OXYGEN SATURATION: 96 % | TEMPERATURE: 98 F | HEART RATE: 54 BPM | RESPIRATION RATE: 16 BRPM

## 2023-07-24 DIAGNOSIS — C50.412 MALIGNANT NEOPLASM OF UPPER-OUTER QUADRANT OF LEFT BREAST IN FEMALE, ESTROGEN RECEPTOR POSITIVE (H): Primary | ICD-10-CM

## 2023-07-24 DIAGNOSIS — I78.0 HHT (HEREDITARY HEMORRHAGIC TELANGIECTASIA) (H): Primary | ICD-10-CM

## 2023-07-24 DIAGNOSIS — Z17.0 MALIGNANT NEOPLASM OF UPPER-OUTER QUADRANT OF LEFT BREAST IN FEMALE, ESTROGEN RECEPTOR POSITIVE (H): Primary | ICD-10-CM

## 2023-07-24 DIAGNOSIS — Q25.72 PULMONARY ARTERIOVENOUS MALFORMATION: ICD-10-CM

## 2023-07-24 PROCEDURE — 96402 CHEMO HORMON ANTINEOPL SQ/IM: CPT

## 2023-07-24 PROCEDURE — 250N000011 HC RX IP 250 OP 636: Mod: JZ | Performed by: PHYSICIAN ASSISTANT

## 2023-07-24 RX ADMIN — GOSERELIN ACETATE 3.6 MG: 3.6 IMPLANT SUBCUTANEOUS at 13:28

## 2023-07-24 ASSESSMENT — PAIN SCALES - GENERAL: PAINLEVEL: NO PAIN (0)

## 2023-07-24 NOTE — PROGRESS NOTES
Infusion Nursing Note:  Leigh Espinal presents today for Zoladex.    Patient seen by provider today: No   present during visit today: Not Applicable.    Note: Leigh presents to infusion today feeling well. She denies any pain, infectious symptoms, or other new concerns. Ice applied prior to her injection.    Intravenous Access:  No Intravenous access/labs at this visit.    Treatment Conditions:  Not Applicable.    Post Infusion Assessment:  Patient tolerated injection to RLQ abdomen without incident.     Discharge Plan:   Discharge instructions reviewed with: Patient.  Patient and/or family verbalized understanding of discharge instructions and all questions answered.  AVS to patient via [employer].  Patient will return 8/17 for next appointment. IB sent to [facility] and scheduling to push this appointment back at least one day since this is too soon from today.   Patient discharged in stable condition accompanied by: self.  Departure Mode: Ambulatory.      Nichole El RN

## 2023-08-18 ENCOUNTER — INFUSION THERAPY VISIT (OUTPATIENT)
Dept: ONCOLOGY | Facility: CLINIC | Age: 58
End: 2023-08-18
Attending: INTERNAL MEDICINE
Payer: COMMERCIAL

## 2023-08-18 VITALS
DIASTOLIC BLOOD PRESSURE: 82 MMHG | OXYGEN SATURATION: 95 % | TEMPERATURE: 97.9 F | RESPIRATION RATE: 16 BRPM | HEART RATE: 51 BPM | SYSTOLIC BLOOD PRESSURE: 166 MMHG

## 2023-08-18 DIAGNOSIS — Z17.0 MALIGNANT NEOPLASM OF UPPER-OUTER QUADRANT OF LEFT BREAST IN FEMALE, ESTROGEN RECEPTOR POSITIVE (H): Primary | ICD-10-CM

## 2023-08-18 DIAGNOSIS — C50.412 MALIGNANT NEOPLASM OF UPPER-OUTER QUADRANT OF LEFT BREAST IN FEMALE, ESTROGEN RECEPTOR POSITIVE (H): Primary | ICD-10-CM

## 2023-08-18 PROCEDURE — 250N000011 HC RX IP 250 OP 636: Mod: JZ | Performed by: INTERNAL MEDICINE

## 2023-08-18 PROCEDURE — 96402 CHEMO HORMON ANTINEOPL SQ/IM: CPT

## 2023-08-18 RX ADMIN — GOSERELIN ACETATE 3.6 MG: 3.6 IMPLANT SUBCUTANEOUS at 14:27

## 2023-08-18 NOTE — PATIENT INSTRUCTIONS
[facility] Triage and after hours / weekends / holidays:  [phone]    Please call the triage or after hours line if you experience a temperature greater than or equal to 100.4, shaking chills, have uncontrolled nausea, vomiting and/or diarrhea, dizziness, shortness of breath, chest pain, bleeding, unexplained bruising, or if you have any other new/concerning symptoms, questions or concerns.      If you are having any concerning symptoms or wish to speak to a provider before your next infusion visit, please call triage to notify them so we can adequately serve you.     If you need a refill on a narcotic prescription or other medication, please call before your infusion appointment.                August 2023 Sunday Monday Tuesday Wednesday Thursday Friday Saturday             1     2     3     4     5       6     7     8     9     10     11     12       13     14     15     16     17     18    ONC INFUSION 0.5 HR (30 MIN)   2:30 PM   (30 min.)    ONC INFUSION NURSE   [facility] 19       20     21     22     23     24     25     26       27     28     29     30     31                           September 2023 Sunday Monday Tuesday Wednesday Thursday Friday Saturday                            1     2       3     4     5     6     7     8     9       10     11     12     13     14    ONC INFUSION 0.5 HR (30 MIN)   4:00 PM   (30 min.)   [facility] ONC INFUSION NURSE   [facility] 15     16       17     18     19     20     21     22     23       24     25     26     27     28     29     30                     Lab Results:  No results found for this or any previous visit (from the past 12 hour(s)).

## 2023-08-18 NOTE — PROGRESS NOTES
Infusion Nursing Note:  Leigh CARLSON Hilarykarlo presents today for zoladex.    Patient seen by provider today: No   present during visit today: Not Applicable.    Note: Pt comes to infusion today with no questions or concerns.  Pt  has no pain today and denies any need for intervention at this appointment. Pt has been afebrile and denies signs and symptoms of infection including: cough, SOB, sore throat, diarrhea, vomiting, rash, or pain with urination. Pt wishes to proceed with today's planned treatment.    Intravenous Access:  No Intravenous access/labs at this visit.    Treatment Conditions:  Not Applicable.      Post Infusion Assessment:  Patient tolerated one zoladex injection to LLQ of abdomen without incident.     Discharge Plan:   Patient declined prescription refills.  Discharge instructions reviewed with: Patient.  Patient and/or family verbalized understanding of discharge instructions and all questions answered.  AVS to patient via PayPalT.  Patient will return 09/14/23 for next appointment.   Patient discharged in stable condition accompanied by: self.  Departure Mode: Ambulatory.      Emily Meese, RN

## 2023-09-14 ENCOUNTER — INFUSION THERAPY VISIT (OUTPATIENT)
Dept: ONCOLOGY | Facility: CLINIC | Age: 58
End: 2023-09-14
Attending: INTERNAL MEDICINE
Payer: COMMERCIAL

## 2023-09-14 VITALS
OXYGEN SATURATION: 99 % | TEMPERATURE: 97.9 F | HEART RATE: 53 BPM | SYSTOLIC BLOOD PRESSURE: 129 MMHG | RESPIRATION RATE: 12 BRPM | DIASTOLIC BLOOD PRESSURE: 86 MMHG

## 2023-09-14 DIAGNOSIS — C50.412 MALIGNANT NEOPLASM OF UPPER-OUTER QUADRANT OF LEFT BREAST IN FEMALE, ESTROGEN RECEPTOR POSITIVE (H): Primary | ICD-10-CM

## 2023-09-14 DIAGNOSIS — Z17.0 MALIGNANT NEOPLASM OF UPPER-OUTER QUADRANT OF LEFT BREAST IN FEMALE, ESTROGEN RECEPTOR POSITIVE (H): Primary | ICD-10-CM

## 2023-09-14 DIAGNOSIS — Z23 NEED FOR PROPHYLACTIC VACCINATION AND INOCULATION AGAINST INFLUENZA: ICD-10-CM

## 2023-09-14 PROCEDURE — 90682 RIV4 VACC RECOMBINANT DNA IM: CPT | Performed by: INTERNAL MEDICINE

## 2023-09-14 PROCEDURE — G0008 ADMIN INFLUENZA VIRUS VAC: HCPCS | Performed by: INTERNAL MEDICINE

## 2023-09-14 PROCEDURE — 96402 CHEMO HORMON ANTINEOPL SQ/IM: CPT

## 2023-09-14 PROCEDURE — 250N000011 HC RX IP 250 OP 636: Performed by: INTERNAL MEDICINE

## 2023-09-14 PROCEDURE — 250N000011 HC RX IP 250 OP 636: Mod: JZ | Performed by: PHYSICIAN ASSISTANT

## 2023-09-14 RX ADMIN — INFLUENZA A VIRUS A/WEST VIRGINIA/30/2022 (H1N1) RECOMBINANT HEMAGGLUTININ ANTIGEN, INFLUENZA A VIRUS A/DARWIN/6/2021 (H3N2) RECOMBINANT HEMAGGLUTININ ANTIGEN, INFLUENZA B VIRUS B/AUSTRIA/1359417/2021 RECOMBINANT HEMAGGLUTININ ANTIGEN, AND INFLUENZA B VIRUS B/PHUKET/3073/2013 RECOMBINANT HEMAGGLUTININ ANTIGEN 0.5 ML: 45; 45; 45; 45 INJECTION INTRAMUSCULAR at 16:16

## 2023-09-14 RX ADMIN — GOSERELIN ACETATE 3.6 MG: 3.6 IMPLANT SUBCUTANEOUS at 16:09

## 2023-09-14 ASSESSMENT — PAIN SCALES - GENERAL: PAINLEVEL: NO PAIN (0)

## 2023-09-14 NOTE — PROGRESS NOTES
Infusion Nursing Note:  Leigh CARLSON Kylie presents today for Zoladex-FluShot.    Patient seen by provider today: No   present during visit today: Not Applicable.    Note: Patient denies the following: fevers, body aches, chills, headaches, vision changes, dizziness, chest pain, shortness of breath, nausea, vomiting, constipation, abdominal pain, rashes, bruising/bleeding, mouth sores, swelling or pain in the legs. Ok for treatment.     -pt requesting COVID shot with next appt, request added to appt notes    Intravenous Access:  No Intravenous access/labs at this visit.    Treatment Conditions:  Not Applicable.      Post Infusion Assessment:  Patient tolerated Zoladex injection into RUQ of abdomen without incident.   -Ice applied prior to injection    Patient tolerated Flu Shot injection into Left Arm without incident.     Discharge Plan:   Patient declined prescription refills.  Discharge instructions reviewed with: Patient.  Patient and/or family verbalized understanding of discharge instructions and all questions answered.  AVS to patient via [employer].  Patient will return 10/12 for next appointment.   Patient discharged in stable condition accompanied by: self.  Departure Mode: Ambulatory.    Bebe Stokes RN

## 2023-10-13 ENCOUNTER — INFUSION THERAPY VISIT (OUTPATIENT)
Dept: ONCOLOGY | Facility: CLINIC | Age: 58
End: 2023-10-13
Attending: INTERNAL MEDICINE
Payer: COMMERCIAL

## 2023-10-13 VITALS
RESPIRATION RATE: 14 BRPM | SYSTOLIC BLOOD PRESSURE: 134 MMHG | DIASTOLIC BLOOD PRESSURE: 67 MMHG | OXYGEN SATURATION: 99 % | HEART RATE: 51 BPM | TEMPERATURE: 97.5 F

## 2023-10-13 DIAGNOSIS — Z17.0 MALIGNANT NEOPLASM OF UPPER-OUTER QUADRANT OF LEFT BREAST IN FEMALE, ESTROGEN RECEPTOR POSITIVE (H): Primary | ICD-10-CM

## 2023-10-13 DIAGNOSIS — C50.412 MALIGNANT NEOPLASM OF UPPER-OUTER QUADRANT OF LEFT BREAST IN FEMALE, ESTROGEN RECEPTOR POSITIVE (H): Primary | ICD-10-CM

## 2023-10-13 PROCEDURE — 90480 ADMN SARSCOV2 VAC 1/ONLY CMP: CPT | Performed by: INTERNAL MEDICINE

## 2023-10-13 PROCEDURE — 250N000011 HC RX IP 250 OP 636: Mod: JZ | Performed by: PHYSICIAN ASSISTANT

## 2023-10-13 PROCEDURE — 96402 CHEMO HORMON ANTINEOPL SQ/IM: CPT

## 2023-10-13 PROCEDURE — 91320 SARSCV2 VAC 30MCG TRS-SUC IM: CPT | Mod: 59 | Performed by: INTERNAL MEDICINE

## 2023-10-13 PROCEDURE — 250N000011 HC RX IP 250 OP 636: Mod: 59 | Performed by: INTERNAL MEDICINE

## 2023-10-13 RX ADMIN — COVID-19 VACCINE, MRNA 30 MCG: 0.05 INJECTION, SUSPENSION INTRAMUSCULAR at 09:31

## 2023-10-13 RX ADMIN — GOSERELIN ACETATE 3.6 MG: 3.6 IMPLANT SUBCUTANEOUS at 09:33

## 2023-10-13 NOTE — PROGRESS NOTES
Infusion Nursing Note:  Leigh Espinal presents today for Cycle 38 Day 1 Zoladex and covid vaccine.    Patient seen by provider today: No   present during visit today: Not Applicable.    Note: Patient presents to the infusion center today denying any new symptoms or concerns. No pain, fevers, chills, chest pain or shortness of breath.    *Patient ices prior to injection.    Patient requested covid vaccine today. Patient consented prior to administration.     Intravenous Access:  No Intravenous access/labs at this visit.    Treatment Conditions:  Not Applicable.    Post Infusion Assessment:  Patient tolerated Zoladex injection in right lower abd without incident.   Patient tolerated covid injection in left deltoid without incident.   Patient refused to wait 15 mins after her covid vaccine for observation per protocol. She has the nurse triage line number to call if any symptoms arise.     Discharge Plan:   Patient declined prescription refills.  Discharge instructions reviewed with: Patient.  Patient and/or family verbalized understanding of discharge instructions and all questions answered.  AVS to patient via cheerappT.  Patient will return 11/9 for next appointment.   Patient discharged in stable condition accompanied by: self.  Departure Mode: Ambulatory.      Treva Moreno RN

## 2023-10-13 NOTE — PATIENT INSTRUCTIONS
[facility] Triage and after hours / weekends / holidays:  [phone]    Please call the triage or after hours line if you experience a temperature greater than or equal to 100.4, shaking chills, have uncontrolled nausea, vomiting and/or diarrhea, dizziness, shortness of breath, chest pain, bleeding, unexplained bruising, or if you have any other new/concerning symptoms, questions or concerns.      If you are having any concerning symptoms or wish to speak to a provider before your next infusion visit, please call triage to notify them so we can adequately serve you.     If you need a refill on a narcotic prescription or other medication, please call before your infusion appointment.

## 2023-10-18 ENCOUNTER — ANCILLARY PROCEDURE (OUTPATIENT)
Dept: CT IMAGING | Facility: CLINIC | Age: 58
End: 2023-10-18
Attending: RADIOLOGY
Payer: COMMERCIAL

## 2023-10-18 DIAGNOSIS — Q25.72 PULMONARY ARTERIOVENOUS MALFORMATION: ICD-10-CM

## 2023-10-18 DIAGNOSIS — I78.0 HHT (HEREDITARY HEMORRHAGIC TELANGIECTASIA) (H): ICD-10-CM

## 2023-10-18 PROCEDURE — 71275 CT ANGIOGRAPHY CHEST: CPT | Mod: GC | Performed by: RADIOLOGY

## 2023-10-18 RX ORDER — IOPAMIDOL 755 MG/ML
100 INJECTION, SOLUTION INTRAVASCULAR ONCE
Status: COMPLETED | OUTPATIENT
Start: 2023-10-18 | End: 2023-10-18

## 2023-10-18 RX ORDER — IOPAMIDOL 755 MG/ML
63 INJECTION, SOLUTION INTRAVASCULAR ONCE
Status: DISCONTINUED | OUTPATIENT
Start: 2023-10-18 | End: 2023-10-18

## 2023-10-18 RX ADMIN — IOPAMIDOL 100 ML: 755 INJECTION, SOLUTION INTRAVASCULAR at 15:40

## 2023-10-25 ENCOUNTER — VIRTUAL VISIT (OUTPATIENT)
Dept: RADIOLOGY | Facility: CLINIC | Age: 58
End: 2023-10-25
Attending: RADIOLOGY
Payer: COMMERCIAL

## 2023-10-25 VITALS — WEIGHT: 175 LBS | BODY MASS INDEX: 30.04 KG/M2

## 2023-10-25 DIAGNOSIS — Q25.72 PULMONARY ARTERIOVENOUS MALFORMATION: Primary | ICD-10-CM

## 2023-10-25 PROCEDURE — 99203 OFFICE O/P NEW LOW 30 MIN: CPT | Mod: VID | Performed by: RADIOLOGY

## 2023-10-25 ASSESSMENT — PAIN SCALES - GENERAL: PAINLEVEL: NO PAIN (0)

## 2023-10-25 NOTE — PROGRESS NOTES
"Virtual Visit Details    Type of service:  Video Visit     Originating Location (pt. Location): Home    Distant Location (provider location):  On-site  Platform used for Video Visit: [facility]        INTERVENTIONAL RADIOLOGY CONSULTATION    Name: Leigh Espinal  Age: 58 year old   Referring Physician: Dr. Fernandez   REASON FOR REFERRAL: followup pulmonary AVM     HPI: Ms. Espinal is seen today for routine follow-up status post pulmonary AVM embolization in 2015.  Since our last visit, she was diagnosed with breast cancer in January 2020.  She underwent mastectomy and sentinel lymph node biopsy, with some postprocedure issues that ultimately resolved.  She sees Dr. Holbrook for management, currently only on Zoladex to reduce estradiol levels.    With regards to her pulmonary AVM, she is doing well. She is having some shortness of breath she thinks from inactivity as it is only when walking stairs, but not with routine walking or household chores. No cough. No dizziness, vision changes, headaches, focal neurologic symptoms.     No epistaxis.  She notes that since being on Zoladex, her mucosal telangiectasias have significantly decreased.    Her energy level is \"good\".    No blood in stool. No vomiting. No chest pain.       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    CHEK2 c.1100delC [facility] 12/19/2019             MEDICATIONS:   Prescription " Medications as of 10/25/2023         Rx Number Disp Refills Start End Last Dispensed Date Next Fill Date Owning Pharmacy    acetaminophen (TYLENOL) 500 MG tablet  120 tablet 0 9/24/2021    [facility] - [place], [place] - [address] [facility]    Sig: Take 2 tablets (1,000 mg) by mouth 3 times daily    Class: E-Prescribe    Route: Oral    amLODIPine (NORVASC) 10 MG tablet    12/28/2021        Class: Historical    atenolol (TENORMIN) 50 MG tablet    1/29/2020    [address]    Sig: Take 50 mg by mouth every evening    Class: Historical    Route: Oral    lisinopril-hydrochlorothiazide (ZESTORETIC) 20-25 MG tablet    1/17/2023        Class: Historical    pantoprazole (PROTONIX) 20 MG EC tablet   3 1/12/2018        Sig: Take 20 mg by mouth daily as needed     Class: Historical    Route: Oral    sertraline (ZOLOFT) 50 MG tablet  135 tablet 3 3/8/2023    [address]    Sig: Take 1.5 tablets (75 mg) by mouth every evening    Class: E-Prescribe    Route: Oral    traZODone (DESYREL) 50 MG tablet            Sig: Take  mg by mouth At Bedtime    Class: Historical    Route: Oral            ALLERGIES:   Sulfa antibiotics    ROS:  As above    Physical Examination:   VITALS:   Wt 79.4 kg (175 lb)   LMP 03/01/2020 (Approximate)   BMI 30.04 kg/m    GENERAL: Healthy, alert and no distress  SKIN: Visible skin clear. No significant rash, abnormal pigmentation or lesions.  PSYCH: Mentation appears normal, affect normal/bright, judgement and insight intact, normal speech and appearance well-groomed.  EYES: Eyes grossly normal to inspection.  No discharge or erythema, or obvious scleral/conjunctival abnormalities.  RESP: No audible wheeze, cough, or visible cyanosis.  No visible retractions or increased work of breathing.    NEURO: Cranial nerves grossly intact.  Mentation and speech appropriate for age.    Labs:    BMP RESULTS:  Lab Results    Component Value Date     03/07/2023     09/09/2020    POTASSIUM 4.0 03/07/2023    POTASSIUM 3.2 (L) 10/12/2021    POTASSIUM 3.6 09/09/2020    CHLORIDE 105 03/07/2023    CHLORIDE 100 10/12/2021    CHLORIDE 109 09/09/2020    CO2 28 03/07/2023    CO2 29 10/12/2021    CO2 22 09/09/2020    ANIONGAP 8 03/07/2023    ANIONGAP 6 10/12/2021    ANIONGAP 9 09/09/2020     (H) 03/07/2023     (H) 10/12/2021     (H) 09/09/2020    BUN 10.4 03/07/2023    BUN 13 10/12/2021    BUN 15 09/09/2020    CR 0.76 03/07/2023    CR 0.89 09/09/2020    GFRESTIMATED >90 03/07/2023    GFRESTIMATED >60 10/12/2021    GFRESTIMATED 74 09/09/2020    GFRESTBLACK 86 09/09/2020    ILIANA 10.2 (H) 03/07/2023    ILIANA 9.3 09/09/2020        CBC RESULTS:  Lab Results   Component Value Date    WBC 6.4 03/07/2023    WBC 6.2 09/09/2020    RBC 5.28 (H) 03/07/2023    RBC 4.72 09/09/2020    HGB 14.8 03/07/2023    HGB 13.6 09/09/2020    HCT 45.9 03/07/2023    HCT 42.1 09/09/2020    MCV 87 03/07/2023    MCV 89 09/09/2020    MCH 28.0 03/07/2023    MCH 28.8 09/09/2020    MCHC 32.2 03/07/2023    MCHC 32.3 09/09/2020    RDW 14.6 03/07/2023    RDW 15.0 09/09/2020     03/07/2023     09/09/2020       INR/PTT:  Lab Results   Component Value Date    INR 1.20 (H) 10/12/2021    INR 1.11 03/04/2015       Diagnostic studies:   Imaging was personally reviewed and interpreted by me.  Chest CT shows stable, very small peripheral pulmonary AVMs, below threshold for treatment.  Embolized AVM remains reduced.    Radiologist interpretation:  IMPRESSION:  1. Right upper lobe embolization plug unchanged in position. Nodular  opacity that the plug spans is smaller. Nodular opacity between the  artery distal to the plug and the persistent draining vein measures 2  mm in diameter, unchanged.     2. Two sub 2 mm arteriovenous malformation nidi in the right upper  lobe are unchanged.     I have personally reviewed the examination and initial  interpretation  and I agree with the findings.     BENNETT DEVINE MD     Assessment: 58-year-old female with history of gastroesophageal reflux disease, status post Nissen revision, hypertension, lateral breast cancer status bilateral mastectomy and sentinel lymph node biopsy in 2020 now on Zoladex, also with HHT and a right middle lobe pulmonary AVM status post embolization March 2015. AVM has remained stable on imaging, significantly reduced in size compared to preembolization images.  Current CTA shows no new pulmonary AVMs, and stable postembolization changes and stable small peripheral pulmonary AVMs below threshold for treatment.     Plan: Followup CTA with clinic visit in 5 years, sooner if needed.  I instructed Leigh to reach out to me sooner should she develop a TIA or respiratory symptoms or activity intolerance.       It was a pleasure to conduct this video visit with Mrs. Espinal today. Thank you for involving the Interventional Radiology service in her care.     I spent a total of 13 minutes on this video visit, over 50% time was for counseling and coordination.  In addition I spent 10 minutes completing documentation and 5 minutes reviewing imaging.    Minerva Israel MD  Interventional Radiology   Pager [phone]      Minerva Israel MD  Interventional Radiology Attending                 [facility]       Review of the result(s) of each unique test - CTA  Independent interpretation of a test performed by another physician/other qualified health care professional (not separately reported) - CTA     Video-Visit Details     Type of service:  Video Visit     Video Start and End Time:9:26 AM    Video Start and End Time:9:39 AM    Originating Location (pt. Location): Home     Distant Location (provider location):  [facility] VASCULAR [facility]      Platform used for Video Visit: [employer]       Patient Care Team:  Darleen Ventura MD as PCP - General (Family  Practice)  Minerva Israel MD as MD (Radiology)  Samaria Gooden GC as Genetic Counselor (Genetic Counselor, MS)  Katarzyna Swann MD as MD (Breast Oncology)  Zita Brennan, RN as Specialty Care Coordinator (Breast Oncology)  Katarzyna Swann MD as Assigned Cancer Care Provider  SELF, REFERRED

## 2023-10-25 NOTE — NURSING NOTE
Is the patient currently in the state of [place]? YES    Visit mode:VIDEO    If the visit is dropped, the patient can be reconnected by: VIDEO VISIT: Text to cell phone:   Telephone Information:   Mobile [phone]       Will anyone else be joining the visit? NO      How would you like to obtain your AVS? MyChart    Are changes needed to the allergy or medication list? Pt stated no changes to allergies and Pt stated no med changes    Reason for visit: FAHEEM Kern LPN

## 2023-10-25 NOTE — PATIENT INSTRUCTIONS
Leigh,  You had your follow up visit with Dr. Israel today. She would like to see you in 5 years with a repeat CT scan. I will have this on my radar and schedule closer to 5 years. Please reach out if you need anything in the meantime.   Take care!  Maya Del Rosario MS,RN,CRN  Nurse Care Coordinator-Interventional Radiology  [phone]

## 2023-10-25 NOTE — LETTER
"    10/25/2023         RE: Leigh Espinal  [address] [place] [address]        Dear Colleague,    Thank you for referring your patient, Leigh Espinal, to the [facility]. Please see a copy of my visit note below.    Virtual Visit Details    Type of service:  Video Visit     Originating Location (pt. Location): Home    Distant Location (provider location):  On-site  Platform used for Video Visit: Well        INTERVENTIONAL RADIOLOGY CONSULTATION    Name: Leigh Espinal  Age: 58 year old   Referring Physician: Dr. Fernandez   REASON FOR REFERRAL: followup pulmonary AVM     HPI: Ms. Espinal is seen today for routine follow-up status post pulmonary AVM embolization in 2015.  Since our last visit, she was diagnosed with breast cancer in January 2020.  She underwent mastectomy and sentinel lymph node biopsy, with some postprocedure issues that ultimately resolved.  She sees Dr. Holbrook for management, currently only on Zoladex to reduce estradiol levels.    With regards to her pulmonary AVM, she is doing well. She is having some shortness of breath she thinks from inactivity as it is only when walking stairs, but not with routine walking or household chores. No cough. No dizziness, vision changes, headaches, focal neurologic symptoms.     No epistaxis.  She notes that since being on Zoladex, her mucosal telangiectasias have significantly decreased.    Her energy level is \"good\".    No blood in stool. No vomiting. No chest pain.       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    CHEK2 c.1100delC [facility] 12/19/2019             MEDICATIONS:   Prescription Medications as of 10/25/2023         Rx Number Disp Refills Start End Last Dispensed Date Next Fill Date Owning Pharmacy    acetaminophen (TYLENOL) 500 MG tablet  120 tablet 0 9/24/2021    [facility] - [place], [place] - [address] [facility]    Sig: Take 2 tablets (1,000 mg) by mouth 3 times daily    Class: E-Prescribe    Route: Oral    amLODIPine (NORVASC) 10 MG tablet    12/28/2021        Class: Historical    atenolol (TENORMIN) 50 MG tablet    1/29/2020    [address]    Sig: Take 50 mg by mouth every evening    Class: Historical    Route: Oral    lisinopril-hydrochlorothiazide (ZESTORETIC) 20-25 MG tablet    1/17/2023        Class: Historical    pantoprazole (PROTONIX) 20 MG EC tablet   3 1/12/2018        Sig: Take 20 mg by mouth daily as needed     Class: Historical    Route: Oral    sertraline (ZOLOFT) 50 MG tablet  135 tablet 3 3/8/2023    [address]    Sig: Take 1.5 tablets (75 mg) by mouth every evening    Class: E-Prescribe    Route: Oral    traZODone (DESYREL) 50 MG tablet            Sig: Take  mg by mouth At Bedtime    Class: Historical    Route: Oral            ALLERGIES:   Sulfa antibiotics    ROS:  As above    Physical Examination:   VITALS:   Wt 79.4 kg (175 lb)   LMP 03/01/2020 (Approximate)   BMI 30.04 kg/m    GENERAL: Healthy, alert and no distress  SKIN: Visible skin clear. No significant rash, abnormal pigmentation or lesions.  PSYCH: Mentation appears normal, affect normal/bright, judgement and insight intact, normal speech and appearance well-groomed.  EYES: Eyes grossly normal to inspection.  No discharge or erythema, or obvious  scleral/conjunctival abnormalities.  RESP: No audible wheeze, cough, or visible cyanosis.  No visible retractions or increased work of breathing.    NEURO: Cranial nerves grossly intact.  Mentation and speech appropriate for age.    Labs:    BMP RESULTS:  Lab Results   Component Value Date     03/07/2023     09/09/2020    POTASSIUM 4.0 03/07/2023    POTASSIUM 3.2 (L) 10/12/2021    POTASSIUM 3.6 09/09/2020    CHLORIDE 105 03/07/2023    CHLORIDE 100 10/12/2021    CHLORIDE 109 09/09/2020    CO2 28 03/07/2023    CO2 29 10/12/2021    CO2 22 09/09/2020    ANIONGAP 8 03/07/2023    ANIONGAP 6 10/12/2021    ANIONGAP 9 09/09/2020     (H) 03/07/2023     (H) 10/12/2021     (H) 09/09/2020    BUN 10.4 03/07/2023    BUN 13 10/12/2021    BUN 15 09/09/2020    CR 0.76 03/07/2023    CR 0.89 09/09/2020    GFRESTIMATED >90 03/07/2023    GFRESTIMATED >60 10/12/2021    GFRESTIMATED 74 09/09/2020    GFRESTBLACK 86 09/09/2020    ILIANA 10.2 (H) 03/07/2023    ILIANA 9.3 09/09/2020        CBC RESULTS:  Lab Results   Component Value Date    WBC 6.4 03/07/2023    WBC 6.2 09/09/2020    RBC 5.28 (H) 03/07/2023    RBC 4.72 09/09/2020    HGB 14.8 03/07/2023    HGB 13.6 09/09/2020    HCT 45.9 03/07/2023    HCT 42.1 09/09/2020    MCV 87 03/07/2023    MCV 89 09/09/2020    MCH 28.0 03/07/2023    MCH 28.8 09/09/2020    MCHC 32.2 03/07/2023    MCHC 32.3 09/09/2020    RDW 14.6 03/07/2023    RDW 15.0 09/09/2020     03/07/2023     09/09/2020       INR/PTT:  Lab Results   Component Value Date    INR 1.20 (H) 10/12/2021    INR 1.11 03/04/2015       Diagnostic studies:   Imaging was personally reviewed and interpreted by me.  Chest CT shows stable, very small peripheral pulmonary AVMs, below threshold for treatment.  Embolized AVM remains reduced.    Radiologist interpretation:  IMPRESSION:  1. Right upper lobe embolization plug unchanged in position. Nodular  opacity that the plug spans is smaller. Nodular opacity  between the  artery distal to the plug and the persistent draining vein measures 2  mm in diameter, unchanged.     2. Two sub 2 mm arteriovenous malformation nidi in the right upper  lobe are unchanged.     I have personally reviewed the examination and initial interpretation  and I agree with the findings.     BENNETT DEVINE MD     Assessment: 58-year-old female with history of gastroesophageal reflux disease, status post Nissen revision, hypertension, lateral breast cancer status bilateral mastectomy and sentinel lymph node biopsy in 2020 now on Zoladex, also with HHT and a right middle lobe pulmonary AVM status post embolization March 2015. AVM has remained stable on imaging, significantly reduced in size compared to preembolization images.  Current CTA shows no new pulmonary AVMs, and stable postembolization changes and stable small peripheral pulmonary AVMs below threshold for treatment.     Plan: Followup CTA with clinic visit in 5 years, sooner if needed.  I instructed Leigh to reach out to me sooner should she develop a TIA or respiratory symptoms or activity intolerance.       It was a pleasure to conduct this video visit with Mrs. Espinal today. Thank you for involving the Interventional Radiology service in her care.     I spent a total of 13 minutes on this video visit, over 50% time was for counseling and coordination.  In addition I spent 10 minutes completing documentation and 5 minutes reviewing imaging.    Minerva Israel MD  Interventional Radiology   Pager [phone]      Minerva Israel MD  Interventional Radiology Attending                 [facility]       Review of the result(s) of each unique test - CTA  Independent interpretation of a test performed by another physician/other qualified health care professional (not separately reported) - CTA     Video-Visit Details     Type of service:  Video Visit     Video Start and End Time:9:26 AM    Video Start and  End Time:9:39 AM    Originating Location (pt. Location): Home     Distant Location (provider location):  [facility] VASCULAR CLINIC [place]      Platform used for Video Visit: Levon     CC  Patient Care Team:  Darleen Ventura MD as PCP - General (Family Practice)  Minerva Israel MD as MD (Radiology)  Samaria Gooden GC as Genetic Counselor (Genetic Counselor, MS)  Katarzyna Swann MD as MD (Breast Oncology)  Zita Brennan, ENOCH as Specialty Care Coordinator (Breast Oncology)  Katarzyna Swann MD as Assigned Cancer Care Provider  SELF, REFERRED

## 2023-12-01 ENCOUNTER — TELEPHONE (OUTPATIENT)
Dept: GASTROENTEROLOGY | Facility: CLINIC | Age: 58
End: 2023-12-01
Payer: COMMERCIAL

## 2023-12-01 DIAGNOSIS — Z12.11 ENCOUNTER FOR SCREENING COLONOSCOPY: Primary | ICD-10-CM

## 2023-12-01 NOTE — TELEPHONE ENCOUNTER
"Endoscopy Scheduling Screen    Have you had a positive Covid test in the last 14 days?  No    Are you active on MyChart?   Yes    What insurance is in the chart?  Other:  [employer]    Ordering/Referring Provider: VALENCIA WAYNE   (If ordering provider performs procedure, schedule with ordering provider unless otherwise instructed. )    BMI: Estimated body mass index is 30.04 kg/m  as calculated from the following:    Height as of 10/20/21: 1.626 m (5' 4\").    Weight as of 10/25/23: 79.4 kg (175 lb).     Sedation Ordered  moderate sedation.   If patient BMI > 50 do not schedule in [facility].    If patient BMI > 45 do not schedule at [facility].    Are you taking methadone or Suboxone?  No    Are you taking any prescription medications for pain 3 or more times per week?   No    Do you have a history of malignant hyperthermia or adverse reaction to anesthesia?  No    (Females) Are you currently pregnant?   No     Have you been diagnosed or told you have pulmonary hypertension?   No    Do you have an LVAD?  No    Have you been told you have moderate to severe sleep apnea?  No    Have you been told you have COPD, asthma, or any other lung disease?  No    Do you have any heart conditions?  No     Have you ever had an organ transplant?   No    Have you ever had or are you awaiting a heart or lung transplant?   No    Have you had a stroke or transient ischemic attack (TIA aka \"mini stroke\" in the last 6 months?   No    Have you been diagnosed with or been told you have cirrhosis of the liver?   No    Are you currently on dialysis?   No    Do you need assistance transferring?   No    BMI: Estimated body mass index is 30.04 kg/m  as calculated from the following:    Height as of 10/20/21: 1.626 m (5' 4\").    Weight as of 10/25/23: 79.4 kg (175 lb).     Is patients BMI > 40 and scheduling location [facility]?  No    Do you take an injectable medication for weight loss or diabetes (excluding insulin)?  No    Do you take the " medication Naltrexone?  No    Do you take blood thinners?  No       Prep   Are you currently on dialysis or do you have chronic kidney disease?  No    Do you have a diagnosis of diabetes?  No    Do you have a diagnosis of cystic fibrosis (CF)?  No    On a regular basis do you go 3 -5 days between bowel movements?  No    BMI > 40?  No    Preferred Pharmacy:  [place], [place] - [address]  [address]  [facility] [address]  Phone: [phone] Fax: [phone] Alternate Fax: [phone], [phone]      Final Scheduling Details   Colonoscopy prep sent?  Standard MiraLAX    Procedure scheduled  Colonoscopy    Surgeon:  ROBYN BURRELL     Date of procedure:  3/12/2024     Pre-OP / PAC:   No - Not required for this site.    Location  [facility] - [facility] - Patient preference.    Sedation   Moderate Sedation - Per order.      Patient Reminders:   You will receive a call from a Nurse to review instructions and health history.  This assessment must be completed prior to your procedure.  Failure to complete the Nurse assessment may result in the procedure being cancelled.      On the day of your procedure, please designate an adult(s) who can drive you home stay with you for the next 24 hours. The medicines used in the exam will make you sleepy. You will not be able to drive.      You cannot take public transportation, ride share services, or non-medical taxi service without a responsible caregiver.  Medical transport services are allowed with the requirement that a responsible caregiver will receive you at your destination.  We require that drivers and caregivers are confirmed prior to your procedure.

## 2023-12-04 ENCOUNTER — ONCOLOGY VISIT (OUTPATIENT)
Dept: ONCOLOGY | Facility: CLINIC | Age: 58
End: 2023-12-04
Attending: INTERNAL MEDICINE
Payer: COMMERCIAL

## 2023-12-04 VITALS
WEIGHT: 174.5 LBS | RESPIRATION RATE: 14 BRPM | OXYGEN SATURATION: 97 % | HEART RATE: 66 BPM | BODY MASS INDEX: 29.95 KG/M2 | DIASTOLIC BLOOD PRESSURE: 82 MMHG | SYSTOLIC BLOOD PRESSURE: 125 MMHG | TEMPERATURE: 98.2 F

## 2023-12-04 DIAGNOSIS — Z17.0 MALIGNANT NEOPLASM OF UPPER-OUTER QUADRANT OF LEFT BREAST IN FEMALE, ESTROGEN RECEPTOR POSITIVE (H): Primary | ICD-10-CM

## 2023-12-04 DIAGNOSIS — F43.22 ADJUSTMENT DISORDER WITH ANXIOUS MOOD: ICD-10-CM

## 2023-12-04 DIAGNOSIS — R53.83 OTHER FATIGUE: ICD-10-CM

## 2023-12-04 DIAGNOSIS — C50.412 MALIGNANT NEOPLASM OF UPPER-OUTER QUADRANT OF LEFT BREAST IN FEMALE, ESTROGEN RECEPTOR POSITIVE (H): Primary | ICD-10-CM

## 2023-12-04 LAB
ALBUMIN SERPL BCG-MCNC: 4.2 G/DL (ref 3.5–5.2)
ALP SERPL-CCNC: 158 U/L (ref 40–150)
ALT SERPL W P-5'-P-CCNC: 67 U/L (ref 0–50)
ANION GAP SERPL CALCULATED.3IONS-SCNC: 10 MMOL/L (ref 7–15)
AST SERPL W P-5'-P-CCNC: ABNORMAL U/L
BASOPHILS # BLD AUTO: 0 10E3/UL (ref 0–0.2)
BASOPHILS NFR BLD AUTO: 0 %
BILIRUB SERPL-MCNC: 0.2 MG/DL
BUN SERPL-MCNC: 16 MG/DL (ref 6–20)
CALCIUM SERPL-MCNC: 10.3 MG/DL (ref 8.6–10)
CHLORIDE SERPL-SCNC: 104 MMOL/L (ref 98–107)
CREAT SERPL-MCNC: 0.7 MG/DL (ref 0.51–0.95)
DEPRECATED HCO3 PLAS-SCNC: 27 MMOL/L (ref 22–29)
EGFRCR SERPLBLD CKD-EPI 2021: >90 ML/MIN/1.73M2
EOSINOPHIL # BLD AUTO: 0 10E3/UL (ref 0–0.7)
EOSINOPHIL NFR BLD AUTO: 1 %
ERYTHROCYTE [DISTWIDTH] IN BLOOD BY AUTOMATED COUNT: 14.6 % (ref 10–15)
ESTRADIOL SERPL-MCNC: 9 PG/ML
FSH SERPL IRP2-ACNC: 9.9 MIU/ML
GLUCOSE SERPL-MCNC: 101 MG/DL (ref 70–99)
HCT VFR BLD AUTO: 46.5 % (ref 35–47)
HGB BLD-MCNC: 15.4 G/DL (ref 11.7–15.7)
IMM GRANULOCYTES # BLD: 0 10E3/UL
IMM GRANULOCYTES NFR BLD: 0 %
IRON BINDING CAPACITY (ROCHE): NORMAL
IRON SATN MFR SERPL: NORMAL %
IRON SERPL-MCNC: 94 UG/DL (ref 37–145)
LYMPHOCYTES # BLD AUTO: 2.2 10E3/UL (ref 0.8–5.3)
LYMPHOCYTES NFR BLD AUTO: 36 %
MCH RBC QN AUTO: 28.6 PG (ref 26.5–33)
MCHC RBC AUTO-ENTMCNC: 33.1 G/DL (ref 31.5–36.5)
MCV RBC AUTO: 86 FL (ref 78–100)
MONOCYTES # BLD AUTO: 0.4 10E3/UL (ref 0–1.3)
MONOCYTES NFR BLD AUTO: 6 %
NEUTROPHILS # BLD AUTO: 3.6 10E3/UL (ref 1.6–8.3)
NEUTROPHILS NFR BLD AUTO: 57 %
NRBC # BLD AUTO: 0 10E3/UL
NRBC BLD AUTO-RTO: 0 /100
PLATELET # BLD AUTO: 189 10E3/UL (ref 150–450)
POTASSIUM SERPL-SCNC: 4 MMOL/L (ref 3.4–5.3)
PROT SERPL-MCNC: 7.2 G/DL (ref 6.4–8.3)
RBC # BLD AUTO: 5.39 10E6/UL (ref 3.8–5.2)
SODIUM SERPL-SCNC: 141 MMOL/L (ref 135–145)
TSH SERPL DL<=0.005 MIU/L-ACNC: 2.24 UIU/ML (ref 0.3–4.2)
VIT B12 SERPL-MCNC: 705 PG/ML (ref 232–1245)
VIT D+METAB SERPL-MCNC: 18 NG/ML (ref 20–50)
WBC # BLD AUTO: 6.2 10E3/UL (ref 4–11)

## 2023-12-04 PROCEDURE — 250N000011 HC RX IP 250 OP 636: Mod: JZ | Performed by: PHYSICIAN ASSISTANT

## 2023-12-04 PROCEDURE — 99214 OFFICE O/P EST MOD 30 MIN: CPT | Performed by: INTERNAL MEDICINE

## 2023-12-04 PROCEDURE — 83001 ASSAY OF GONADOTROPIN (FSH): CPT | Performed by: INTERNAL MEDICINE

## 2023-12-04 PROCEDURE — 36415 COLL VENOUS BLD VENIPUNCTURE: CPT | Performed by: INTERNAL MEDICINE

## 2023-12-04 PROCEDURE — 82306 VITAMIN D 25 HYDROXY: CPT | Performed by: INTERNAL MEDICINE

## 2023-12-04 PROCEDURE — 84155 ASSAY OF PROTEIN SERUM: CPT | Performed by: INTERNAL MEDICINE

## 2023-12-04 PROCEDURE — 96402 CHEMO HORMON ANTINEOPL SQ/IM: CPT

## 2023-12-04 PROCEDURE — 82670 ASSAY OF TOTAL ESTRADIOL: CPT | Performed by: INTERNAL MEDICINE

## 2023-12-04 PROCEDURE — 83550 IRON BINDING TEST: CPT | Performed by: INTERNAL MEDICINE

## 2023-12-04 PROCEDURE — 99213 OFFICE O/P EST LOW 20 MIN: CPT | Mod: 25 | Performed by: INTERNAL MEDICINE

## 2023-12-04 PROCEDURE — 85025 COMPLETE CBC W/AUTO DIFF WBC: CPT | Performed by: INTERNAL MEDICINE

## 2023-12-04 PROCEDURE — 84443 ASSAY THYROID STIM HORMONE: CPT | Performed by: INTERNAL MEDICINE

## 2023-12-04 PROCEDURE — 82607 VITAMIN B-12: CPT | Performed by: INTERNAL MEDICINE

## 2023-12-04 RX ADMIN — GOSERELIN ACETATE 3.6 MG: 3.6 IMPLANT SUBCUTANEOUS at 15:01

## 2023-12-04 ASSESSMENT — PAIN SCALES - GENERAL: PAINLEVEL: NO PAIN (0)

## 2023-12-04 NOTE — PROGRESS NOTES
Medical oncology follow-up visit  12/04/2023     Reason for Visit: Follow up of bilateral breast cancer    Oncology HPI:   Ms. Espinal is a 58 year old female with bilateral breast cancers. Other past medical history is notable for HHT (follows with Dr. Real), pulmonary and liver AVMs, GERD and HTN. Bilateral screening mammograms on 10/23/19 showed architectural distortion and developing focal asymmetry in the right breast, and possible developing asymmetry in the left breast. Diagnostic mammograms showed a 2 x 1.4 cm mass at 1:00 right breast and a 9 mm mass at 2:00 left breast. An additional 1.4 cm satellite mass in the left breast (total diameter of both left breast masses, 2.1 cm) was noted on contrast mammogram on 11/21/19.  Biopsy of the right breast at 1:00 revealed invasive ductal carcinoma, grade 1, ER+ (98%), GA+ (99%), HER2 negative. Biopsy of the left breast 2:00 revealed invasive lobular carcinoma, grade 2, ER+ (95%), GA+ (75%), and HER2 negative.  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       She underwent bilateral nipple sparing mastectomies, tissue expander placement, and bilateral axillary sentinel lymph node biopsies on 1/29/20 under the care of Dr. Smith. Final pathology revealed grade 2 invasive mammary carcinoma in the right breast measuring 2.1 cm, a smaller focus of invasive mammary carcinoma (mixed ductal and cribriform) measuring 3.6 mm, admixed DCIS, and negative margins. The left breast showed grade 2 invasive lobular carcinoma, measuring 1.6 cm, DCIS and LCIS, and negative margins. The right axillary sentinel node was benign; the left axillary sentinel node showed micrometastatic carcinoma measuring 1.5 mm without extranodal extension.     OncotypeDx was ordered for right and left breast cancers.  Oncotype dx recurrence score of the left breast cancer was 10. Oncotype on the right breast cancer was 18.       Her surgery  was complicated by skin necrosis requiring additional surgery.     She received adjuvant left chest wall and low axillary radiation: 5040 cGy in 28 fractions under the care of Dr. Mary Brunson from 3/26/20 -5/5/20.      Started Zoladex 8/2020.  She had her reconstruction surgery Sept 20.  She underwent a bilateral free MIKAL flap reconstruction on 09/20/2021.    She has been unable to tolerate AI.     Interval history:      Leigh returns today for follow-up.  Overall she is doing well.  She had a trial off of Zoladex given she is now 58 years old.  She did resume menstrual periods with elevations in her estradiol levels.  She subsequently restarted Zoladex shots.    Her biggest complaint today is fatigue.  She says work has been stressful. She however has been more fatigued than she expected and is concerned about how she is doing.    She wishes she was exercising regularly.  She has no other complaints today.    ROS: 10 point ROS neg other than the symptoms noted above in the HPI.    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    CHEK2 [facility] 12/19/2019        Current Outpatient Medications   Medication Sig Dispense Refill     acetaminophen (TYLENOL) 500 MG tablet Take 2 tablets (1,000 mg) by mouth 3 times daily (Patient not taking: Reported on 10/13/2023) 120 tablet 0     amLODIPine (NORVASC) 10 MG tablet        atenolol (TENORMIN) 50 MG  tablet Take 50 mg by mouth every evening       lisinopril-hydrochlorothiazide (ZESTORETIC) 20-25 MG tablet        pantoprazole (PROTONIX) 20 MG EC tablet Take 20 mg by mouth daily as needed   3     sertraline (ZOLOFT) 50 MG tablet Take 1.5 tablets (75 mg) by mouth every evening 135 tablet 3     traZODone (DESYREL) 50 MG tablet Take  mg by mouth At Bedtime            Allergies   Allergen Reactions     Sulfa Antibiotics      Got really sick, headache, facial swelling, felt like she was dying.   Delayed reaction happened ~3-4 days after she started taking it.        Exam:  Last menstrual period 03/01/2020, not currently breastfeeding.  Wt Readings from Last 4 Encounters:   10/25/23 79.4 kg (175 lb)   06/19/23 81.7 kg (180 lb 1.6 oz)   03/28/22 77.5 kg (170 lb 12.8 oz)   01/27/22 75.3 kg (166 lb)     LMP 03/01/2020 (Approximate)         PE:  GENERAL:  Alert, no acute distress  HEAD/NECK:  No scleral icterus  CV: rrr  Lungs: clear  Abd: soft, nt, nd + bs  Ext : no edema  Breast: s/p bilateral mastectomy with MIKAL reconstruction.  She has scar tissue in the medial right breast that is unchanged,  No nodules or masses, no axillary adenopathy  PSYCH: Normal affect    LABS:   Reviewed her prior imaging and labs.      Assessment/plan:   1. Bilateral Breast Cancer (Left breast cancer [id number] ER + CA + her2 negative, oncotype 10 s/p mastectomy and SNLB and Right breast cancer T2(2)N0 ER + CA + her2 negative, oncotype 18 s/p mastectomy and SNLB)  - S/p bilateral mastectomy and left chest wall RT    - Zoladex monthly.  She is a little behind of zoladex; will recheck hormone levels.  - Off AI. Was unable to tolerate trial of Anastrozole. Discussed possibility of restarting, and patient has declined.     2. CHEK2 mutation: Colonoscopy due in March 2024    3. Lymphedema: Followed with PT. Overall this has not been problem.      4. HHT: follows with Dr. Real. No significant bleeding at this time.     5. Fatigue - check b12,  iron, vitamin d.      Katarzyna Swann MD

## 2023-12-04 NOTE — NURSING NOTE
"Oncology Rooming Note    December 4, 2023 2:33 PM   Leigh Espinal is a 58 year old female who presents for:    Chief Complaint   Patient presents with    Oncology Clinic Visit     Breast CA     Initial Vitals: /82 (BP Location: Right arm, Patient Position: Sitting, Cuff Size: Adult Regular)   Pulse 66   Temp 98.2  F (36.8  C) (Oral)   Resp 14   Wt 79.2 kg (174 lb 8 oz)   LMP 03/01/2020 (Approximate)   SpO2 97%   BMI 29.95 kg/m   Estimated body mass index is 29.95 kg/m  as calculated from the following:    Height as of 10/20/21: 1.626 m (5' 4\").    Weight as of this encounter: 79.2 kg (174 lb 8 oz). Body surface area is 1.89 meters squared.  No Pain (0) Comment: Data Unavailable   Patient's last menstrual period was 03/01/2020 (approximate).  Allergies reviewed: Yes  Medications reviewed: No    Medications: Medication refills not needed today.  Pharmacy name entered into EPIC:    [facility] - [address] [facility] - [place], [place] - [address] [facility] [place], [place] - [phone]    Clinical concerns: Provider walked in during rooming process. Did review the patient's medications list.        Salena Raygoza              "

## 2023-12-04 NOTE — NURSING NOTE
Infusion Nursing Note:  Leigh Espinal presents today for Zoladex.    Patient seen by provider today: Dr. Swann   present during visit today: Not Applicable.    Note: Patient iced prior to injection for comfort.    Intravenous Access:  Lab draw site VPT left antecubital, Needle type butterfly, Gauge 23.  Labs drawn without difficulty.    Treatment Conditions:  Not Applicable.    Post Infusion Assessment:  Patient tolerated injection without incident.  Zoladex administered via deep subcutaneous injection into the LEFT side of patient's abdomen.     Discharge Plan:   Discharge instructions reviewed with: Patient.  Patient discharged in stable condition accompanied by: self.  Departure Mode: Ambulatory.      JESSICA NEWELL RN

## 2023-12-04 NOTE — LETTER
12/4/2023         RE: Leigh Espinal  [address] [place] [address]        Dear Colleague,    Thank you for referring your patient, Leigh Espinal, to the [facility]. Please see a copy of my visit note below.       Medical oncology follow-up visit  12/04/2023     Reason for Visit: Follow up of bilateral breast cancer    Oncology HPI:   Ms. Espinal is a 58 year old female with bilateral breast cancers. Other past medical history is notable for HHT (follows with Dr. Real), pulmonary and liver AVMs, GERD and HTN. Bilateral screening mammograms on 10/23/19 showed architectural distortion and developing focal asymmetry in the right breast, and possible developing asymmetry in the left breast. Diagnostic mammograms showed a 2 x 1.4 cm mass at 1:00 right breast and a 9 mm mass at 2:00 left breast. An additional 1.4 cm satellite mass in the left breast (total diameter of both left breast masses, 2.1 cm) was noted on contrast mammogram on 11/21/19.  Biopsy of the right breast at 1:00 revealed invasive ductal carcinoma, grade 1, ER+ (98%), AK+ (99%), HER2 negative. Biopsy of the left breast 2:00 revealed invasive lobular carcinoma, grade 2, ER+ (95%), AK+ (75%), and HER2 negative.  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       She underwent bilateral nipple sparing mastectomies, tissue expander placement, and bilateral axillary sentinel lymph node biopsies on 1/29/20 under the care of Dr. Smith. Final pathology revealed grade 2 invasive mammary carcinoma in the right breast measuring 2.1 cm, a smaller focus of invasive mammary carcinoma (mixed ductal and cribriform) measuring 3.6 mm, admixed DCIS, and negative margins. The left breast showed grade 2 invasive lobular carcinoma, measuring 1.6 cm, DCIS and LCIS, and negative margins. The right axillary sentinel node was benign; the left axillary sentinel node  showed micrometastatic carcinoma measuring 1.5 mm without extranodal extension.     OncotypeDx was ordered for right and left breast cancers.  Oncotype dx recurrence score of the left breast cancer was 10. Oncotype on the right breast cancer was 18.       Her surgery was complicated by skin necrosis requiring additional surgery.     She received adjuvant left chest wall and low axillary radiation: 5040 cGy in 28 fractions under the care of Dr. Mary Brunson from 3/26/20 -5/5/20.      Started Zoladex 8/2020.  She had her reconstruction surgery Sept 20.  She underwent a bilateral free MIKAL flap reconstruction on 09/20/2021.    She has been unable to tolerate AI.     Interval history:      Leigh returns today for follow-up.  Overall she is doing well.  She had a trial off of Zoladex given she is now 58 years old.  She did resume menstrual periods with elevations in her estradiol levels.  She subsequently restarted Zoladex shots.    Her biggest complaint today is fatigue.  She says work has been stressful. She however has been more fatigued than she expected and is concerned about how she is doing.    She wishes she was exercising regularly.  She has no other complaints today.    ROS: 10 point ROS neg other than the symptoms noted above in the HPI.    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    CHEK2 c.1100delC [facility] 12/19/2019        Current Outpatient  Medications   Medication Sig Dispense Refill     acetaminophen (TYLENOL) 500 MG tablet Take 2 tablets (1,000 mg) by mouth 3 times daily (Patient not taking: Reported on 10/13/2023) 120 tablet 0     amLODIPine (NORVASC) 10 MG tablet        atenolol (TENORMIN) 50 MG tablet Take 50 mg by mouth every evening       lisinopril-hydrochlorothiazide (ZESTORETIC) 20-25 MG tablet        pantoprazole (PROTONIX) 20 MG EC tablet Take 20 mg by mouth daily as needed   3     sertraline (ZOLOFT) 50 MG tablet Take 1.5 tablets (75 mg) by mouth every evening 135 tablet 3     traZODone (DESYREL) 50 MG tablet Take  mg by mouth At Bedtime            Allergies   Allergen Reactions     Sulfa Antibiotics      Got really sick, headache, facial swelling, felt like she was dying.   Delayed reaction happened ~3-4 days after she started taking it.        Exam:  Last menstrual period 03/01/2020, not currently breastfeeding.  Wt Readings from Last 4 Encounters:   10/25/23 79.4 kg (175 lb)   06/19/23 81.7 kg (180 lb 1.6 oz)   03/28/22 77.5 kg (170 lb 12.8 oz)   01/27/22 75.3 kg (166 lb)     LMP 03/01/2020 (Approximate)         PE:  GENERAL:  Alert, no acute distress  HEAD/NECK:  No scleral icterus  CV: rrr  Lungs: clear  Abd: soft, nt, nd + bs  Ext : no edema  Breast: s/p bilateral mastectomy with MIKAL reconstruction.  She has scar tissue in the medial right breast that is unchanged,  No nodules or masses, no axillary adenopathy  PSYCH: Normal affect    LABS:   Reviewed her prior imaging and labs.      Assessment/plan:   1. Bilateral Breast Cancer (Left breast cancer [id number] ER + IA + her2 negative, oncotype 10 s/p mastectomy and SNLB and Right breast cancer T2(2)N0 ER + IA + her2 negative, oncotype 18 s/p mastectomy and SNLB)  - S/p bilateral mastectomy and left chest wall RT    - Zoladex monthly.  She is a little behind of zoladex; will recheck hormone levels.  - Off AI. Was unable to tolerate trial of Anastrozole. Discussed possibility of  restarting, and patient has declined.     2. CHEK2 mutation: Colonoscopy due in March 2024    3. Lymphedema: Followed with PT. Overall this has not been problem.      4. HHT: follows with Dr. Real. No significant bleeding at this time.     5. Fatigue - check b12, iron, vitamin d.      Katarzyna Swann MD

## 2023-12-07 DIAGNOSIS — Z17.0 MALIGNANT NEOPLASM OF UPPER-OUTER QUADRANT OF LEFT BREAST IN FEMALE, ESTROGEN RECEPTOR POSITIVE (H): Primary | ICD-10-CM

## 2023-12-07 DIAGNOSIS — R79.89 ELEVATED LIVER FUNCTION TESTS: ICD-10-CM

## 2023-12-07 DIAGNOSIS — C50.412 MALIGNANT NEOPLASM OF UPPER-OUTER QUADRANT OF LEFT BREAST IN FEMALE, ESTROGEN RECEPTOR POSITIVE (H): Primary | ICD-10-CM

## 2023-12-14 ENCOUNTER — ANCILLARY PROCEDURE (OUTPATIENT)
Dept: ULTRASOUND IMAGING | Facility: CLINIC | Age: 58
End: 2023-12-14
Attending: INTERNAL MEDICINE
Payer: COMMERCIAL

## 2023-12-14 DIAGNOSIS — R79.89 ELEVATED LIVER FUNCTION TESTS: ICD-10-CM

## 2023-12-14 DIAGNOSIS — Z17.0 MALIGNANT NEOPLASM OF UPPER-OUTER QUADRANT OF LEFT BREAST IN FEMALE, ESTROGEN RECEPTOR POSITIVE (H): ICD-10-CM

## 2023-12-14 DIAGNOSIS — C50.412 MALIGNANT NEOPLASM OF UPPER-OUTER QUADRANT OF LEFT BREAST IN FEMALE, ESTROGEN RECEPTOR POSITIVE (H): ICD-10-CM

## 2023-12-14 PROCEDURE — 76705 ECHO EXAM OF ABDOMEN: CPT | Mod: GC | Performed by: STUDENT IN AN ORGANIZED HEALTH CARE EDUCATION/TRAINING PROGRAM

## 2023-12-20 DIAGNOSIS — R79.89 ELEVATED LIVER FUNCTION TESTS: ICD-10-CM

## 2023-12-20 DIAGNOSIS — Q25.72 PULMONARY ARTERIOVENOUS MALFORMATION: Primary | ICD-10-CM

## 2023-12-20 DIAGNOSIS — I78.0 HHT (HEREDITARY HEMORRHAGIC TELANGIECTASIA) (H): ICD-10-CM

## 2024-01-05 ENCOUNTER — PATIENT OUTREACH (OUTPATIENT)
Dept: ONCOLOGY | Facility: CLINIC | Age: 59
End: 2024-01-05
Payer: COMMERCIAL

## 2024-01-05 DIAGNOSIS — I10 HTN (HYPERTENSION): Primary | ICD-10-CM

## 2024-01-05 RX ORDER — LISINOPRIL AND HYDROCHLOROTHIAZIDE 20; 25 MG/1; MG/1
1 TABLET ORAL DAILY
Status: CANCELLED | OUTPATIENT
Start: 2024-01-05

## 2024-01-05 NOTE — PROGRESS NOTES
Pt ended up getting some tablets to get her through until she sees her doctor so she does not need the refill from Dr Swann.

## 2024-01-18 ENCOUNTER — ANCILLARY PROCEDURE (OUTPATIENT)
Dept: MRI IMAGING | Facility: CLINIC | Age: 59
End: 2024-01-18
Attending: INTERNAL MEDICINE
Payer: COMMERCIAL

## 2024-01-18 DIAGNOSIS — Q25.72 PULMONARY ARTERIOVENOUS MALFORMATION: ICD-10-CM

## 2024-01-18 DIAGNOSIS — I78.0 HHT (HEREDITARY HEMORRHAGIC TELANGIECTASIA) (H): ICD-10-CM

## 2024-01-18 DIAGNOSIS — R79.89 ELEVATED LIVER FUNCTION TESTS: ICD-10-CM

## 2024-01-18 PROCEDURE — 74183 MRI ABD W/O CNTR FLWD CNTR: CPT | Performed by: RADIOLOGY

## 2024-01-18 PROCEDURE — A9585 GADOBUTROL INJECTION: HCPCS | Performed by: RADIOLOGY

## 2024-01-18 RX ORDER — GADOBUTROL 604.72 MG/ML
7.5 INJECTION INTRAVENOUS ONCE
Status: COMPLETED | OUTPATIENT
Start: 2024-01-18 | End: 2024-01-18

## 2024-01-18 RX ADMIN — GADOBUTROL 7.5 ML: 604.72 INJECTION INTRAVENOUS at 07:19

## 2024-01-18 NOTE — DISCHARGE INSTRUCTIONS
MRI Contrast Discharge Instructions    The IV contrast you received today will pass out of your body in your  urine. This will happen in the next 24 hours. You will not feel this process.  Your urine will not change color.    Drink at least 4 extra glasses of water or juice today (unless your doctor  has restricted your fluids). This reduces the stress on your kidneys.  You may take your regular medicines.    If you are on dialysis: It is best to have dialysis today.    If you have a reaction: Most reactions happen right away. If you have  any new symptoms after leaving the hospital (such as hives or swelling),  call your hospital at the correct number below. Or call your family doctor.  If you have breathing distress or wheezing, call 911.    Special instructions: ***    I have read and understand the above information.    Signature:______________________________________ Date:___________    Staff:__________________________________________ Date:___________     Time:__________    [facility] Departments:    ___[place]: [phone]  ___[facility]: [phone]  ___[place]: [phone] ___[facility]: [phone]  ___[facility]: [phone]  ___[facility]: [phone]  ___[phone]  ___[facility]: [phone]  ___[phone]

## 2024-02-27 ENCOUNTER — TELEPHONE (OUTPATIENT)
Dept: GASTROENTEROLOGY | Facility: CLINIC | Age: 59
End: 2024-02-27

## 2024-02-27 NOTE — TELEPHONE ENCOUNTER
Pre visit planning completed.      Procedure details:    Patient scheduled for Colonoscopy  on 3/12/24.     Arrival time: 0745. Procedure time 0845    Pre op exam needed? N/A    [place] [facility]; [address] [place] [address], [address] Floor, [place] [address]    Sedation type: MAC    Indication for procedure: Screening      Chart review:     Electronic implanted devices? No    Recent diagnosis of diverticulitis within the last 6 weeks? No    Diabetic? No    Diabetic medication HOLDING recommendations: (if applicable)  Oral diabetic medications: No  Diabetic injectables: No  Insulin: No      Medication review:    Anticoagulants? No    NSAIDS? No    Other medication HOLDING recommendations:  N/A      Prep for procedure:     Bowel prep recommendation: Standard Miralax   Due to:  standard bowel prep.    Prep instructions sent via [employer]     Eleonora Loaiza RN  Endoscopy Procedure Pre Assessment RN  [phone] option 4

## 2024-02-28 NOTE — TELEPHONE ENCOUNTER
Attempted to contact patient in order to complete pre assessment questions.     Patient scheduled for Colonoscopy  on 3/12/24.     No answer. Left message to return call to [phone] option 4    Missed call communication sent via [employer].      Latanya Adhikari RN  Endoscopy Procedure Pre Assessment RN

## 2024-03-04 NOTE — TELEPHONE ENCOUNTER
Pre assessment completed for upcoming procedure.      Procedure details:    Patient scheduled for Colonoscopy  on 3/12/24.     Arrival time: 0745. Procedure time 0845    Pre op exam needed? N/A    Facility location: [facility]; [address] [place] [address], [address], [place] [address]    Sedation type: MAC    Indication for procedure: screening    Designated  policy reviewed. Instructed to have someone stay 24 hours post procedure.       Chart review:     Electronic implanted devices? No    Recent diagnosis of diverticulitis within the last 6 weeks?  No    Diabetic? No    Medication review:    Anticoagulants? No    NSAIDS? No    Other medication HOLDING recommendations:  N/A      Prep for procedure:     Bowel prep recommendation: Standard Miralax  Due to: standard bowel prep.    Prep instructions sent via [employer]     Reviewed procedure prep instructions.     Patient verbalized understanding and had no questions or concerns at this time.        Latanya Adhikari RN  Endoscopy Procedure Pre Assessment RN  [phone] option 4

## 2024-03-11 ENCOUNTER — TELEPHONE (OUTPATIENT)
Dept: GASTROENTEROLOGY | Facility: CLINIC | Age: 59
End: 2024-03-11
Payer: COMMERCIAL

## 2024-03-11 NOTE — TELEPHONE ENCOUNTER
Caller: Leigh Espinal   Reason for Reschedule/Cancellation (please be detailed, any staff messages or encounters to note?):     Per pt -- change date - work conflict - co-worker had medical emergency       Prior to reschedule please review:  Ordering Provider:    Jazmin Macario APRN CNP      Sedation Determined: moderate   Does patient have any ASC Exclusions, please identify?: n       Notes on Cancelled Procedure:  Procedure:Lower Endoscopy [Colonoscopy]   Date: 03/12/2024  Location:[facility]; [address] [place] [address], [address] [place] [address]  Surgeon: GAMA Gordillo         Rescheduled: Yes   Procedure: Lower Endoscopy [Colonoscopy]  Date: 07/18/2024  Location:[facility]; [address] [place] [address], [address] [place] [address]  Surgeon: jeet Luke   Sedation Level Scheduled  MAC          Reason for Sedation Level per block   Prep/Instructions updated and sent: mychart     Does patient need PAC or Pre -Op Rescheduled? : n        Send In - basket message to [facility] - Trevor Pool if E[facility] procedure is canceled or rescheduled: [ N/A, YES or NO] n/a

## 2024-05-07 NOTE — TELEPHONE ENCOUNTER
Caller: Leigh Espinal   Reason for Reschedule/Cancellation (please be detailed, any staff messages or encounters to note?):     Provider is OOO         Rescheduled: yes   Procedure: Lower Endoscopy [Colonoscopy]  Date: 09/05/2024  Location: [facility]; [address] [place] [address], [address] Floor, [place] [address]  Surgeon: arabella   Sedation Level Scheduled  mac          Reason for Sedation Level per pt   Prep/Instructions updated and sent: mychart     Does patient need PAC or Pre -Op Rescheduled? : n        Send In - basket message to [facility] - Trevor Pool if EUS procedure is canceled or rescheduled: [ N/A, YES or NO] n/a

## 2024-06-01 ENCOUNTER — HEALTH MAINTENANCE LETTER (OUTPATIENT)
Age: 59
End: 2024-06-01

## 2024-07-18 ENCOUNTER — ONCOLOGY VISIT (OUTPATIENT)
Dept: ONCOLOGY | Facility: CLINIC | Age: 59
End: 2024-07-18
Attending: INTERNAL MEDICINE
Payer: COMMERCIAL

## 2024-07-18 VITALS
WEIGHT: 182 LBS | DIASTOLIC BLOOD PRESSURE: 70 MMHG | RESPIRATION RATE: 16 BRPM | BODY MASS INDEX: 31.24 KG/M2 | SYSTOLIC BLOOD PRESSURE: 138 MMHG | HEART RATE: 51 BPM | TEMPERATURE: 97.9 F | OXYGEN SATURATION: 97 %

## 2024-07-18 DIAGNOSIS — R53.83 OTHER FATIGUE: ICD-10-CM

## 2024-07-18 DIAGNOSIS — C50.412 MALIGNANT NEOPLASM OF UPPER-OUTER QUADRANT OF LEFT BREAST IN FEMALE, ESTROGEN RECEPTOR POSITIVE (H): Primary | ICD-10-CM

## 2024-07-18 DIAGNOSIS — Z78.0 POSTMENOPAUSAL STATUS: ICD-10-CM

## 2024-07-18 DIAGNOSIS — Z17.0 MALIGNANT NEOPLASM OF UPPER-OUTER QUADRANT OF LEFT BREAST IN FEMALE, ESTROGEN RECEPTOR POSITIVE (H): Primary | ICD-10-CM

## 2024-07-18 PROCEDURE — 99213 OFFICE O/P EST LOW 20 MIN: CPT | Performed by: INTERNAL MEDICINE

## 2024-07-18 PROCEDURE — 99214 OFFICE O/P EST MOD 30 MIN: CPT | Performed by: INTERNAL MEDICINE

## 2024-07-18 ASSESSMENT — PAIN SCALES - GENERAL: PAINLEVEL: NO PAIN (0)

## 2024-07-18 NOTE — LETTER
7/18/2024      Leigh Espinal  [address] [place] [address]      Dear Colleague,    Thank you for referring your patient, Leigh Espinal, to the [facility]. Please see a copy of my visit note below.       Medical oncology follow-up visit  07/18/2024     Reason for Visit: Follow up of bilateral breast cancer    Oncology HPI:   Ms. Espinal is a 59 year old female with bilateral breast cancers. Other past medical history is notable for HHT (follows with Dr. Real), pulmonary and liver AVMs, GERD and HTN. Bilateral screening mammograms on 10/23/19 showed architectural distortion and developing focal asymmetry in the right breast, and possible developing asymmetry in the left breast. Diagnostic mammograms showed a 2 x 1.4 cm mass at 1:00 right breast and a 9 mm mass at 2:00 left breast. An additional 1.4 cm satellite mass in the left breast (total diameter of both left breast masses, 2.1 cm) was noted on contrast mammogram on 11/21/19.  Biopsy of the right breast at 1:00 revealed invasive ductal carcinoma, grade 1, ER+ (98%), CT+ (99%), HER2 negative. Biopsy of the left breast 2:00 revealed invasive lobular carcinoma, grade 2, ER+ (95%), CT+ (75%), and HER2 negative.  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       She underwent bilateral nipple sparing mastectomies, tissue expander placement, and bilateral axillary sentinel lymph node biopsies on 1/29/20 under the care of Dr. Smith. Final pathology revealed grade 2 invasive mammary carcinoma in the right breast measuring 2.1 cm, a smaller focus of invasive mammary carcinoma (mixed ductal and cribriform) measuring 3.6 mm, admixed DCIS, and negative margins. The left breast showed grade 2 invasive lobular carcinoma, measuring 1.6 cm, DCIS and LCIS, and negative margins. The right axillary sentinel node was benign; the left axillary sentinel node showed  micrometastatic carcinoma measuring 1.5 mm without extranodal extension.     OncotypeDx was ordered for right and left breast cancers.  Oncotype dx recurrence score of the left breast cancer was 10. Oncotype on the right breast cancer was 18.       Her surgery was complicated by skin necrosis requiring additional surgery.     She received adjuvant left chest wall and low axillary radiation: 5040 cGy in 28 fractions under the care of Dr. Mary Brunson from 3/26/20 -5/5/20.      Started Zoladex 8/2020.  She had her reconstruction surgery Sept 20.  She underwent a bilateral free MIKAL flap reconstruction on 09/20/2021.    She has been unable to tolerate AI.     Interval history:      Leigh returns today for follow-up.  She has been off of zoladex x 6 months now.  Estradiol levels 9 consistent with menopause.      She says work has been stressful.     No pain.  No shortness of breath.      No menses    She wishes she was exercising regularly.  She has just started walking half mile daily and is pleased with this change.  She has no other complaints today.    ROS: 10 point ROS neg other than the symptoms noted above in the HPI.    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    CHEK2 c.1100delC [facility] 12/19/2019        Current Outpatient Medications   Medication Sig Dispense Refill     acetaminophen (TYLENOL) 500 MG tablet Take 2 tablets (1,000 mg) by mouth 3  times daily (Patient not taking: Reported on 10/13/2023) 120 tablet 0     amLODIPine (NORVASC) 10 MG tablet        atenolol (TENORMIN) 50 MG tablet Take 50 mg by mouth every evening       lisinopril-hydrochlorothiazide (ZESTORETIC) 20-25 MG tablet        pantoprazole (PROTONIX) 20 MG EC tablet Take 20 mg by mouth daily as needed   3     sertraline (ZOLOFT) 50 MG tablet Take 1.5 tablets (75 mg) by mouth every evening 135 tablet 3     traZODone (DESYREL) 50 MG tablet Take  mg by mouth At Bedtime            Allergies   Allergen Reactions     Sulfa Antibiotics      Got really sick, headache, facial swelling, felt like she was dying.   Delayed reaction happened ~3-4 days after she started taking it.        Exam:  Last menstrual period 03/01/2020, not currently breastfeeding.  Wt Readings from Last 4 Encounters:   12/04/23 79.2 kg (174 lb 8 oz)   10/25/23 79.4 kg (175 lb)   06/19/23 81.7 kg (180 lb 1.6 oz)   03/28/22 77.5 kg (170 lb 12.8 oz)     LMP 03/01/2020 (Approximate)         PE:  GENERAL:  Alert, no acute distress  HEAD/NECK:  No scleral icterus  CV: rrr  Lungs: clear  Abd: soft, nt, nd + bs  Ext : no edema  Breast: not performed today  PSYCH: Normal affect    LABS:   Reviewed her prior imaging and labs.    Estradiol 9.      Assessment/plan:   1. Bilateral Breast Cancer (Left breast cancer [id number] ER + [place] + her2 negative, oncotype 10 s/p mastectomy and SNLB and Right breast cancer T2(2)N0 ER + [place] + her2 negative, oncotype 18 s/p mastectomy and SNLB)  - S/p bilateral mastectomy and left chest wall RT    - Estradiol 9.    - Off AI. Was unable to tolerate trial of Anastrozole. Discussed possibility of restarting, and patient has declined.     2. CHEK2 mutation: Colonoscopy scheduled in a few weeks.      3. Lymphedema: Followed with PT. Overall this has not been problem.      4. HHT: follows with Dr. Real. No significant bleeding at this time.         Katarzyna Swann MD

## 2024-07-18 NOTE — PROGRESS NOTES
Medical oncology follow-up visit  07/18/2024     Reason for Visit: Follow up of bilateral breast cancer    Oncology HPI:   Ms. Espinal is a 59 year old female with bilateral breast cancers. Other past medical history is notable for HHT (follows with Dr. Real), pulmonary and liver AVMs, GERD and HTN. Bilateral screening mammograms on 10/23/19 showed architectural distortion and developing focal asymmetry in the right breast, and possible developing asymmetry in the left breast. Diagnostic mammograms showed a 2 x 1.4 cm mass at 1:00 right breast and a 9 mm mass at 2:00 left breast. An additional 1.4 cm satellite mass in the left breast (total diameter of both left breast masses, 2.1 cm) was noted on contrast mammogram on 11/21/19.  Biopsy of the right breast at 1:00 revealed invasive ductal carcinoma, grade 1, ER+ (98%), HI+ (99%), HER2 negative. Biopsy of the left breast 2:00 revealed invasive lobular carcinoma, grade 2, ER+ (95%), HI+ (75%), and HER2 negative.  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       She underwent bilateral nipple sparing mastectomies, tissue expander placement, and bilateral axillary sentinel lymph node biopsies on 1/29/20 under the care of Dr. Smith. Final pathology revealed grade 2 invasive mammary carcinoma in the right breast measuring 2.1 cm, a smaller focus of invasive mammary carcinoma (mixed ductal and cribriform) measuring 3.6 mm, admixed DCIS, and negative margins. The left breast showed grade 2 invasive lobular carcinoma, measuring 1.6 cm, DCIS and LCIS, and negative margins. The right axillary sentinel node was benign; the left axillary sentinel node showed micrometastatic carcinoma measuring 1.5 mm without extranodal extension.     OncotypeDx was ordered for right and left breast cancers.  Oncotype dx recurrence score of the left breast cancer was 10. Oncotype on the right breast cancer was 18.       Her surgery  was complicated by skin necrosis requiring additional surgery.     She received adjuvant left chest wall and low axillary radiation: 5040 cGy in 28 fractions under the care of Dr. Mary Brunson from 3/26/20 -5/5/20.      Started Zoladex 8/2020.  She had her reconstruction surgery Sept 20.  She underwent a bilateral free MIKAL flap reconstruction on 09/20/2021.    She has been unable to tolerate AI.     Interval history:      Leigh returns today for follow-up.  She has been off of zoladex x 6 months now.  Estradiol levels 9 consistent with menopause.      She says work has been stressful.     No pain.  No shortness of breath.      No menses    She wishes she was exercising regularly.  She has just started walking half mile daily and is pleased with this change.  She has no other complaints today.    ROS: 10 point ROS neg other than the symptoms noted above in the HPI.    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    CHEK2 c.1100delC [facility] 12/19/2019        Current Outpatient Medications   Medication Sig Dispense Refill    acetaminophen (TYLENOL) 500 MG tablet Take 2 tablets (1,000 mg) by mouth 3 times daily (Patient not taking: Reported on 10/13/2023) 120 tablet 0    amLODIPine (NORVASC) 10 MG tablet       atenolol (TENORMIN) 50 MG tablet Take 50 mg by mouth every evening      lisinopril-hydrochlorothiazide (ZESTORETIC) 20-25 MG tablet       pantoprazole  (PROTONIX) 20 MG EC tablet Take 20 mg by mouth daily as needed   3    sertraline (ZOLOFT) 50 MG tablet Take 1.5 tablets (75 mg) by mouth every evening 135 tablet 3    traZODone (DESYREL) 50 MG tablet Take  mg by mouth At Bedtime            Allergies   Allergen Reactions    Sulfa Antibiotics      Got really sick, headache, facial swelling, felt like she was dying.   Delayed reaction happened ~3-4 days after she started taking it.        Exam:  Last menstrual period 03/01/2020, not currently breastfeeding.  Wt Readings from Last 4 Encounters:   12/04/23 79.2 kg (174 lb 8 oz)   10/25/23 79.4 kg (175 lb)   06/19/23 81.7 kg (180 lb 1.6 oz)   03/28/22 77.5 kg (170 lb 12.8 oz)     LMP 03/01/2020 (Approximate)         PE:  GENERAL:  Alert, no acute distress  HEAD/NECK:  No scleral icterus  CV: rrr  Lungs: clear  Abd: soft, nt, nd + bs  Ext : no edema  Breast: not performed today  PSYCH: Normal affect    LABS:   Reviewed her prior imaging and labs.    Estradiol 9.      Assessment/plan:   1. Bilateral Breast Cancer (Left breast cancer [id number] ER + [place] + her2 negative, oncotype 10 s/p mastectomy and SNLB and Right breast cancer T2(2)N0 ER + [place] + her2 negative, oncotype 18 s/p mastectomy and SNLB)  - S/p bilateral mastectomy and left chest wall RT    - Estradiol 9.    - Off AI. Was unable to tolerate trial of Anastrozole. Discussed possibility of restarting, and patient has declined.     2. CHEK2 mutation: Colonoscopy scheduled in a few weeks.      3. Lymphedema: Followed with PT. Overall this has not been problem.      4. HHT: follows with Dr. Real. No significant bleeding at this time.         Katarzyna Swann MD

## 2024-07-18 NOTE — NURSING NOTE
"Oncology Rooming Note    July 18, 2024 8:27 AM   Leigh Espinal is a 59 year old female who presents for:    Chief Complaint   Patient presents with    Oncology Clinic Visit     Malignant neoplasm of upper-inner quadrant of right breast in female, estrogen receptor positive      Initial Vitals: /70   Pulse 51   Temp 97.9  F (36.6  C)   Resp 16   Wt 82.6 kg (182 lb)   LMP 03/01/2020 (Approximate)   SpO2 97%   BMI 31.24 kg/m   Estimated body mass index is 31.24 kg/m  as calculated from the following:    Height as of 10/20/21: 1.626 m (5' 4\").    Weight as of this encounter: 82.6 kg (182 lb). Body surface area is 1.93 meters squared.  No Pain (0) Comment: Data Unavailable   Patient's last menstrual period was 03/01/2020 (approximate).  Allergies reviewed: Yes  Medications reviewed: Yes    Medications: Medication refills not needed today.  Pharmacy name entered into EPIC:    [facility] - [place], [place] - [address] [facility] - [place], [place] - [address] [place] [address] - [address] [facility] [phone]    Frailty Screening:   Is the patient here for a new oncology consult visit in cancer care? 2. No      Clinical concerns: no other complaints      Igor Lechuga"

## 2024-08-27 NOTE — TELEPHONE ENCOUNTER
Rescheduled Colonoscopy  Due to provider out of office.    Pre visit planning completed.      Procedure details:    Patient scheduled for Colonoscopy on 9/5/24.     Arrival time: 0730. Procedure time 0830    [facility] [address] [place] [address], 5th Floor, [place] [address]. Check in location: 5th Floor.    Sedation type: MAC    Pre op exam needed? No.    Indication for procedure: screening colonoscopy       Chart review:     Electronic implanted devices? No    Recent diagnosis of diverticulitis within the last 6 weeks? No      Medication review:    Diabetic? No    Anticoagulants? No    Weight loss medication/injectable? No GLP-1 medication per patient's medication list.  RN will verify with pre-assessment call.    NSAIDS? No NSAID medications per patient's medication list.  RN will verify with pre-assessment call.    Other medication HOLDING recommendations:  N/A      Prep for procedure:     Bowel prep recommendation: Standard Golytely. Bowel prep prescription sent to    [place], [place] - [address] [place] [address] [phone]  Due to: poor prep/inadequate bowel prep in the past.     Patient had poor prep noted 2/2/21, had repeat colonoscopy at [facility] 3/22/21 with noted good prep (standard miralax). Discuss prep with patient.     Prep instructions sent via [employer]ato Adhikari RN  Endoscopy Procedure Pre Assessment   [phone] option 4

## 2024-08-28 NOTE — TELEPHONE ENCOUNTER
Attempted to contact patient in order to complete pre assessment questions.     No answer. Left message to return call to [phone] option 4    Callback required communication sent via [employer].    Discuss bowel prep (standard Golytely vs Standard Miralax)     Eleonora Ferro RN  Endoscopy Procedure Pre Assessment

## 2024-08-30 NOTE — TELEPHONE ENCOUNTER
Second call attempt to complete pre assessment.     No answer.  Left message to return call to [phone] #4 by next business day prior to 4PM or procedure will be sent to cancel.     Callback required communication sent via [employer].      Latanya Adhikari RN  Endoscopy Procedure Pre Assessment

## 2024-09-04 ENCOUNTER — TELEPHONE (OUTPATIENT)
Dept: GASTROENTEROLOGY | Facility: CLINIC | Age: 59
End: 2024-09-04
Payer: COMMERCIAL

## 2024-09-04 NOTE — TELEPHONE ENCOUNTER
"Caller: Leigh    Reason for Reschedule/Cancellation   (please be detailed, any staff messages or encounters to note?): work conflict      Prior to reschedule please review:  Ordering Provider: VALENCIA WAYNE   Sedation Determined: MAC  Does patient have any ASC Exclusions, please identify?: no      Notes on Cancelled Procedure:  Procedure: Lower Endoscopy [Colonoscopy]   Date: 9/4  Location: [facility]; [address] [place] [address], [place] [address]  Surgeon: Ti      Rescheduled: Yes,   Procedure: Lower Endoscopy [Colonoscopy]    Date: 12/27   Location[facility]; [address] [place] [address], [address] Floor, [place] [address]   Surgeon: Lance   Sedation Level Scheduled  MAC ,  Reason for Sedation Level patient   Instructions updated and sent: y     Does patient need PAC or Pre -Op Rescheduled? : n       Did you cancel or rescheduled an EUS procedure? No.               Endoscopy Scheduling Screen    Have you had a positive Covid test in the last 14 days?  No    What is your communication preference for Instructions and/or Bowel Prep?   MyChart    What insurance is in the chart?  Other:  [employer]    Ordering/Referring Provider: VALENCIA WAYNE    (If ordering provider performs procedure, schedule with ordering provider unless otherwise instructed. )    BMI: Estimated body mass index is 31.24 kg/m  as calculated from the following:    Height as of 10/20/21: 1.626 m (5' 4\").    Weight as of 7/18/24: 82.6 kg (182 lb).     Sedation Ordered  no sedation.  If patient BMI > 50 do not schedule in ASC.    If patient BMI > 45 do not schedule at [facility].    Are you taking methadone or Suboxone?  No    Have you had difficulties, pain, or discomfort during past endoscopy procedures?  No    Are you taking any prescription medications for pain 3 or more times per week?   NO, No RN review required.    Do you have a history of malignant hyperthermia?  No    (Females) Are you " "currently pregnant?   No     Have you been diagnosed or told you have pulmonary hypertension?   No    Do you have an LVAD?  No    Have you been told you have moderate to severe sleep apnea?  No    Have you been told you have COPD, asthma, or any other lung disease?  No    Do you have any heart conditions?  No     Have you ever had or are you waiting for an organ transplant?  No. Continue scheduling, no site restrictions.    Have you had a stroke or transient ischemic attack (TIA aka \"mini stroke\" in the last 6 months?   No    Have you been diagnosed with or been told you have cirrhosis of the liver?   No    Are you currently on dialysis?   No    Do you need assistance transferring?   No    BMI: Estimated body mass index is 31.24 kg/m  as calculated from the following:    Height as of 10/20/21: 1.626 m (5' 4\").    Weight as of 7/18/24: 82.6 kg (182 lb).     Is patients BMI > 40 and scheduling location UP?  No    Do you take an injectable medication for weight loss or diabetes (excluding insulin)?  No    Do you take the medication Naltrexone?  No    Do you take blood thinners?  No       Prep   Are you currently on dialysis or do you have chronic kidney disease?  No    Do you have a diagnosis of diabetes?  No    Do you have a diagnosis of cystic fibrosis (CF)?  No    On a regular basis do you go 3 -5 days between bowel movements?  No    BMI > 40?  No    Preferred Pharmacy:      [place], [place] - [address] [facility] [address] to [facility]  [address] [facility] [address] [place] [address]  Phone: [phone] Fax: [phone] Alternate Fax: [phone], [phone]      "

## 2024-11-13 ENCOUNTER — OFFICE VISIT (OUTPATIENT)
Dept: URGENT CARE | Facility: URGENT CARE | Age: 59
End: 2024-11-13
Payer: COMMERCIAL

## 2024-11-13 ENCOUNTER — ANCILLARY PROCEDURE (OUTPATIENT)
Dept: GENERAL RADIOLOGY | Facility: CLINIC | Age: 59
End: 2024-11-13
Attending: FAMILY MEDICINE
Payer: COMMERCIAL

## 2024-11-13 VITALS
OXYGEN SATURATION: 94 % | TEMPERATURE: 98.9 F | SYSTOLIC BLOOD PRESSURE: 148 MMHG | BODY MASS INDEX: 31.24 KG/M2 | RESPIRATION RATE: 18 BRPM | HEART RATE: 79 BPM | WEIGHT: 182 LBS | DIASTOLIC BLOOD PRESSURE: 81 MMHG
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
## 2024-11-13 DIAGNOSIS — S06.0X0D CONCUSSION WITHOUT LOSS OF CONSCIOUSNESS, SUBSEQUENT ENCOUNTER: ICD-10-CM

## 2024-11-13 DIAGNOSIS — M62.838 MUSCLE SPASM: ICD-10-CM

## 2024-11-13 DIAGNOSIS — M54.2 NECK PAIN: ICD-10-CM

## 2024-11-13 DIAGNOSIS — M54.50 LUMBAR PAIN: ICD-10-CM

## 2024-11-13 PROCEDURE — 99215 OFFICE O/P EST HI 40 MIN: CPT | Mod: 25 | Performed by: FAMILY MEDICINE

## 2024-11-13 PROCEDURE — 72040 X-RAY EXAM NECK SPINE 2-3 VW: CPT | Mod: TC | Performed by: RADIOLOGY

## 2024-11-13 PROCEDURE — 96372 THER/PROPH/DIAG INJ SC/IM: CPT | Performed by: FAMILY MEDICINE

## 2024-11-13 RX ORDER — MELOXICAM 15 MG/1
15 TABLET ORAL DAILY
Qty: 30 TABLET | Refills: 0 | Status: SHIPPED | OUTPATIENT
Start: 2024-11-13

## 2024-11-13 RX ORDER — KETOROLAC TROMETHAMINE 30 MG/ML
30 INJECTION, SOLUTION INTRAMUSCULAR; INTRAVENOUS ONCE
Status: COMPLETED | OUTPATIENT
Start: 2024-11-13 | End: 2024-11-13

## 2024-11-13 RX ORDER — METHOCARBAMOL 500 MG/1
TABLET, FILM COATED ORAL
Qty: 40 TABLET | Refills: 0 | Status: SHIPPED | OUTPATIENT
Start: 2024-11-13

## 2024-11-13 RX ADMIN — KETOROLAC TROMETHAMINE 30 MG: 30 INJECTION, SOLUTION INTRAMUSCULAR; INTRAVENOUS at 15:36
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
## 2024-11-13 NOTE — PATIENT INSTRUCTIONS
Alternate heat and ice.     Methocarbamol 4 times daily if needed for muscle spasms.     Continue tylenol three times daily   Stop ibuprofen.   Toradol given in the office today - no ibuprofen today.   Tomorrow start meloxicam once daily with food which should be better for your stomach.     Follow up next week with primary care or PMR for PT or further evaluations.     Rest your brain, limit reading
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
## 2024-11-19 ENCOUNTER — TRANSFERRED RECORDS (OUTPATIENT)
Dept: HEALTH INFORMATION MANAGEMENT | Facility: CLINIC | Age: 59
End: 2024-11-19
Payer: COMMERCIAL

## 2024-11-20 ENCOUNTER — OFFICE VISIT (OUTPATIENT)
Dept: FAMILY MEDICINE | Facility: CLINIC | Age: 59
End: 2024-11-20
Payer: COMMERCIAL

## 2024-11-20 VITALS
HEART RATE: 60 BPM | HEIGHT: 64 IN | TEMPERATURE: 97.9 F | BODY MASS INDEX: 31.16 KG/M2 | OXYGEN SATURATION: 97 % | SYSTOLIC BLOOD PRESSURE: 142 MMHG | DIASTOLIC BLOOD PRESSURE: 80 MMHG | RESPIRATION RATE: 16 BRPM | WEIGHT: 182.5 LBS

## 2024-11-20 DIAGNOSIS — S06.0XAD CONCUSSION WITH UNKNOWN LOSS OF CONSCIOUSNESS STATUS, SUBSEQUENT ENCOUNTER: ICD-10-CM

## 2024-11-20 DIAGNOSIS — M54.50 ACUTE BILATERAL LOW BACK PAIN, UNSPECIFIED WHETHER SCIATICA PRESENT: Primary | ICD-10-CM

## 2024-11-20 ASSESSMENT — PAIN SCALES - GENERAL: PAINLEVEL_OUTOF10: SEVERE PAIN (7)
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
## 2024-11-26 ENCOUNTER — VIRTUAL VISIT (OUTPATIENT)
Dept: INTERNAL MEDICINE | Facility: CLINIC | Age: 59
End: 2024-11-26
Payer: COMMERCIAL
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
## 2024-11-26 DIAGNOSIS — Z02.89 ENCOUNTER FOR COMPLETION OF FORM WITH PATIENT: ICD-10-CM

## 2024-11-26 DIAGNOSIS — S06.0X0D CONCUSSION WITHOUT LOSS OF CONSCIOUSNESS, SUBSEQUENT ENCOUNTER: ICD-10-CM

## 2024-11-26 DIAGNOSIS — M54.50 ACUTE BILATERAL LOW BACK PAIN, UNSPECIFIED WHETHER SCIATICA PRESENT: ICD-10-CM

## 2024-11-26 RX ORDER — NORTRIPTYLINE HYDROCHLORIDE 10 MG/1
CAPSULE ORAL
COMMUNITY
Start: 2024-11-19
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
## 2024-11-26 NOTE — PATIENT INSTRUCTIONS
Thank you for visiting the Primary Care Center today at the [facility]! The following is some information about our clinic:     Primary Care Center Frequently-Asked Questions    (1) How do I schedule appointments at the [facility]?     Primary Care--to schedule or make changes to an existing appointment, please call our primary care line at [phone].    Labs--to schedule a lab appointment at the [facility] you can use TalkTo or call [phone]. If you have a [facility] location that is closer to home, you can reach out to that location for scheduling options.     Imaging--if you need to schedule a CT, X-ray, MRI, ultrasound, or other imaging study you can call [phone] to schedule at the [facility] or any other [facility] imaging location.     Referrals--if a referral to another specialty was ordered you can expect a phone call from their scheduling team. If you have not heard from them in a week, please call us or send us a TalkTo message to check the status or get a scheduling number. Please note that this only applies to internal [facility] referrals. If the referral is external you would need to contact their office for scheduling.     (2) I have a question about my visit, who do I contact?     You can call us at the primary care line at [phone] to ask questions about your visit. You can also send a secure message through TalkTo, which is reviewed by clinic staff. Please note that TalkTo messages have a twenty-four to forty-eight business hour turnaround time and should not be used for urgent concerns.    (3) How will I get the results of my tests?    If you are signed up for [facility] all tests will be released to you within twenty-four hours of resulting. Please allow three to five days for your doctor to review your results and place a note interpreting the results. If you do not have [facility] you will receive your  results through mail seven to ten business days following the return of the tests. Please note that if there should be any urgent or concerning results that your doctor or their registered nurse will reach out to you the same day as the tests come back. If you have follow up questions about your results or would like to discuss the results in detail please schedule a follow up with your provider either in person or virtually.     (4) How do I get refills of my prescriptions?     You should always first contact your pharmacy for refills of your medications. If submitting a refill request on [employer], please be sure to submit the request only once--repeat requests can cause delays in refill. If you are requesting a NEW medication or a medication related to new symptoms you will need to schedule an appointment with a provider prior to approval. Please note: Routine medication refills have up to one to three business day turnaround whereas controlled substances refills have up to five to seven business day turnaround.    (5) I have new symptoms, what do I do?     If you are having an immediate medical emergency, you should dial 911 for assistance.   For anything urgent that needs to be seen within a few hours to one day you should visit a local urgent care for assistance.  For non-urgent symptoms that need to be seen within a few days to a week you can schedule with an available provider in primary care by going to [facility] or calling [phone].   If you are not sure how serious your symptoms are or you would like to receive medical advice you can always call [phone] to speak with a triage nurse.

## 2024-12-03 NOTE — LETTER
Date:December 8, 2021      Provider requested that no letter be sent. Do not send.       [facility]       No

## 2024-12-10 ENCOUNTER — TELEPHONE (OUTPATIENT)
Dept: INTERNAL MEDICINE | Facility: CLINIC | Age: 59
End: 2024-12-10
Payer: COMMERCIAL

## 2024-12-10 NOTE — TELEPHONE ENCOUNTER
Patient Contacted for the patient to call back and schedule the following:    Appointment type: P New  Provider: Dr Lin  Additonal Notes: spoke with pt regarding r/s 1/31 Delia appt, pt was not happy with different appt date due to template change and hung up before r/s

## 2024-12-26 ENCOUNTER — ANESTHESIA EVENT (OUTPATIENT)
Dept: SURGERY | Facility: AMBULATORY SURGERY CENTER | Age: 59
End: 2024-12-26

## 2024-12-27 ENCOUNTER — ANESTHESIA (OUTPATIENT)
Dept: SURGERY | Facility: AMBULATORY SURGERY CENTER | Age: 59
End: 2024-12-27

## 2025-01-06 ENCOUNTER — ONCOLOGY VISIT (OUTPATIENT)
Dept: ONCOLOGY | Facility: CLINIC | Age: 60
End: 2025-01-06
Attending: PHYSICIAN ASSISTANT
Payer: COMMERCIAL

## 2025-01-06 ENCOUNTER — ANCILLARY PROCEDURE (OUTPATIENT)
Dept: BONE DENSITY | Facility: CLINIC | Age: 60
End: 2025-01-06
Attending: INTERNAL MEDICINE
Payer: COMMERCIAL

## 2025-01-06 VITALS
DIASTOLIC BLOOD PRESSURE: 109 MMHG | BODY MASS INDEX: 31 KG/M2 | RESPIRATION RATE: 14 BRPM | HEART RATE: 65 BPM | TEMPERATURE: 97.8 F | OXYGEN SATURATION: 98 % | SYSTOLIC BLOOD PRESSURE: 186 MMHG | WEIGHT: 180.6 LBS

## 2025-01-06 DIAGNOSIS — Z17.0 MALIGNANT NEOPLASM OF UPPER-OUTER QUADRANT OF LEFT BREAST IN FEMALE, ESTROGEN RECEPTOR POSITIVE (H): ICD-10-CM

## 2025-01-06 DIAGNOSIS — C50.412 MALIGNANT NEOPLASM OF UPPER-OUTER QUADRANT OF LEFT BREAST IN FEMALE, ESTROGEN RECEPTOR POSITIVE (H): ICD-10-CM

## 2025-01-06 DIAGNOSIS — I10 BENIGN ESSENTIAL HYPERTENSION: Primary | ICD-10-CM

## 2025-01-06 DIAGNOSIS — F43.22 ADJUSTMENT DISORDER WITH ANXIOUS MOOD: ICD-10-CM

## 2025-01-06 DIAGNOSIS — Z78.0 POSTMENOPAUSAL STATUS: ICD-10-CM

## 2025-01-06 PROCEDURE — 99214 OFFICE O/P EST MOD 30 MIN: CPT | Performed by: PHYSICIAN ASSISTANT

## 2025-01-06 PROCEDURE — 77080 DXA BONE DENSITY AXIAL: CPT | Performed by: INTERNAL MEDICINE

## 2025-01-06 PROCEDURE — 99213 OFFICE O/P EST LOW 20 MIN: CPT | Performed by: PHYSICIAN ASSISTANT

## 2025-01-06 RX ORDER — LIDOCAINE 50 MG/G
OINTMENT TOPICAL PRN
COMMUNITY
Start: 2024-11-07

## 2025-01-06 RX ORDER — LISINOPRIL AND HYDROCHLOROTHIAZIDE 20; 25 MG/1; MG/1
1 TABLET ORAL DAILY
Qty: 90 TABLET | Refills: 3 | Status: SHIPPED | OUTPATIENT
Start: 2025-01-06

## 2025-01-06 RX ORDER — SERTRALINE HYDROCHLORIDE 100 MG/1
100 TABLET, FILM COATED ORAL DAILY
COMMUNITY
Start: 2024-10-02 | End: 2025-01-06

## 2025-01-06 RX ORDER — SERTRALINE HYDROCHLORIDE 100 MG/1
100 TABLET, FILM COATED ORAL DAILY
Qty: 90 TABLET | Refills: 3 | Status: SHIPPED | OUTPATIENT
Start: 2025-01-06

## 2025-01-06 ASSESSMENT — PAIN SCALES - GENERAL: PAINLEVEL_OUTOF10: NO PAIN (0)
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
## 2025-01-06 NOTE — NURSING NOTE
"Oncology Rooming Note    January 6, 2025 9:04 AM   Leigh Espinal is a 59 year old female who presents for:    Chief Complaint   Patient presents with    Oncology Clinic Visit     Malignant neoplasm of upper-outer quadrant of left breast in female, estrogen receptor positive     Initial Vitals: BP (!) 186/109 (BP Location: Right arm, Patient Position: Sitting, Cuff Size: Adult Regular)   Pulse 65   Temp 97.8  F (36.6  C) (Oral)   Resp 14   Wt 81.9 kg (180 lb 9.6 oz)   LMP 03/01/2020 (Approximate)   SpO2 98%   BMI 31.00 kg/m   Estimated body mass index is 31 kg/m  as calculated from the following:    Height as of 11/20/24: 1.626 m (5' 4\").    Weight as of this encounter: 81.9 kg (180 lb 9.6 oz). Body surface area is 1.92 meters squared.  No Pain (0) Comment: Data Unavailable   Patient's last menstrual period was 03/01/2020 (approximate).  Allergies reviewed: Yes  Medications reviewed: Yes    Medications: MEDICATION REFILLS NEEDED TODAY. Provider was notified.  Pharmacy name entered into EPIC:    [facility] - [place], [place] - [address] [facility] - [place], [place] - [address] [place] [address]    Frailty Screening:   Is the patient here for a new oncology consult visit in cancer care? 2. No      Clinical concerns: Patient states no new concerns to discuss with provider.        Serene Yen, EMT            "

## 2025-01-17 PROBLEM — M54.50 ACUTE BILATERAL LOW BACK PAIN, UNSPECIFIED WHETHER SCIATICA PRESENT: Status: ACTIVE | Noted: 2025-01-17

## 2025-02-07 ENCOUNTER — OFFICE VISIT (OUTPATIENT)
Dept: INTERNAL MEDICINE | Facility: CLINIC | Age: 60
End: 2025-02-07
Payer: COMMERCIAL

## 2025-02-07 ENCOUNTER — LAB (OUTPATIENT)
Dept: LAB | Facility: CLINIC | Age: 60
End: 2025-02-07
Payer: COMMERCIAL

## 2025-02-07 VITALS
OXYGEN SATURATION: 97 % | HEIGHT: 64 IN | TEMPERATURE: 98.1 F | DIASTOLIC BLOOD PRESSURE: 82 MMHG | RESPIRATION RATE: 15 BRPM | SYSTOLIC BLOOD PRESSURE: 150 MMHG | HEART RATE: 51 BPM | WEIGHT: 182.8 LBS | BODY MASS INDEX: 31.21 KG/M2

## 2025-02-07 DIAGNOSIS — Z00.00 ROUTINE HISTORY AND PHYSICAL EXAMINATION OF ADULT: ICD-10-CM

## 2025-02-07 DIAGNOSIS — Z13.220 LIPID SCREENING: ICD-10-CM

## 2025-02-07 DIAGNOSIS — Z11.4 SCREENING FOR HIV (HUMAN IMMUNODEFICIENCY VIRUS): ICD-10-CM

## 2025-02-07 DIAGNOSIS — Z12.11 SCREEN FOR COLON CANCER: ICD-10-CM

## 2025-02-07 DIAGNOSIS — Z12.4 CERVICAL CANCER SCREENING: Primary | ICD-10-CM

## 2025-02-07 DIAGNOSIS — Z11.59 NEED FOR HEPATITIS C SCREENING TEST: ICD-10-CM

## 2025-02-07 DIAGNOSIS — I10 BENIGN ESSENTIAL HYPERTENSION: ICD-10-CM

## 2025-02-07 LAB
ALBUMIN SERPL BCG-MCNC: 4.2 G/DL (ref 3.5–5.2)
ALP SERPL-CCNC: 138 U/L (ref 40–150)
ALT SERPL W P-5'-P-CCNC: 64 U/L (ref 0–50)
ANION GAP SERPL CALCULATED.3IONS-SCNC: 10 MMOL/L (ref 7–15)
AST SERPL W P-5'-P-CCNC: 51 U/L (ref 0–45)
BILIRUB SERPL-MCNC: 0.2 MG/DL
BUN SERPL-MCNC: 16.7 MG/DL (ref 6–20)
CALCIUM SERPL-MCNC: 10.1 MG/DL (ref 8.8–10.4)
CHLORIDE SERPL-SCNC: 105 MMOL/L (ref 98–107)
CHOLEST SERPL-MCNC: 274 MG/DL
CREAT SERPL-MCNC: 0.87 MG/DL (ref 0.51–0.95)
EGFRCR SERPLBLD CKD-EPI 2021: 77 ML/MIN/1.73M2
FASTING STATUS PATIENT QL REPORTED: NO
FASTING STATUS PATIENT QL REPORTED: NO
GLUCOSE SERPL-MCNC: 100 MG/DL (ref 70–99)
HCO3 SERPL-SCNC: 27 MMOL/L (ref 22–29)
HCV AB SERPL QL IA: NONREACTIVE
HDLC SERPL-MCNC: 53 MG/DL
HIV 1+2 AB+HIV1 P24 AG SERPL QL IA: NONREACTIVE
LDLC SERPL CALC-MCNC: 190 MG/DL
NONHDLC SERPL-MCNC: 221 MG/DL
POTASSIUM SERPL-SCNC: 3.9 MMOL/L (ref 3.4–5.3)
PROT SERPL-MCNC: 7.1 G/DL (ref 6.4–8.3)
SODIUM SERPL-SCNC: 142 MMOL/L (ref 135–145)
TRIGL SERPL-MCNC: 154 MG/DL

## 2025-02-07 PROCEDURE — G0145 SCR C/V CYTO,THINLAYER,RESCR: HCPCS | Performed by: NURSE PRACTITIONER

## 2025-02-07 PROCEDURE — 80061 LIPID PANEL: CPT | Performed by: PATHOLOGY

## 2025-02-07 PROCEDURE — 86803 HEPATITIS C AB TEST: CPT | Performed by: NURSE PRACTITIONER

## 2025-02-07 PROCEDURE — 80053 COMPREHEN METABOLIC PANEL: CPT | Performed by: PATHOLOGY

## 2025-02-07 PROCEDURE — 87389 HIV-1 AG W/HIV-1&-2 AB AG IA: CPT | Performed by: NURSE PRACTITIONER

## 2025-02-07 PROCEDURE — 99000 SPECIMEN HANDLING OFFICE-LAB: CPT | Performed by: PATHOLOGY

## 2025-02-07 PROCEDURE — 36415 COLL VENOUS BLD VENIPUNCTURE: CPT | Performed by: PATHOLOGY

## 2025-02-07 PROCEDURE — 87624 HPV HI-RISK TYP POOLED RSLT: CPT | Performed by: NURSE PRACTITIONER

## 2025-02-07 RX ORDER — ATENOLOL 50 MG/1
50 TABLET ORAL DAILY
Qty: 90 TABLET | Refills: 3 | Status: SHIPPED | OUTPATIENT
Start: 2025-02-07

## 2025-02-07 RX ORDER — AMLODIPINE BESYLATE 10 MG/1
10 TABLET ORAL DAILY
Qty: 90 TABLET | Refills: 3 | Status: SHIPPED | OUTPATIENT
Start: 2025-02-07

## 2025-02-07 SDOH — HEALTH STABILITY: PHYSICAL HEALTH: ON AVERAGE, HOW MANY DAYS PER WEEK DO YOU ENGAGE IN MODERATE TO STRENUOUS EXERCISE (LIKE A BRISK WALK)?: 2 DAYS

## 2025-02-07 SDOH — HEALTH STABILITY: PHYSICAL HEALTH: ON AVERAGE, HOW MANY MINUTES DO YOU ENGAGE IN EXERCISE AT THIS LEVEL?: 20 MIN

## 2025-02-07 ASSESSMENT — SOCIAL DETERMINANTS OF HEALTH (SDOH): HOW OFTEN DO YOU GET TOGETHER WITH FRIENDS OR RELATIVES?: ONCE A WEEK
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
## 2025-02-10 LAB
HPV HR 12 DNA CVX QL NAA+PROBE: NEGATIVE
HPV16 DNA CVX QL NAA+PROBE: NEGATIVE
HPV18 DNA CVX QL NAA+PROBE: NEGATIVE
HUMAN PAPILLOMA VIRUS FINAL DIAGNOSIS: NORMAL

## 2025-02-12 LAB
BKR AP ASSOCIATED HPV REPORT: NORMAL
BKR LAB AP GYN ADEQUACY: NORMAL
BKR LAB AP GYN INTERPRETATION: NORMAL
BKR LAB AP PREVIOUS ABNORMAL: NORMAL
PATH REPORT.COMMENTS IMP SPEC: NORMAL
PATH REPORT.COMMENTS IMP SPEC: NORMAL
PATH REPORT.RELEVANT HX SPEC: NORMAL

## 2025-03-06 ENCOUNTER — THERAPY VISIT (OUTPATIENT)
Dept: PHYSICAL THERAPY | Facility: REHABILITATION | Age: 60
End: 2025-03-06
Payer: COMMERCIAL

## 2025-03-06 DIAGNOSIS — G44.209 TENSION HEADACHE: ICD-10-CM

## 2025-03-06 DIAGNOSIS — M54.2 CERVICALGIA: Primary | ICD-10-CM
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
## 2025-03-10 ENCOUNTER — THERAPY VISIT (OUTPATIENT)
Dept: PHYSICAL THERAPY | Facility: REHABILITATION | Age: 60
End: 2025-03-10
Payer: COMMERCIAL

## 2025-03-10 DIAGNOSIS — M54.50 ACUTE BILATERAL LOW BACK PAIN, UNSPECIFIED WHETHER SCIATICA PRESENT: ICD-10-CM

## 2025-03-10 DIAGNOSIS — G44.209 TENSION HEADACHE: ICD-10-CM

## 2025-03-10 DIAGNOSIS — M54.2 CERVICALGIA: Primary | ICD-10-CM

## 2025-03-13 ENCOUNTER — TELEPHONE (OUTPATIENT)
Dept: GASTROENTEROLOGY | Facility: CLINIC | Age: 60
End: 2025-03-13
Payer: COMMERCIAL

## 2025-03-13 NOTE — TELEPHONE ENCOUNTER
"Endoscopy Scheduling Screen    Have you had any respiratory illness or flu-like symptoms in the last 10 days?  No    What is your communication preference for Instructions and/or Bowel Prep?   MyChart    What insurance is in the chart?  Other:  [employer]    Ordering/Referring Provider: Delfina Ca APRN CNP in [facility]     (If ordering provider performs procedure, schedule with ordering provider unless otherwise instructed. )    BMI: Estimated body mass index is 31.36 kg/m  as calculated from the following:    Height as of 2/7/25: 1.626 m (5' 4.02\").    Weight as of 2/7/25: 82.9 kg (182 lb 12.8 oz).     Sedation Ordered  moderate sedation.   If patient BMI > 50 do not schedule in ASC.    If patient BMI > 45 do not schedule at [facility].    Are you taking methadone or Suboxone?  NO, No RN review required.    Have you been diagnosed and are being treated for severe PTSD or severe anxiety?  NO, No RN review required.    Are you taking any prescription medications for pain 3 or more times per week?   NO, No RN review required.    Do you have a history of malignant hyperthermia?  No    (Females) Are you currently pregnant?   No     Have you been diagnosed or told you have pulmonary hypertension?   No    Do you have an LVAD?  No    Have you been told you have moderate to severe sleep apnea?  No.    Have you been told you have COPD, asthma, or any other lung disease?  No    Has your doctor ordered any cardiac tests like echo, angiogram, stress test, ablation, or EKG, that you have not completed yet?  No    Do you  have a history of any heart conditions?  No     Have you ever had or are you waiting for an organ transplant?  No. Continue scheduling, no site restrictions.    Have you had a stroke or transient ischemic attack (TIA aka \"mini stroke\") in the last 2 years?   No.    Have you been diagnosed with or been told you have cirrhosis of the liver?   No.    Are you currently on dialysis?   No    Do you need " "assistance transferring?   No    BMI: Estimated body mass index is 31.36 kg/m  as calculated from the following:    Height as of 2/7/25: 1.626 m (5' 4.02\").    Weight as of 2/7/25: 82.9 kg (182 lb 12.8 oz).     Is patients BMI > 40 and scheduling location [facility]?  No    Do you take an injectable or oral medication for weight loss or diabetes (excluding insulin)?  No    Do you take the medication Naltrexone?  No    Do you take blood thinners?  No       Prep   Are you currently on dialysis or do you have chronic kidney disease?  No    Do you have a diagnosis of diabetes?  No    Do you have a diagnosis of cystic fibrosis (CF)?  No    On a regular basis do you go 3 -5 days between bowel movements?  No    BMI > 40?  No    Preferred Pharmacy:    [facility] [address] [address]  Phone: [phone] Fax: [phone]    Final Scheduling Details     Procedure scheduled  Colonoscopy    Surgeon:  LESLYE     Date of procedure:  4/29     Pre-OP / PAC:   No - Not required for this site.    Location  [facility] - [facility] - Patient preference.    Sedation   Moderate Sedation - Per order.      Patient Reminders:   You will receive a call from a Nurse to review instructions and health history.  This assessment must be completed prior to your procedure.  Failure to complete the Nurse assessment may result in the procedure being cancelled.      On the day of your procedure, please designate an adult(s) who can drive you home stay with you for the next 24 hours. The medicines used in the exam will make you sleepy. You will not be able to drive.      You cannot take public transportation, ride share services, or non-medical taxi service without a responsible caregiver.  Medical transport services are allowed with the requirement that a responsible caregiver will receive you at your destination.  We require that drivers and caregivers are confirmed prior to your procedure.    "

## 2025-04-14 ENCOUNTER — TELEPHONE (OUTPATIENT)
Dept: GASTROENTEROLOGY | Facility: CLINIC | Age: 60
End: 2025-04-14
Payer: COMMERCIAL

## 2025-04-14 DIAGNOSIS — Z86.0100 HISTORY OF COLONIC POLYPS: Primary | ICD-10-CM

## 2025-04-14 RX ORDER — BISACODYL 5 MG/1
TABLET, DELAYED RELEASE ORAL
Qty: 4 TABLET | Refills: 0 | Status: SHIPPED | OUTPATIENT
Start: 2025-04-14

## 2025-04-14 NOTE — TELEPHONE ENCOUNTER
Pre assessment completed for upcoming procedure.   (Please see previous telephone encounter notes for complete details)    Procedure details:    Arrival time and facility location reviewed.    Pre op exam needed? No.    Designated  policy reviewed. Instructed to have someone stay 6  hours post procedure.     Sedation type: Conscious sedation  -- last 2 Colonoscopies have been with MAC. Did tolerate CS well in 2012. Ensure Pt ok with CS. Discussed sedation with patient. She will keep the procedure scheduled with CS for now. Writer explained that she could call back to reschedule with MAC sedation but it will probably mean the rescheduled procedure would be pushed out a couple of months. Patient verbalizes understanding.    Medication review:    Medications reviewed. Please see supporting documentation below. Holding recommendations discussed (if applicable).     Prep for procedure:     Procedure prep instructions reviewed.      Any additional information needed:  N/A    Patient verbalized understanding and had no questions or concerns at this time.      Le Raygoza LPN  Endoscopy Procedure Pre Assessment   [phone] option 3

## 2025-04-14 NOTE — TELEPHONE ENCOUNTER
"Pre visit planning completed.    Procedure details:    Patient scheduled for Colonoscopy on 4/29/25.     Arrival time: 0745. Procedure time 0845    [facility] [address] [place] [address], 5th Floor, [place] [address]. Check in location: 5th Floor.    Sedation type: Conscious sedation  -- last 2 Colonoscopies have been with MAC. Did tolerate CS well in 2012. Ensure Pt ok with CS.     Pre op exam needed? No.    Indication for procedure: Hx of polyps       Chart review:     Electronic implanted devices? No    Recent diagnosis of diverticulitis within the last 6 weeks? No      Medication review:    Diabetic? No    Anticoagulants? No    Weight loss medication/injectable? No GLP-1 medication per patient's medication list. Nursing to verify with pre-assessment call.    Other medication HOLDING recommendations:  N/A      Prep for procedure:     Bowel prep recommendation: Standard Golytely. Bowel prep sent to      [facility] - [place], [place] - [address] [place] [address]    Due to: poor prep/inadequate bowel prep in the past    BBPS in 2/2021 was 4. Repeat in 3/2021 was good.     Last bowel prep was \"good: with Miralax/Gatorade, but bowel prep before that had a BBPS of 4. Not sure if the Pt had issues drinking that previous bowel prep. Will bump up to Golytely to try and achieve excellent prep outcome and prevent a failed prep.     Procedure information and instructions sent via [facility]         Allegra Campos RN  Endoscopy Procedure Pre Assessment   [phone] option 3   "

## 2025-04-28 ENCOUNTER — TELEPHONE (OUTPATIENT)
Dept: GASTROENTEROLOGY | Facility: CLINIC | Age: 60
End: 2025-04-28
Payer: COMMERCIAL

## 2025-04-28 NOTE — TELEPHONE ENCOUNTER
Caller: Leigh Espinal   Reason for Reschedule/Cancellation (please be detailed, any staff messages or encounters to note?):     Per pt --  is sick     Did you cancel or rescheduled an EUS procedure? No.    Is screening questionnaire older than 3 months from the reschedule date.   If Yes, please complete screening questionnaire. No    Prior to reschedule please review:  Ordering Provider:    Delfina Ca APRN CNP      Sedation Determined: mod   Does patient have any ASC Exclusions, please identify?: n       Notes on Cancelled Procedure:  Procedure:Lower Endoscopy [Colonoscopy]   Date: 04/29/2025  Location:[facility]; [address] [place] [address], [address] [place] [address]  Surgeon: Marcial         Rescheduled: yes   Procedure: Lower Endoscopy [Colonoscopy]  Date: 07/01/2025  Location: [facility]; [address] [place] [address], [address] [place] [address]  Surgeon: Theresa   Sedation Level Scheduled  mod          Reason for Sedation Level per order   Prep/Instructions updated and sent: gato     Does patient need PAC or Pre -Op Rescheduled? : n

## 2025-05-27 ENCOUNTER — TELEPHONE (OUTPATIENT)
Dept: INTERNAL MEDICINE | Facility: CLINIC | Age: 60
End: 2025-05-27
Payer: COMMERCIAL

## 2025-05-27 NOTE — TELEPHONE ENCOUNTER
[facility] Call Center    Phone Message    May a detailed message be left on voicemail: yes     Reason for Call: Other: Per pt would like to know if there is any way for her to see binh Smithdereje sooner than 06/04/25? Per pt hurt her knee and isnt sure what to do at this time.Please call back to advise. Thank you.      Action Taken: Message routed to:  [facility]    Travel Screening: Not Applicable     Date of Service:

## 2025-05-27 NOTE — TELEPHONE ENCOUNTER
Patient confirmed rescheduled appointment:  Date: 5/28  Time: 5:30pm  Visit type: Return   Provider: PCP

## 2025-05-28 ENCOUNTER — OFFICE VISIT (OUTPATIENT)
Dept: INTERNAL MEDICINE | Facility: CLINIC | Age: 60
End: 2025-05-28
Payer: COMMERCIAL

## 2025-05-28 ENCOUNTER — ANCILLARY PROCEDURE (OUTPATIENT)
Dept: GENERAL RADIOLOGY | Facility: CLINIC | Age: 60
End: 2025-05-28
Attending: NURSE PRACTITIONER
Payer: COMMERCIAL

## 2025-05-28 VITALS
OXYGEN SATURATION: 99 % | WEIGHT: 186.7 LBS | HEART RATE: 48 BPM | RESPIRATION RATE: 15 BRPM | BODY MASS INDEX: 31.88 KG/M2 | SYSTOLIC BLOOD PRESSURE: 146 MMHG | DIASTOLIC BLOOD PRESSURE: 86 MMHG | HEIGHT: 64 IN

## 2025-05-28 DIAGNOSIS — M79.89 LEFT LEG SWELLING: Primary | ICD-10-CM

## 2025-05-28 DIAGNOSIS — M25.562 ACUTE PAIN OF LEFT KNEE: ICD-10-CM

## 2025-05-28 DIAGNOSIS — I10 BENIGN ESSENTIAL HYPERTENSION: ICD-10-CM

## 2025-05-28 PROCEDURE — 3078F DIAST BP <80 MM HG: CPT | Performed by: NURSE PRACTITIONER

## 2025-05-28 PROCEDURE — 73562 X-RAY EXAM OF KNEE 3: CPT | Mod: LT | Performed by: RADIOLOGY

## 2025-05-28 PROCEDURE — 3077F SYST BP >= 140 MM HG: CPT | Performed by: NURSE PRACTITIONER

## 2025-05-28 PROCEDURE — 99214 OFFICE O/P EST MOD 30 MIN: CPT | Performed by: NURSE PRACTITIONER

## 2025-05-28 RX ORDER — METHOCARBAMOL 500 MG/1
500 TABLET, FILM COATED ORAL 2 TIMES DAILY PRN
Qty: 30 TABLET | Refills: 0 | Status: SHIPPED | OUTPATIENT
Start: 2025-05-28

## 2025-05-28 RX ORDER — TRAMADOL HYDROCHLORIDE 50 MG/1
50 TABLET ORAL EVERY 6 HOURS PRN
Qty: 10 TABLET | Refills: 0 | Status: SHIPPED | OUTPATIENT
Start: 2025-05-28 | End: 2025-05-31

## 2025-05-28 RX ORDER — METHOCARBAMOL 750 MG/1
750 TABLET, FILM COATED ORAL 2 TIMES DAILY PRN
COMMUNITY
End: 2025-05-28

## 2025-05-28 NOTE — PROGRESS NOTES
Assessment & Plan     Left leg swelling  Endorses new onset L leg swelling, will obtain US to r/o DVT.    - US Lower Extremity Venous Duplex Left; Future    Acute pain of left knee  Acute onset L knee pain and swelling with no known injury--will obtain Xray for initial evaluation. Encouraged resting, elevating, icing, and will refill short course tramadol and methocarbamol for pain management.  If symptoms persist, will refer to Sports Med.  - methocarbamol (ROBAXIN) 500 MG tablet; Take 1 tablet (500 mg) by mouth 2 times daily as needed for muscle spasms.  - traMADol (ULTRAM) 50 MG tablet; Take 1 tablet (50 mg) by mouth every 6 hours as needed for severe pain.  - XR Knee Left 3 Views; Future    Benign essential hypertension  BP elevated in clinic today, though she is in pain.  She continues on amlodipine 10 mg daily, Zestoretic 20-25mg.  She has a BP cuff at home and agrees to check BP at home, will message in the next 2-3 weeks with home BP readings.  If continues to be above goal range will increase lisinopril to 40 mg.    Follow-up  Return if symptoms worsen or fail to improve.      Subjective   Leigh is a 60 year old, presenting for the following health issues:  Follow Up (Left leg pain that started a week ago and restricting movement )        5/28/2025     5:16 PM   Additional Questions   Roomed by [facility]      Woke up 1 week ago with pain behind the knee; no known injury.  L knee swollen and painful, the whole leg hurts at times. She has been gardening, but otherwise just doing her normal activities.  Borrowed methocarbamol from her , which helps somewhat, but also makes her tired.  Had some Tramadol left from when she had breast cancer, takes at night; also using tylenol, and icing the knee.  Calf feels swollen.  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    No hx prior knee injury.    Denies recent travel or prolonged  "sitting.  Colonoscopy scheduled 7/1/25.    Review of Systems  Constitutional, HEENT, cardiovascular, pulmonary, gi and gu systems are negative, except as otherwise noted.      Objective    BP (!) 146/86 (BP Location: Right arm, Patient Position: Sitting, Cuff Size: Adult Large)   Pulse (!) 48   Resp 15   Ht 1.626 m (5' 4.02\")   Wt 84.7 kg (186 lb 11.2 oz)   LMP 03/01/2020 (Approximate)   SpO2 99%   BMI 32.03 kg/m    Body mass index is 32.03 kg/m .  Physical Exam   GENERAL: alert and no distress  RESP: lungs clear to auscultation - no rales, rhonchi or wheezes  CV: regular rate and rhythm, normal S1 S2, no S3 or S4, no murmur, click or rub, no peripheral edema  MS: edema over left knee and calf (R proximal calf circumference 34 cm, L proximal calf 34.8 cm), pain to palpation of posterior knee, unable to flex L knee >90 degrees d/t pain  SKIN: no suspicious lesions or rashes  NEURO: Normal strength and tone, mentation intact and speech normal  PSYCH: mentation appears normal, affect normal/bright            Signed Electronically by: MATTHEW Gomes CNP    "

## 2025-05-29 ENCOUNTER — RESULTS FOLLOW-UP (OUTPATIENT)
Dept: INTERNAL MEDICINE | Facility: CLINIC | Age: 60
End: 2025-05-29
Payer: COMMERCIAL

## 2025-06-02 ENCOUNTER — PATIENT OUTREACH (OUTPATIENT)
Dept: CARE COORDINATION | Facility: CLINIC | Age: 60
End: 2025-06-02
Payer: COMMERCIAL

## 2025-06-06 ENCOUNTER — HOSPITAL ENCOUNTER (OUTPATIENT)
Dept: ULTRASOUND IMAGING | Facility: HOSPITAL | Age: 60
Discharge: HOME | End: 2025-06-06
Attending: NURSE PRACTITIONER | Admitting: NURSE PRACTITIONER
Payer: COMMERCIAL

## 2025-06-06 DIAGNOSIS — M79.89 LEFT LEG SWELLING: ICD-10-CM

## 2025-06-06 PROCEDURE — 93971 EXTREMITY STUDY: CPT | Mod: LT

## 2025-06-09 NOTE — TELEPHONE ENCOUNTER
DIAGNOSIS: Acute pain of left knee/Delfina Ca APRN CNP/MEDICA/XR     APPOINTMENT DATE: 6/11/25   NOTES STATUS DETAILS   OFFICE NOTE from referring provider Internal 5/28/25  Lanny  IM   MEDICATION LIST Internal    XRAYS (IMAGES & REPORTS) Internal 5/28/25  XR Knee Left

## 2025-06-11 ENCOUNTER — OFFICE VISIT (OUTPATIENT)
Dept: ORTHOPEDICS | Facility: CLINIC | Age: 60
End: 2025-06-11
Attending: NURSE PRACTITIONER
Payer: COMMERCIAL

## 2025-06-11 ENCOUNTER — PRE VISIT (OUTPATIENT)
Dept: ORTHOPEDICS | Facility: CLINIC | Age: 60
End: 2025-06-11

## 2025-06-11 VITALS — HEIGHT: 64 IN | BODY MASS INDEX: 31.76 KG/M2 | WEIGHT: 186 LBS

## 2025-06-11 DIAGNOSIS — M17.12 TRICOMPARTMENT OSTEOARTHRITIS OF LEFT KNEE: ICD-10-CM

## 2025-06-11 DIAGNOSIS — M25.562 ACUTE PAIN OF LEFT KNEE: Primary | ICD-10-CM

## 2025-06-11 RX ADMIN — TRIAMCINOLONE ACETONIDE 40 MG: 40 INJECTION, SUSPENSION INTRA-ARTICULAR; INTRAMUSCULAR at 16:02

## 2025-06-11 RX ADMIN — LIDOCAINE HYDROCHLORIDE 4 ML: 10 INJECTION, SOLUTION EPIDURAL; INFILTRATION; INTRACAUDAL; PERINEURAL at 16:02

## 2025-06-11 RX ADMIN — LIDOCAINE HYDROCHLORIDE 5 ML: 10 INJECTION, SOLUTION EPIDURAL; INFILTRATION; INTRACAUDAL; PERINEURAL at 16:02

## 2025-06-11 SDOH — HEALTH STABILITY: PHYSICAL HEALTH: ON AVERAGE, HOW MANY MINUTES DO YOU ENGAGE IN EXERCISE AT THIS LEVEL?: 20 MIN

## 2025-06-11 SDOH — HEALTH STABILITY: PHYSICAL HEALTH: ON AVERAGE, HOW MANY DAYS PER WEEK DO YOU ENGAGE IN MODERATE TO STRENUOUS EXERCISE (LIKE A BRISK WALK)?: 2 DAYS

## 2025-06-11 NOTE — LETTER
6/11/2025      RE: Leigh Espinal  [address] [place] [address]     Dear Colleague,    Thank you for referring your patient, Leigh Espinal, to the [facility]. Please see a copy of my visit note below.    [facility]  Sports Medicine Clinic  [facility]         SUBJECTIVE       Leigh Espinal is a 60 year old female presenting to clinic today with left knee pain.     The patient is seen by themselves.  The patient is right-handed.    Onset: 2 week(s) ago, woke up with acute knee pain and swelling. Reports insidious onset without acute precipitating event, although notes that she was gardening the night before.  Location of Pain: Left knee diffuse, especially medial and posterolateral aspects.  Worsened by: Laying down at night, walking, movement, palpation.  Better with: Rest, Tylenol, Ibuprofen, muscle relaxer, pain medication.  Treatments tried: Rest/activity avoidance, medication.  Associated symptoms: Swelling.    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    ROS:  As noted above, otherwise negative.    Patient Active Problem List   Diagnosis     Iron deficiency anemia     Pulmonary arteriovenous malformation     HHT (hereditary hemorrhagic telangiectasia)     Malignant neoplasm of upper-outer quadrant of left breast in female, estrogen receptor positive (H)     Malignant neoplasm of upper-inner quadrant of right breast in female, estrogen receptor positive (H)     S/P breast reconstruction, bilateral     Menorrhagia     Anxiety and depression     Breast cancer (H)     Monoallelic mutation of CHEK2 gene in female patient     Bradycardia     Non-healing surgical wound, subsequent encounter     Postoperative wound dehiscence     Acute bilateral low back pain, unspecified  whether sciatica present       Current Outpatient Medications   Medication Sig Dispense Refill     acetaminophen (TYLENOL) 500 MG tablet Take 2 tablets (1,000 mg) by mouth 3 times daily (Patient taking differently: Take 1,000 mg by mouth as needed.) 120 tablet 0     amLODIPine (NORVASC) 10 MG tablet Take 1 tablet (10 mg) by mouth daily. 90 tablet 3     atenolol (TENORMIN) 50 MG tablet Take 1 tablet (50 mg) by mouth daily. 90 tablet 3     bisacodyl (DULCOLAX) 5 MG EC tablet Take 2 tablets at 3 pm the day before your procedure. If your procedure is before 11 am, take 2 additional tablets at 11 pm. If your procedure is after 11 am, take 2 additional tablets at 6 am. For additional instructions refer to your colonoscopy prep instructions. 4 tablet 0     cholecalciferol (VITAMIN D3) 125 mcg (5000 units) capsule Take 125 mcg by mouth as needed.       lidocaine (XYLOCAINE) 5 % external ointment Apply topically as needed.       lisinopril-hydrochlorothiazide (ZESTORETIC) 20-25 MG tablet Take 1 tablet by mouth daily. 90 tablet 3     methocarbamol (ROBAXIN) 500 MG tablet Take 1 tablet (500 mg) by mouth 2 times daily as needed for muscle spasms. 30 tablet 0     polyethylene glycol (GOLYTELY) 236 g suspension The night before the exam at 6 pm drink an 8-ounce glass every 15 minutes until the jug is half empty. If you arrive before 11 AM: Drink the other half of the Golytely jug at 11 PM night before procedure. If you arrive after 11 AM: Drink the other half of the Golytely jug at 6 AM day of procedure. For additional instructions refer to your colonoscopy prep instructions. 4000 mL 0     sertraline (ZOLOFT) 100 MG tablet Take 1 tablet (100 mg) by mouth daily. 90 tablet 3     traMADol (ULTRAM) 50 MG tablet Take 1 tablet (50 mg) by mouth every 6 hours as needed for severe pain. 10 tablet 0     traZODone (DESYREL) 50 MG tablet Take  mg by mouth At Bedtime (Patient not taking: Reported on 5/28/2025)              OBJECTIVE:     "   Vitals:   Vitals:    06/11/25 1319   Weight: 84.4 kg (186 lb)   Height: 1.626 m (5' 4.02\")     BMI: Body mass index is 31.91 kg/m .    Gen: Alert and in no acute distress.  Pulm: Breathing comfortably on room air, no increased respiratory effort.  Psych: Euthymic, appropriately answers questions.    MSK:   LEFT KNEE  Inspection:    Normal alignment; no erythema or ecchymosis present. Mild-to-moderate effusion present.  Palpation:    Diffusely tender to palpation, especially about the medial and lateral joint lines and suprapatellar region.  Range of Motion:     100 extension to 1200 flexion with discomfort, compared to 0-135 degrees on right.  Strength:    Extensor mechanism intact.  Special Tests:    Difficult to perform due to significant discomfort with movement and palpation.      3 views left knee radiographs 5/29/2025 7:31 AM  History: Acute pain of left knee  Additional History from EMR: No acute trauma  Comparison: None  Findings:     AP, lateral, and sunrise views of the left knee were obtained.      At least moderate tricompartmental degenerative changes of the knee  with peripheral osteophytosis and medial joint space narrowing.  Moderate sized knee effusion. No acute osseous abnormality. Mild  lateral patellar lateral subluxation.  Soft tissue is unremarkable.                                                                      Impression:  Moderate tricompartmental degenerative changes. Acute osseous  abnormality.     I have personally reviewed the examination and initial interpretation  and I agree with the findings.     IRENE FERRARI MD (Joe)      EXAM: US LOWER EXTREMITY VENOUS DUPLEX LEFT  LOCATION: [facility]  DATE: 6/6/2025  INDICATION: Left lower extremity swelling.  COMPARISON: None available.  TECHNIQUE: Venous Duplex ultrasound of the left lower extremity with and without compression, augmentation and duplex. Color flow and spectral Doppler with waveform " analysis performed.     FINDINGS: Exam includes the common femoral, femoral, popliteal, and contralateral common femoral veins as well as segmentally visualized deep calf veins and greater saphenous vein.      LEFT: No deep vein thrombosis. No superficial thrombophlebitis. Complex, small to moderate-sized left suprapatellar joint effusion. Additional tiny fluid collection versus normal lymph node within the popliteal fossa, measuring no greater than 1 cm.                                                                      IMPRESSION:  1.  No deep venous thrombosis in the visualized left lower extremity.  2.  Small to moderate-sized left knee joint effusion, which appears complex and is likely indicative of an underlying synovitis. If there is clinical suspicion of a septic arthritis, arthrocentesis is recommended for further evaluation.            ASSESSMENT and PLAN:     Leigh was seen today for consult.    Diagnoses and all orders for this visit:    Acute pain of left knee  -     Orthopedic  Referral  -     POC US GUIDANCE NEEDLE PLACEMENT  -     Large Joint Injection/Arthocentesis: L knee joint    Tricompartment osteoarthritis of left knee  -     Large Joint Injection/Arthocentesis: L knee joint        59 yo F with acute left knee pain and swelling x 2 weeks, recent XR revealed tricompartmental OA. Suspect her symptoms are due to an acute flare-up of her arthritis due to her recent gardening and activity level.    - Discussed aspiration and CSI of her left knee for symptom management, she would like to proceed.      Aspiration and corticosteroid injection of LEFT knee joint, ultrasound-guided  The indications, risks, and expected benefits were discussed. Written consent was obtained. The area was first prepped using Chloraprep. Topical ethyl chloride in addition to 3 ml 1% Lidocaine were used as local. Using sterile technique and a 1.5 inch, 18 gauge needle, approximately 22 ml was aspirated from the  visualized left suprapatellar recess under ultrasound guidance. Then, a syringe with a 1.5 inch, 23 gauge needle containing 40 mg/mL Kenalog and 1 mL of 1% Lidocaine was injected into the left knee joint under ultrasound guidance. Ultrasound was necessary to visualize the effusion and injectate placement. Patient tolerated the procedure without complication or significant bleeding, and she noted significant relief post-aspiration/injection, she was able to walk and bend her knee more freely. Upon completion of the injection, the wound was dressed with a bandaid. Post-aspiration/injection instructions were given verbally. Images were taken on the Sonosite US machine and stored in PACS.       - She was given a Tubigrip sleeve for additional comfort.  - Follow up PRN depending on symptom response.      Options for treatment and/or follow-up care were reviewed with the patient, who was actively involved in the decision-making process. Patient verbalized understanding and was in agreement with the plan.    The patient was seen by and discussed with attending physician Dr. Delmis Haider MD, CAQ, CCD, who agrees with the plan unless otherwise stated.    Large Joint Injection/Arthocentesis: L knee joint    Date/Time: 6/11/2025 4:02 PM    Performed by: Delmis Haider MD  Authorized by: Delmis Haider MD    Indications:  Pain and osteoarthritis  Needle Size:  25 G  Needle Size comment:  18g for aspiration  25g for lido  Guidance: ultrasound    Approach:  Lateral  Location:  Knee      Medications:  40 mg triamcinolone 40 MG/ML; 4 mL lidocaine (PF) 1 %; 5 mL lidocaine (PF) 1 %  Aspirate amount (mL):  22  Aspirate:  Yellow and clear  Outcome:  Tolerated well, no immediate complications  Procedure discussed: discussed risks, benefits, and alternatives    Consent Given by:  Patient  Timeout: timeout called immediately prior to procedure    Prep: patient was prepped and draped in usual sterile  yuko Peñaloza, DO  Primary Care Sports Medicine Fellow  [facility]    Attending Note:   I have   examined this patient and have reviewed the clinical presentation and progress note with the fellow. I agree with the treatment plan as outlined. The plan was formulated with the fellow on the day of the patient's visit. I have reviewed all imaging with the fellow and agree with the findings in the documentation. I have directly supervised the aspiration and injection.     Delmis Haider MD, CAQ, CCD  [facility]  Sports Medicine and Bone Health    Again, thank you for allowing me to participate in the care of your patient.      Sincerely,    Delmis Haider MD

## 2025-06-11 NOTE — NURSING NOTE
[address] [address]  Dept: [phone]  ______________________________________________________________________________    Patient: Leigh Espinal   : 1965   MRN: [id number]   2025    INVASIVE PROCEDURE SAFETY CHECKLIST    Date: 25   Procedure: Left knee USG aspiration and kenalog injection  Patient Name: Leigh Espinal  MRN: [id number]  YOB: 1965    Action: Complete sections as appropriate. Any discrepancy results in a HARD COPY until resolved.     PRE PROCEDURE:  Patient ID verified with 2 identifiers (name and  or MRN): Yes  Procedure and site verified with patient/designee (when able): Yes  Accurate consent documentation in medical record: Yes  H&P (or appropriate assessment) documented in medical record: Yes  H&P must be up to 20 days prior to procedure and updates within 24 hours of procedure as applicable: NA  Relevant diagnostic and radiology test results appropriately labeled and displayed as applicable: Yes  Procedure site(s) marked with provider initials: NA    TIMEOUT:  Time-Out performed immediately prior to starting procedure, including verbal and active participation of all team members addressing the following:Yes  * Correct patient identify  * Confirmed that the correct side and site are marked  * An accurate procedure consent form  * Agreement on the procedure to be done  * Correct patient position  * Relevant images and results are properly labeled and appropriately displayed  * The need to administer antibiotics or fluids for irrigation purposes during the procedure as applicable   * Safety precautions based on patient history or medication use    DURING PROCEDURE: Verification of correct person, site, and procedures any time the responsibility for care of the patient is transferred to another member of the care team.       Prior to injection, verified patient identity using  patient's name and date of birth.  Due to injection administration, patient instructed to remain in clinic for 15 minutes  afterwards, and to report any adverse reaction to me immediately.    Joint injection was performed.      Drug Amount Wasted:  Yes: 1 mg/ml  lidocaine  Vial/Syringe: Single dose vial  Expiration Date:  01/2029      Rachelle Solano, SANTO  June 11, 2025

## 2025-06-11 NOTE — PROGRESS NOTES
[facility]  Sports Medicine Clinic  [facility]         SUBJECTIVE       Leigh Espinal is a 60 year old female presenting to clinic today with left knee pain.     The patient is seen by themselves.  The patient is right-handed.    Onset: 2 week(s) ago, woke up with acute knee pain and swelling. Reports insidious onset without acute precipitating event, although notes that she was gardening the night before.  Location of Pain: Left knee diffuse, especially medial and posterolateral aspects.  Worsened by: Laying down at night, walking, movement, palpation.  Better with: Rest, Tylenol, Ibuprofen, muscle relaxer, pain medication.  Treatments tried: Rest/activity avoidance, medication.  Associated symptoms: Swelling.    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    ROS:  As noted above, otherwise negative.    Patient Active Problem List   Diagnosis    Iron deficiency anemia    Pulmonary arteriovenous malformation    HHT (hereditary hemorrhagic telangiectasia)    Malignant neoplasm of upper-outer quadrant of left breast in female, estrogen receptor positive (H)    Malignant neoplasm of upper-inner quadrant of right breast in female, estrogen receptor positive (H)    S/P breast reconstruction, bilateral    Menorrhagia    Anxiety and depression    Breast cancer (H)    Monoallelic mutation of CHEK2 gene in female patient    Bradycardia    Non-healing surgical wound, subsequent encounter    Postoperative wound dehiscence    Acute bilateral low back pain, unspecified whether sciatica present       Current Outpatient Medications   Medication Sig Dispense Refill    acetaminophen (TYLENOL) 500 MG tablet Take 2 tablets (1,000 mg) by mouth 3 times daily (Patient taking differently: Take 1,000 mg by mouth as needed.) 120 tablet 0    amLODIPine (NORVASC) 10 MG  "tablet Take 1 tablet (10 mg) by mouth daily. 90 tablet 3    atenolol (TENORMIN) 50 MG tablet Take 1 tablet (50 mg) by mouth daily. 90 tablet 3    bisacodyl (DULCOLAX) 5 MG EC tablet Take 2 tablets at 3 pm the day before your procedure. If your procedure is before 11 am, take 2 additional tablets at 11 pm. If your procedure is after 11 am, take 2 additional tablets at 6 am. For additional instructions refer to your colonoscopy prep instructions. 4 tablet 0    cholecalciferol (VITAMIN D3) 125 mcg (5000 units) capsule Take 125 mcg by mouth as needed.      lidocaine (XYLOCAINE) 5 % external ointment Apply topically as needed.      lisinopril-hydrochlorothiazide (ZESTORETIC) 20-25 MG tablet Take 1 tablet by mouth daily. 90 tablet 3    methocarbamol (ROBAXIN) 500 MG tablet Take 1 tablet (500 mg) by mouth 2 times daily as needed for muscle spasms. 30 tablet 0    polyethylene glycol (GOLYTELY) 236 g suspension The night before the exam at 6 pm drink an 8-ounce glass every 15 minutes until the jug is half empty. If you arrive before 11 AM: Drink the other half of the Golytely jug at 11 PM night before procedure. If you arrive after 11 AM: Drink the other half of the Golytely jug at 6 AM day of procedure. For additional instructions refer to your colonoscopy prep instructions. 4000 mL 0    sertraline (ZOLOFT) 100 MG tablet Take 1 tablet (100 mg) by mouth daily. 90 tablet 3    traMADol (ULTRAM) 50 MG tablet Take 1 tablet (50 mg) by mouth every 6 hours as needed for severe pain. 10 tablet 0    traZODone (DESYREL) 50 MG tablet Take  mg by mouth At Bedtime (Patient not taking: Reported on 5/28/2025)              OBJECTIVE:       Vitals:   Vitals:    06/11/25 1319   Weight: 84.4 kg (186 lb)   Height: 1.626 m (5' 4.02\")     BMI: Body mass index is 31.91 kg/m .    Gen: Alert and in no acute distress.  Pulm: Breathing comfortably on room air, no increased respiratory effort.  Psych: Euthymic, appropriately answers " questions.    MSK:   LEFT KNEE  Inspection:    Normal alignment; no erythema or ecchymosis present. Mild-to-moderate effusion present.  Palpation:    Diffusely tender to palpation, especially about the medial and lateral joint lines and suprapatellar region.  Range of Motion:     100 extension to 1200 flexion with discomfort, compared to 0-135 degrees on right.  Strength:    Extensor mechanism intact.  Special Tests:    Difficult to perform due to significant discomfort with movement and palpation.      3 views left knee radiographs 5/29/2025 7:31 AM  History: Acute pain of left knee  Additional History from EMR: No acute trauma  Comparison: None  Findings:     AP, lateral, and sunrise views of the left knee were obtained.      At least moderate tricompartmental degenerative changes of the knee  with peripheral osteophytosis and medial joint space narrowing.  Moderate sized knee effusion. No acute osseous abnormality. Mild  lateral patellar lateral subluxation.  Soft tissue is unremarkable.                                                                      Impression:  Moderate tricompartmental degenerative changes. Acute osseous  abnormality.     I have personally reviewed the examination and initial interpretation  and I agree with the findings.     IRENE FERRARI MD (Joe)      EXAM: US LOWER EXTREMITY VENOUS DUPLEX LEFT  LOCATION: [facility]  DATE: 6/6/2025  INDICATION: Left lower extremity swelling.  COMPARISON: None available.  TECHNIQUE: Venous Duplex ultrasound of the left lower extremity with and without compression, augmentation and duplex. Color flow and spectral Doppler with waveform analysis performed.     FINDINGS: Exam includes the common femoral, femoral, popliteal, and contralateral common femoral veins as well as segmentally visualized deep calf veins and greater saphenous vein.      LEFT: No deep vein thrombosis. No superficial thrombophlebitis. Complex, small to  moderate-sized left suprapatellar joint effusion. Additional tiny fluid collection versus normal lymph node within the popliteal fossa, measuring no greater than 1 cm.                                                                      IMPRESSION:  1.  No deep venous thrombosis in the visualized left lower extremity.  2.  Small to moderate-sized left knee joint effusion, which appears complex and is likely indicative of an underlying synovitis. If there is clinical suspicion of a septic arthritis, arthrocentesis is recommended for further evaluation.            ASSESSMENT and PLAN:     Leigh was seen today for consult.    Diagnoses and all orders for this visit:    Acute pain of left knee  -     Orthopedic  Referral  -     POC US GUIDANCE NEEDLE PLACEMENT  -     Large Joint Injection/Arthocentesis: L knee joint    Tricompartment osteoarthritis of left knee  -     Large Joint Injection/Arthocentesis: L knee joint        59 yo F with acute left knee pain and swelling x 2 weeks, recent XR revealed tricompartmental OA. Suspect her symptoms are due to an acute flare-up of her arthritis due to her recent gardening and activity level.    - Discussed aspiration and CSI of her left knee for symptom management, she would like to proceed.      Aspiration and corticosteroid injection of LEFT knee joint, ultrasound-guided  The indications, risks, and expected benefits were discussed. Written consent was obtained. The area was first prepped using Chloraprep. Topical ethyl chloride in addition to 3 ml 1% Lidocaine were used as local. Using sterile technique and a 1.5 inch, 18 gauge needle, approximately 22 ml was aspirated from the visualized left suprapatellar recess under ultrasound guidance. Then, a syringe with a 1.5 inch, 23 gauge needle containing 40 mg/mL Kenalog and 1 mL of 1% Lidocaine was injected into the left knee joint under ultrasound guidance. Ultrasound was necessary to visualize the effusion and  injectate placement. Patient tolerated the procedure without complication or significant bleeding, and she noted significant relief post-aspiration/injection, she was able to walk and bend her knee more freely. Upon completion of the injection, the wound was dressed with a bandaid. Post-aspiration/injection instructions were given verbally. Images were taken on the Sonosite US machine and stored in PACS.       - She was given a Tubigrip sleeve for additional comfort.  - Follow up PRN depending on symptom response.      Options for treatment and/or follow-up care were reviewed with the patient, who was actively involved in the decision-making process. Patient verbalized understanding and was in agreement with the plan.    The patient was seen by and discussed with attending physician Dr. Delmis Haider MD, CAQ, CCD, who agrees with the plan unless otherwise stated.    Large Joint Injection/Arthocentesis: L knee joint    Date/Time: 6/11/2025 4:02 PM    Performed by: Delmis Haider MD  Authorized by: Delmis Haider MD    Indications:  Pain and osteoarthritis  Needle Size:  25 G  Needle Size comment:  18g for aspiration  25g for lido  Guidance: ultrasound    Approach:  Lateral  Location:  Knee      Medications:  40 mg triamcinolone 40 MG/ML; 4 mL lidocaine (PF) 1 %; 5 mL lidocaine (PF) 1 %  Aspirate amount (mL):  22  Aspirate:  Yellow and clear  Outcome:  Tolerated well, no immediate complications  Procedure discussed: discussed risks, benefits, and alternatives    Consent Given by:  Patient  Timeout: timeout called immediately prior to procedure    Prep: patient was prepped and draped in usual sterile fashion          Zoye Peñaloza DO  Primary Care Sports Medicine Fellow  [facility]

## 2025-06-12 RX ORDER — LIDOCAINE HYDROCHLORIDE 10 MG/ML
5 INJECTION, SOLUTION EPIDURAL; INFILTRATION; INTRACAUDAL; PERINEURAL
Status: COMPLETED | OUTPATIENT
Start: 2025-06-11 | End: 2025-06-11

## 2025-06-12 RX ORDER — TRIAMCINOLONE ACETONIDE 40 MG/ML
40 INJECTION, SUSPENSION INTRA-ARTICULAR; INTRAMUSCULAR
Status: COMPLETED | OUTPATIENT
Start: 2025-06-11 | End: 2025-06-11

## 2025-06-12 RX ORDER — LIDOCAINE HYDROCHLORIDE 10 MG/ML
4 INJECTION, SOLUTION EPIDURAL; INFILTRATION; INTRACAUDAL; PERINEURAL
Status: COMPLETED | OUTPATIENT
Start: 2025-06-11 | End: 2025-06-11

## 2025-06-12 NOTE — PROGRESS NOTES
Attending Note:   I have   examined this patient and have reviewed the clinical presentation and progress note with the fellow. I agree with the treatment plan as outlined. The plan was formulated with the fellow on the day of the patient's visit. I have reviewed all imaging with the fellow and agree with the findings in the documentation. I have directly supervised the aspiration and injection.     Delmis Haider MD, CAQ, CCD  [facility]  Sports Medicine and Bone Health

## 2025-06-23 DIAGNOSIS — C50.211 MALIGNANT NEOPLASM OF UPPER-INNER QUADRANT OF RIGHT BREAST IN FEMALE, ESTROGEN RECEPTOR POSITIVE (H): Primary | ICD-10-CM

## 2025-06-23 DIAGNOSIS — Z17.0 MALIGNANT NEOPLASM OF UPPER-INNER QUADRANT OF RIGHT BREAST IN FEMALE, ESTROGEN RECEPTOR POSITIVE (H): Primary | ICD-10-CM

## 2025-06-23 DIAGNOSIS — N95.0 POSTMENOPAUSAL BLEEDING: ICD-10-CM

## 2025-08-18 ENCOUNTER — ONCOLOGY VISIT (OUTPATIENT)
Dept: ONCOLOGY | Facility: CLINIC | Age: 60
End: 2025-08-18
Attending: INTERNAL MEDICINE
Payer: COMMERCIAL

## 2025-08-18 VITALS
SYSTOLIC BLOOD PRESSURE: 158 MMHG | WEIGHT: 181.3 LBS | TEMPERATURE: 98.4 F | BODY MASS INDEX: 31.1 KG/M2 | HEART RATE: 53 BPM | OXYGEN SATURATION: 98 % | DIASTOLIC BLOOD PRESSURE: 89 MMHG | RESPIRATION RATE: 16 BRPM

## 2025-08-18 DIAGNOSIS — Z15.09 MONOALLELIC MUTATION OF CHEK2 GENE IN FEMALE PATIENT: ICD-10-CM

## 2025-08-18 DIAGNOSIS — Z17.0 BILATERAL MALIGNANT NEOPLASM OF BREAST IN FEMALE, ESTROGEN RECEPTOR POSITIVE, UNSPECIFIED SITE OF BREAST (H): ICD-10-CM

## 2025-08-18 DIAGNOSIS — D50.0 IRON DEFICIENCY ANEMIA DUE TO CHRONIC BLOOD LOSS: Primary | ICD-10-CM

## 2025-08-18 DIAGNOSIS — C50.912 BILATERAL MALIGNANT NEOPLASM OF BREAST IN FEMALE, ESTROGEN RECEPTOR POSITIVE, UNSPECIFIED SITE OF BREAST (H): ICD-10-CM

## 2025-08-18 DIAGNOSIS — M25.562 LEFT KNEE PAIN, UNSPECIFIED CHRONICITY: ICD-10-CM

## 2025-08-18 DIAGNOSIS — Z15.89 MONOALLELIC MUTATION OF CHEK2 GENE IN FEMALE PATIENT: ICD-10-CM

## 2025-08-18 DIAGNOSIS — Z15.01 MONOALLELIC MUTATION OF CHEK2 GENE IN FEMALE PATIENT: ICD-10-CM

## 2025-08-18 DIAGNOSIS — Z15.02 MONOALLELIC MUTATION OF CHEK2 GENE IN FEMALE PATIENT: ICD-10-CM

## 2025-08-18 DIAGNOSIS — C50.911 BILATERAL MALIGNANT NEOPLASM OF BREAST IN FEMALE, ESTROGEN RECEPTOR POSITIVE, UNSPECIFIED SITE OF BREAST (H): ICD-10-CM

## 2025-08-18 PROCEDURE — G2211 COMPLEX E/M VISIT ADD ON: HCPCS | Performed by: INTERNAL MEDICINE

## 2025-08-18 PROCEDURE — 99213 OFFICE O/P EST LOW 20 MIN: CPT | Performed by: INTERNAL MEDICINE

## 2025-08-18 ASSESSMENT — PAIN SCALES - GENERAL: PAINLEVEL_OUTOF10: NO PAIN (0)

## 2025-08-19 ENCOUNTER — OFFICE VISIT (OUTPATIENT)
Dept: OBGYN | Facility: CLINIC | Age: 60
End: 2025-08-19
Attending: INTERNAL MEDICINE
Payer: COMMERCIAL

## 2025-08-19 ENCOUNTER — PATIENT OUTREACH (OUTPATIENT)
Dept: CARE COORDINATION | Facility: CLINIC | Age: 60
End: 2025-08-19

## 2025-08-19 ENCOUNTER — LAB (OUTPATIENT)
Dept: LAB | Facility: CLINIC | Age: 60
End: 2025-08-19
Payer: COMMERCIAL

## 2025-08-19 VITALS
BODY MASS INDEX: 31.07 KG/M2 | WEIGHT: 181.1 LBS | TEMPERATURE: 98.1 F | DIASTOLIC BLOOD PRESSURE: 75 MMHG | OXYGEN SATURATION: 96 % | HEART RATE: 59 BPM | SYSTOLIC BLOOD PRESSURE: 137 MMHG

## 2025-08-19 DIAGNOSIS — Z15.02 MONOALLELIC MUTATION OF CHEK2 GENE IN FEMALE PATIENT: ICD-10-CM

## 2025-08-19 DIAGNOSIS — D50.0 IRON DEFICIENCY ANEMIA DUE TO CHRONIC BLOOD LOSS: ICD-10-CM

## 2025-08-19 DIAGNOSIS — N95.0 POSTMENOPAUSAL BLEEDING: Primary | ICD-10-CM

## 2025-08-19 DIAGNOSIS — Z17.0 MALIGNANT NEOPLASM OF UPPER-INNER QUADRANT OF RIGHT BREAST IN FEMALE, ESTROGEN RECEPTOR POSITIVE (H): ICD-10-CM

## 2025-08-19 DIAGNOSIS — Z15.01 MONOALLELIC MUTATION OF CHEK2 GENE IN FEMALE PATIENT: ICD-10-CM

## 2025-08-19 DIAGNOSIS — C50.912 BILATERAL MALIGNANT NEOPLASM OF BREAST IN FEMALE, ESTROGEN RECEPTOR POSITIVE, UNSPECIFIED SITE OF BREAST (H): ICD-10-CM

## 2025-08-19 DIAGNOSIS — C50.211 MALIGNANT NEOPLASM OF UPPER-INNER QUADRANT OF RIGHT BREAST IN FEMALE, ESTROGEN RECEPTOR POSITIVE (H): ICD-10-CM

## 2025-08-19 DIAGNOSIS — C50.911 BILATERAL MALIGNANT NEOPLASM OF BREAST IN FEMALE, ESTROGEN RECEPTOR POSITIVE, UNSPECIFIED SITE OF BREAST (H): ICD-10-CM

## 2025-08-19 DIAGNOSIS — Z17.0 BILATERAL MALIGNANT NEOPLASM OF BREAST IN FEMALE, ESTROGEN RECEPTOR POSITIVE, UNSPECIFIED SITE OF BREAST (H): ICD-10-CM

## 2025-08-19 DIAGNOSIS — Z15.09 MONOALLELIC MUTATION OF CHEK2 GENE IN FEMALE PATIENT: ICD-10-CM

## 2025-08-19 DIAGNOSIS — Z15.89 MONOALLELIC MUTATION OF CHEK2 GENE IN FEMALE PATIENT: ICD-10-CM

## 2025-08-19 LAB
ALBUMIN SERPL BCG-MCNC: 4 G/DL (ref 3.5–5.2)
ALP SERPL-CCNC: 122 U/L (ref 40–150)
ALT SERPL W P-5'-P-CCNC: 18 U/L (ref 0–50)
ANION GAP SERPL CALCULATED.3IONS-SCNC: 13 MMOL/L (ref 7–15)
AST SERPL W P-5'-P-CCNC: 20 U/L (ref 0–45)
BASOPHILS # BLD AUTO: <0.04 10E3/UL (ref 0–0.2)
BASOPHILS NFR BLD AUTO: 0.3 %
BILIRUB SERPL-MCNC: 0.2 MG/DL
BUN SERPL-MCNC: 13.9 MG/DL (ref 6–20)
CALCIUM SERPL-MCNC: 9.9 MG/DL (ref 8.8–10.4)
CHLORIDE SERPL-SCNC: 102 MMOL/L (ref 98–107)
CREAT SERPL-MCNC: 0.74 MG/DL (ref 0.51–0.95)
EGFRCR SERPLBLD CKD-EPI 2021: >90 ML/MIN/1.73M2
EOSINOPHIL # BLD AUTO: 0.05 10E3/UL (ref 0–0.7)
EOSINOPHIL NFR BLD AUTO: 0.8 %
ERYTHROCYTE [DISTWIDTH] IN BLOOD BY AUTOMATED COUNT: 14.5 % (ref 10–15)
ESTRADIOL SERPL-MCNC: 15 PG/ML
FERRITIN SERPL-MCNC: 110 NG/ML (ref 11–328)
FSH SERPL IRP2-ACNC: 32.2 MIU/ML
GLUCOSE SERPL-MCNC: 182 MG/DL (ref 70–99)
HCO3 SERPL-SCNC: 25 MMOL/L (ref 22–29)
HCT VFR BLD AUTO: 44.4 % (ref 35–47)
HGB BLD-MCNC: 14.6 G/DL (ref 11.7–15.7)
IMM GRANULOCYTES # BLD: <0.04 10E3/UL
IMM GRANULOCYTES NFR BLD: 0 %
IRON BINDING CAPACITY (ROCHE): 254 UG/DL (ref 240–430)
IRON SATN MFR SERPL: 42 % (ref 15–46)
IRON SERPL-MCNC: 107 UG/DL (ref 37–145)
LYMPHOCYTES # BLD AUTO: 2.87 10E3/UL (ref 0.8–5.3)
LYMPHOCYTES NFR BLD AUTO: 45.3 %
MCH RBC QN AUTO: 29 PG (ref 26.5–33)
MCHC RBC AUTO-ENTMCNC: 32.9 G/DL (ref 31.5–36.5)
MCV RBC AUTO: 88.3 FL (ref 78–100)
MONOCYTES # BLD AUTO: 0.43 10E3/UL (ref 0–1.3)
MONOCYTES NFR BLD AUTO: 6.8 %
NEUTROPHILS # BLD AUTO: 2.97 10E3/UL (ref 1.6–8.3)
NEUTROPHILS NFR BLD AUTO: 46.8 %
PLATELET # BLD AUTO: 179 10E3/UL (ref 150–450)
POTASSIUM SERPL-SCNC: 3.5 MMOL/L (ref 3.4–5.3)
PROT SERPL-MCNC: 6.3 G/DL (ref 6.4–8.3)
RBC # BLD AUTO: 5.03 10E6/UL (ref 3.8–5.2)
SODIUM SERPL-SCNC: 140 MMOL/L (ref 135–145)
WBC # BLD AUTO: 6.34 10E3/UL (ref 4–11)

## 2025-08-19 PROCEDURE — 36415 COLL VENOUS BLD VENIPUNCTURE: CPT

## 2025-08-19 PROCEDURE — 85025 COMPLETE CBC W/AUTO DIFF WBC: CPT

## 2025-08-19 PROCEDURE — 83540 ASSAY OF IRON: CPT

## 2025-08-19 PROCEDURE — 82728 ASSAY OF FERRITIN: CPT

## 2025-08-19 PROCEDURE — 83001 ASSAY OF GONADOTROPIN (FSH): CPT

## 2025-08-19 PROCEDURE — 83550 IRON BINDING TEST: CPT

## 2025-08-19 PROCEDURE — 82670 ASSAY OF TOTAL ESTRADIOL: CPT

## 2025-08-19 PROCEDURE — 80053 COMPREHEN METABOLIC PANEL: CPT

## 2025-08-19 ASSESSMENT — ANXIETY QUESTIONNAIRES
IF YOU CHECKED OFF ANY PROBLEMS ON THIS QUESTIONNAIRE, HOW DIFFICULT HAVE THESE PROBLEMS MADE IT FOR YOU TO DO YOUR WORK, TAKE CARE OF THINGS AT HOME, OR GET ALONG WITH OTHER PEOPLE: SOMEWHAT DIFFICULT
6. BECOMING EASILY ANNOYED OR IRRITABLE: NOT AT ALL
2. NOT BEING ABLE TO STOP OR CONTROL WORRYING: SEVERAL DAYS
5. BEING SO RESTLESS THAT IT IS HARD TO SIT STILL: NOT AT ALL
3. WORRYING TOO MUCH ABOUT DIFFERENT THINGS: NOT AT ALL
1. FEELING NERVOUS, ANXIOUS, OR ON EDGE: SEVERAL DAYS
GAD7 TOTAL SCORE: 2
7. FEELING AFRAID AS IF SOMETHING AWFUL MIGHT HAPPEN: NOT AT ALL
GAD7 TOTAL SCORE: 2

## 2025-08-19 ASSESSMENT — PATIENT HEALTH QUESTIONNAIRE - PHQ9
5. POOR APPETITE OR OVEREATING: NOT AT ALL
SUM OF ALL RESPONSES TO PHQ QUESTIONS 1-9: 6

## 2025-08-21 ENCOUNTER — PATIENT OUTREACH (OUTPATIENT)
Dept: CARE COORDINATION | Facility: CLINIC | Age: 60
End: 2025-08-21
Payer: COMMERCIAL

## 2025-08-21 LAB
PATH REPORT.COMMENTS IMP SPEC: NORMAL
PATH REPORT.COMMENTS IMP SPEC: NORMAL
PATH REPORT.FINAL DX SPEC: NORMAL
PATH REPORT.GROSS SPEC: NORMAL
PATH REPORT.MICROSCOPIC SPEC OTHER STN: NORMAL
PATH REPORT.RELEVANT HX SPEC: NORMAL
PHOTO IMAGE: NORMAL

## (undated) DEVICE — PROTECTOR ARM ONE-STEP TRENDELENBURG 40418

## (undated) DEVICE — DRAPE U SPLIT 74X120" 29440

## (undated) DEVICE — SU ETHILON 3-0 PS-1 18" 1663H

## (undated) DEVICE — GLOVE PROTEXIS MICRO 5.5  2D73PM55

## (undated) DEVICE — SYR BULB IRRIG 50ML LATEX FREE 0035280

## (undated) DEVICE — SYR 05ML LL W/O NDL

## (undated) DEVICE — SUCTION SLEEVE NEPTUNE 2 125MM 0703-005-125

## (undated) DEVICE — GLOVE PROTEXIS BLUE W/NEU-THERA 7.0  2D73EB70

## (undated) DEVICE — ESU GROUND PAD ADULT W/CORD E7507

## (undated) DEVICE — CLIP HORIZON SM RED WIDE SLOT 001201

## (undated) DEVICE — DRSG PRIMAPORE 02X3" 7133

## (undated) DEVICE — DRAIN JACKSON PRATT CHANNEL 15FR ROUND HUBLESS SIL JP-2228

## (undated) DEVICE — PAD CHUX UNDERPAD 23X24" 7136

## (undated) DEVICE — DRSG DRAIN 2X2" 7087

## (undated) DEVICE — NDL 25GA 2"  8881200441

## (undated) DEVICE — DRSG TEGADERM 2 3/8X2 3/4" 1624W

## (undated) DEVICE — SYR 10ML LL W/O NDL 302995

## (undated) DEVICE — DRAPE SHEET REV FOLD 3/4 9349

## (undated) DEVICE — GLOVE PROTEXIS W/NEU-THERA 7.0  2D73TE70

## (undated) DEVICE — Device

## (undated) DEVICE — ESU ELEC BLADE 6" COATED E1450-6

## (undated) DEVICE — SUCTION TIP YANKAUER W/O VENT K86

## (undated) DEVICE — SPONGE LAP 18X18" 1515

## (undated) DEVICE — RX BACITRACIN OINTMENT 0.9G 1/32OZ CUR001109

## (undated) DEVICE — GOWN IMPERVIOUS 2XL BLUE

## (undated) DEVICE — TUBING SUCTION 12"X1/4" N612

## (undated) DEVICE — SOL NACL 0.9% IRRIG 500ML BOTTLE 2F7123

## (undated) DEVICE — PITCHER STERILE 1000ML  SSK9004A

## (undated) DEVICE — SU ETHILON 9-0 BV100-4 5" 2829G

## (undated) DEVICE — TUBING SUCTION 10'X3/16" N510

## (undated) DEVICE — DRAIN JACKSON PRATT RESERVOIR 100ML SU130-1305

## (undated) DEVICE — SPECIMEN CONTAINER 3OZ W/FORMALIN 59901

## (undated) DEVICE — COVER NEOPROBE SOFTFLEX 5X96" W/BANDS 20-PC596

## (undated) DEVICE — SYR 03ML LL W/O NDL 309657

## (undated) DEVICE — SPONGE LAP 12X12" X8425

## (undated) DEVICE — LINEN TOWEL PACK X5 5464

## (undated) DEVICE — SPONGE LAP 18X18" X8435

## (undated) DEVICE — SU SECURESEAL SKIN ADHESIVE 0.5ML CHSS-05

## (undated) DEVICE — SPONGE RAY-TEC 4X8" 7318

## (undated) DEVICE — NDL ANGIOCATH 24GA 0.75" 4053

## (undated) DEVICE — ESU PENCIL SMOKE EVAC W/ROCKER SWITCH 0703-047-000

## (undated) DEVICE — SYR 10ML FINGER CONTROL W/O NDL 309695

## (undated) DEVICE — SUCTION MANIFOLD NEPTUNE 2 SYS 1 PORT 702-025-000

## (undated) DEVICE — DRSG STERI STRIP 1/2X4" R1547

## (undated) DEVICE — SU MONOCRYL 4-0 PS-2 18" UND Y496G

## (undated) DEVICE — PREP CHLORAPREP 26ML TINTED ORANGE  260815

## (undated) DEVICE — NDL ANGIOCATH 22GA 1" 4050

## (undated) DEVICE — SOL WATER IRRIG 1000ML BOTTLE 2F7114

## (undated) DEVICE — HEMOSTAT ABSORBABLE AGENT ARISTA 3GM POWDER SM0002-USA

## (undated) DEVICE — SOL WATER IRRIG 1000ML BOTTLE 07139-09

## (undated) DEVICE — SU STRATAFIX MONOCRYL 3-0 SPIRAL PS-2 45CM SXMP1B107

## (undated) DEVICE — CLOSURE SYS SKIN PREMIERPRO EXOFINFUSION 4X60CM 3473

## (undated) DEVICE — SUCTION MANIFOLD NEPTUNE 2 SYS 4 PORT 0702-020-000

## (undated) DEVICE — DRAPE SHEET MED 44X70" 9355

## (undated) DEVICE — PEN MARKING W/RULER DYNJSM04

## (undated) DEVICE — BLADE KNIFE SURG 10 371110

## (undated) DEVICE — DRSG KERLIX 4 1/2"X4YDS ROLL 6715

## (undated) DEVICE — SU VICRYL 0 CT-1 27" UND J260H

## (undated) DEVICE — WIPE INSTRUMENT MEROCEL 400200

## (undated) DEVICE — SU ETHILON 8-0 BV130-4 5" 2815G

## (undated) DEVICE — RETR ELASTIC STAYS LONE STAR SHARP 5MM 8/PACK 3311-8G

## (undated) DEVICE — CLOSURE SYS SKIN PREMIERPRO EXOFIN FUSION 4X22CM STRL 3472

## (undated) DEVICE — SYR BULB IRRIG DOVER 60 ML LATEX FREE 67000

## (undated) DEVICE — ESU FCP BIPOLAR NONSTICK STR 4"X0.4MM W/CORD 19-3002AU

## (undated) DEVICE — STPL SKIN 35W 054887

## (undated) DEVICE — NDL 30GA 0.5" 305106

## (undated) DEVICE — DRAPE SPLIT EENT 76X124" 3X28" 9447

## (undated) DEVICE — SU SILK 3-0 TIE 12X30" A304H

## (undated) DEVICE — DRSG TEGADERM 4X4 3/4" 1626W

## (undated) DEVICE — STPL SKIN 35W ROTATING HEAD PRW35

## (undated) DEVICE — PAD CHUX UNDERPAD 30X30"

## (undated) DEVICE — LABEL MEDICATION SYSTEM 3303-P

## (undated) DEVICE — DRAPE IOBAN INCISE 23X17" 6650EZ

## (undated) DEVICE — PREP SKIN SCRUB TRAY 4461A

## (undated) DEVICE — SU VICRYL 3-0 SH 27" J316H

## (undated) DEVICE — DRAPE POUCH INSTRUMENT 1018

## (undated) DEVICE — LINEN TOWEL PACK X6 WHITE 5487

## (undated) DEVICE — CLIP GEM MICRO GEM1521

## (undated) DEVICE — DRAPE ISOLATION BAG 1003

## (undated) DEVICE — GOWN IMPERVIOUS ZONED XLG 9041

## (undated) DEVICE — SOL WATER IRRIG 500ML BOTTLE 2F7113

## (undated) DEVICE — RETR BLADE LONE STAR 20MM SPIRA 3 FINGER 3335-4G

## (undated) DEVICE — GEL ULTRASOUND AQUASONIC 20GM 01-01

## (undated) DEVICE — SUCTION SLEEVE NEPTUNE 2 165MM 0703-005-165

## (undated) DEVICE — DECANTER TRANSFER DEVICE 2008S

## (undated) DEVICE — KIT ENDO TURNOVER/PROCEDURE CARRY-ON 101822

## (undated) DEVICE — CLIP HORIZON MED BLUE 002200

## (undated) DEVICE — PACK MINOR CUSTOM ASC

## (undated) DEVICE — GLOVE PROTEXIS BLUE W/NEU-THERA 6.0  2D73EB60

## (undated) DEVICE — STPL SKIN 35W 059037

## (undated) DEVICE — SU VICRYL 3-0 SH 27" UND J416H

## (undated) DEVICE — NDL 21GA 1.5"

## (undated) DEVICE — DRAPE MICRO ZEISS 120X54" LF 09-GL900

## (undated) DEVICE — NDL 19GA 1.5"

## (undated) DEVICE — SU VICRYL 3-0 FS-1 27" J442H

## (undated) DEVICE — SU MONOCRYL 2-0 SH 27" UND Y417H

## (undated) DEVICE — LINEN TOWEL PACK X30 5481

## (undated) DEVICE — BNDG ABDOMINAL BINDER 9X62-84" 79-89210

## (undated) DEVICE — SU MONOCRYL 4-0 P-3 18" UND Y494G

## (undated) DEVICE — ESU ELEC BLADE 2.75" COATED/INSULATED E1455

## (undated) DEVICE — TAPE MEASURE PAPER 36" LF NI14-1300

## (undated) DEVICE — SOL NACL 0.9% IRRIG 1000ML BOTTLE 2F7124

## (undated) DEVICE — GOWN LG DISP 9515

## (undated) DEVICE — EYE SPONGE SPEAR WECK CEL 0008685

## (undated) DEVICE — ESU ELEC BLADE 6" COATED/INSULATED E1455-6

## (undated) DEVICE — SU PDS II 0 CT-1 27" Z340H

## (undated) DEVICE — DRAPE IOBAN INCISE 13X13" 6640EZ

## (undated) DEVICE — SPONGE COTTONOID 1/2X3" 20-07S

## (undated) DEVICE — CUP AND LID 2PK 2OZ STERILE  SSK9006A

## (undated) DEVICE — SU SILK 3-0 SH 30" K832H

## (undated) DEVICE — CLIP GEM MICRO GEM2431

## (undated) DEVICE — ADH SKIN CLOSURE PREMIERPRO EXOFIN 1.0ML 3470

## (undated) DEVICE — SENSOR MONITOR SM PATCH TISSUE VIOPTIX OXY-2-SPD-1-P5

## (undated) DEVICE — BNDG ABDOMINAL BINDER 9X45-62" 79-89071

## (undated) DEVICE — SOL NACL 0.9% INJ 1000ML BAG 2B1324X

## (undated) DEVICE — SU VICRYL 4-0 PS-2 18" UND J496H

## (undated) DEVICE — STPL SKIN SUBCUTICULAR INSORB  2030

## (undated) DEVICE — WIPES FOLEY CARE SURESTEP PROVON DFC100

## (undated) DEVICE — SU PDS II 0 CTX 36" Z370T

## (undated) DEVICE — DRAPE MICRO ZEISS PENTERO 120X54" G650DL

## (undated) DEVICE — SYR 10ML LL W/O NDL

## (undated) DEVICE — CATH TRAY FOLEY SURESTEP 16FR W/TMP PRB STLK LATEX A319416AM

## (undated) DEVICE — DRAPE SHEET HALF 40X60" 9358

## (undated) DEVICE — DRAPE WARMER 66X44" ORS-300

## (undated) DEVICE — BNDG ELASTIC 6" DBL LENGTH UNSTERILE 6611-16

## (undated) DEVICE — PACK MINOR SBA15MIFSE

## (undated) DEVICE — KIT SPY ELITE DISP LC3006

## (undated) DEVICE — SUCTION CARDIAC FRAZIER TIP 6FR STERILE 3" 10053

## (undated) DEVICE — DRSG PRIMAPORE 03 1/8X6" 66000318

## (undated) DEVICE — SU SILK 2-0 TIE 12X30" A305H

## (undated) DEVICE — CATH TRAY FOLEY SURESTEP 16FR W/URNE MTR STLK LATEX A303316A

## (undated) DEVICE — BNDG ESMARK 4" STERILE LF 820-3412

## (undated) DEVICE — SUCTION MANIFOLD DORNOCH ULTRA CART UL-CL500

## (undated) DEVICE — SU PROLENE 5-0 RB-1DA 36"  8556H

## (undated) RX ORDER — GABAPENTIN 300 MG/1
CAPSULE ORAL
Status: DISPENSED
Start: 2021-10-14

## (undated) RX ORDER — HEPARIN SODIUM 1000 [USP'U]/ML
INJECTION, SOLUTION INTRAVENOUS; SUBCUTANEOUS
Status: DISPENSED
Start: 2021-09-20

## (undated) RX ORDER — CEFAZOLIN SODIUM 2 G/100ML
INJECTION, SOLUTION INTRAVENOUS
Status: DISPENSED
Start: 2021-09-20

## (undated) RX ORDER — ACETAMINOPHEN 325 MG/1
TABLET ORAL
Status: DISPENSED
Start: 2020-02-14

## (undated) RX ORDER — OXYCODONE HYDROCHLORIDE 5 MG/1
TABLET ORAL
Status: DISPENSED
Start: 2020-01-29

## (undated) RX ORDER — HEPARIN SODIUM 1000 [USP'U]/ML
INJECTION, SOLUTION INTRAVENOUS; SUBCUTANEOUS
Status: DISPENSED
Start: 2021-09-22

## (undated) RX ORDER — ACETAMINOPHEN 325 MG/1
TABLET ORAL
Status: DISPENSED
Start: 2021-10-14

## (undated) RX ORDER — KETOROLAC TROMETHAMINE 30 MG/ML
INJECTION, SOLUTION INTRAMUSCULAR; INTRAVENOUS
Status: DISPENSED
Start: 2020-02-14

## (undated) RX ORDER — ONDANSETRON 2 MG/ML
INJECTION INTRAMUSCULAR; INTRAVENOUS
Status: DISPENSED
Start: 2021-09-20

## (undated) RX ORDER — FENTANYL CITRATE 50 UG/ML
INJECTION, SOLUTION INTRAMUSCULAR; INTRAVENOUS
Status: DISPENSED
Start: 2021-09-22

## (undated) RX ORDER — FENTANYL CITRATE 50 UG/ML
INJECTION, SOLUTION INTRAMUSCULAR; INTRAVENOUS
Status: DISPENSED
Start: 2021-10-14

## (undated) RX ORDER — DEXTROSE MONOHYDRATE, SODIUM CHLORIDE, AND POTASSIUM CHLORIDE 50; 1.49; 4.5 G/1000ML; G/1000ML; G/1000ML
INJECTION, SOLUTION INTRAVENOUS
Status: DISPENSED
Start: 2021-09-20

## (undated) RX ORDER — DEXAMETHASONE SODIUM PHOSPHATE 4 MG/ML
INJECTION, SOLUTION INTRA-ARTICULAR; INTRALESIONAL; INTRAMUSCULAR; INTRAVENOUS; SOFT TISSUE
Status: DISPENSED
Start: 2021-09-22

## (undated) RX ORDER — HYDROMORPHONE HYDROCHLORIDE 1 MG/ML
INJECTION, SOLUTION INTRAMUSCULAR; INTRAVENOUS; SUBCUTANEOUS
Status: DISPENSED
Start: 2021-09-20

## (undated) RX ORDER — CEFAZOLIN SODIUM 1 G/3ML
INJECTION, POWDER, FOR SOLUTION INTRAMUSCULAR; INTRAVENOUS
Status: DISPENSED
Start: 2021-09-22

## (undated) RX ORDER — GABAPENTIN 300 MG/1
CAPSULE ORAL
Status: DISPENSED
Start: 2020-02-14

## (undated) RX ORDER — OXYCODONE HYDROCHLORIDE 5 MG/1
TABLET ORAL
Status: DISPENSED
Start: 2020-02-14

## (undated) RX ORDER — FENTANYL CITRATE 50 UG/ML
INJECTION, SOLUTION INTRAMUSCULAR; INTRAVENOUS
Status: DISPENSED
Start: 2020-01-29

## (undated) RX ORDER — PROPOFOL 10 MG/ML
INJECTION, EMULSION INTRAVENOUS
Status: DISPENSED
Start: 2020-01-29

## (undated) RX ORDER — HYDROMORPHONE HCL/0.9% NACL/PF 0.2MG/0.2
SYRINGE (ML) INTRAVENOUS
Status: DISPENSED
Start: 2020-01-29

## (undated) RX ORDER — PAPAVERINE HYDROCHLORIDE 30 MG/ML
INJECTION INTRAMUSCULAR; INTRAVENOUS
Status: DISPENSED
Start: 2021-09-22

## (undated) RX ORDER — TRIAMCINOLONE ACETONIDE 40 MG/ML
INJECTION, SUSPENSION INTRA-ARTICULAR; INTRAMUSCULAR
Status: DISPENSED
Start: 2025-06-11

## (undated) RX ORDER — DEXAMETHASONE SODIUM PHOSPHATE 4 MG/ML
INJECTION, SOLUTION INTRA-ARTICULAR; INTRALESIONAL; INTRAMUSCULAR; INTRAVENOUS; SOFT TISSUE
Status: DISPENSED
Start: 2021-10-14

## (undated) RX ORDER — HYDROMORPHONE HYDROCHLORIDE 1 MG/ML
INJECTION, SOLUTION INTRAMUSCULAR; INTRAVENOUS; SUBCUTANEOUS
Status: DISPENSED
Start: 2021-10-14

## (undated) RX ORDER — FENTANYL CITRATE-0.9 % NACL/PF 10 MCG/ML
PLASTIC BAG, INJECTION (ML) INTRAVENOUS
Status: DISPENSED
Start: 2021-09-22

## (undated) RX ORDER — ONDANSETRON 2 MG/ML
INJECTION INTRAMUSCULAR; INTRAVENOUS
Status: DISPENSED
Start: 2021-10-14

## (undated) RX ORDER — GLYCOPYRROLATE 0.2 MG/ML
INJECTION INTRAMUSCULAR; INTRAVENOUS
Status: DISPENSED
Start: 2020-02-14

## (undated) RX ORDER — CEFAZOLIN SODIUM 1 G/3ML
INJECTION, POWDER, FOR SOLUTION INTRAMUSCULAR; INTRAVENOUS
Status: DISPENSED
Start: 2021-10-14

## (undated) RX ORDER — HYDRALAZINE HYDROCHLORIDE 20 MG/ML
INJECTION INTRAMUSCULAR; INTRAVENOUS
Status: DISPENSED
Start: 2021-09-20

## (undated) RX ORDER — LIDOCAINE HYDROCHLORIDE 20 MG/ML
INJECTION, SOLUTION EPIDURAL; INFILTRATION; INTRACAUDAL; PERINEURAL
Status: DISPENSED
Start: 2020-01-29

## (undated) RX ORDER — ONDANSETRON 2 MG/ML
INJECTION INTRAMUSCULAR; INTRAVENOUS
Status: DISPENSED
Start: 2020-02-14

## (undated) RX ORDER — DEXAMETHASONE SODIUM PHOSPHATE 4 MG/ML
INJECTION, SOLUTION INTRA-ARTICULAR; INTRALESIONAL; INTRAMUSCULAR; INTRAVENOUS; SOFT TISSUE
Status: DISPENSED
Start: 2020-02-14

## (undated) RX ORDER — CEFAZOLIN SODIUM 1 G/3ML
INJECTION, POWDER, FOR SOLUTION INTRAMUSCULAR; INTRAVENOUS
Status: DISPENSED
Start: 2020-01-29

## (undated) RX ORDER — HYDROMORPHONE HYDROCHLORIDE 1 MG/ML
INJECTION, SOLUTION INTRAMUSCULAR; INTRAVENOUS; SUBCUTANEOUS
Status: DISPENSED
Start: 2020-01-29

## (undated) RX ORDER — LIDOCAINE HYDROCHLORIDE 10 MG/ML
INJECTION, SOLUTION INFILTRATION; PERINEURAL
Status: DISPENSED
Start: 2021-10-07

## (undated) RX ORDER — EPHEDRINE SULFATE 50 MG/ML
INJECTION, SOLUTION INTRAMUSCULAR; INTRAVENOUS; SUBCUTANEOUS
Status: DISPENSED
Start: 2020-02-14

## (undated) RX ORDER — GLYCOPYRROLATE 0.2 MG/ML
INJECTION, SOLUTION INTRAMUSCULAR; INTRAVENOUS
Status: DISPENSED
Start: 2020-01-29

## (undated) RX ORDER — PROPOFOL 10 MG/ML
INJECTION, EMULSION INTRAVENOUS
Status: DISPENSED
Start: 2021-10-14

## (undated) RX ORDER — LIDOCAINE HYDROCHLORIDE 20 MG/ML
INJECTION, SOLUTION EPIDURAL; INFILTRATION; INTRACAUDAL; PERINEURAL
Status: DISPENSED
Start: 2021-09-22

## (undated) RX ORDER — EPHEDRINE SULFATE 50 MG/ML
INJECTION, SOLUTION INTRAMUSCULAR; INTRAVENOUS; SUBCUTANEOUS
Status: DISPENSED
Start: 2020-01-29

## (undated) RX ORDER — PROPOFOL 10 MG/ML
INJECTION, EMULSION INTRAVENOUS
Status: DISPENSED
Start: 2021-09-20

## (undated) RX ORDER — FENTANYL CITRATE 50 UG/ML
INJECTION, SOLUTION INTRAMUSCULAR; INTRAVENOUS
Status: DISPENSED
Start: 2021-09-20

## (undated) RX ORDER — LIDOCAINE HYDROCHLORIDE 10 MG/ML
INJECTION, SOLUTION EPIDURAL; INFILTRATION; INTRACAUDAL; PERINEURAL
Status: DISPENSED
Start: 2025-06-11

## (undated) RX ORDER — FENTANYL CITRATE 50 UG/ML
INJECTION, SOLUTION INTRAMUSCULAR; INTRAVENOUS
Status: DISPENSED
Start: 2020-02-14

## (undated) RX ORDER — PAPAVERINE HYDROCHLORIDE 30 MG/ML
INJECTION INTRAMUSCULAR; INTRAVENOUS
Status: DISPENSED
Start: 2021-09-20

## (undated) RX ORDER — BUPIVACAINE HYDROCHLORIDE 2.5 MG/ML
INJECTION, SOLUTION EPIDURAL; INFILTRATION; INTRACAUDAL
Status: DISPENSED
Start: 2021-10-14

## (undated) RX ORDER — KETOROLAC TROMETHAMINE 30 MG/ML
INJECTION, SOLUTION INTRAMUSCULAR; INTRAVENOUS
Status: DISPENSED
Start: 2021-10-14

## (undated) RX ORDER — ONDANSETRON 2 MG/ML
INJECTION INTRAMUSCULAR; INTRAVENOUS
Status: DISPENSED
Start: 2021-09-22

## (undated) RX ORDER — CEFAZOLIN SODIUM 2 G/100ML
INJECTION, SOLUTION INTRAVENOUS
Status: DISPENSED
Start: 2020-01-29

## (undated) RX ORDER — CEFAZOLIN SODIUM 2 G/50ML
SOLUTION INTRAVENOUS
Status: DISPENSED
Start: 2021-10-14

## (undated) RX ORDER — CEFAZOLIN SODIUM 1 G/3ML
INJECTION, POWDER, FOR SOLUTION INTRAMUSCULAR; INTRAVENOUS
Status: DISPENSED
Start: 2021-09-20

## (undated) RX ORDER — SODIUM CHLORIDE 9 MG/ML
INJECTION, SOLUTION INTRAVENOUS
Status: DISPENSED
Start: 2021-09-22

## (undated) RX ORDER — OXYCODONE HYDROCHLORIDE 5 MG/1
TABLET ORAL
Status: DISPENSED
Start: 2021-10-14

## (undated) RX ORDER — ISOSULFAN BLUE 50 MG/5ML
INJECTION, SOLUTION SUBCUTANEOUS
Status: DISPENSED
Start: 2020-01-29

## (undated) RX ORDER — CALCIUM CHLORIDE 100 MG/ML
INJECTION INTRAVENOUS; INTRAVENTRICULAR
Status: DISPENSED
Start: 2021-09-22

## (undated) RX ORDER — ONDANSETRON 2 MG/ML
INJECTION INTRAMUSCULAR; INTRAVENOUS
Status: DISPENSED
Start: 2020-01-29

## (undated) RX ORDER — DEXAMETHASONE SODIUM PHOSPHATE 4 MG/ML
INJECTION, SOLUTION INTRA-ARTICULAR; INTRALESIONAL; INTRAMUSCULAR; INTRAVENOUS; SOFT TISSUE
Status: DISPENSED
Start: 2020-01-29

## (undated) RX ORDER — PROPOFOL 10 MG/ML
INJECTION, EMULSION INTRAVENOUS
Status: DISPENSED
Start: 2021-09-22

## (undated) RX ORDER — ACETAMINOPHEN 325 MG/1
TABLET ORAL
Status: DISPENSED
Start: 2020-01-29

## (undated) RX ORDER — DEXAMETHASONE SODIUM PHOSPHATE 4 MG/ML
INJECTION, SOLUTION INTRA-ARTICULAR; INTRALESIONAL; INTRAMUSCULAR; INTRAVENOUS; SOFT TISSUE
Status: DISPENSED
Start: 2021-09-20

## (undated) RX ORDER — LIDOCAINE HYDROCHLORIDE 20 MG/ML
INJECTION, SOLUTION EPIDURAL; INFILTRATION; INTRACAUDAL; PERINEURAL
Status: DISPENSED
Start: 2021-09-20